# Patient Record
Sex: FEMALE | Race: WHITE | Employment: OTHER | URBAN - METROPOLITAN AREA
[De-identification: names, ages, dates, MRNs, and addresses within clinical notes are randomized per-mention and may not be internally consistent; named-entity substitution may affect disease eponyms.]

---

## 2017-03-02 LAB
CREATININE, EXTERNAL: 0.63
HBA1C MFR BLD HPLC: 10.1 %
LDL-C, EXTERNAL: 74

## 2017-04-21 ENCOUNTER — OFFICE VISIT (OUTPATIENT)
Dept: ENDOCRINOLOGY | Age: 77
End: 2017-04-21

## 2017-04-21 VITALS
BODY MASS INDEX: 36.25 KG/M2 | OXYGEN SATURATION: 95 % | HEIGHT: 61 IN | HEART RATE: 72 BPM | WEIGHT: 192 LBS | SYSTOLIC BLOOD PRESSURE: 121 MMHG | RESPIRATION RATE: 18 BRPM | TEMPERATURE: 97.6 F | DIASTOLIC BLOOD PRESSURE: 63 MMHG

## 2017-04-21 DIAGNOSIS — I10 ESSENTIAL HYPERTENSION: ICD-10-CM

## 2017-04-21 DIAGNOSIS — E11.9 TYPE 2 DIABETES MELLITUS WITHOUT COMPLICATION, UNSPECIFIED LONG TERM INSULIN USE STATUS: Primary | ICD-10-CM

## 2017-04-21 DIAGNOSIS — E11.9 TYPE 2 DIABETES MELLITUS WITHOUT COMPLICATION, UNSPECIFIED LONG TERM INSULIN USE STATUS: ICD-10-CM

## 2017-04-21 DIAGNOSIS — E78.2 MIXED HYPERLIPIDEMIA: ICD-10-CM

## 2017-04-21 RX ORDER — NATEGLINIDE 120 MG/1
120 TABLET ORAL
Qty: 90 TAB | Refills: 6 | Status: SHIPPED | OUTPATIENT
Start: 2017-04-21 | End: 2017-06-26 | Stop reason: ALTCHOICE

## 2017-04-21 RX ORDER — METFORMIN HYDROCHLORIDE 500 MG/1
500 TABLET, EXTENDED RELEASE ORAL
Qty: 30 TAB | Refills: 6 | Status: SHIPPED | OUTPATIENT
Start: 2017-04-21 | End: 2017-08-15 | Stop reason: SDUPTHER

## 2017-04-21 RX ORDER — INSULIN DETEMIR 100 [IU]/ML
46 INJECTION, SOLUTION SUBCUTANEOUS
COMMUNITY
Start: 2017-04-03 | End: 2017-04-21 | Stop reason: SDUPTHER

## 2017-04-21 RX ORDER — LOSARTAN POTASSIUM 25 MG/1
1 TABLET ORAL DAILY
COMMUNITY
Start: 2017-01-27 | End: 2022-04-25

## 2017-04-21 RX ORDER — ATORVASTATIN CALCIUM 10 MG/1
1 TABLET, FILM COATED ORAL DAILY
COMMUNITY
Start: 2017-02-10

## 2017-04-21 RX ORDER — METFORMIN HYDROCHLORIDE 500 MG/1
2 TABLET, EXTENDED RELEASE ORAL
COMMUNITY
Start: 2017-01-29 | End: 2017-04-21 | Stop reason: SDUPTHER

## 2017-04-21 RX ORDER — DULOXETIN HYDROCHLORIDE 30 MG/1
1 CAPSULE, DELAYED RELEASE ORAL DAILY
COMMUNITY
Start: 2017-04-01 | End: 2017-08-28 | Stop reason: DRUGHIGH

## 2017-04-21 NOTE — PATIENT INSTRUCTIONS
Do not skip meals  Do not eat in between meals    Reduce carbs- pasta, rice, potatoes, bread   Do not drink juices or sodas  Donot eat peanut butter     Do not eat sugar free cookies and cakes   Do not eat peaches,  Grapes, raisins, pineapples and oranges       -------------------------------------------------------------------------------------------------------------------------------------------------------------------------------------------------    Decrease metformin er  500 mg with dinner     Check blood sugars immediately before each meal and at bedtime      Take  Levemir  insulin  50  units  at bed time   And increase  To 60 units after  One week     Take  starlix  120 mg right before each meal        Less than 70 mg NO INSULIN

## 2017-04-21 NOTE — PROGRESS NOTES
Wt Readings from Last 3 Encounters:   04/21/17 192 lb (87.1 kg)   09/24/12 190 lb (86.2 kg)   07/25/11 200 lb (90.7 kg)     Temp Readings from Last 3 Encounters:   04/21/17 97.6 °F (36.4 °C) (Oral)     BP Readings from Last 3 Encounters:   04/21/17 121/63   09/24/12 127/53   07/25/11 123/77     Pulse Readings from Last 3 Encounters:   04/21/17 72   09/24/12 67   07/25/11 67     Lab Results   Component Value Date/Time    Hemoglobin A1c 7.0 05/04/2010 05:25 AM     No Podiatry  Last Eye exam 2017

## 2017-04-21 NOTE — MR AVS SNAPSHOT
Visit Information Date & Time Provider Department Dept. Phone Encounter #  
 4/21/2017  4:00 PM Nasreen Drew MD [facility] [phone] [id number] Follow-up Instructions Return in about 6 weeks (around 6/2/2017). Upcoming Health Maintenance Date Due DTaP/Tdap/Td series (1 - Tdap) 9/16/1961 ZOSTER VACCINE AGE 60> 9/16/2000 GLAUCOMA SCREENING Q2Y 9/16/2005 OSTEOPOROSIS SCREENING (DEXA) 9/16/2005 Pneumococcal 65+ Low/Medium Risk (1 of 2 - PCV13) 9/16/2005 MEDICARE YEARLY EXAM 9/16/2005 INFLUENZA AGE 9 TO ADULT 8/1/2016 Allergies as of 4/21/2017  Review Complete On: 4/21/2017 By: Nasreen Drew MD  
 No Known Allergies Current Immunizations  Never Reviewed No immunizations on file. Not reviewed this visit You Were Diagnosed With   
  
 Codes Comments Type 2 diabetes mellitus without complication, unspecified long term insulin use status    -  Primary ICD-10-CM: E11.9 ICD-9-CM: 250.00 Vitals BP Pulse Temp Resp Height(growth percentile) Weight(growth percentile) 121/63 72 97.6 °F (36.4 °C) (Oral) 18 5' 1\" (1.549 m) 192 lb (87.1 kg) SpO2 BMI OB Status Smoking Status 95% 36.28 kg/m2 Hysterectomy Former Smoker BMI and BSA Data Body Mass Index Body Surface Area  
 36.28 kg/m 2 1.94 m 2 Your Updated Medication List  
  
   
This list is accurate as of: 4/21/17  5:02 PM.  Always use your most recent med list.  
  
  
  
  
 atorvastatin 10 mg tablet Commonly known as:  LIPITOR Take 1 Tab by mouth daily. DULoxetine 30 mg capsule Commonly known as:  CYMBALTA Take 1 Cap by mouth daily. LEVEMIR FLEXTOUCH 100 unit/mL (3 mL) Inpn Generic drug:  insulin detemir 46 Units by SubCUTAneous route nightly. losartan 25 mg tablet Commonly known as:  COZAAR Take 1 Tab by mouth daily. metFORMIN  mg tablet Commonly known as:  GLUCOPHAGE XR Take 2 Tabs by mouth nightly. We Performed the Following AMB POC HEMOGLOBIN A1C [57502 CPT(R)] Follow-up Instructions Return in about 6 weeks (around 6/2/2017). Patient Instructions Do not skip meals Do not eat in between meals Reduce carbs- pasta, rice, potatoes, bread Do not drink juices or sodas Donot eat peanut butter Do not eat sugar free cookies and cakes Do not eat peaches,  Grapes, raisins, pineapples and oranges  
 
 
------------------------------------------------------------------------------------------------------------------------------------------------------------------------------------------------- 
 
Decrease metformin er  500 mg with dinner Check blood sugars immediately before each meal and at bedtime Take  Levemir  insulin  50  units  at bed time   And increase  To 60 units after  One week Take  starlix  120 mg right before each meal  
 
  
Less than 70 mg NO INSULIN Introducing [facility] SERVICES! Sara Galvan introduces [employer] patient portal. Now you can access parts of your medical record, email your doctor's office, and request medication refills online. 1. In your internet browser, go to [email or web address] 2. Click on the First Time User? Click Here link in the Sign In box. You will see the New Member Sign Up page. 3. Enter your [employer] Access Code exactly as it appears below. You will not need to use this code after youve completed the sign-up process. If you do not sign up before the expiration date, you must request a new code. · [employer] Access Code: [email or web address] Expires: 7/20/2017  5:02 PM 
 
4. Enter the last four digits of your Social Security Number (xxxx) and Date of Birth (mm/dd/yyyy) as indicated and click Submit. You will be taken to the next sign-up page. 5. Create a [employer] ID.  This will be your [employer] login ID and cannot be changed, so think of one that is secure and easy to remember. 6. Create a Plum Baby password. You can change your password at any time. 7. Enter your Password Reset Question and Answer. This can be used at a later time if you forget your password. 8. Enter your e-mail address. You will receive e-mail notification when new information is available in [address] 9. Click Sign Up. You can now view and download portions of your medical record. 10. Click the Download Summary menu link to download a portable copy of your medical information. If you have questions, please visit the Frequently Asked Questions section of the [employer] website. Remember, [employer] Baby is NOT to be used for urgent needs. For medical emergencies, dial 911. Now available from your iPhone and Android! Please provide this summary of care documentation to your next provider. Your primary care clinician is listed as Faith Regalado. If you have any questions after today's visit, please call [phone].

## 2017-04-21 NOTE — PROGRESS NOTES
HISTORY OF PRESENT ILLNESS  Dung Palencia is a 68 y.o. female. HPI  Patient here for initial visit of Type 2 diabetes mellitus . Referred : by daughter, my pt      H/o diabetes for 10 years     Current A1C is over 10 %  and symptoms/problems include polyuria and polydipsia     Current diabetic medications include Lantus And meformin ER     Current monitoring regimen: home blood tests - 1-2 times daily  Home blood sugar records: trend: fluctuating a lot  Any episodes of hypoglycemia? Weight trend: stable  Prior visit with dietician: no  Current diet: \"unhealthy\" diet in general  Current exercise: no regular exercise    Known diabetic complications: retinopathy, nephropathy and peripheral neuropathy  Cardiovascular risk factors: dyslipidemia, diabetes mellitus, obesity, sedentary life style, hypertension, stress, post-menopausal    Eye exam current (within one year): yes  JACOB: yes     Past Medical History:   Diagnosis Date    Arthritis     Cancer ([facility] [address] 2006    colon cancer    COPD     sleep apnea    [facility]     GERD (gastroesophageal reflux disease)     Other ill-defined conditions     fibromylgia    PUD (peptic ulcer disease)     Unspecified sleep apnea      Past Surgical History:   Procedure Laterality Date    ABDOMEN SURGERY PROC UNLISTED  2006    colectomy    HX APPENDECTOMY      HX BACK SURGERY      neck and back    HX HYSTERECTOMY      HX ORTHOPAEDIC  2010    right tkr    HX OTHER SURGICAL      tonsil    WRIST ARTHROSCOP,DIAGNOSTIC      bilateral carpal tunell     Current Outpatient Prescriptions   Medication Sig    atorvastatin (LIPITOR) 10 mg tablet Take 1 Tab by mouth daily.  DULoxetine (CYMBALTA) 30 mg capsule Take 1 Cap by mouth daily.  LEVEMIR FLEXTOUCH 100 unit/mL (3 mL) inpn 46 Units by SubCUTAneous route nightly.  losartan (COZAAR) 25 mg tablet Take 1 Tab by mouth daily.  metFORMIN ER (GLUCOPHAGE XR) 500 mg tablet Take 2 Tabs by mouth nightly.      No current facility-administered medications for this visit. Review of Systems   Constitutional: Negative. HENT: Negative. Eyes: Negative for pain and redness. Respiratory: Negative. Cardiovascular: Negative for chest pain, palpitations and leg swelling. Gastrointestinal: Negative. Negative for constipation. Genitourinary: Negative. Musculoskeletal: Negative for myalgias. Skin: Negative. Neurological: Negative. Endo/Heme/Allergies: Negative. Psychiatric/Behavioral: Negative for depression and memory loss. The patient does not have insomnia. Physical Exam   Constitutional: She is oriented to person, place, and time. She appears well-developed and well-nourished. HENT:   Head: Normocephalic. Eyes: Conjunctivae and EOM are normal. Pupils are equal, round, and reactive to light. Neck: Normal range of motion. Neck supple. No JVD present. No tracheal deviation present. No thyromegaly present. Cardiovascular: Normal rate, regular rhythm and normal heart sounds. No murmur heard. Pulmonary/Chest: Breath sounds normal.   Abdominal: Soft. Bowel sounds are normal.   Musculoskeletal: Normal range of motion. Lymphadenopathy:     She has no cervical adenopathy. Neurological: She is alert and oriented to person, place, and time. She has normal reflexes. Skin: Skin is warm. Psychiatric: She has a normal mood and affect. ASSESSMENT and PLAN    1. Type 2 DM, poorly controlled :   a1c is 10. 1 %     From feb 2017     Discussed DM 2 patho-physiology extensively with pt and her daughter     Asking her to avoid the high GI foods     Take  Levemir  insulin  50  units  at bed time   And increase  To 60 units after  One week   Take  starlix  120 mg right before each meal AS  A  TRIAL     If not better , will change to humalog with each meal     Patient is advised about checking blood sugars 2-3  times a day and maintaining log book.  The danger of having low blood sugars has been explained with inappropriate use of insulin  Patient voiced understanding and using the printed instructions at home. Hypoglycemia management has been explained to the patient. Low Carbohydrate diet  discussed with the patient Patient is demonstrated the use of Humalog pen. Explained the benefits associated with Pen use. lab results and schedule of future lab studies reviewed with patient  specific diabetic recommendations: diabetic diet discussed in detail, written exchange diet given, low cholesterol diet, weight control and daily exercise discussed and home glucose monitoring demonstrated and taught    2. Hypoglycemia :  Educated on treating the hypoglycemia. Discussed Glucagon use and prescribed    3. HTN : continue current care. Patient is educated about importance of compliance with anti-hypertensives especially ARB/ACEI    4. Dyslipidemia : continue current meds. Patient is educated about benefits and adverse effects of statins and explained how benefits outweigh risk. 5. use of aspirin to prevent MI and TIA's discussed      6.  Early alzeihmers dementia- daughter is helpful, but challenge is still the diet and checks and taking insulin       7. askign to try metformin er with FOOD and one pill only for GI distress prevention    > 50 % of time is spent on counseling

## 2017-06-12 DIAGNOSIS — E11.9 TYPE 2 DIABETES MELLITUS WITHOUT COMPLICATION, UNSPECIFIED LONG TERM INSULIN USE STATUS: ICD-10-CM

## 2017-06-26 ENCOUNTER — OFFICE VISIT (OUTPATIENT)
Dept: ENDOCRINOLOGY | Age: 77
End: 2017-06-26

## 2017-06-26 VITALS
TEMPERATURE: 97.9 F | OXYGEN SATURATION: 97 % | WEIGHT: 187.7 LBS | DIASTOLIC BLOOD PRESSURE: 60 MMHG | HEART RATE: 67 BPM | BODY MASS INDEX: 35.44 KG/M2 | RESPIRATION RATE: 16 BRPM | SYSTOLIC BLOOD PRESSURE: 137 MMHG | HEIGHT: 61 IN

## 2017-06-26 DIAGNOSIS — Z79.4 TYPE 2 DIABETES MELLITUS WITH HYPERGLYCEMIA, WITH LONG-TERM CURRENT USE OF INSULIN (HCC): Primary | ICD-10-CM

## 2017-06-26 DIAGNOSIS — E78.2 MIXED HYPERLIPIDEMIA: ICD-10-CM

## 2017-06-26 DIAGNOSIS — E11.65 TYPE 2 DIABETES MELLITUS WITH HYPERGLYCEMIA, WITH LONG-TERM CURRENT USE OF INSULIN (HCC): Primary | ICD-10-CM

## 2017-06-26 DIAGNOSIS — I10 ESSENTIAL HYPERTENSION: ICD-10-CM

## 2017-06-26 LAB
GLUCOSE POC: 171 MG/DL
HBA1C MFR BLD HPLC: 12.2 %

## 2017-06-26 RX ORDER — PEN NEEDLE, DIABETIC 30 GX3/16"
NEEDLE, DISPOSABLE MISCELLANEOUS
Qty: 200 PACKAGE | Refills: 6 | Status: SHIPPED | OUTPATIENT
Start: 2017-06-26 | End: 2021-09-17

## 2017-06-26 RX ORDER — INSULIN LISPRO 100 [IU]/ML
INJECTION, SOLUTION INTRAVENOUS; SUBCUTANEOUS
Qty: 15 ML | Refills: 6 | Status: SHIPPED | OUTPATIENT
Start: 2017-06-26 | End: 2017-06-26 | Stop reason: SDUPTHER

## 2017-06-26 RX ORDER — INSULIN LISPRO 100 [IU]/ML
INJECTION, SOLUTION INTRAVENOUS; SUBCUTANEOUS
Qty: 45 ML | Refills: 6 | Status: SHIPPED | OUTPATIENT
Start: 2017-06-26 | End: 2017-08-28 | Stop reason: SDUPTHER

## 2017-06-26 NOTE — MR AVS SNAPSHOT
Visit Information Date & Time Provider Department Dept. Phone Encounter #  
 6/26/2017 11:30 AM Celena Sanchez [facility] [phone] [id number] Follow-up Instructions Return in about 2 months (around 8/26/2017). Upcoming Health Maintenance Date Due  
 FOOT EXAM Q1 9/16/1950 MICROALBUMIN Q1 9/16/1950 EYE EXAM RETINAL OR DILATED Q1 9/16/1950 DTaP/Tdap/Td series (1 - Tdap) 9/16/1961 ZOSTER VACCINE AGE 60> 9/16/2000 GLAUCOMA SCREENING Q2Y 9/16/2005 OSTEOPOROSIS SCREENING (DEXA) 9/16/2005 Pneumococcal 65+ Low/Medium Risk (1 of 2 - PCV13) 9/16/2005 MEDICARE YEARLY EXAM 9/16/2005 INFLUENZA AGE 9 TO ADULT 8/1/2017 HEMOGLOBIN A1C Q6M 9/2/2017 LIPID PANEL Q1 3/2/2018 Allergies as of 6/26/2017  Review Complete On: 6/26/2017 By: Celena Sanchez MD  
 No Known Allergies Current Immunizations  Never Reviewed No immunizations on file. Not reviewed this visit You Were Diagnosed With   
  
 Codes Comments Type 2 diabetes mellitus with hyperglycemia, with long-term current use of insulin (HCC)    -  Primary ICD-10-CM: E11.65, Z79.4 ICD-9-CM: 250.00, 790.29, V58.67 Vitals BP Pulse Temp Resp Height(growth percentile) Weight(growth percentile)  
 137/60 (BP 1 Location: Right arm, BP Patient Position: Sitting) 67 97.9 °F (36.6 °C) (Oral) 16 5' 1\" (1.549 m) 187 lb 11.2 oz (85.1 kg) SpO2 BMI OB Status Smoking Status 97% 35.47 kg/m2 Hysterectomy Former Smoker BMI and BSA Data Body Mass Index Body Surface Area  
 35.47 kg/m 2 1.91 m 2 Preferred Pharmacy Pharmacy Name Phone [address] [facility], [facility] [address] ([address] [phone] Your Updated Medication List  
  
   
This list is accurate as of: 6/26/17 12:20 PM.  Always use your most recent med list.  
  
  
  
  
 atorvastatin 10 mg tablet Commonly known as: LIPITOR Take 1 Tab by mouth daily. DULoxetine 30 mg capsule Commonly known as:  CYMBALTA Take 1 Cap by mouth daily. insulin detemir 100 unit/mL (3 mL) Inpn Commonly known as:  LEVEMIR FLEXTOUCH  
60 Units by SubCUTAneous route nightly. Note the increase  
  
 losartan 25 mg tablet Commonly known as:  COZAAR Take 1 Tab by mouth daily. metFORMIN  mg tablet Commonly known as:  GLUCOPHAGE XR Take 1 Tab by mouth nightly. We Performed the Following AMB POC GLUCOSE, QUANTITATIVE, BLOOD [44125 CPT(R)] AMB POC HEMOGLOBIN A1C [71462 CPT(R)] Follow-up Instructions Return in about 2 months (around 8/26/2017). Patient Instructions Do not skip meals Do not eat in between meals Reduce carbs- pasta, rice, potatoes, bread Do not drink juices or sodas Donot eat peanut butter Do not eat sugar free cookies and cakes Do not eat peaches,  Grapes, raisins, pineapples and oranges  
 
 
------------------------------------------------------------------------------------------------------------------------------------------------------------------------------------------------- 
 
Decrease metformin er  500 mg with dinner Check blood sugars immediately before each meal and at bedtime Take  Levemir  insulin  60 units at home Start on Novolog  6 units before each meal  
Stop starlix Take additional Novolog  as folllows with meals: 
 
150-200 mg 2 units 201-250 mg 4 units 251-300 mg 6 units 301-350 mg 8 units 351-400 mg 10  units 401-450 mg 12 units 451-500 mg 14 units Introducing [facility] SERVICES! [employer] introduces [employer] patient portal. Now you can access parts of your medical record, email your doctor's office, and request medication refills online. 1. In your internet browser, go to [email or web address] 2. Click on the First Time User?  Click Here link in the Sign In box. You will see the New Member Sign Up page. 3. Enter your [employer] Access Code exactly as it appears below. You will not need to use this code after youve completed the sign-up process. If you do not sign up before the expiration date, you must request a new code. · [employer] Access Code: [email or web address] Expires: 7/20/2017  5:02 PM 
 
4. Enter the last four digits of your Social Security Number (xxxx) and Date of Birth (mm/dd/yyyy) as indicated and click Submit. You will be taken to the next sign-up page. 5. Create a [employer] ID. This will be your [employer] login ID and cannot be changed, so think of one that is secure and easy to remember. 6. Create a [employer] password. You can change your password at any time. 7. Enter your Password Reset Question and Answer. This can be used at a later time if you forget your password. 8. Enter your e-mail address. You will receive e-mail notification when new information is available in [address] 9. Click Sign Up. You can now view and download portions of your medical record. 10. Click the Download Summary menu link to download a portable copy of your medical information. If you have questions, please visit the Frequently Asked Questions section of the [employer] website. Remember, [employer] is NOT to be used for urgent needs. For medical emergencies, dial 911. Now available from your iPhone and Android! Please provide this summary of care documentation to your next provider. Your primary care clinician is listed as Moise Hudson. If you have any questions after today's visit, please call [phone].

## 2017-06-26 NOTE — PROGRESS NOTES
Wt Readings from Last 3 Encounters:   06/26/17 187 lb 11.2 oz (85.1 kg)   04/21/17 192 lb (87.1 kg)   09/24/12 190 lb (86.2 kg)     Temp Readings from Last 3 Encounters:   06/26/17 97.9 °F (36.6 °C) (Oral)   04/21/17 97.6 °F (36.4 °C) (Oral)     BP Readings from Last 3 Encounters:   06/26/17 137/60   04/21/17 121/63   09/24/12 127/53     Pulse Readings from Last 3 Encounters:   06/26/17 67   04/21/17 72   09/24/12 67   Foot exam:No  Eye exam:No

## 2017-06-26 NOTE — PATIENT INSTRUCTIONS
Do not skip meals  Do not eat in between meals    Reduce carbs- pasta, rice, potatoes, bread   Do not drink juices or sodas  Donot eat peanut butter     Do not eat sugar free cookies and cakes   Do not eat peaches,  Grapes, raisins, pineapples and oranges       -------------------------------------------------------------------------------------------------------------------------------------------------------------------------------------------------    Decrease metformin er  500 mg with dinner     Check blood sugars immediately before each meal and at bedtime      Take  Levemir  insulin  60 units at home     Start on Novolog  6 units before each meal   Stop starlix     Take additional Novolog  as folllows with meals:    150-200 mg 2 units    201-250 mg 4 units    251-300 mg 6 units    301-350 mg 8 units    351-400 mg 10  units    401-450 mg 12 units    451-500 mg 14 units

## 2017-06-26 NOTE — PROGRESS NOTES
HISTORY OF PRESENT ILLNESS  Fabienne Aguilar is a 68 y.o. female. HPI  Patient here for  First f/u after  initial visit of Type 2 diabetes mellitus   From April 2017       and daughter are present in room    Pt provides no history   Blood sugars are high on log     Minimal checks    appears very frustrated about the whole management of diabetes     He is surprised that no one mentioned about starlix           Prior history    Referred : by daughter, my pt      H/o diabetes for 10 years     Current A1C is over 10 %  and symptoms/problems include polyuria and polydipsia     Current diabetic medications include Lantus And meformin ER     Current monitoring regimen: home blood tests - 1-2 times daily  Home blood sugar records: trend: fluctuating a lot  Any episodes of hypoglycemia?      Weight trend: stable  Prior visit with dietician: no  Current diet: \"unhealthy\" diet in general  Current exercise: no regular exercise    Known diabetic complications: retinopathy, nephropathy and peripheral neuropathy  Cardiovascular risk factors: dyslipidemia, diabetes mellitus, obesity, sedentary life style, hypertension, stress, post-menopausal    Eye exam current (within one year): yes  JACOB: yes     Past Medical History:   Diagnosis Date    Arthritis     [facility] 2006    colon cancer    COPD     sleep apnea    [facility]     GERD (gastroesophageal reflux disease)     Other ill-defined conditions     fibromylgia    PUD (peptic ulcer disease)     Unspecified sleep apnea      Past Surgical History:   Procedure Laterality Date    ABDOMEN SURGERY PROC UNLISTED  2006    colectomy    HX APPENDECTOMY      HX BACK SURGERY      neck and back    HX HYSTERECTOMY      HX ORTHOPAEDIC  2010    right tkr    HX OTHER SURGICAL      tonsil    WRIST ARTHROSCOP,DIAGNOSTIC      bilateral carpal tunell     Current Outpatient Prescriptions   Medication Sig    insulin detemir (LEVEMIR FLEXTOUCH) 100 unit/mL (3 mL) inpn 60 Units by SubCUTAneous route nightly. Note the increase    atorvastatin (LIPITOR) 10 mg tablet Take 1 Tab by mouth daily.  DULoxetine (CYMBALTA) 30 mg capsule Take 1 Cap by mouth daily.  metFORMIN ER (GLUCOPHAGE XR) 500 mg tablet Take 1 Tab by mouth nightly.  losartan (COZAAR) 25 mg tablet Take 1 Tab by mouth daily.  nateglinide (STARLIX) 120 mg tablet Take 1 Tab by mouth Before breakfast, lunch, and dinner. No current facility-administered medications for this visit. Review of Systems   Constitutional: Negative. HENT: Negative. Eyes: Negative for pain and redness. Respiratory: Negative. Cardiovascular: Negative for chest pain, palpitations and leg swelling. Gastrointestinal: Negative. Negative for constipation. Genitourinary: Negative. Musculoskeletal: Negative for myalgias. Skin: Negative. Neurological: Negative. Endo/Heme/Allergies: Negative. Psychiatric/Behavioral: Negative for depression and memory loss. The patient does not have insomnia. Physical Exam   Constitutional: She is oriented to person, place, and time. She appears well-developed and well-nourished. HENT:   Head: Normocephalic. Eyes: Conjunctivae and EOM are normal. Pupils are equal, round, and reactive to light. Neck: Normal range of motion. Neck supple. No JVD present. No tracheal deviation present. No thyromegaly present. Cardiovascular: Normal rate, regular rhythm and normal heart sounds. No murmur heard. Pulmonary/Chest: Breath sounds normal.   Abdominal: Soft. Bowel sounds are normal.   Musculoskeletal: Normal range of motion. Lymphadenopathy:     She has no cervical adenopathy. Neurological: She is alert and oriented to person, place, and time. She has normal reflexes. Skin: Skin is warm. Psychiatric: She has a normal mood and affect. ASSESSMENT and PLAN    1.  Type 2 DM, poorly controlled :   a1c is  12 .2  %    From  June 2017     comapred to 10. 1 %     From feb 2017     Discussed DM 2 patho-physiology extensively with  and her daughter   Asked her to avoid the high GI foods     Take  Levemir  insulin   60 units hs  Will do humalog with each meal as sugars are very high     Patient is advised about checking blood sugars 2-3  times a day and maintaining log book. The danger of having low blood sugars has been explained with inappropriate use of insulin  Patient voiced understanding and using the printed instructions at home. Hypoglycemia management has been explained to the patient. Low Carbohydrate diet  discussed with the patient Patient is demonstrated the use of Humalog pen. Explained the benefits associated with Pen use. 2. Hypoglycemia :  Educated on treating the hypoglycemia. Discussed Glucagon use and prescribed    3. HTN : continue current care. Patient is educated about importance of compliance with anti-hypertensives especially ARB/ACEI    4. Dyslipidemia : continue current meds. Patient is educated about benefits and adverse effects of statins and explained how benefits outweigh risk. 5. use of aspirin to prevent MI and TIA's discussed      6.  Early alzeihmers dementia- daughter is helpful, but challenge is still the diet and checks and taking insulin       7. askign to try metformin er with FOOD and one pill only for GI distress prevention    > 50 % of time is spent on counseling

## 2017-06-27 RX ORDER — BLOOD SUGAR DIAGNOSTIC
STRIP MISCELLANEOUS
Qty: 300 STRIP | Refills: 6 | Status: SHIPPED | OUTPATIENT
Start: 2017-06-27 | End: 2021-09-17

## 2017-08-14 ENCOUNTER — TELEPHONE (OUTPATIENT)
Dept: ENDOCRINOLOGY | Age: 77
End: 2017-08-14

## 2017-08-15 DIAGNOSIS — E11.9 TYPE 2 DIABETES MELLITUS WITHOUT COMPLICATION, UNSPECIFIED LONG TERM INSULIN USE STATUS: ICD-10-CM

## 2017-08-15 RX ORDER — METFORMIN HYDROCHLORIDE 500 MG/1
500 TABLET, EXTENDED RELEASE ORAL
Qty: 30 TAB | Refills: 6 | Status: SHIPPED | OUTPATIENT
Start: 2017-08-15 | End: 2017-08-15 | Stop reason: SDUPTHER

## 2017-08-15 RX ORDER — METFORMIN HYDROCHLORIDE 500 MG/1
TABLET, EXTENDED RELEASE ORAL
Qty: 90 TAB | Refills: 6 | Status: SHIPPED | OUTPATIENT
Start: 2017-08-15 | End: 2018-09-10 | Stop reason: ALTCHOICE

## 2017-08-21 ENCOUNTER — HOSPITAL ENCOUNTER (OUTPATIENT)
Dept: LAB | Age: 77
Discharge: HOME | End: 2017-08-21
Payer: MEDICARE

## 2017-08-21 PROCEDURE — 83036 HEMOGLOBIN GLYCOSYLATED A1C: CPT

## 2017-08-21 PROCEDURE — 82043 UR ALBUMIN QUANTITATIVE: CPT

## 2017-08-21 PROCEDURE — 80061 LIPID PANEL: CPT

## 2017-08-21 PROCEDURE — 80053 COMPREHEN METABOLIC PANEL: CPT

## 2017-08-21 PROCEDURE — 36415 COLL VENOUS BLD VENIPUNCTURE: CPT

## 2017-08-28 ENCOUNTER — OFFICE VISIT (OUTPATIENT)
Dept: ENDOCRINOLOGY | Age: 77
End: 2017-08-28

## 2017-08-28 VITALS
HEART RATE: 70 BPM | DIASTOLIC BLOOD PRESSURE: 59 MMHG | BODY MASS INDEX: 35.95 KG/M2 | TEMPERATURE: 95.8 F | WEIGHT: 190.4 LBS | RESPIRATION RATE: 16 BRPM | HEIGHT: 61 IN | SYSTOLIC BLOOD PRESSURE: 133 MMHG | OXYGEN SATURATION: 95 %

## 2017-08-28 DIAGNOSIS — I10 ESSENTIAL HYPERTENSION: ICD-10-CM

## 2017-08-28 DIAGNOSIS — Z79.4 UNCONTROLLED TYPE 2 DIABETES MELLITUS WITH HYPERGLYCEMIA, WITH LONG-TERM CURRENT USE OF INSULIN (HCC): Primary | ICD-10-CM

## 2017-08-28 DIAGNOSIS — E78.2 MIXED HYPERLIPIDEMIA: ICD-10-CM

## 2017-08-28 DIAGNOSIS — E11.65 UNCONTROLLED TYPE 2 DIABETES MELLITUS WITH HYPERGLYCEMIA, WITH LONG-TERM CURRENT USE OF INSULIN (HCC): Primary | ICD-10-CM

## 2017-08-28 DIAGNOSIS — G30.9 ALZHEIMER'S DEMENTIA WITHOUT BEHAVIORAL DISTURBANCE, UNSPECIFIED TIMING OF DEMENTIA ONSET: ICD-10-CM

## 2017-08-28 DIAGNOSIS — F02.80 ALZHEIMER'S DEMENTIA WITHOUT BEHAVIORAL DISTURBANCE, UNSPECIFIED TIMING OF DEMENTIA ONSET: ICD-10-CM

## 2017-08-28 RX ORDER — INSULIN LISPRO 100 [IU]/ML
INJECTION, SOLUTION INTRAVENOUS; SUBCUTANEOUS
Qty: 45 ML | Refills: 6 | Status: SHIPPED | OUTPATIENT
Start: 2017-08-28 | End: 2017-08-28 | Stop reason: SDUPTHER

## 2017-08-28 RX ORDER — INSULIN LISPRO 100 [IU]/ML
INJECTION, SOLUTION INTRAVENOUS; SUBCUTANEOUS
Qty: 75 ML | Refills: 6 | Status: SHIPPED | OUTPATIENT
Start: 2017-08-28 | End: 2018-09-10 | Stop reason: SDUPTHER

## 2017-08-28 RX ORDER — DULOXETIN HYDROCHLORIDE 60 MG/1
CAPSULE, DELAYED RELEASE ORAL
Qty: 90 CAP | Refills: 3 | Status: SHIPPED | OUTPATIENT
Start: 2017-08-28 | End: 2018-09-01 | Stop reason: SDUPTHER

## 2017-08-28 RX ORDER — DULOXETIN HYDROCHLORIDE 60 MG/1
60 CAPSULE, DELAYED RELEASE ORAL DAILY
Qty: 30 CAP | Refills: 3 | Status: SHIPPED | OUTPATIENT
Start: 2017-08-28 | End: 2017-08-28 | Stop reason: SDUPTHER

## 2017-08-28 NOTE — MR AVS SNAPSHOT
Visit Information Date & Time Provider Department Dept. Phone Encounter #  
 8/28/2017 11:00 AM Libra Mazariegos MD [facility] [phone] [id number] Follow-up Instructions Return in about 3 months (around 11/28/2017). Upcoming Health Maintenance Date Due  
 FOOT EXAM Q1 9/16/1950 EYE EXAM RETINAL OR DILATED Q1 9/16/1950 DTaP/Tdap/Td series (1 - Tdap) 9/16/1961 ZOSTER VACCINE AGE 60> 7/16/2000 GLAUCOMA SCREENING Q2Y 9/16/2005 OSTEOPOROSIS SCREENING (DEXA) 9/16/2005 Pneumococcal 65+ Low/Medium Risk (1 of 2 - PCV13) 9/16/2005 MEDICARE YEARLY EXAM 9/16/2005 INFLUENZA AGE 9 TO ADULT 8/1/2017 HEMOGLOBIN A1C Q6M 2/21/2018 MICROALBUMIN Q1 8/21/2018 LIPID PANEL Q1 8/21/2018 Allergies as of 8/28/2017  Review Complete On: 8/28/2017 By: Libra Mazariegos MD  
 No Known Allergies Current Immunizations  Never Reviewed No immunizations on file. Not reviewed this visit You Were Diagnosed With   
  
 Codes Comments Uncontrolled type 2 diabetes mellitus with hyperglycemia, with long-term current use of insulin (HCC)    -  Primary ICD-10-CM: E11.65, Z79.4 ICD-9-CM: 250.02, V58.67 Essential hypertension     ICD-10-CM: I10 
ICD-9-CM: 401.9 Mixed hyperlipidemia     ICD-10-CM: E78.2 ICD-9-CM: 272.2 Vitals BP Pulse Temp Resp Height(growth percentile) Weight(growth percentile) 133/59 (BP 1 Location: Left arm, BP Patient Position: Sitting) 70 95.8 °F (35.4 °C) (Oral) 16 5' 1\" (1.549 m) 190 lb 6.4 oz (86.4 kg) SpO2 BMI OB Status Smoking Status 95% 35.98 kg/m2 Hysterectomy Former Smoker Vitals History BMI and BSA Data Body Mass Index Body Surface Area 35.98 kg/m 2 1.93 m 2 Preferred Pharmacy Pharmacy Name Phone 310 [facility] [address] ([phone] Your Updated Medication List  
  
 This list is accurate as of: 8/28/17 12:12 PM.  Always use your most recent med list.  
  
  
  
  
 atorvastatin 10 mg tablet Commonly known as:  LIPITOR Take 1 Tab by mouth daily. DULoxetine 30 mg capsule Commonly known as:  CYMBALTA Take 1 Cap by mouth daily. HumaLOG KwikPen 100 unit/mL kwikpen Generic drug:  insulin lispro INJECT 6 UNITS SUBCUTANEOUSLY BEFORE BREAKFAST LUNCH AND DINNER PLUS SLIDING SCALE  
  
 insulin detemir 100 unit/mL (3 mL) Inpn Commonly known as:  LEVEMIR FLEXTOUCH  
60 Units by SubCUTAneous route nightly. Note the increase Insulin Needles (Disposable) 31 gauge x 5/16\" Ndle Commonly known as:  ULTRA-THIN II (SHORT) PEN NDL Use  4 times daily. Dx code E11.65  
  
 losartan 25 mg tablet Commonly known as:  COZAAR Take 1 Tab by mouth daily. metFORMIN  mg tablet Commonly known as:  GLUCOPHAGE XR  
TAKE 1 TABLET BY MOUTH EVERY NIGHT  
  
 ONETOUCH ULTRA TEST strip Generic drug:  glucose blood VI test strips USE TO TEST BLOOD SUGAR FOUR TIMES DAILY Follow-up Instructions Return in about 3 months (around 11/28/2017). Patient Instructions Do not skip meals Do not eat in between meals Reduce carbs- pasta, rice, potatoes, bread Do not drink juices or sodas Donot eat peanut butter Do not eat sugar free cookies and cakes Do not eat peaches,  Grapes, raisins, pineapples and oranges  
 
 
------------------------------------------------------------------------------------------------------------------------------------------------------------------------------------------------- Increase cymbalta to 60 mg a day ( 3 refills and future goes to pcp ) Decrease metformin er  500 mg with dinner Check blood sugars immediately before each meal and at bedtime Take  Levemir  insulin  60 units at home Increase  Novolog 8  units before each meal  
Stop starlix Take additional Novolog  as folllows with meals: 
 
150-200 mg 2 units 201-250 mg 5 units 251-300 mg 8 units 301-350 mg 11 units 351-400 mg 14  units 401-450 mg 17 units 451-500 mg 20  Units Less than 70 mg - no insulin Introducing [facility]! Ester Everette introduces NuLabel patient portal. Now you can access parts of your medical record, email your doctor's office, and request medication refills online. 1. In your internet browser, go to [email or web address] 2. Click on the First Time User? Click Here link in the Sign In box. You will see the New Member Sign Up page. 3. Enter your NuLabel Access Code exactly as it appears below. You will not need to use this code after youve completed the sign-up process. If you do not sign up before the expiration date, you must request a new code. · NuLabel Access Code: [email or web address] Expires: 11/26/2017 12:12 PM 
 
4. Enter the last four digits of your Social Security Number (xxxx) and Date of Birth (mm/dd/yyyy) as indicated and click Submit. You will be taken to the next sign-up page. 5. Create a NuLabel ID. This will be your NuLabel login ID and cannot be changed, so think of one that is secure and easy to remember. 6. Create a NuLabel password. You can change your password at any time. 7. Enter your Password Reset Question and Answer. This can be used at a later time if you forget your password. 8. Enter your e-mail address. You will receive e-mail notification when new information is available in [address] 9. Click Sign Up. You can now view and download portions of your medical record. 10. Click the Download Summary menu link to download a portable copy of your medical information. If you have questions, please visit the Frequently Asked Questions section of the NuLabel website. Remember, NuLabel is NOT to be used for urgent needs. For medical emergencies, dial 911. Now available from your iPhone and Android! Please provide this summary of care documentation to your next provider. Your primary care clinician is listed as Reta Mckee. If you have any questions after today's visit, please call [phone].

## 2017-08-28 NOTE — PATIENT INSTRUCTIONS
Do not skip meals  Do not eat in between meals    Reduce carbs- pasta, rice, potatoes, bread   Do not drink juices or sodas  Donot eat peanut butter     Do not eat sugar free cookies and cakes   Do not eat peaches,  Grapes, raisins, pineapples and oranges       -------------------------------------------------------------------------------------------------------------------------------------------------------------------------------------------------      Increase cymbalta to 60 mg a day ( 3 refills and future goes to pcp )    Decrease metformin er  500 mg with dinner     Check blood sugars immediately before each meal and at bedtime      Take  Levemir  insulin  60 units at home     Increase  Novolog 8  units before each meal   Stop starlix     Take additional Novolog  as folllows with meals:    150-200 mg 2 units    201-250 mg 5 units    251-300 mg 8 units    301-350 mg 11 units    351-400 mg 14  units    401-450 mg 17 units    451-500 mg 20  Units      Less than 70 mg - no insulin

## 2017-08-28 NOTE — PROGRESS NOTES
HISTORY OF PRESENT ILLNESS  Christopher Rangel is a 68 y.o. female. HPI  Patient here for  First f/u after  initial visit of Type 2 diabetes mellitus   From April 2017       daughter is present in room    Pt provides no history   Blood sugars are high on log     Minimal checks   Pt is demented             Prior history    Referred : by daughter, my pt      H/o diabetes for 10 years     Current A1C is over 10 %  and symptoms/problems include polyuria and polydipsia     Current diabetic medications include Lantus And meformin ER     Current monitoring regimen: home blood tests - 1-2 times daily  Home blood sugar records: trend: fluctuating a lot  Any episodes of hypoglycemia?      Weight trend: stable  Prior visit with dietician: no  Current diet: \"unhealthy\" diet in general  Current exercise: no regular exercise    Known diabetic complications: retinopathy, nephropathy and peripheral neuropathy  Cardiovascular risk factors: dyslipidemia, diabetes mellitus, obesity, sedentary life style, hypertension, stress, post-menopausal    Eye exam current (within one year): yes  JACOB: yes     Past Medical History:   Diagnosis Date    Arthritis     [facility] ([facility] 2006    colon cancer    COPD     sleep apnea    [facility] ([facility]     GERD (gastroesophageal reflux disease)     Other ill-defined conditions     fibromylgia    PUD (peptic ulcer disease)     Unspecified sleep apnea      Past Surgical History:   Procedure Laterality Date    ABDOMEN SURGERY PROC UNLISTED  2006    colectomy    HX APPENDECTOMY      HX BACK SURGERY      neck and back    HX HYSTERECTOMY      HX ORTHOPAEDIC  2010    right tkr    HX OTHER SURGICAL      tonsil    WRIST ARTHROSCOP,DIAGNOSTIC      bilateral carpal tunell     Current Outpatient Prescriptions   Medication Sig    metFORMIN ER (GLUCOPHAGE XR) 500 mg tablet TAKE 1 TABLET BY MOUTH EVERY NIGHT    ONETOUCH ULTRA TEST strip USE TO TEST BLOOD SUGAR FOUR TIMES DAILY    Insulin Needles, Disposable, (ULTRA-THIN II, SHORT, PEN NDL) 31 gauge x 5/16\" ndle Use  4 times daily. Dx code E11.65    HUMALOG KWIKPEN 100 unit/mL kwikpen INJECT 6 UNITS SUBCUTANEOUSLY BEFORE BREAKFAST LUNCH AND DINNER PLUS SLIDING SCALE    insulin detemir (LEVEMIR FLEXTOUCH) 100 unit/mL (3 mL) inpn 60 Units by SubCUTAneous route nightly. Note the increase    atorvastatin (LIPITOR) 10 mg tablet Take 1 Tab by mouth daily.  DULoxetine (CYMBALTA) 30 mg capsule Take 1 Cap by mouth daily.  losartan (COZAAR) 25 mg tablet Take 1 Tab by mouth daily. No current facility-administered medications for this visit. Review of Systems   Constitutional: Negative. HENT: Negative. Eyes: Negative for pain and redness. Respiratory: Negative. Cardiovascular: Negative for chest pain, palpitations and leg swelling. Gastrointestinal: Negative. Negative for constipation. Genitourinary: Negative. Musculoskeletal: Negative for myalgias. Skin: Negative. Neurological: Negative. Endo/Heme/Allergies: Negative. Psychiatric/Behavioral: pt is demented       Physical Exam   Constitutional: She is oriented to person, place, and time. She appears well-developed and well-nourished. HENT:   Head: Normocephalic. Eyes: Conjunctivae and EOM are normal. Pupils are equal, round, and reactive to light. Neck: Normal range of motion. Neck supple. No JVD present. No tracheal deviation present. No thyromegaly present. Cardiovascular: Normal rate, regular rhythm and normal heart sounds. No murmur heard. Pulmonary/Chest: Breath sounds normal.   Abdominal: Soft. Bowel sounds are normal.   Musculoskeletal: Normal range of motion. Lymphadenopathy:     She has no cervical adenopathy. Neurological: She is alert and oriented to person, place, and time. She has normal reflexes. Skin: Skin is warm. Psychiatric: She has a normal mood and affect.          Lab Results  Component Value Date/Time   Hemoglobin A1c 11.9 08/21/2017 03:12 PM Hemoglobin A1c 7.0 05/04/2010 05:25 AM   Hemoglobin A1c, External 10.1 03/02/2017   Glucose 307 08/21/2017 03:12 PM   Glucose (POC) 185 05/04/2010 11:44 AM   Glucose  06/26/2017 11:45 AM   Microalb/Creat ratio (ug/mg creat.) 31.3 08/21/2017 03:12 PM   LDL, calculated Comment 08/21/2017 03:12 PM   Creatinine 0.60 08/21/2017 03:12 PM      Lab Results  Component Value Date/Time   Cholesterol, total 257 08/21/2017 03:12 PM   HDL Cholesterol 35 08/21/2017 03:12 PM   LDL, calculated Comment 08/21/2017 03:12 PM   LDL-C, External 74 03/02/2017   Triglyceride 406 08/21/2017 03:12 PM   Lab Results  Component Value Date/Time   ALT (SGPT) 19 08/21/2017 03:12 PM   AST (SGOT) 21 08/21/2017 03:12 PM   Alk. phosphatase 97 08/21/2017 03:12 PM   Bilirubin, total <0.2 08/21/2017 03:12 PM   Albumin 4.2 08/21/2017 03:12 PM   Protein, total 7.2 08/21/2017 03:12 PM   INR 1.2 05/05/2010 03:57 AM   Prothrombin time 12.4 05/05/2010 03:57 AM   PLATELET 577 89/19/1889 03:57 AM       Lab Results  Component Value Date/Time   GFR est non-AA 89 08/21/2017 03:12 PM   GFR est  08/21/2017 03:12 PM   Creatinine 0.60 08/21/2017 03:12 PM   BUN 12 08/21/2017 03:12 PM   Sodium 136 08/21/2017 03:12 PM   Potassium 4.6 08/21/2017 03:12 PM   Chloride 96 08/21/2017 03:12 PM   CO2 23 08/21/2017 03:12 PM       ASSESSMENT and PLAN    1. Type 2 DM, poorly controlled :   a1c is  11.9 %      From       Aug 2017    compared  To   12 .2  %    From  June 2017     comapred to    10. 1 %     From feb 2017     Take  Levemir  insulin   60 units hs   humalog with each meal has to be taken as sugars are very high     Controlling her diet has become a big challenge per daughter   Pt has alzeihmer's dementia     Patient is advised about checking blood sugars 2-3  times a day and maintaining log book. The danger of having low blood sugars has been explained with inappropriate use of insulin  Patient voiced understanding and using the printed instructions at home. Hypoglycemia management has been explained to the patient. Low Carbohydrate diet  discussed with the patient Patient is demonstrated the use of Humalog pen. Explained the benefits associated with Pen use. 2. Hypoglycemia :  Educated on treating the hypoglycemia. Discussed Glucagon use and prescribed    3. HTN : continue current care. Patient is educated about importance of compliance with anti-hypertensives especially ARB/ACEI    4. Dyslipidemia : continue current meds. Patient is educated about benefits and adverse effects of statins and explained how benefits outweigh risk. 5. use of aspirin to prevent MI and TIA's discussed      6.  Early alzeihmers dementia- daughter is helpful, but challenge is still the diet and checks and taking insulin       7. askign to try metformin er with FOOD and one pill only for GI distress prevention    > 50 % of time is spent on counseling

## 2017-08-28 NOTE — PROGRESS NOTES
Eye exam: within last year  Foot exam: none    Lab Results   Component Value Date/Time    Hemoglobin A1c 11.9 08/21/2017 03:12 PM    Hemoglobin A1c (POC) 12.2 06/26/2017 11:48 AM    Hemoglobin A1c, External 10.1 03/02/2017     Wt Readings from Last 3 Encounters:   08/28/17 190 lb 6.4 oz (86.4 kg)   06/26/17 187 lb 11.2 oz (85.1 kg)   04/21/17 192 lb (87.1 kg)     Temp Readings from Last 3 Encounters:   08/28/17 95.8 °F (35.4 °C) (Oral)   06/26/17 97.9 °F (36.6 °C) (Oral)   04/21/17 97.6 °F (36.4 °C) (Oral)     BP Readings from Last 3 Encounters:   08/28/17 133/59   06/26/17 137/60   04/21/17 121/63     Pulse Readings from Last 3 Encounters:   08/28/17 70   06/26/17 67   04/21/17 72

## 2018-01-08 ENCOUNTER — OFFICE VISIT (OUTPATIENT)
Dept: ENDOCRINOLOGY | Age: 78
End: 2018-01-08

## 2018-01-08 VITALS
BODY MASS INDEX: 34.38 KG/M2 | SYSTOLIC BLOOD PRESSURE: 143 MMHG | OXYGEN SATURATION: 97 % | HEART RATE: 85 BPM | DIASTOLIC BLOOD PRESSURE: 67 MMHG | RESPIRATION RATE: 16 BRPM | WEIGHT: 182.1 LBS | HEIGHT: 61 IN | TEMPERATURE: 95.6 F

## 2018-01-08 DIAGNOSIS — Z79.4 UNCONTROLLED TYPE 2 DIABETES MELLITUS WITH HYPERGLYCEMIA, WITH LONG-TERM CURRENT USE OF INSULIN (HCC): Primary | ICD-10-CM

## 2018-01-08 DIAGNOSIS — E11.65 UNCONTROLLED TYPE 2 DIABETES MELLITUS WITH HYPERGLYCEMIA, WITH LONG-TERM CURRENT USE OF INSULIN (HCC): Primary | ICD-10-CM

## 2018-01-08 DIAGNOSIS — G30.9 ALZHEIMER'S DEMENTIA WITHOUT BEHAVIORAL DISTURBANCE, UNSPECIFIED TIMING OF DEMENTIA ONSET: ICD-10-CM

## 2018-01-08 DIAGNOSIS — E78.2 MIXED HYPERLIPIDEMIA: ICD-10-CM

## 2018-01-08 DIAGNOSIS — I10 ESSENTIAL HYPERTENSION: ICD-10-CM

## 2018-01-08 DIAGNOSIS — F02.80 ALZHEIMER'S DEMENTIA WITHOUT BEHAVIORAL DISTURBANCE, UNSPECIFIED TIMING OF DEMENTIA ONSET: ICD-10-CM

## 2018-01-08 PROBLEM — E11.21 TYPE 2 DIABETES MELLITUS WITH NEPHROPATHY (HCC): Status: ACTIVE | Noted: 2018-01-08

## 2018-01-08 LAB
GLUCOSE POC: 179 MG/DL
HBA1C MFR BLD HPLC: 11.7 %

## 2018-01-08 NOTE — PROGRESS NOTES
HISTORY OF PRESENT ILLNESS  Madelyn Love is a 68 y.o. female. HPI  Patient here for  Last visit  after  Last  visit of Type 2 diabetes mellitus   From August 2017      daughter is present in room    Pt provides no history ( h/o dementia )  Blood sugars are high on log , over 400   She lives at home with her      Minimal checks only in the past 3-4 days  None before that       Pt was given 60 units bid of levemir by daughter, to bring the sugars down        Prior history    Referred : by daughter, my pt      H/o diabetes for 10 years     Current A1C is over 10 %  and symptoms/problems include polyuria and polydipsia     Current diabetic medications include Lantus And meformin ER     Current monitoring regimen: home blood tests - 1-2 times daily  Home blood sugar records: trend: fluctuating a lot  Any episodes of hypoglycemia?      Weight trend: stable  Prior visit with dietician: no  Current diet: \"unhealthy\" diet in general  Current exercise: no regular exercise    Known diabetic complications: retinopathy, nephropathy and peripheral neuropathy  Cardiovascular risk factors: dyslipidemia, diabetes mellitus, obesity, sedentary life style, hypertension, stress, post-menopausal    Eye exam current (within one year): yes  JACOB: yes     Past Medical History:   Diagnosis Date    Arthritis     Cancer ([facility] 2006    colon cancer    COPD     sleep apnea    Diabetes ([facility]     GERD (gastroesophageal reflux disease)     Other ill-defined conditions(799.89)     fibromylgia    PUD (peptic ulcer disease)     Unspecified sleep apnea      Past Surgical History:   Procedure Laterality Date    ABDOMEN SURGERY PROC UNLISTED  2006    colectomy    HX APPENDECTOMY      HX BACK SURGERY      neck and back    HX HYSTERECTOMY      HX ORTHOPAEDIC  2010    right tkr    HX OTHER SURGICAL      tonsil    WRIST ARTHROSCOP,DIAGNOSTIC      bilateral carpal tunell     Current Outpatient Prescriptions   Medication Sig    DULoxetine (CYMBALTA) 60 mg capsule TAKE ONE CAPSULE BY MOUTH DAILY    HUMALOG KWIKPEN 100 unit/mL kwikpen INJECT 8 UNITS BEFORE EACH MEAL. PLUS SLIDING SCALE. MAXIMUM DAILY DOSE IS 84 UNITS    metFORMIN ER (GLUCOPHAGE XR) 500 mg tablet TAKE 1 TABLET BY MOUTH EVERY NIGHT    ONETOUCH ULTRA TEST strip USE TO TEST BLOOD SUGAR FOUR TIMES DAILY    Insulin Needles, Disposable, (ULTRA-THIN II, SHORT, PEN NDL) 31 gauge x 5/16\" ndle Use  4 times daily. Dx code E11.65    insulin detemir (LEVEMIR FLEXTOUCH) 100 unit/mL (3 mL) inpn 60 Units by SubCUTAneous route nightly. Note the increase    atorvastatin (LIPITOR) 10 mg tablet Take 1 Tab by mouth daily.  losartan (COZAAR) 25 mg tablet Take 1 Tab by mouth daily. No current facility-administered medications for this visit. Review of Systems   Constitutional: Negative. HENT: Negative. Eyes: Negative for pain and redness. Respiratory: Negative. Cardiovascular: Negative for chest pain, palpitations and leg swelling. Gastrointestinal: Negative. Negative for constipation. Genitourinary: Negative. Musculoskeletal: Negative for myalgias. Skin: Negative. Neurological: Negative. Endo/Heme/Allergies: Negative. Psychiatric/Behavioral: pt is demented       Physical Exam   Constitutional: She is oriented to person, place, and time. She appears well-developed and well-nourished. HENT:   Head: Normocephalic. Eyes: Conjunctivae and EOM are normal. Pupils are equal, round, and reactive to light. Neck: Normal range of motion. Neck supple. No JVD present. No tracheal deviation present. No thyromegaly present. Cardiovascular: Normal rate, regular rhythm and normal heart sounds. No murmur heard. Pulmonary/Chest: Breath sounds normal.   Abdominal: Soft. Bowel sounds are normal.   Musculoskeletal: Normal range of motion. Lymphadenopathy:     She has no cervical adenopathy.    Neurological: She is alert and oriented to person, place, and time. She has normal reflexes. Skin: Skin is warm. Psychiatric: She has a normal mood and affect. Diabetic foot exam: jan 2018    Left: Reflexes not done      Vibratory sensation diminished    Proprioception not done    Sharp/dull discrimination not done     Filament test reduced sensation with micro filament   Pulse DP: 2+ (normal)   Pulse PT: 2+ (normal)   Deformities: Yes - left foot , third patti is hurt, contused ( she injured herself )  Right: Reflexes not done      Vibratory sensation diminished    Proprioception not done    Sharp/dull discrimination not done     Filament test reduced sensation with micro filament   Pulse DP: 2+ (normal)   Pulse PT: 2+ (normal)   Deformities: none        Lab Results   Component Value Date/Time    Hemoglobin A1c 11.9 08/21/2017 03:12 PM    Hemoglobin A1c 7.0 05/04/2010 05:25 AM    Hemoglobin A1c, External 10.1 03/02/2017    Glucose 307 08/21/2017 03:12 PM    Glucose (POC) 185 05/04/2010 11:44 AM    Glucose  01/08/2018 09:46 AM    Microalb/Creat ratio (ug/mg creat.) 31.3 08/21/2017 03:12 PM    LDL, calculated Comment 08/21/2017 03:12 PM    Creatinine 0.60 08/21/2017 03:12 PM      Lab Results   Component Value Date/Time    Cholesterol, total 257 08/21/2017 03:12 PM    HDL Cholesterol 35 08/21/2017 03:12 PM    LDL, calculated Comment 08/21/2017 03:12 PM    LDL-C, External 74 03/02/2017    Triglyceride 406 08/21/2017 03:12 PM     Lab Results   Component Value Date/Time    ALT (SGPT) 19 08/21/2017 03:12 PM    AST (SGOT) 21 08/21/2017 03:12 PM    Alk.  phosphatase 97 08/21/2017 03:12 PM    Bilirubin, total <0.2 08/21/2017 03:12 PM    Albumin 4.2 08/21/2017 03:12 PM    Protein, total 7.2 08/21/2017 03:12 PM    INR 1.2 05/05/2010 03:57 AM    Prothrombin time 12.4 05/05/2010 03:57 AM    PLATELET 592 09/58/7021 03:57 AM       Lab Results   Component Value Date/Time    GFR est non-AA 89 08/21/2017 03:12 PM    GFR est  08/21/2017 03:12 PM    Creatinine 0.60 08/21/2017 03:12 PM    BUN 12 08/21/2017 03:12 PM    Sodium 136 08/21/2017 03:12 PM    Potassium 4.6 08/21/2017 03:12 PM    Chloride 96 08/21/2017 03:12 PM    CO2 23 08/21/2017 03:12 PM       ASSESSMENT and PLAN    1. Type 2 DM, poorly controlled :   a1c is  11.7 %     From today by fsbg compared to   11.9 %      From       Aug 2017    compared  To   12 .2  %    From  June 2017     comapred to    10. 1 %     From feb 2017       Controlling her diet has become a big challenge per daughter   Pt has alzeihmer's dementia     Will try levemir 40 units bid, as giving 2 basal doses might be a better choice     I requested daughter to bring along her dad as educating him might help pt  Patient is advised about checking blood sugars 3-4  times a day and maintaining log book. The danger of having low blood sugars has been explained with inappropriate use of insulin  Patient voiced understanding and using the printed instructions at home. Hypoglycemia management has been explained to the patient. Low Carbohydrate diet  discussed with the patient Patient is demonstrated the use of Humalog pen. Explained the benefits associated with Pen use. 2. Hypoglycemia :  Educated on treating the hypoglycemia. Discussed Glucagon use and prescribed    3. HTN : continue current care. Patient is educated about importance of compliance with anti-hypertensives especially ARB/ACEI    4. Dyslipidemia : continue current meds. Patient is educated about benefits and adverse effects of statins and explained how benefits outweigh risk. 5. use of aspirin to prevent MI and TIA's discussed      6.  Early alzeihmers dementia- daughter is helpful, but challenge is still the diet and checks and taking insulin       7. askign to try metformin er with FOOD and one pill only for GI distress prevention    Referred to ophthal      > 50 % of time is spent on counseling   Patient voiced understanding her plan of care

## 2018-01-08 NOTE — PATIENT INSTRUCTIONS
Do not skip meals  Do not eat in between meals    Reduce carbs- pasta, rice, potatoes, bread   Do not drink juices or sodas  Donot eat peanut butter     Do not eat sugar free cookies and cakes   Do not eat peaches,  Grapes, raisins, pineapples and oranges       -------------------------------------------------------------------------------------------------------------------------------------------------------------------------------------------------      Increase cymbalta to 60 mg a day ( 3 refills and future goes to pcp )    Decrease metformin er  500 mg with dinner     Check blood sugars immediately before each meal and at bedtime      Increase   Levemir  insulin  40 units twice a day     Decrease Novolog 5  units before each meal     Take additional Novolog  as folllows with meals:    150-200 mg 2 units    201-250 mg 5 units    251-300 mg 8 units    301-350 mg 11 units    351-400 mg 14  units    401-450 mg 17 units    451-500 mg 20  Units      Less than 70 mg - no insulin

## 2018-01-08 NOTE — PROGRESS NOTES
Chief Complaint   Patient presents with    Diabetes     1. Have you been to the ER, urgent care clinic since your last visit? Hospitalized since your last visit? No    2. Have you seen or consulted any other health care providers outside of the [address] [place] [address] since your last visit? Include any pap smears or colon screening.  No     Wt Readings from Last 3 Encounters:   01/08/18 182 lb 1.6 oz (82.6 kg)   08/28/17 190 lb 6.4 oz (86.4 kg)   06/26/17 187 lb 11.2 oz (85.1 kg)     Temp Readings from Last 3 Encounters:   01/08/18 95.6 °F (35.3 °C) (Oral)   08/28/17 95.8 °F (35.4 °C) (Oral)   06/26/17 97.9 °F (36.6 °C) (Oral)     BP Readings from Last 3 Encounters:   01/08/18 143/67   08/28/17 133/59   06/26/17 137/60     Pulse Readings from Last 3 Encounters:   01/08/18 85   08/28/17 70   06/26/17 67     Pt has meter  No foot exam  No eye exam

## 2018-01-08 NOTE — MR AVS SNAPSHOT
Visit Information Date & Time Provider Department Dept. Phone Encounter #  
 1/8/2018  9:15 AM Kourtney Quintero MD [facility] [phone] [id number] Follow-up Instructions Return in about 4 months (around 5/8/2018). Upcoming Health Maintenance Date Due  
 FOOT EXAM Q1 9[birth year]16/1950 EYE EXAM RETINAL OR DILATED Q1 9/16/1950 DTaP/Tdap/Td series (1 - Tdap) 9/16/1961 ZOSTER VACCINE AGE 60> 7/16/2000 GLAUCOMA SCREENING Q2Y 9/16/2005 OSTEOPOROSIS SCREENING (DEXA) 9/16/2005 Pneumococcal 65+ Low/Medium Risk (1 of 2 - PCV13) 9/16/2005 MEDICARE YEARLY EXAM 9/16/2005 Influenza Age 5 to Adult 8/1/2017 HEMOGLOBIN A1C Q6M 2/21/2018 MICROALBUMIN Q1 8/21/2018 LIPID PANEL Q1 8/21/2018 Allergies as of 1/8/2018  Review Complete On: 1/8/2018 By: Kourtney Quintero MD  
 No Known Allergies Current Immunizations  Never Reviewed No immunizations on file. Not reviewed this visit You Were Diagnosed With   
  
 Codes Comments Uncontrolled type 2 diabetes mellitus with hyperglycemia, with long-term current use of insulin (HCC)    -  Primary ICD-10-CM: E11.65, Z79.4 ICD-9-CM: 250.02, V58.67 Mixed hyperlipidemia     ICD-10-CM: E78.2 ICD-9-CM: 272.2 Essential hypertension     ICD-10-CM: I10 
ICD-9-CM: 401.9 Alzheimer's dementia without behavioral disturbance, unspecified timing of dementia onset     ICD-10-CM: G30.9, F02.80 ICD-9-CM: 331.0, 294.10 Vitals BP Pulse Temp Resp Height(growth percentile) Weight(growth percentile) 143/67 (BP 1 Location: Left arm, BP Patient Position: Sitting) 85 95.6 °F (35.3 °C) (Oral) 16 5' 1\" (1.549 m) 182 lb 1.6 oz (82.6 kg) SpO2 BMI OB Status Smoking Status 97% 34.41 kg/m2 Hysterectomy Former Smoker BMI and BSA Data Body Mass Index Body Surface Area 34.41 kg/m 2 1.89 m 2 Preferred Pharmacy Pharmacy Name Phone  [address], [address] AT [address] OF [address] [facility] ([address] [phone] Your Updated Medication List  
  
   
This list is accurate as of: 1/8/18 10:13 AM.  Always use your most recent med list.  
  
  
  
  
 atorvastatin 10 mg tablet Commonly known as:  LIPITOR Take 1 Tab by mouth daily. DULoxetine 60 mg capsule Commonly known as:  CYMBALTA TAKE ONE CAPSULE BY MOUTH DAILY HumaLOG KwikPen 100 unit/mL kwikpen Generic drug:  insulin lispro INJECT 8 UNITS BEFORE EACH MEAL. PLUS SLIDING SCALE. MAXIMUM DAILY DOSE IS 84 UNITS  
  
 insulin detemir 100 unit/mL (3 mL) Inpn Commonly known as:  LEVEMIR FLEXTOUCH  
60 Units by SubCUTAneous route nightly. Note the increase Insulin Needles (Disposable) 31 gauge x 5/16\" Ndle Commonly known as:  ULTRA-THIN II (SHORT) PEN NDL Use  4 times daily. Dx code E11.65  
  
 losartan 25 mg tablet Commonly known as:  COZAAR Take 1 Tab by mouth daily. metFORMIN  mg tablet Commonly known as:  GLUCOPHAGE XR  
TAKE 1 TABLET BY MOUTH EVERY NIGHT  
  
 ONETOUCH ULTRA TEST strip Generic drug:  glucose blood VI test strips USE TO TEST BLOOD SUGAR FOUR TIMES DAILY We Performed the Following AMB POC GLUCOSE BLOOD, BY GLUCOSE MONITORING DEVICE [83204 CPT(R)] AMB POC HEMOGLOBIN A1C [82532 CPT(R)]  DIABETES FOOT EXAM [HM7 Custom] REFERRAL TO OPHTHALMOLOGY [REF57 Custom] Comments:  
 She has to see her old doc Follow-up Instructions Return in about 4 months (around 5/8/2018). Referral Information Referral ID Referred By Referred To  
  
 [id number] Uzam Patino Not Available Visits Status Start Date End Date 1 New Request 1/8/18 1/8/19 If your referral has a status of pending review or denied, additional information will be sent to support the outcome of this decision. Patient Instructions Do not skip meals Do not eat in between meals Reduce carbs- pasta, rice, potatoes, bread Do not drink juices or sodas Donot eat peanut butter Do not eat sugar free cookies and cakes Do not eat peaches,  Grapes, raisins, pineapples and oranges  
 
 
------------------------------------------------------------------------------------------------------------------------------------------------------------------------------------------------- Increase cymbalta to 60 mg a day ( 3 refills and future goes to pcp ) Decrease metformin er  500 mg with dinner Check blood sugars immediately before each meal and at bedtime Increase   Levemir  insulin  40 units twice a day Decrease Novolog 5  units before each meal  
 
Take additional Novolog  as folllows with meals: 
 
150-200 mg 2 units 201-250 mg 5 units 251-300 mg 8 units 301-350 mg 11 units 351-400 mg 14  units 401-450 mg 17 units 451-500 mg 20  Units Less than 70 mg - no insulin Introducing [facility]! Neema Deras introduces Venuetastic patient portal. Now you can access parts of your medical record, email your doctor's office, and request medication refills online. 1. In your internet browser, go to [email or web address] 2. Click on the First Time User? Click Here link in the Sign In box. You will see the New Member Sign Up page. 3. Enter your Venuetastic Access Code exactly as it appears below. You will not need to use this code after youve completed the sign-up process. If you do not sign up before the expiration date, you must request a new code. · Venu[employer] Access Code: [email or web address] Expires: 4/8/2018 10:01 AM 
 
4. Enter the last four digits of your Social Security Number (xxxx) and Date of Birth (mm/dd/yyyy) as indicated and click Submit. You will be taken to the next sign-up page. 5. Create a Venuetastic ID. This will be your Venuetastic login ID and cannot be changed, so think of one that is secure and easy to remember.  
6. Create a Divitel password. You can change your password at any time. 7. Enter your Password Reset Question and Answer. This can be used at a later time if you forget your password. 8. Enter your e-mail address. You will receive e-mail notification when new information is available in [address] 9. Click Sign Up. You can now view and download portions of your medical record. 10. Click the Download Summary menu link to download a portable copy of your medical information. If you have questions, please visit the Frequently Asked Questions section of the Divitel website. Remember, Divitel is NOT to be used for urgent needs. For medical emergencies, dial 911. Now available from your iPhone and Android! Please provide this summary of care documentation to your next provider. Your primary care clinician is listed as Elaine Guajardo. If you have any questions after today's visit, please call [phone].

## 2018-03-20 ENCOUNTER — OP HISTORICAL/CONVERTED ENCOUNTER (OUTPATIENT)
Dept: OTHER | Age: 78
End: 2018-03-20

## 2018-03-24 DIAGNOSIS — E11.9 TYPE 2 DIABETES MELLITUS WITHOUT COMPLICATION, UNSPECIFIED LONG TERM INSULIN USE STATUS: ICD-10-CM

## 2018-03-24 RX ORDER — METFORMIN HYDROCHLORIDE 500 MG/1
TABLET, EXTENDED RELEASE ORAL
Qty: 30 TAB | Refills: 0 | Status: SHIPPED | OUTPATIENT
Start: 2018-03-24 | End: 2018-04-22 | Stop reason: SDUPTHER

## 2018-03-29 DIAGNOSIS — E11.9 TYPE 2 DIABETES MELLITUS WITHOUT COMPLICATION, UNSPECIFIED LONG TERM INSULIN USE STATUS: ICD-10-CM

## 2018-03-29 NOTE — TELEPHONE ENCOUNTER
----- Message from [facility] sent at 3/29/2018 12:24 PM EDT -----  Regarding: Dr. Ross Factor  Pt stated her dosage of \"Levemir\" has changed from 60 once a day to 60 twice a day and that the pharmacy is not providing enough to last the entire month. Pt would like the doctor to send over a new script to pharmacy. [facility] on [address], [address] [facility]. Jeannette Chong, daughter Best contact [phone] because pt is out of medication.

## 2018-04-23 RX ORDER — METFORMIN HYDROCHLORIDE 500 MG/1
TABLET, EXTENDED RELEASE ORAL
Qty: 90 TAB | Refills: 6 | Status: SHIPPED | OUTPATIENT
Start: 2018-04-23 | End: 2018-09-10 | Stop reason: SDUPTHER

## 2018-08-17 ENCOUNTER — IP HISTORICAL/CONVERTED ENCOUNTER (OUTPATIENT)
Dept: OTHER | Age: 78
End: 2018-08-17

## 2018-08-31 ENCOUNTER — TELEPHONE (OUTPATIENT)
Dept: ENDOCRINOLOGY | Age: 78
End: 2018-08-31

## 2018-08-31 DIAGNOSIS — Z79.4 UNCONTROLLED TYPE 2 DIABETES MELLITUS WITH HYPERGLYCEMIA, WITH LONG-TERM CURRENT USE OF INSULIN (HCC): Primary | ICD-10-CM

## 2018-08-31 DIAGNOSIS — E11.65 UNCONTROLLED TYPE 2 DIABETES MELLITUS WITH HYPERGLYCEMIA, WITH LONG-TERM CURRENT USE OF INSULIN (HCC): Primary | ICD-10-CM

## 2018-09-01 RX ORDER — DULOXETIN HYDROCHLORIDE 60 MG/1
CAPSULE, DELAYED RELEASE ORAL
Qty: 90 CAP | Refills: 4 | Status: SHIPPED | OUTPATIENT
Start: 2018-09-01 | End: 2019-10-07 | Stop reason: SDUPTHER

## 2018-09-05 ENCOUNTER — HOSPITAL ENCOUNTER (OUTPATIENT)
Dept: LAB | Age: 78
Discharge: HOME | End: 2018-09-05
Payer: MEDICARE

## 2018-09-05 PROCEDURE — 83036 HEMOGLOBIN GLYCOSYLATED A1C: CPT

## 2018-09-05 PROCEDURE — 80053 COMPREHEN METABOLIC PANEL: CPT

## 2018-09-05 PROCEDURE — 36415 COLL VENOUS BLD VENIPUNCTURE: CPT

## 2018-09-05 PROCEDURE — 80061 LIPID PANEL: CPT

## 2018-09-05 PROCEDURE — 82043 UR ALBUMIN QUANTITATIVE: CPT

## 2018-09-06 LAB
ALBUMIN SERPL-MCNC: 3.9 G/DL (ref 3.5–4.8)
ALBUMIN/CREAT UR: 19.4 MG/G CREAT (ref 0–30)
ALBUMIN/GLOB SERPL: 1.5 {RATIO} (ref 1.2–2.2)
ALP SERPL-CCNC: 83 IU/L (ref 39–117)
ALT SERPL-CCNC: 16 IU/L (ref 0–32)
AST SERPL-CCNC: 17 IU/L (ref 0–40)
BILIRUB SERPL-MCNC: <0.2 MG/DL (ref 0–1.2)
BUN SERPL-MCNC: 11 MG/DL (ref 8–27)
BUN/CREAT SERPL: 17 (ref 12–28)
CALCIUM SERPL-MCNC: 9.4 MG/DL (ref 8.7–10.3)
CHLORIDE SERPL-SCNC: 103 MMOL/L (ref 96–106)
CHOLEST SERPL-MCNC: 163 MG/DL (ref 100–199)
CO2 SERPL-SCNC: 24 MMOL/L (ref 20–29)
CREAT SERPL-MCNC: 0.63 MG/DL (ref 0.57–1)
CREAT UR-MCNC: 181.2 MG/DL
EST. AVERAGE GLUCOSE BLD GHB EST-MCNC: 301 MG/DL
GLOBULIN SER CALC-MCNC: 2.6 G/DL (ref 1.5–4.5)
GLUCOSE SERPL-MCNC: 186 MG/DL (ref 65–99)
HBA1C MFR BLD: 12.1 % (ref 4.8–5.6)
HDLC SERPL-MCNC: 37 MG/DL
INTERPRETATION, 910389: NORMAL
LDLC SERPL CALC-MCNC: 77 MG/DL (ref 0–99)
Lab: NORMAL
MICROALBUMIN UR-MCNC: 35.1 UG/ML
POTASSIUM SERPL-SCNC: 4.3 MMOL/L (ref 3.5–5.2)
PROT SERPL-MCNC: 6.5 G/DL (ref 6–8.5)
SODIUM SERPL-SCNC: 141 MMOL/L (ref 134–144)
TRIGL SERPL-MCNC: 243 MG/DL (ref 0–149)
VLDLC SERPL CALC-MCNC: 49 MG/DL (ref 5–40)

## 2018-09-10 ENCOUNTER — OFFICE VISIT (OUTPATIENT)
Dept: ENDOCRINOLOGY | Age: 78
End: 2018-09-10

## 2018-09-10 VITALS
OXYGEN SATURATION: 98 % | HEIGHT: 61 IN | SYSTOLIC BLOOD PRESSURE: 138 MMHG | WEIGHT: 194.2 LBS | HEART RATE: 77 BPM | DIASTOLIC BLOOD PRESSURE: 59 MMHG | BODY MASS INDEX: 36.67 KG/M2 | RESPIRATION RATE: 18 BRPM | TEMPERATURE: 97 F

## 2018-09-10 DIAGNOSIS — I10 ESSENTIAL HYPERTENSION: ICD-10-CM

## 2018-09-10 DIAGNOSIS — E11.65 UNCONTROLLED TYPE 2 DIABETES MELLITUS WITH HYPERGLYCEMIA, WITH LONG-TERM CURRENT USE OF INSULIN (HCC): Primary | ICD-10-CM

## 2018-09-10 DIAGNOSIS — E78.2 MIXED HYPERLIPIDEMIA: ICD-10-CM

## 2018-09-10 DIAGNOSIS — F02.80 EARLY ONSET ALZHEIMER'S DEMENTIA WITHOUT BEHAVIORAL DISTURBANCE (HCC): ICD-10-CM

## 2018-09-10 DIAGNOSIS — Z79.4 UNCONTROLLED TYPE 2 DIABETES MELLITUS WITH HYPERGLYCEMIA, WITH LONG-TERM CURRENT USE OF INSULIN (HCC): Primary | ICD-10-CM

## 2018-09-10 DIAGNOSIS — I63.9 CEREBROVASCULAR ACCIDENT (CVA), UNSPECIFIED MECHANISM (HCC): ICD-10-CM

## 2018-09-10 DIAGNOSIS — G30.0 EARLY ONSET ALZHEIMER'S DEMENTIA WITHOUT BEHAVIORAL DISTURBANCE (HCC): ICD-10-CM

## 2018-09-10 PROBLEM — E66.01 SEVERE OBESITY (BMI 35.0-39.9): Status: ACTIVE | Noted: 2018-09-10

## 2018-09-10 RX ORDER — DONEPEZIL HYDROCHLORIDE 5 MG/1
TABLET, FILM COATED ORAL
COMMUNITY
Start: 2018-09-05 | End: 2021-09-17

## 2018-09-10 RX ORDER — INSULIN LISPRO 100 [IU]/ML
INJECTION, SOLUTION INTRAVENOUS; SUBCUTANEOUS
Qty: 75 ML | Refills: 6 | Status: SHIPPED | OUTPATIENT
Start: 2018-09-10 | End: 2021-09-17

## 2018-09-10 NOTE — PROGRESS NOTES
HISTORY OF PRESENT ILLNESS Xi Fonseca is a 68 y.o. female. HPI Patient here for  Last visit  after  Last  visit of Type 2 diabetes mellitus   From jan 2018  
 
daughter is present in room Pt is hospitalized in the interim at [facility]  A month ago She has developed right sided weakness and  Is recovering She has home health , not using cane Gaining 12 lbs Old history ; Pt provides no history ( h/o dementia ) Blood sugars are high on log , over 400 She lives at home with her  Minimal checks only in the past 3-4 days None before that Pt was given 60 units bid of levemir by daughter, to bring the sugars down Prior history Referred : by daughter, my pt   
 
H/o diabetes for 10 years Current A1C is over 10 %  and symptoms/problems include polyuria and polydipsia Current diabetic medications include Lantus And meformin ER Current monitoring regimen: home blood tests - 1-2 times daily Home blood sugar records: trend: fluctuating a lot Any episodes of hypoglycemia? Weight trend: stable Prior visit with dietician: no 
Current diet: \"unhealthy\" diet in general 
Current exercise: no regular exercise Known diabetic complications: retinopathy, nephropathy and peripheral neuropathy Cardiovascular risk factors: dyslipidemia, diabetes mellitus, obesity, sedentary life style, hypertension, stress, post-menopausal 
 
Eye exam current (within one year): yes JACOB: yes Past Medical History:  
Diagnosis Date  Arthritis  Cancer [facility]) 2006  
 colon cancer  COPD   
 sleep apnea  Diabetes ([facility] [address]  GERD (gastroesophageal reflux disease)  Other ill-defined conditions(799.89)   
 fibromylgia  PUD (peptic ulcer disease)  Unspecified sleep apnea Past Surgical History:  
Procedure Laterality Date  ABDOMEN SURGERY PROC UNLISTED  2006  
 colectomy  HX APPENDECTOMY  HX BACK SURGERY    
 neck and back  HX HYSTERECTOMY  HX ORTHOPAEDIC  2010  
 right tkr  HX OTHER SURGICAL    
 tonsil  WRIST ARTHROSCOP,DIAGNOSTIC    
 bilateral carpal tunell Current Outpatient Prescriptions Medication Sig  
 donepezil (ARICEPT) 5 mg tablet  DULoxetine (CYMBALTA) 60 mg capsule TAKE ONE CAPSULE BY MOUTH DAILY  LEVEMIR FLEXTOUCH U-100 INSULN 100 unit/mL (3 mL) inpn INJECT 60 UNITS UNDER THE SKIN EVERY NIGHT AT BEDTIME  insulin detemir U-100 (LEVEMIR FLEXTOUCH U-100 INSULN) 100 unit/mL (3 mL) inpn 40 Units by SubCUTAneous route two (2) times a day. Note the change  HUMALOG KWIKPEN 100 unit/mL kwikpen INJECT 8 UNITS BEFORE EACH MEAL. PLUS SLIDING SCALE. MAXIMUM DAILY DOSE IS 84 UNITS  metFORMIN ER (GLUCOPHAGE XR) 500 mg tablet TAKE 1 TABLET BY MOUTH EVERY NIGHT  ONETOUCH ULTRA TEST strip USE TO TEST BLOOD SUGAR FOUR TIMES DAILY  Insulin Needles, Disposable, (ULTRA-THIN II, SHORT, PEN NDL) 31 gauge x 5/16\" ndle Use  4 times daily. Dx code E11.65  
 atorvastatin (LIPITOR) 10 mg tablet Take 1 Tab by mouth daily.  losartan (COZAAR) 25 mg tablet Take 1 Tab by mouth daily. No current facility-administered medications for this visit. Review of Systems Constitutional: Negative. HENT: Negative. Eyes: Negative for pain and redness. Respiratory: Negative. Cardiovascular: Negative for chest pain, palpitations and leg swelling. Gastrointestinal: Negative. Negative for constipation. Genitourinary: Negative. Musculoskeletal: Negative for myalgias. Skin: Negative. Neurological: Negative. Endo/Heme/Allergies: Negative. Psychiatric/Behavioral: pt is demented Physical Exam  
Constitutional: She is oriented to person, place, and time. She appears well-developed and well-nourished. HENT:  
Head: Normocephalic. Eyes: Conjunctivae and EOM are normal. Pupils are equal, round, and reactive to light. Neck: Normal range of motion. Neck supple. No JVD present.  No tracheal deviation present. No thyromegaly present. Cardiovascular: Normal rate, regular rhythm and normal heart sounds. No murmur heard. Pulmonary/Chest: Breath sounds normal.  
Abdominal: Soft. Bowel sounds are normal.  
Musculoskeletal: Normal range of motion. Lymphadenopathy:  
  She has no cervical adenopathy. Neurological: She is alert and oriented to person, place, and time. She has normal reflexes. Skin: Skin is warm. Psychiatric: She has a normal mood and affect. Diabetic foot exam: jan 2018 Left: Reflexes not done Vibratory sensation diminished Proprioception not done Sharp/dull discrimination not done Filament test reduced sensation with micro filament Pulse DP: 2+ (normal) Pulse PT: 2+ (normal) Deformities: Yes - left foot , third patti is hurt, contused ( she injured herself ) Right: Reflexes not done Vibratory sensation diminished Proprioception not done Sharp/dull discrimination not done Filament test reduced sensation with micro filament Pulse DP: 2+ (normal) Pulse PT: 2+ (normal) Deformities: none Lab Results Component Value Date/Time Hemoglobin A1c 12.1 (H) 09/05/2018 02:17 PM  
 Hemoglobin A1c 11.9 (H) 08/21/2017 03:12 PM  
 Hemoglobin A1c 7.0 (H) 05/04/2010 05:25 AM  
 Hemoglobin A1c, External 10.1 03/02/2017 Glucose 186 (H) 09/05/2018 02:17 PM  
 Glucose (POC) 185 (H) 05/04/2010 11:44 AM  
 Glucose  01/08/2018 09:46 AM  
 Microalb/Creat ratio (ug/mg creat.) 19.4 09/05/2018 02:17 PM  
 LDL, calculated 77 09/05/2018 02:17 PM  
 Creatinine 0.63 09/05/2018 02:17 PM  
  
Lab Results Component Value Date/Time Cholesterol, total 163 09/05/2018 02:17 PM  
 HDL Cholesterol 37 (L) 09/05/2018 02:17 PM  
 LDL, calculated 77 09/05/2018 02:17 PM  
 LDL-C, External 74 03/02/2017 Triglyceride 243 (H) 09/05/2018 02:17 PM  
 
Lab Results Component Value Date/Time  ALT (SGPT) 16 09/05/2018 02:17 PM  
 AST (SGOT) 17 09/05/2018 02:17 PM  
 Alk. phosphatase 83 09/05/2018 02:17 PM  
 Bilirubin, total <0.2 09/05/2018 02:17 PM  
 Albumin 3.9 09/05/2018 02:17 PM  
 Protein, total 6.5 09/05/2018 02:17 PM  
 INR 1.2 (H) 05/05/2010 03:57 AM  
 Prothrombin time 12.4 (H) 05/05/2010 03:57 AM  
 PLATELET 564 28/90/4779 03:57 AM  
 
 
Lab Results Component Value Date/Time GFR est non-AA 87 09/05/2018 02:17 PM  
 GFR est  09/05/2018 02:17 PM  
 Creatinine 0.63 09/05/2018 02:17 PM  
 BUN 11 09/05/2018 02:17 PM  
 Sodium 141 09/05/2018 02:17 PM  
 Potassium 4.3 09/05/2018 02:17 PM  
 Chloride 103 09/05/2018 02:17 PM  
 CO2 24 09/05/2018 02:17 PM  
 
 
ASSESSMENT and PLAN 1. Type 2 DM, poorly controlled :   a1c is  12.1 %      From     Sept 2018    comapred to   11.7 %     From today by fsbg compared to   11.9 %      From       Aug 2017    compared  To   12 .2  %    From  June 2017     comapred to    10. 1 %     From feb 2017 Her not having control  On DM  made her have   STROKE She needs help with diet and medication use No checks being done and none is happening to control her diabetes Daughter works and managing mom's DM is a difficult task for her other than having co-living Controlling her diet has become a big challenge per daughter Pt has alzeihmer's dementia  
 
 levemir 40 units bid, as giving 2 basal doses might be a better choice I requested daughter to bring along her dad as educating him might help pt Patient is advised about checking blood sugars 3-4  times a day and maintaining log book. The danger of having low blood sugars has been explained with inappropriate use of insulin  Patient voiced understanding and using the printed instructions at home. Hypoglycemia management has been explained to the patient. Low Carbohydrate diet  discussed with the patient Patient is demonstrated the use of Humalog pen. Explained the benefits associated with Pen use.  
 
 
2. Hypoglycemia :  Educated on treating the hypoglycemia. Discussed Glucagon use and prescribed 3. HTN : continue current care. Patient is educated about importance of compliance with anti-hypertensives especially ARB/ACEI 4. Dyslipidemia : continue current meds. Patient is educated about benefits and adverse effects of statins and explained how benefits outweigh risk. 5. use of aspirin to prevent MI and TIA's discussed 6. Early alzeihmers dementia- daughter is helpful, but challenge is still the diet and checks and taking insulin Will get her helo 7. askign to try metformin er with FOOD and one pill only for GI distress prevention , daughter refuses 8   Referred to ophthal, she had it , obraining recs 9. New onset CVA , right sided hemiparesis To get home health continue  
 
 
 
> 50 % of time is spent on counseling Patient voiced understanding her plan of care

## 2018-09-10 NOTE — MR AVS SNAPSHOT
[address] [facility] [address] 
[phone] Patient: Ceci Motta MRN: [id number] ILANA:[id number] Visit Information Date & Time Provider Department Dept. Phone Encounter #  
 9/10/2018  9:45 AM Kane Cunningham MD [facility] [phone] [id number] Follow-up Instructions Return in about 4 months (around 1/10/2019). Upcoming Health Maintenance Date Due  
 EYE EXAM RETINAL OR DILATED Q1 9[birth year]16/1950 DTaP/Tdap/Td series (1 - Tdap) 9/16/1961 ZOSTER VACCINE AGE 60> 7/16/2000 GLAUCOMA SCREENING Q2Y 9/16/2005 Bone Densitometry (Dexa) Screening 9/16/2005 Pneumococcal 65+ Low/Medium Risk (1 of 2 - PCV13) 9/16/2005 MEDICARE YEARLY EXAM 3/14/2018 Influenza Age 5 to Adult 8/1/2018 FOOT EXAM Q1 1/8/2019 HEMOGLOBIN A1C Q6M 3/5/2019 MICROALBUMIN Q1 9/5/2019 LIPID PANEL Q1 9/5/2019 Allergies as of 9/10/2018  Review Complete On: 9/10/2018 By: Kane Cunningham MD  
 No Known Allergies Current Immunizations  Never Reviewed No immunizations on file. Not reviewed this visit You Were Diagnosed With   
  
 Codes Comments Uncontrolled type 2 diabetes mellitus with hyperglycemia, with long-term current use of insulin (HCC)    -  Primary ICD-10-CM: E11.65, Z79.4 ICD-9-CM: 250.02, V58.67 Cerebrovascular accident (CVA), unspecified mechanism ([facility] [address]     ICD-10-CM: I63.9 ICD-9-CM: 434.91 Essential hypertension     ICD-10-CM: I10 
ICD-9-CM: 401.9 Mixed hyperlipidemia     ICD-10-CM: E78.2 ICD-9-CM: 272.2 Early onset Alzheimer's dementia without behavioral disturbance     ICD-10-CM: G30.0, F02.80 ICD-9-CM: 331.0, 294.10 Vitals BP Pulse Temp Resp Height(growth percentile) Weight(growth percentile) 138/59 (BP 1 Location: Left arm, BP Patient Position: Sitting) 77 97 °F (36.1 °C) (Oral) 18 5' 1\" (1.549 m) 194 lb 3.2 oz (88.1 kg)  SpO2 BMI OB Status Smoking Status 98% 36.69 kg/m2 Hysterectomy Former Smoker BMI and BSA Data Body Mass Index Body Surface Area  
 36.69 kg/m 2 1.95 m 2 Preferred Pharmacy Pharmacy Name Phone [address] [facility] [address] ([address] [phone] Your Updated Medication List  
  
   
This list is accurate as of 9/10/18 11:10 AM.  Always use your most recent med list.  
  
  
  
  
 atorvastatin 10 mg tablet Commonly known as:  LIPITOR Take 1 Tab by mouth daily. donepezil 5 mg tablet Commonly known as:  ARICEPT DULoxetine 60 mg capsule Commonly known as:  CYMBALTA TAKE ONE CAPSULE BY MOUTH DAILY HumaLOG KwikPen Insulin 100 unit/mL kwikpen Generic drug:  insulin lispro INJECT 8 UNITS BEFORE EACH MEAL. PLUS SLIDING SCALE. MAXIMUM DAILY DOSE IS 84 UNITS  
  
 * insulin detemir U-100 100 unit/mL (3 mL) Inpn Commonly known as:  LEVEMIR FLEXTOUCH U-100 INSULN  
40 Units by SubCUTAneous route two (2) times a day. Note the change * LEVEMIR FLEXTOUCH U-100 INSULN 100 unit/mL (3 mL) Inpn Generic drug:  insulin detemir U-100 INJECT 60 UNITS UNDER THE SKIN EVERY NIGHT AT BEDTIME Insulin Needles (Disposable) 31 gauge x 5/16\" Ndle Commonly known as:  ULTRA-THIN II (SHORT) PEN NDL Use  4 times daily. Dx code E11.65  
  
 losartan 25 mg tablet Commonly known as:  COZAAR Take 1 Tab by mouth daily. ONETOUCH ULTRA TEST strip Generic drug:  glucose blood VI test strips USE TO TEST BLOOD SUGAR FOUR TIMES DAILY * Notice: This list has 2 medication(s) that are the same as other medications prescribed for you. Read the directions carefully, and ask your doctor or other care provider to review them with you. We Performed the Following [address]  Comments:  
 Pt needs hemiparesis, right sided and she has dementia and not caring for herself when it comes to insulin use and diet Follow-up Instructions Return in about 4 months (around 1/10/2019). Referral Information Referral ID Referred By Referred To  
  
 [id number] Antonio Medina Not Available Visits Status Start Date End Date 1 New Request 9/10/18 9/10/19 If your referral has a status of pending review or denied, additional information will be sent to support the outcome of this decision. Patient Instructions Refills    -    please call your pharmacy and have them send us a refill request 
 
Results  -  allow up to a week for lab results to be processed and reviewed. Phone calls  -  Allow upto 24 hrs. for non-urgent calls to be retained Prior authorization - It may take up to 2 weeks to process, depending on your insurance Forms  -  FMLA, DMV, patient assistance, etc. will take up to 2 weeks to process Cancellations - please notify the office in advance if you cannot keep your appointment Samples  - will only be dispensed at visits as supply is limited If you are having a medical emergency call 911 
 
-------------------------------------------------------------------------------------------- Do not skip meals Do not eat in between meals Reduce carbs- pasta, rice, potatoes, bread Do not drink juices or sodas Donot eat peanut butter Do not eat sugar free cookies and cakes Do not eat peaches,  Grapes, raisins, pineapples and oranges  
 
 
------------------------------------------------------------------------------------------------------------------------------------------------------------------------------------------------- 
 
 
 
STOP  metformin er  500 mg with dinner Check blood sugars immediately before each meal and at bedtime Levemir  insulin  40 units twice a day Novolog 5  units before each meal  
 
Take additional Novolog  as folllows with meals: 
 
150-200 mg 2 units 201-250 mg 5 units 251-300 mg 8 units 301-350 mg 11 units 351-400 mg 14  units 401-450 mg 17 units 451-500 mg 20  Units Less than 70 mg - no insulin Introducing [facility]! Aleksander Rehman introduces Arkmicro patient portal. Now you can access parts of your medical record, email your doctor's office, and request medication refills online. 1. In your internet browser, go to [email or web address] 2. Click on the First Time User? Click Here link in the Sign In box. You will see the New Member Sign Up page. 3. Enter your Arkmicro Access Code exactly as it appears below. You will not need to use this code after youve completed the sign-up process. If you do not sign up before the expiration date, you must request a new code. · Arkmicro Access Code: [email or web address] Expires: 12/9/2018 10:30 AM 
 
4. Enter the last four digits of your Social Security Number (xxxx) and Date of Birth (mm/dd/yyyy) as indicated and click Submit. You will be taken to the next sign-up page. 5. Create a Arkmicro ID. This will be your Arkmicro login ID and cannot be changed, so think of one that is secure and easy to remember. 6. Create a Arkmicro password. You can change your password at any time. 7. Enter your Password Reset Question and Answer. This can be used at a later time if you forget your password. 8. Enter your e-mail address. You will receive e-mail notification when new information is available in [address] 9. Click Sign Up. You can now view and download portions of your medical record. 10. Click the Download Summary menu link to download a portable copy of your medical information. If you have questions, please visit the Frequently Asked Questions section of the Arkmicro website. Remember, Arkmicro is NOT to be used for urgent needs. For medical emergencies, dial 911. Now available from your iPhone and Android!  
 
  
  
 Please provide this summary of care documentation to your next provider. Your primary care clinician is listed as Elvis Chen. If you have any questions after today's visit, please call [phone].

## 2018-09-10 NOTE — PROGRESS NOTES
1. Have you been to the ER, urgent care clinic since your last visit? Yes/[facility]/Stroke 8/2018  Hospitalized since your last visit? yes 2. Have you seen or consulted any other health care providers outside of the [address] [place] [address] since your last visit? Yes Wt Readings from Last 3 Encounters:  
09/10/18 194 lb 3.2 oz (88.1 kg) 01/08/18 182 lb 1.6 oz (82.6 kg) 08/28/17 190 lb 6.4 oz (86.4 kg) Temp Readings from Last 3 Encounters:  
09/10/18 97 °F (36.1 °C) (Oral) 01/08/18 95.6 °F (35.3 °C) (Oral) 08/28/17 95.8 °F (35.4 °C) (Oral) BP Readings from Last 3 Encounters:  
09/10/18 138/59  
01/08/18 143/67  
08/28/17 133/59 Pulse Readings from Last 3 Encounters:  
09/10/18 77  
01/08/18 85  
08/28/17 70 Lab Results Component Value Date/Time Hemoglobin A1c 12.1 (H) 09/05/2018 02:17 PM  
 Hemoglobin A1c (POC) 11.7 01/08/2018 09:46 AM  
 Hemoglobin A1c, External 10.1 03/02/2017 No meter or logbook today.

## 2018-09-10 NOTE — PATIENT INSTRUCTIONS
Refills    -    please call your pharmacy and have them send us a refill request 
 
Results  -  allow up to a week for lab results to be processed and reviewed. Phone calls  -  Allow upto 24 hrs. for non-urgent calls to be retained Prior authorization - It may take up to 2 weeks to process, depending on your insurance Forms  -  FMLA, DMV, patient assistance, etc. will take up to 2 weeks to process Cancellations - please notify the office in advance if you cannot keep your appointment Samples  - will only be dispensed at visits as supply is limited If you are having a medical emergency call 911 
 
-------------------------------------------------------------------------------------------- Do not skip meals Do not eat in between meals Reduce carbs- pasta, rice, potatoes, bread Do not drink juices or sodas Donot eat peanut butter Do not eat sugar free cookies and cakes Do not eat peaches,  Grapes, raisins, pineapples and oranges  
 
 
------------------------------------------------------------------------------------------------------------------------------------------------------------------------------------------------- 
 
 
 
STOP  metformin er  500 mg with dinner Check blood sugars immediately before each meal and at bedtime Levemir  insulin  40 units twice a day Novolog 5  units before each meal  
 
Take additional Novolog  as folllows with meals: 
 
150-200 mg 2 units 201-250 mg 5 units 251-300 mg 8 units 301-350 mg 11 units 351-400 mg 14  units 401-450 mg 17 units 451-500 mg 20  Units Less than 70 mg - no insulin

## 2019-03-11 ENCOUNTER — HOSPITAL ENCOUNTER (OUTPATIENT)
Dept: LAB | Age: 79
Discharge: HOME | End: 2019-03-11
Payer: MEDICARE

## 2019-03-11 PROCEDURE — 83036 HEMOGLOBIN GLYCOSYLATED A1C: CPT

## 2019-03-11 PROCEDURE — 80061 LIPID PANEL: CPT

## 2019-03-11 PROCEDURE — 82043 UR ALBUMIN QUANTITATIVE: CPT

## 2019-03-11 PROCEDURE — 80053 COMPREHEN METABOLIC PANEL: CPT

## 2019-03-11 PROCEDURE — 36415 COLL VENOUS BLD VENIPUNCTURE: CPT

## 2019-08-19 ENCOUNTER — HOSPITAL ENCOUNTER (OUTPATIENT)
Dept: LAB | Age: 79
Discharge: HOME | End: 2019-08-19
Payer: MEDICARE

## 2019-08-19 ENCOUNTER — OFFICE VISIT (OUTPATIENT)
Dept: ENDOCRINOLOGY | Age: 79
End: 2019-08-19

## 2019-08-19 VITALS
HEIGHT: 61 IN | RESPIRATION RATE: 20 BRPM | BODY MASS INDEX: 38.48 KG/M2 | OXYGEN SATURATION: 97 % | SYSTOLIC BLOOD PRESSURE: 128 MMHG | DIASTOLIC BLOOD PRESSURE: 49 MMHG | WEIGHT: 203.8 LBS | HEART RATE: 78 BPM | TEMPERATURE: 95.8 F

## 2019-08-19 DIAGNOSIS — G30.9 ALZHEIMER'S DEMENTIA WITHOUT BEHAVIORAL DISTURBANCE, UNSPECIFIED TIMING OF DEMENTIA ONSET: ICD-10-CM

## 2019-08-19 DIAGNOSIS — F02.80 ALZHEIMER'S DEMENTIA WITHOUT BEHAVIORAL DISTURBANCE, UNSPECIFIED TIMING OF DEMENTIA ONSET: ICD-10-CM

## 2019-08-19 DIAGNOSIS — Z79.4 UNCONTROLLED TYPE 2 DIABETES MELLITUS WITH HYPERGLYCEMIA, WITH LONG-TERM CURRENT USE OF INSULIN (HCC): Primary | ICD-10-CM

## 2019-08-19 DIAGNOSIS — E11.65 UNCONTROLLED TYPE 2 DIABETES MELLITUS WITH HYPERGLYCEMIA, WITH LONG-TERM CURRENT USE OF INSULIN (HCC): Primary | ICD-10-CM

## 2019-08-19 LAB — HBA1C MFR BLD HPLC: 8.4 %

## 2019-08-19 PROCEDURE — 83036 HEMOGLOBIN GLYCOSYLATED A1C: CPT

## 2019-08-19 PROCEDURE — 80053 COMPREHEN METABOLIC PANEL: CPT

## 2019-08-19 PROCEDURE — 82043 UR ALBUMIN QUANTITATIVE: CPT

## 2019-08-19 PROCEDURE — 80061 LIPID PANEL: CPT

## 2019-08-19 PROCEDURE — 36415 COLL VENOUS BLD VENIPUNCTURE: CPT

## 2019-08-19 RX ORDER — INSULIN GLARGINE 100 [IU]/ML
INJECTION, SOLUTION SUBCUTANEOUS
COMMUNITY
End: 2021-09-17

## 2019-08-19 NOTE — LETTER
8/19/19 Patient: Niko Real YOB: 1940 Date of Visit: 8/19/2019 Gretchen Nj MD 
[address] [facility] [address] VIA Facsimile: [phone] Dear Gretchen Nj MD, Thank you for referring Ms. Keith Gordillo to [address] [place] [address] for evaluation. My notes for this consultation are attached. If you have questions, please do not hesitate to call me. I look forward to following your patient along with you. Sincerely, Love Morales MD

## 2019-08-19 NOTE — PROGRESS NOTES
HISTORY OF PRESENT ILLNESS  Alex Espitia is a 66 y.o. female. HPI  Patient here for  Last visit  after  Last  visit of Type 2 diabetes mellitus   From September 2018     Another daughter is present   Gained 9 lbs     Her  had stroke in June 2019   Therefore, pt has no log in hand     She had no labs   Family is trying to fill gaps with continuity of care       Old history :    daughter is present in room  Pt is hospitalized in the interim at [facility]  A month ago   She has developed right sided weakness and  Is recovering  She has home health , not using cane     Gaining 12 lbs       Old history ; Pt provides no history ( h/o dementia )  Blood sugars are high on log , over 400   She lives at home with her      Minimal checks only in the past 3-4 days  None before that       Pt was given 60 units bid of levemir by daughter, to bring the sugars down        Prior history    Referred : by daughter, my pt      H/o diabetes for 10 years     Current A1C is over 10 %  and symptoms/problems include polyuria and polydipsia     Current diabetic medications include Lantus And meformin ER     Current monitoring regimen: home blood tests - 1-2 times daily  Home blood sugar records: trend: fluctuating a lot  Any episodes of hypoglycemia?      Weight trend: stable  Prior visit with dietician: no  Current diet: \"unhealthy\" diet in general  Current exercise: no regular exercise    Known diabetic complications: retinopathy, nephropathy and peripheral neuropathy  Cardiovascular risk factors: dyslipidemia, diabetes mellitus, obesity, sedentary life style, hypertension, stress, post-menopausal    Eye exam current (within one year): yes  JACOB: yes     Past Medical History:   Diagnosis Date    Arthritis     Cancer ([facility] [address] 2006    colon cancer    COPD     sleep apnea    Diabetes ([facility] [address]     GERD (gastroesophageal reflux disease)     Other ill-defined conditions(799.89)     fibromylgia    PUD (peptic ulcer disease)     Unspecified sleep apnea      Past Surgical History:   Procedure Laterality Date    ABDOMEN SURGERY PROC UNLISTED  2006    colectomy    HX APPENDECTOMY      HX BACK SURGERY      neck and back    HX HYSTERECTOMY      HX ORTHOPAEDIC  2010    right tkr    HX OTHER SURGICAL      tonsil    WRIST ARTHROSCOP,DIAGNOSTIC      bilateral carpal tunell     Current Outpatient Medications   Medication Sig    insulin glargine (LANTUS SOLOSTAR U-100 INSULIN) 100 unit/mL (3 mL) inpn by SubCUTAneous route. Inject 60 units in the Am and 60 units at Night    metformin HCl (METFORMIN PO) Take  by mouth. 1 tab once every morning.  donepezil (ARICEPT) 5 mg tablet     insulin lispro (HUMALOG KWIKPEN INSULIN) 100 unit/mL kwikpen DECREASE INJECT 5 UNITS BEFORE EACH MEAL. PLUS SLIDING SCALE. MAXIMUM DAILY DOSE IS 75 UNITS    DULoxetine (CYMBALTA) 60 mg capsule TAKE ONE CAPSULE BY MOUTH DAILY    ONETOUCH ULTRA TEST strip USE TO TEST BLOOD SUGAR FOUR TIMES DAILY    Insulin Needles, Disposable, (ULTRA-THIN II, SHORT, PEN NDL) 31 gauge x 5/16\" ndle Use  4 times daily. Dx code E11.65    atorvastatin (LIPITOR) 10 mg tablet Take 1 Tab by mouth daily.  insulin detemir U-100 (LEVEMIR FLEXTOUCH U-100 INSULN) 100 unit/mL (3 mL) inpn DECREASE INJECT 40 UNITS UNDER THE SKIN EVERY NIGHT AT BEDTIME    insulin detemir U-100 (LEVEMIR FLEXTOUCH U-100 INSULN) 100 unit/mL (3 mL) inpn 40 Units by SubCUTAneous route two (2) times a day. Note the change    losartan (COZAAR) 25 mg tablet Take 1 Tab by mouth daily. No current facility-administered medications for this visit. Review of Systems   Constitutional: Negative. HENT: Negative. Eyes: Negative for pain and redness. Respiratory: Negative. Cardiovascular: Negative for chest pain, palpitations and leg swelling. Gastrointestinal: Negative. Negative for constipation. Genitourinary: Negative. Musculoskeletal: Negative for myalgias. Skin: Negative.     Neurological: Negative. Endo/Heme/Allergies: Negative. Psychiatric/Behavioral: pt is demented       Physical Exam   Constitutional: She is oriented to person, place, and time. She appears well-developed and well-nourished. HENT:   Head: Normocephalic. Eyes: Conjunctivae and EOM are normal. Pupils are equal, round, and reactive to light. Neck: Normal range of motion. Neck supple. No JVD present. No tracheal deviation present. No thyromegaly present. Cardiovascular: Normal rate, regular rhythm and normal heart sounds. No murmur heard. Pulmonary/Chest: Breath sounds normal.   Abdominal: Soft. Bowel sounds are normal.   Musculoskeletal: Normal range of motion. Lymphadenopathy:     She has no cervical adenopathy. Neurological: She is alert and oriented to person, place, and time. She has normal reflexes. Skin: Skin is warm. Psychiatric: She has a normal mood and affect.        Diabetic foot exam: jan 2018    Left: Reflexes not done      Vibratory sensation diminished    Proprioception not done    Sharp/dull discrimination not done     Filament test reduced sensation with micro filament   Pulse DP: 2+ (normal)   Pulse PT: 2+ (normal)   Deformities: Yes - left foot , third patti is hurt, contused ( she injured herself )  Right: Reflexes not done      Vibratory sensation diminished    Proprioception not done    Sharp/dull discrimination not done     Filament test reduced sensation with micro filament   Pulse DP: 2+ (normal)   Pulse PT: 2+ (normal)   Deformities: none        Lab Results   Component Value Date/Time    Hemoglobin A1c 8.4 (H) 03/11/2019 10:10 AM    Hemoglobin A1c 12.1 (H) 09/05/2018 02:17 PM    Hemoglobin A1c 11.9 (H) 08/21/2017 03:12 PM    Hemoglobin A1c, External 10.1 03/02/2017    Glucose 109 (H) 03/11/2019 10:10 AM    Glucose (POC) 185 (H) 05/04/2010 11:44 AM    Glucose  01/08/2018 09:46 AM    Microalb/Creat ratio (ug/mg creat.) 11.5 03/11/2019 10:10 AM    LDL, calculated 99 03/11/2019 10:10 AM    Creatinine 0.71 03/11/2019 10:10 AM      Lab Results   Component Value Date/Time    Cholesterol, total 163 03/11/2019 10:10 AM    HDL Cholesterol 44 03/11/2019 10:10 AM    LDL, calculated 99 03/11/2019 10:10 AM    LDL-C, External 74 03/02/2017    Triglyceride 99 03/11/2019 10:10 AM     Lab Results   Component Value Date/Time    ALT (SGPT) 11 03/11/2019 10:10 AM    AST (SGOT) 13 03/11/2019 10:10 AM    Alk. phosphatase 80 03/11/2019 10:10 AM    Bilirubin, total 0.4 03/11/2019 10:10 AM    Albumin 4.0 03/11/2019 10:10 AM    Protein, total 6.8 03/11/2019 10:10 AM    INR 1.2 (H) 05/05/2010 03:57 AM    Prothrombin time 12.4 (H) 05/05/2010 03:57 AM    PLATELET 864 02/16/2948 03:57 AM       Lab Results   Component Value Date/Time    GFR est non-AA 82 03/11/2019 10:10 AM    GFR est AA 94 03/11/2019 10:10 AM    Creatinine 0.71 03/11/2019 10:10 AM    BUN 10 03/11/2019 10:10 AM    Sodium 140 03/11/2019 10:10 AM    Potassium 4.7 03/11/2019 10:10 AM    Chloride 103 03/11/2019 10:10 AM    CO2 23 03/11/2019 10:10 AM       ASSESSMENT and PLAN    1.  Type 2 DM, poorly controlled :   a1c is    8.7 %     From march 2019     Compared to   12.1 %      From     Sept 2018    comapred to   11.7 %     From today by fsbg compared to   11.9 %      From       Aug 2017    compared  To   12 .2  %    From  June 2017     comapred to    10. 1 %     From feb 2017       No meter is present   No labs present before the visit     Improved  control  By march 2019   Compared to   Sept 2018 visit after she had STROKE     She needs help with diet and medication use  ( her daughters have to do it )    No checks being done and none is happening to control her diabetes   Daughter works and managing mom's DM is a difficult task for her other than having co-living     Controlling her diet has become a big challenge per daughter   Pt has alzeihmer's dementia      levemir 40 units bid, as giving 2 basal doses might be a better choice but her pcp had increased doses to 60 units bid   Not sure if she is using novolog well     Is she having low sugars  ?/  No answers to these questions       Patient is advised about checking blood sugars 3-4  times a day and maintaining log book. The danger of having low blood sugars has been explained with inappropriate use of insulin  Patient voiced understanding and using the printed instructions at home. Hypoglycemia management has been explained to the patient. Low Carbohydrate diet  discussed with the patient Patient is demonstrated the use of Humalog pen. Explained the benefits associated with Pen use. 2. Hypoglycemia :  Educated on treating the hypoglycemia. Discussed Glucagon use and prescribed    3. HTN : continue current care. Patient is educated about importance of compliance with anti-hypertensives especially ARB/ACEI    4. Dyslipidemia : continue current meds. Patient is educated about benefits and adverse effects of statins and explained how benefits outweigh risk. 5. use of aspirin to prevent MI and TIA's discussed      6. Early alzeihmers dementia- daughter is helpful, but challenge is still the diet and checks and taking insulin   Will get her helo       7. asking to try metformin er with FOOD and one pill only for GI distress prevention , daughter refused but she is back on to it        8   Referred to ophthal, she had it , obraining recs       9.  New onset CVA , right sided hemiparesis   To get home health continue       Daughter at the visit called daughter who actually Is involved in care ,   I clearly stated that I will not be able to write insulin doses at such higher doses unless I see the glycemic excurtions, which means I need log     > 50 % of time is spent on counseling   Patient voiced understanding her plan of care

## 2019-08-19 NOTE — PATIENT INSTRUCTIONS
Refills    -    please call your pharmacy and have them send us a refill request    Results  -  allow up to a week for lab results to be processed and reviewed. Phone calls  -  Allow upto 24 hrs.  for non-urgent calls to be retained    Prior authorization - It may take up to 2 weeks to process, depending on your insurance    Forms  -  FMLA, DMV, patient assistance, etc. will take up to 2 weeks to process    Cancellations - please notify the office in advance if you cannot keep your appointment    Samples  - will only be dispensed at visits as supply is limited      If you are having a medical emergency call 911    --------------------------------------------------------------------------------------------      Do not skip meals  Do not eat in between meals    Reduce carbs- pasta, rice, potatoes, bread   Do not drink juices or sodas  Donot eat peanut butter     Do not eat sugar free cookies and cakes   Do not eat peaches,  Grapes, raisins, pineapples and oranges       -------------------------------------------------------------------------------------------------------------------------------------------------------------------------------------------------  Patient is getting free insulin from Dr. Dayan Ramirez   Daughter is going to decide on refills  ( I am not in agreement with doses )              Resumed metformin er  500 mg with dinner  ( she gets it free )    Check blood sugars immediately before each meal and at bedtime       Levemir  insulin  40 units twice a day ( per daughter, pcp  Is writing   lantus 60 units twice a day  As delfinaetn gets it free   )     Novolog 5  units before each meal     Take additional Novolog  as folllows with meals:    150-200 mg 2 units    201-250 mg 5 units    251-300 mg 8 units    301-350 mg 11 units    351-400 mg 14  units    401-450 mg 17 units    451-500 mg 20  Units      Less than 70 mg - no insulin

## 2019-08-19 NOTE — PROGRESS NOTES
1. Have you been to the ER, urgent care clinic since your last visit? No  Hospitalized since your last visit? No    2. Have you seen or consulted any other health care providers outside of the [address] [place] [address] since your last visit? Include any pap smears or colon screening. No     Wt Readings from Last 3 Encounters:   08/19/19 203 lb 12.8 oz (92.4 kg)   09/10/18 194 lb 3.2 oz (88.1 kg)   01/08/18 182 lb 1.6 oz (82.6 kg)     Temp Readings from Last 3 Encounters:   08/19/19 95.8 °F (35.4 °C) (Oral)   09/10/18 97 °F (36.1 °C) (Oral)   01/08/18 95.6 °F (35.3 °C) (Oral)     BP Readings from Last 3 Encounters:   08/19/19 128/49   09/10/18 138/59   01/08/18 143/67     Pulse Readings from Last 3 Encounters:   08/19/19 78   09/10/18 77   01/08/18 85     Lab Results   Component Value Date/Time    Hemoglobin A1c 8.4 (H) 03/11/2019 10:10 AM    Hemoglobin A1c (POC) 11.7 01/08/2018 09:46 AM    Hemoglobin A1c, External 10.1 03/02/2017     Patient does not have meter today.   Patient  Will schedule eye exam.

## 2019-08-20 LAB
ALBUMIN SERPL-MCNC: 4 G/DL (ref 3.5–4.8)
ALBUMIN/CREAT UR: 12.6 MG/G CREAT (ref 0–30)
ALBUMIN/GLOB SERPL: 1.3 {RATIO} (ref 1.2–2.2)
ALP SERPL-CCNC: 89 IU/L (ref 39–117)
ALT SERPL-CCNC: 12 IU/L (ref 0–32)
AST SERPL-CCNC: 14 IU/L (ref 0–40)
BILIRUB SERPL-MCNC: 0.3 MG/DL (ref 0–1.2)
BUN SERPL-MCNC: 13 MG/DL (ref 8–27)
BUN/CREAT SERPL: 19 (ref 12–28)
CALCIUM SERPL-MCNC: 9.7 MG/DL (ref 8.7–10.3)
CHLORIDE SERPL-SCNC: 101 MMOL/L (ref 96–106)
CHOLEST SERPL-MCNC: 147 MG/DL (ref 100–199)
CO2 SERPL-SCNC: 23 MMOL/L (ref 20–29)
CREAT SERPL-MCNC: 0.68 MG/DL (ref 0.57–1)
CREAT UR-MCNC: 200.5 MG/DL
EST. AVERAGE GLUCOSE BLD GHB EST-MCNC: 203 MG/DL
GLOBULIN SER CALC-MCNC: 3 G/DL (ref 1.5–4.5)
GLUCOSE SERPL-MCNC: 260 MG/DL (ref 65–99)
HBA1C MFR BLD: 8.7 % (ref 4.8–5.6)
HDLC SERPL-MCNC: 42 MG/DL
INTERPRETATION, 910389: NORMAL
LDLC SERPL CALC-MCNC: 63 MG/DL (ref 0–99)
Lab: NORMAL
MICROALBUMIN UR-MCNC: 25.3 UG/ML
POTASSIUM SERPL-SCNC: 4.3 MMOL/L (ref 3.5–5.2)
PROT SERPL-MCNC: 7 G/DL (ref 6–8.5)
SODIUM SERPL-SCNC: 141 MMOL/L (ref 134–144)
TRIGL SERPL-MCNC: 208 MG/DL (ref 0–149)
VLDLC SERPL CALC-MCNC: 42 MG/DL (ref 5–40)

## 2019-08-22 ENCOUNTER — TELEPHONE (OUTPATIENT)
Dept: ENDOCRINOLOGY | Age: 79
End: 2019-08-22

## 2019-08-22 NOTE — TELEPHONE ENCOUNTER
----- Message from Gary Masters sent at 8/22/2019 12:28 PM EDT -----  Regarding: Dr. Abiel Negrete / Nolan Rodriguez, Daughter ([phone]) is returning a call

## 2019-08-22 NOTE — PROGRESS NOTES
Attempt to contact patient at [phone]. Left voicemail for return call. Number provided [phone] does not belong to patient.

## 2019-10-07 RX ORDER — DULOXETIN HYDROCHLORIDE 60 MG/1
CAPSULE, DELAYED RELEASE ORAL
Qty: 90 CAP | Refills: 0 | Status: SHIPPED | OUTPATIENT
Start: 2019-10-07 | End: 2021-09-17

## 2019-10-12 ENCOUNTER — ED HISTORICAL/CONVERTED ENCOUNTER (OUTPATIENT)
Dept: OTHER | Age: 79
End: 2019-10-12

## 2020-04-15 ENCOUNTER — ED HISTORICAL/CONVERTED ENCOUNTER (OUTPATIENT)
Dept: OTHER | Age: 80
End: 2020-04-15

## 2020-05-23 ENCOUNTER — OP HISTORICAL/CONVERTED ENCOUNTER (OUTPATIENT)
Dept: OTHER | Age: 80
End: 2020-05-23

## 2020-07-12 ENCOUNTER — OP HISTORICAL/CONVERTED ENCOUNTER (OUTPATIENT)
Dept: OTHER | Age: 80
End: 2020-07-12

## 2020-08-20 ENCOUNTER — ED HISTORICAL/CONVERTED ENCOUNTER (OUTPATIENT)
Dept: OTHER | Age: 80
End: 2020-08-20

## 2021-09-14 ENCOUNTER — TRANSCRIBE ORDER (OUTPATIENT)
Dept: SCHEDULING | Age: 81
End: 2021-09-14
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
## 2021-09-14 DIAGNOSIS — G44.89 SLUDER'S NEURALGIA: Primary | ICD-10-CM

## 2021-09-17 ENCOUNTER — HOSPITAL ENCOUNTER (EMERGENCY)
Age: 81
Discharge: HOME | End: 2021-09-17
Attending: EMERGENCY MEDICINE
Payer: MEDICARE

## 2021-09-17 ENCOUNTER — APPOINTMENT (OUTPATIENT)
Dept: GENERAL RADIOLOGY | Age: 81
End: 2021-09-17
Attending: FAMILY MEDICINE
Payer: MEDICARE

## 2021-09-17 VITALS
HEART RATE: 85 BPM | BODY MASS INDEX: 30.18 KG/M2 | HEIGHT: 62 IN | SYSTOLIC BLOOD PRESSURE: 125 MMHG | WEIGHT: 164 LBS | DIASTOLIC BLOOD PRESSURE: 61 MMHG | TEMPERATURE: 98.3 F | OXYGEN SATURATION: 94 % | RESPIRATION RATE: 18 BRPM

## 2021-09-17 DIAGNOSIS — R05.9 COUGH: Primary | ICD-10-CM

## 2021-09-17 PROCEDURE — 99282 EMERGENCY DEPT VISIT SF MDM: CPT

## 2021-09-17 PROCEDURE — 71045 X-RAY EXAM CHEST 1 VIEW: CPT

## 2021-09-17 RX ORDER — QUETIAPINE FUMARATE 25 MG/1
25 TABLET, FILM COATED ORAL 2 TIMES DAILY
COMMUNITY

## 2021-09-17 RX ORDER — AZITHROMYCIN 250 MG/1
TABLET, FILM COATED ORAL
Qty: 6 TABLET | Refills: 0 | Status: SHIPPED | OUTPATIENT
Start: 2021-09-17 | End: 2022-04-25

## 2021-09-17 RX ORDER — ACETAMINOPHEN AND CODEINE PHOSPHATE 300; 30 MG/1; MG/1
1 TABLET ORAL
Qty: 5 TABLET | Refills: 0 | Status: SHIPPED | OUTPATIENT
Start: 2021-09-17 | End: 2021-09-22

## 2021-09-17 NOTE — ED TRIAGE NOTES
Pt c/o nasal congestion and cough x 1 week. Saw PCP and had a negative COVID test and negative UA. Family reports increasing confusion.

## 2021-09-18 NOTE — ED PROVIDER NOTES
EMERGENCY DEPARTMENT HISTORY AND PHYSICAL EXAM      Date: 9/17/2021  Patient Name: Maggie Corral    History of Presenting Illness     Chief Complaint   Patient presents with    Cough       History Provided By: Patient and granddaughters. HPI: Maggie Corral, 80 y.o. female   presents to the ED with cc of cough. The family states the patient has had intermittent episodes of nonproductive cough for last several days. Family states the coughing episodes are worse at nighttime after she lays down on the bed. No fever chills. Patient denies any chest pain or shortness of breath. Patient has received Covid vaccination. No obvious exposed COVID-19. No posttussive vomiting. Patient was seen by her primary care last week for the same and was tested negative for COVID-19. Family states that patient has not given any medication for her coughing and requests a cough medicine due to severe cough at night impeding her sleep. Patient has history of dementia and the family states that her confusion has been intermittently worse over last several days. PCP: Xavier Aiken MD    No current facility-administered medications on file prior to encounter. Current Outpatient Medications on File Prior to Encounter   Medication Sig Dispense Refill    QUEtiapine (SEROqueL) 25 mg tablet Take 25 mg by mouth two (2) times a day. Take 2 per dose      atorvastatin (LIPITOR) 10 mg tablet Take 1 Tab by mouth daily.  losartan (COZAAR) 25 mg tablet Take 1 Tab by mouth daily.          Past History     Past Medical History:  Past Medical History:   Diagnosis Date    Arthritis     Cancer ([facility] [address] 2006    colon cancer    COPD     sleep apnea    Diabetes ([facility] [address]     GERD (gastroesophageal reflux disease)     Other ill-defined conditions(799.89)     fibromylgia    PUD (peptic ulcer disease)     Unspecified sleep apnea        Past Surgical History:  Past Surgical History:   Procedure Laterality Date    HX APPENDECTOMY      HX BACK SURGERY      neck and back    HX HYSTERECTOMY      HX ORTHOPAEDIC  2010    right tkr    HX OTHER SURGICAL      tonsil    [place] ABDOMEN SURGERY PROC UNLISTED  2006    colectomy    RI WRIST ARTHROSCOP,DIAGNOSTIC      bilateral carpal tunell       Family History:  Family History   Problem Relation Age of Onset    Diabetes Mother     Diabetes Sister     Diabetes Brother     Heart Disease Brother        Social History:  Social History     Tobacco Use    Smoking status: Former Smoker    Smokeless tobacco: Never Used   Substance Use Topics    Alcohol use: No    Drug use: Not on file       Allergies:  No Known Allergies      Review of Systems   Review of Systems   Constitutional: Negative for activity change, appetite change, chills and fever. HENT: Negative for sore throat. Eyes: Negative for discharge. Respiratory: Negative for shortness of breath. Cardiovascular: Negative for chest pain. Gastrointestinal: Negative for nausea. Endocrine: Negative for polyuria. Genitourinary: Negative for difficulty urinating. Musculoskeletal: Negative for arthralgias. Skin: Negative for rash. Neurological: Negative for headaches. Hematological: Negative for adenopathy. Psychiatric/Behavioral: Positive for confusion. All other systems reviewed and are negative. Physical Exam   Physical Exam  Vitals and nursing note reviewed. Constitutional:       Appearance: Normal appearance. HENT:      Head: Normocephalic and atraumatic. Nose: Nose normal.      Mouth/Throat:      Mouth: Mucous membranes are moist.      Pharynx: Oropharynx is clear. Eyes:      Extraocular Movements: Extraocular movements intact. Conjunctiva/sclera: Conjunctivae normal.      Pupils: Pupils are equal, round, and reactive to light. Cardiovascular:      Rate and Rhythm: Normal rate and regular rhythm. Heart sounds: Normal heart sounds.    Pulmonary:      Effort: Pulmonary effort is normal.      Breath sounds: Normal breath sounds. Abdominal:      General: Abdomen is flat. Bowel sounds are normal.      Palpations: Abdomen is soft. Musculoskeletal:      Cervical back: Neck supple. Right lower leg: No edema. Left lower leg: No edema. Skin:     General: Skin is warm and dry. Neurological:      General: No focal deficit present. Mental Status: She is alert. Cranial Nerves: No cranial nerve deficit. Motor: No weakness. Gait: Gait normal.      Comments: Patient is oriented to place and person and situation. Patient is disoriented to time. Psychiatric:         Mood and Affect: Mood normal.         Diagnostic Study Results     Labs -   No results found for this or any previous visit (from the past 12 hour(s)). Radiologic Studies -   XR CHEST SNGL V   Final Result   No acute process or active disease. CT Results  (Last 48 hours)    None        CXR Results  (Last 48 hours)               09/17/21 1838  XR CHEST SNGL V Final result    Impression:  No acute process or active disease. Narrative:  9/17/2021 6:38 PM       A single view of the chest is presented with no old films available for   comparison. The heart, mediastinum and pulmonary vasculature are normal. The   lungs are relatively well expanded and clear. No acute finding is identified. Medical Decision Making   I am the first provider for this patient. I reviewed the vital signs, available nursing notes, past medical history, past surgical history, family history and social history. Vital Signs-Reviewed the patient's vital signs. Patient Vitals for the past 12 hrs:   Temp Pulse Resp BP SpO2   09/17/21 1811 98.3 °F (36.8 °C) 85 18 125/61 94 %       Records Reviewed:     Provider Notes (Medical Decision Making):       ED Course:   Initial assessment performed.  The patients presenting problems have been discussed, and they are in agreement with the care plan formulated and outlined with them. I have encouraged them to ask questions as they arise throughout their visit. Family request for cough suppressant. I discussed with the family about cautious use of Tylenol with codeine at night. PROCEDURES      Disposition: Condition stable   DC- Adult Discharges: All of the diagnostic tests were reviewed and questions answered. Diagnosis, care plan and treatment options were discussed. understand instructions and will follow up as directed. The patients results have been reviewed with them. They have been counseled regarding their diagnosis. The patient verbally convey understanding and agreement of the signs, symptoms, diagnosis, treatment and prognosis and additionally agrees to follow up as recommended. They also agree with the care-plan and convey that all of their questions have been answered. I have also put together some discharge instructions for them that include: 1) educational information regarding their diagnosis, 2) how to care for their diagnosis at home, as well a 3) list of reasons why they would want to return to the ED prior to their follow-up appointment, should their condition change. PLAN:  1. Discharge Medication List as of 9/17/2021  7:47 PM      START taking these medications    Details   azithromycin (Zithromax Z-Nabeel) 250 mg tablet As instructed in the pack, Normal, Disp-6 Tablet, R-0      guaiFENesin-dextromethorphan SR (Mucinex DM) 600-30 mg per tablet Take 1 Tablet by mouth two (2) times a day., Normal, Disp-12 Tablet, R-0      acetaminophen-codeine (Tylenol-Codeine #3) 300-30 mg per tablet Take 1 Tablet by mouth nightly as needed for Pain (cough) for up to 5 days. Max Daily Amount: 1 Tablet., Normal, Disp-5 Tablet, R-0         CONTINUE these medications which have NOT CHANGED    Details   QUEtiapine (SEROqueL) 25 mg tablet Take 25 mg by mouth two (2) times a day.  Take 2 per dose, Historical Med      atorvastatin (LIPITOR) 10 mg tablet Take 1 Tab by mouth daily. , Historical Med      losartan (COZAAR) 25 mg tablet Take 1 Tab by mouth daily. , Historical Med           2. Follow-up Information     Follow up With Specialties Details Why Contact Info    Follow up with your primary care physician  Schedule an appointment as soon as possible for a visit in 3 days As needed         Return to ED if worse     Diagnosis     Clinical Impression:   1. Cough        Please note that this dictation was completed with [employer], the computer voice recognition software. Quite often unanticipated grammatical, syntax, homophones, and other interpretive errors are inadvertently transcribed by the computer software. Please disregard these errors. Please excuse any errors that have escaped final proofreading. Thank you.

## 2021-10-28 ENCOUNTER — TRANSCRIBE ORDER (OUTPATIENT)
Dept: SCHEDULING | Age: 81
End: 2021-10-28

## 2021-10-28 DIAGNOSIS — R91.1 LUNG NODULE: ICD-10-CM

## 2021-10-28 DIAGNOSIS — C18.2 MALIGNANT NEOPLASM OF ASCENDING COLON (HCC): Primary | ICD-10-CM

## 2021-10-28 DIAGNOSIS — K92.2 ACUTE GASTROINTESTINAL HEMORRHAGE: ICD-10-CM

## 2021-10-29 ENCOUNTER — HOSPITAL ENCOUNTER (OUTPATIENT)
Dept: INFUSION THERAPY | Age: 81
Discharge: HOME | End: 2021-10-29
Payer: MEDICARE

## 2021-10-29 VITALS
HEART RATE: 76 BPM | TEMPERATURE: 98.2 F | SYSTOLIC BLOOD PRESSURE: 133 MMHG | RESPIRATION RATE: 18 BRPM | DIASTOLIC BLOOD PRESSURE: 75 MMHG | OXYGEN SATURATION: 96 %

## 2021-10-29 PROCEDURE — 96374 THER/PROPH/DIAG INJ IV PUSH: CPT

## 2021-10-29 PROCEDURE — 74011250636 HC RX REV CODE- 250/636: Performed by: INTERNAL MEDICINE

## 2021-10-29 PROCEDURE — 36430 TRANSFUSION BLD/BLD COMPNT: CPT

## 2021-10-29 PROCEDURE — 74011250637 HC RX REV CODE- 250/637: Performed by: INTERNAL MEDICINE

## 2021-10-29 PROCEDURE — 86923 COMPATIBILITY TEST ELECTRIC: CPT

## 2021-10-29 PROCEDURE — P9016 RBC LEUKOCYTES REDUCED: HCPCS

## 2021-10-29 PROCEDURE — 36415 COLL VENOUS BLD VENIPUNCTURE: CPT

## 2021-10-29 PROCEDURE — 86901 BLOOD TYPING SEROLOGIC RH(D): CPT

## 2021-10-29 RX ORDER — FUROSEMIDE 10 MG/ML
20 INJECTION INTRAMUSCULAR; INTRAVENOUS ONCE
Status: COMPLETED | OUTPATIENT
Start: 2021-10-29 | End: 2021-10-29

## 2021-10-29 RX ORDER — ACETAMINOPHEN 325 MG/1
650 TABLET ORAL ONCE
Status: COMPLETED | OUTPATIENT
Start: 2021-10-29 | End: 2021-10-29

## 2021-10-29 RX ORDER — SODIUM CHLORIDE 9 MG/ML
250 INJECTION, SOLUTION INTRAVENOUS AS NEEDED
Status: DISCONTINUED | OUTPATIENT
Start: 2021-10-29 | End: 2021-10-31 | Stop reason: HOSPADM

## 2021-10-29 RX ADMIN — FUROSEMIDE 20 MG: 10 INJECTION, SOLUTION INTRAMUSCULAR; INTRAVENOUS at 13:35

## 2021-10-29 RX ADMIN — ACETAMINOPHEN 650 MG: 325 TABLET ORAL at 10:52

## 2021-10-29 NOTE — PROGRESS NOTES
1ST UNIT PRBC'S INFUSED PT TOLERATED WELL NO DISTRESS NOTED FAMILY AT BEDSIDE DISCHARGE INSTRUCTIONS GIVEN TO DAUGHTER

## 2021-10-30 LAB
ABO + RH BLD: NORMAL
BLD PROD TYP BPU: NORMAL
BLOOD GROUP ANTIBODIES SERPL: NEGATIVE
BPU ID: NORMAL
CROSSMATCH RESULT,%XM: NORMAL
SPECIMEN EXP DATE BLD: NORMAL
STATUS OF UNIT,%ST: NORMAL
TRANSFUSION STATUS PATIENT QL: NORMAL
UNIT DIVISION, %UDIV: 0

## 2021-11-02 ENCOUNTER — HOSPITAL ENCOUNTER (OUTPATIENT)
Dept: LAB | Age: 81
Discharge: HOME | End: 2021-11-02
Attending: INTERNAL MEDICINE
Payer: MEDICARE

## 2021-11-02 ENCOUNTER — TRANSCRIBE ORDER (OUTPATIENT)
Dept: REGISTRATION | Age: 81
End: 2021-11-02

## 2021-11-02 ENCOUNTER — HOSPITAL ENCOUNTER (OUTPATIENT)
Dept: LAB | Age: 81
End: 2021-11-02
Attending: INTERNAL MEDICINE
Payer: MEDICARE

## 2021-11-02 ENCOUNTER — HOSPITAL ENCOUNTER (OUTPATIENT)
Dept: CT IMAGING | Age: 81
Discharge: HOME | End: 2021-11-02
Attending: INTERNAL MEDICINE
Payer: MEDICARE

## 2021-11-02 DIAGNOSIS — G44.89 SLUDER'S NEURALGIA: ICD-10-CM

## 2021-11-02 DIAGNOSIS — C18.2 ASCENDING COLON MALIGNANT NEOPLASM (HCC): ICD-10-CM

## 2021-11-02 DIAGNOSIS — C18.2 MALIGNANT NEOPLASM OF ASCENDING COLON (HCC): ICD-10-CM

## 2021-11-02 DIAGNOSIS — C18.2 MALIGNANT NEOPLASM OF ASCENDING COLON (HCC): Primary | ICD-10-CM

## 2021-11-02 DIAGNOSIS — R91.1 LUNG NODULE: ICD-10-CM

## 2021-11-02 DIAGNOSIS — K92.2 ACUTE GASTROINTESTINAL HEMORRHAGE: ICD-10-CM

## 2021-11-02 LAB — CREAT SERPL-MCNC: 0.76 MG/DL (ref 0.55–1.02)

## 2021-11-02 PROCEDURE — 74177 CT ABD & PELVIS W/CONTRAST: CPT

## 2021-11-02 PROCEDURE — 74011000636 HC RX REV CODE- 636: Performed by: INTERNAL MEDICINE

## 2021-11-02 PROCEDURE — 36415 COLL VENOUS BLD VENIPUNCTURE: CPT

## 2021-11-02 PROCEDURE — 82565 ASSAY OF CREATININE: CPT

## 2021-11-02 PROCEDURE — 71260 CT THORAX DX C+: CPT

## 2021-11-02 RX ADMIN — IOPAMIDOL 100 ML: 755 INJECTION, SOLUTION INTRAVENOUS at 14:44

## 2022-03-18 PROBLEM — E11.21 TYPE 2 DIABETES MELLITUS WITH NEPHROPATHY (HCC): Status: ACTIVE | Noted: 2018-01-08

## 2022-03-19 PROBLEM — E11.65 UNCONTROLLED TYPE 2 DIABETES MELLITUS WITH HYPERGLYCEMIA, WITH LONG-TERM CURRENT USE OF INSULIN (HCC): Status: ACTIVE | Noted: 2017-08-28

## 2022-03-19 PROBLEM — G30.9 ALZHEIMER'S DEMENTIA WITHOUT BEHAVIORAL DISTURBANCE (HCC): Status: ACTIVE | Noted: 2017-08-28

## 2022-03-19 PROBLEM — Z79.4 UNCONTROLLED TYPE 2 DIABETES MELLITUS WITH HYPERGLYCEMIA, WITH LONG-TERM CURRENT USE OF INSULIN (HCC): Status: ACTIVE | Noted: 2017-08-28

## 2022-03-19 PROBLEM — E78.2 MIXED HYPERLIPIDEMIA: Status: ACTIVE | Noted: 2017-08-28

## 2022-03-19 PROBLEM — F02.80 ALZHEIMER'S DEMENTIA WITHOUT BEHAVIORAL DISTURBANCE (HCC): Status: ACTIVE | Noted: 2017-08-28

## 2022-03-20 PROBLEM — I10 ESSENTIAL HYPERTENSION: Status: ACTIVE | Noted: 2017-08-28

## 2022-04-25 ENCOUNTER — APPOINTMENT (OUTPATIENT)
Dept: CT IMAGING | Age: 82
DRG: 871 | End: 2022-04-25
Attending: PHYSICIAN ASSISTANT
Payer: MEDICARE

## 2022-04-25 ENCOUNTER — APPOINTMENT (OUTPATIENT)
Dept: GENERAL RADIOLOGY | Age: 82
DRG: 871 | End: 2022-04-25
Attending: EMERGENCY MEDICINE
Payer: MEDICARE

## 2022-04-25 ENCOUNTER — HOSPITAL ENCOUNTER (INPATIENT)
Age: 82
LOS: 6 days | Discharge: HOME HEALTH | DRG: 871 | End: 2022-05-01
Attending: EMERGENCY MEDICINE | Admitting: INTERNAL MEDICINE
Payer: MEDICARE

## 2022-04-25 ENCOUNTER — APPOINTMENT (OUTPATIENT)
Dept: CT IMAGING | Age: 82
DRG: 871 | End: 2022-04-25
Attending: EMERGENCY MEDICINE
Payer: MEDICARE

## 2022-04-25 DIAGNOSIS — R09.02 HYPOXIA: ICD-10-CM

## 2022-04-25 DIAGNOSIS — J18.9 COMMUNITY ACQUIRED PNEUMONIA, UNSPECIFIED LATERALITY: Primary | ICD-10-CM

## 2022-04-25 DIAGNOSIS — N17.9 AKI (ACUTE KIDNEY INJURY) (HCC): ICD-10-CM

## 2022-04-25 PROBLEM — J69.0 ASPIRATION PNEUMONIA (HCC): Status: ACTIVE | Noted: 2022-04-25

## 2022-04-25 LAB
ALBUMIN SERPL-MCNC: 3.7 G/DL (ref 3.5–5)
ALBUMIN/GLOB SERPL: 1.1 {RATIO} (ref 1.1–2.2)
ALP SERPL-CCNC: 76 U/L (ref 45–117)
ALT SERPL-CCNC: 22 U/L (ref 12–78)
ANION GAP SERPL CALC-SCNC: 9 MMOL/L (ref 5–15)
APPEARANCE UR: ABNORMAL
AST SERPL W P-5'-P-CCNC: 27 U/L (ref 15–37)
ATRIAL RATE: 101 BPM
BACTERIA URNS QL MICRO: NEGATIVE /HPF
BASOPHILS # BLD: 0 K/UL (ref 0–0.1)
BASOPHILS NFR BLD: 0 % (ref 0–1)
BILIRUB SERPL-MCNC: 0.8 MG/DL (ref 0.2–1)
BILIRUB UR QL: NEGATIVE
BNP SERPL-MCNC: 597 PG/ML
BUN SERPL-MCNC: 24 MG/DL (ref 6–20)
BUN/CREAT SERPL: 17 (ref 12–20)
CA-I BLD-MCNC: 10 MG/DL (ref 8.5–10.1)
CALCULATED P AXIS, ECG09: 16 DEGREES
CALCULATED R AXIS, ECG10: -26 DEGREES
CALCULATED T AXIS, ECG11: 48 DEGREES
CHLORIDE SERPL-SCNC: 99 MMOL/L (ref 97–108)
CO2 SERPL-SCNC: 25 MMOL/L (ref 21–32)
COLOR UR: ABNORMAL
CREAT SERPL-MCNC: 1.4 MG/DL (ref 0.55–1.02)
DIAGNOSIS, 93000: NORMAL
DIFFERENTIAL METHOD BLD: ABNORMAL
EOSINOPHIL # BLD: 0 K/UL (ref 0–0.4)
EOSINOPHIL NFR BLD: 0 % (ref 0–7)
ERYTHROCYTE [DISTWIDTH] IN BLOOD BY AUTOMATED COUNT: 15.5 % (ref 11.5–14.5)
GLOBULIN SER CALC-MCNC: 3.5 G/DL (ref 2–4)
GLUCOSE BLD STRIP.AUTO-MCNC: 318 MG/DL (ref 65–117)
GLUCOSE SERPL-MCNC: 419 MG/DL (ref 65–100)
GLUCOSE UR STRIP.AUTO-MCNC: >300 MG/DL
HCT VFR BLD AUTO: 39.6 % (ref 35–47)
HGB BLD-MCNC: 13 G/DL (ref 11.5–16)
HGB UR QL STRIP: NEGATIVE
HYALINE CASTS URNS QL MICRO: ABNORMAL /LPF (ref 0–5)
IMM GRANULOCYTES # BLD AUTO: 0 K/UL
IMM GRANULOCYTES NFR BLD AUTO: 0 %
KETONES UR QL STRIP.AUTO: 5 MG/DL
LACTATE SERPL-SCNC: 4.5 MMOL/L (ref 0.4–2)
LEUKOCYTE ESTERASE UR QL STRIP.AUTO: ABNORMAL
LYMPHOCYTES # BLD: 0.8 K/UL (ref 0.8–3.5)
LYMPHOCYTES NFR BLD: 6 % (ref 12–49)
MCH RBC QN AUTO: 29.7 PG (ref 26–34)
MCHC RBC AUTO-ENTMCNC: 32.8 G/DL (ref 30–36.5)
MCV RBC AUTO: 90.6 FL (ref 80–99)
MONOCYTES # BLD: 0.9 K/UL (ref 0–1)
MONOCYTES NFR BLD: 7 % (ref 5–13)
MUCOUS THREADS URNS QL MICRO: ABNORMAL /LPF
NEUTS SEG # BLD: 11.8 K/UL (ref 1.8–8)
NEUTS SEG NFR BLD: 87 % (ref 32–75)
NITRITE UR QL STRIP.AUTO: NEGATIVE
NRBC # BLD: 0 K/UL (ref 0–0.01)
NRBC BLD-RTO: 0 PER 100 WBC
P-R INTERVAL, ECG05: 156 MS
PERFORMED BY, TECHID: ABNORMAL
PH UR STRIP: 6 [PH] (ref 5–8)
PLATELET # BLD AUTO: 255 K/UL (ref 150–400)
PMV BLD AUTO: 12.6 FL (ref 8.9–12.9)
POTASSIUM SERPL-SCNC: 4.4 MMOL/L (ref 3.5–5.1)
PROT SERPL-MCNC: 7.2 G/DL (ref 6.4–8.2)
PROT UR STRIP-MCNC: 30 MG/DL
Q-T INTERVAL, ECG07: 366 MS
QRS DURATION, ECG06: 64 MS
QTC CALCULATION (BEZET), ECG08: 474 MS
RBC # BLD AUTO: 4.37 M/UL (ref 3.8–5.2)
RBC #/AREA URNS HPF: ABNORMAL /HPF (ref 0–5)
RBC MORPH BLD: ABNORMAL
SODIUM SERPL-SCNC: 133 MMOL/L (ref 136–145)
SP GR UR REFRACTOMETRY: 1.02 (ref 1–1.03)
TROPONIN-HIGH SENSITIVITY: 17 NG/L (ref 0–51)
UA: UC IF INDICATED,UAUC: ABNORMAL
UROBILINOGEN UR QL STRIP.AUTO: 0.1 EU/DL (ref 0.1–1)
VENTRICULAR RATE, ECG03: 101 BPM
WBC # BLD AUTO: 13.5 K/UL (ref 3.6–11)
WBC URNS QL MICRO: ABNORMAL /HPF (ref 0–4)

## 2022-04-25 PROCEDURE — 74011250637 HC RX REV CODE- 250/637: Performed by: PHYSICIAN ASSISTANT

## 2022-04-25 PROCEDURE — 36415 COLL VENOUS BLD VENIPUNCTURE: CPT

## 2022-04-25 PROCEDURE — 99285 EMERGENCY DEPT VISIT HI MDM: CPT

## 2022-04-25 PROCEDURE — 65270000032 HC RM SEMIPRIVATE

## 2022-04-25 PROCEDURE — 74011000250 HC RX REV CODE- 250: Performed by: EMERGENCY MEDICINE

## 2022-04-25 PROCEDURE — 74011636637 HC RX REV CODE- 636/637: Performed by: PHYSICIAN ASSISTANT

## 2022-04-25 PROCEDURE — 83036 HEMOGLOBIN GLYCOSYLATED A1C: CPT

## 2022-04-25 PROCEDURE — 83605 ASSAY OF LACTIC ACID: CPT

## 2022-04-25 PROCEDURE — 74176 CT ABD & PELVIS W/O CONTRAST: CPT

## 2022-04-25 PROCEDURE — 87086 URINE CULTURE/COLONY COUNT: CPT

## 2022-04-25 PROCEDURE — 74011250637 HC RX REV CODE- 250/637: Performed by: INTERNAL MEDICINE

## 2022-04-25 PROCEDURE — 70450 CT HEAD/BRAIN W/O DYE: CPT

## 2022-04-25 PROCEDURE — 71250 CT THORAX DX C-: CPT

## 2022-04-25 PROCEDURE — 82962 GLUCOSE BLOOD TEST: CPT

## 2022-04-25 PROCEDURE — 87040 BLOOD CULTURE FOR BACTERIA: CPT

## 2022-04-25 PROCEDURE — 74011250636 HC RX REV CODE- 250/636: Performed by: EMERGENCY MEDICINE

## 2022-04-25 PROCEDURE — 71045 X-RAY EXAM CHEST 1 VIEW: CPT

## 2022-04-25 PROCEDURE — 74011000258 HC RX REV CODE- 258: Performed by: PHYSICIAN ASSISTANT

## 2022-04-25 PROCEDURE — 74011250636 HC RX REV CODE- 250/636: Performed by: PHYSICIAN ASSISTANT

## 2022-04-25 PROCEDURE — 93005 ELECTROCARDIOGRAM TRACING: CPT

## 2022-04-25 PROCEDURE — 96375 TX/PRO/DX INJ NEW DRUG ADDON: CPT

## 2022-04-25 PROCEDURE — 84484 ASSAY OF TROPONIN QUANT: CPT

## 2022-04-25 PROCEDURE — 80053 COMPREHEN METABOLIC PANEL: CPT

## 2022-04-25 PROCEDURE — 74011250636 HC RX REV CODE- 250/636: Performed by: INTERNAL MEDICINE

## 2022-04-25 PROCEDURE — 81001 URINALYSIS AUTO W/SCOPE: CPT

## 2022-04-25 PROCEDURE — 83880 ASSAY OF NATRIURETIC PEPTIDE: CPT

## 2022-04-25 PROCEDURE — 74011000250 HC RX REV CODE- 250: Performed by: PHYSICIAN ASSISTANT

## 2022-04-25 PROCEDURE — 96374 THER/PROPH/DIAG INJ IV PUSH: CPT

## 2022-04-25 PROCEDURE — 85025 COMPLETE CBC W/AUTO DIFF WBC: CPT

## 2022-04-25 RX ORDER — BISMUTH SUBSALICYLATE 262 MG
1 TABLET,CHEWABLE ORAL DAILY
COMMUNITY

## 2022-04-25 RX ORDER — POLYETHYLENE GLYCOL 3350 17 G/17G
17 POWDER, FOR SOLUTION ORAL DAILY PRN
Status: DISCONTINUED | OUTPATIENT
Start: 2022-04-25 | End: 2022-05-01 | Stop reason: HOSPADM

## 2022-04-25 RX ORDER — ONDANSETRON 2 MG/ML
4 INJECTION INTRAMUSCULAR; INTRAVENOUS
Status: DISCONTINUED | OUTPATIENT
Start: 2022-04-25 | End: 2022-05-01 | Stop reason: HOSPADM

## 2022-04-25 RX ORDER — SODIUM CHLORIDE 0.9 % (FLUSH) 0.9 %
5-40 SYRINGE (ML) INJECTION EVERY 8 HOURS
Status: DISCONTINUED | OUTPATIENT
Start: 2022-04-25 | End: 2022-05-01 | Stop reason: HOSPADM

## 2022-04-25 RX ORDER — GALANTAMINE HYDROBROMIDE 16 MG/1
16 CAPSULE, EXTENDED RELEASE ORAL
COMMUNITY

## 2022-04-25 RX ORDER — ASPIRIN 81 MG/1
TABLET ORAL
COMMUNITY

## 2022-04-25 RX ORDER — SODIUM CHLORIDE 9 MG/ML
500 INJECTION, SOLUTION INTRAVENOUS ONCE
Status: COMPLETED | OUTPATIENT
Start: 2022-04-25 | End: 2022-04-25

## 2022-04-25 RX ORDER — SODIUM CHLORIDE 9 MG/ML
75 INJECTION, SOLUTION INTRAVENOUS ONCE
Status: COMPLETED | OUTPATIENT
Start: 2022-04-25 | End: 2022-04-25

## 2022-04-25 RX ORDER — INSULIN LISPRO 100 [IU]/ML
INJECTION, SOLUTION INTRAVENOUS; SUBCUTANEOUS
Status: DISCONTINUED | OUTPATIENT
Start: 2022-04-25 | End: 2022-05-01 | Stop reason: HOSPADM

## 2022-04-25 RX ORDER — OLANZAPINE 10 MG/1
5 TABLET ORAL EVERY EVENING
Status: DISCONTINUED | OUTPATIENT
Start: 2022-04-25 | End: 2022-04-26

## 2022-04-25 RX ORDER — ENOXAPARIN SODIUM 100 MG/ML
30 INJECTION SUBCUTANEOUS DAILY
Status: DISCONTINUED | OUTPATIENT
Start: 2022-04-26 | End: 2022-05-01 | Stop reason: HOSPADM

## 2022-04-25 RX ORDER — GLIPIZIDE 5 MG/1
5 TABLET, FILM COATED, EXTENDED RELEASE ORAL DAILY
COMMUNITY

## 2022-04-25 RX ORDER — MAGNESIUM SULFATE 100 %
4 CRYSTALS MISCELLANEOUS AS NEEDED
Status: DISCONTINUED | OUTPATIENT
Start: 2022-04-25 | End: 2022-05-01 | Stop reason: HOSPADM

## 2022-04-25 RX ORDER — SODIUM CHLORIDE 0.9 % (FLUSH) 0.9 %
5-40 SYRINGE (ML) INJECTION AS NEEDED
Status: DISCONTINUED | OUTPATIENT
Start: 2022-04-25 | End: 2022-05-01 | Stop reason: HOSPADM

## 2022-04-25 RX ORDER — FERROUS SULFATE 325(65) MG
325 TABLET, DELAYED RELEASE (ENTERIC COATED) ORAL 2 TIMES DAILY
COMMUNITY

## 2022-04-25 RX ORDER — ACETAMINOPHEN 650 MG/1
650 SUPPOSITORY RECTAL
Status: DISCONTINUED | OUTPATIENT
Start: 2022-04-25 | End: 2022-05-01 | Stop reason: HOSPADM

## 2022-04-25 RX ORDER — ACETAMINOPHEN 325 MG/1
650 TABLET ORAL
Status: DISCONTINUED | OUTPATIENT
Start: 2022-04-25 | End: 2022-05-01 | Stop reason: HOSPADM

## 2022-04-25 RX ORDER — DEXTROSE MONOHYDRATE 100 MG/ML
0-250 INJECTION, SOLUTION INTRAVENOUS AS NEEDED
Status: DISCONTINUED | OUTPATIENT
Start: 2022-04-25 | End: 2022-05-01 | Stop reason: HOSPADM

## 2022-04-25 RX ORDER — MEMANTINE HYDROCHLORIDE 10 MG/1
10 TABLET ORAL 2 TIMES DAILY
COMMUNITY

## 2022-04-25 RX ORDER — ONDANSETRON 4 MG/1
4 TABLET, ORALLY DISINTEGRATING ORAL
Status: DISCONTINUED | OUTPATIENT
Start: 2022-04-25 | End: 2022-05-01 | Stop reason: HOSPADM

## 2022-04-25 RX ORDER — SODIUM CHLORIDE 9 MG/ML
50 INJECTION, SOLUTION INTRAVENOUS CONTINUOUS
Status: DISCONTINUED | OUTPATIENT
Start: 2022-04-25 | End: 2022-04-26

## 2022-04-25 RX ADMIN — WATER 1 G: 1 INJECTION INTRAMUSCULAR; INTRAVENOUS; SUBCUTANEOUS at 14:53

## 2022-04-25 RX ADMIN — INSULIN LISPRO 4 UNITS: 100 INJECTION, SOLUTION INTRAVENOUS; SUBCUTANEOUS at 22:53

## 2022-04-25 RX ADMIN — SODIUM CHLORIDE 500 ML: 9 INJECTION, SOLUTION INTRAVENOUS at 21:00

## 2022-04-25 RX ADMIN — PIPERACILLIN AND TAZOBACTAM 3.38 G: 3; .375 INJECTION, POWDER, LYOPHILIZED, FOR SOLUTION INTRAVENOUS at 22:54

## 2022-04-25 RX ADMIN — SODIUM CHLORIDE, PRESERVATIVE FREE 10 ML: 5 INJECTION INTRAVENOUS at 22:32

## 2022-04-25 RX ADMIN — OLANZAPINE 5 MG: 10 TABLET, FILM COATED ORAL at 22:31

## 2022-04-25 RX ADMIN — ACETAMINOPHEN 650 MG: 325 TABLET ORAL at 22:30

## 2022-04-25 RX ADMIN — PIPERACILLIN AND TAZOBACTAM 3.38 G: 3; .375 INJECTION, POWDER, LYOPHILIZED, FOR SOLUTION INTRAVENOUS at 15:39

## 2022-04-25 RX ADMIN — SODIUM CHLORIDE 75 ML/HR: 9 INJECTION, SOLUTION INTRAVENOUS at 14:54

## 2022-04-25 NOTE — ED PROVIDER NOTES
EMERGENCY DEPARTMENT HISTORY AND PHYSICAL EXAM      Date: 4/25/2022  Patient Name: Nella Newton      History of Presenting Illness     Chief Complaint   Patient presents with    Fatigue    Nausea    Vomiting    Chest Pain    Abdominal Pain       History Provided By: Patient    HPI: Nella Newton, 80 y.o. female with a past medical history significant  For arthritis, history of colon cancer, diabetes, GERD, peptic ulcer disease presents to the ED with cc of chest pain. Patient's family noticed since yesterday patient has not been acting herself. She has had vomiting nausea and diffuse weakness. Complaining of pain in her chest.  She is status postcholecystectomy. She does have a history of a gastric ulcer. She has not had trouble breathing but has had a chronic cough which she has had for many years. This is unchanged. Patient has a history of a COVID infection in late 2021. There are no other complaints, changes, or physical findings at this time. PCP: Morris Reeves    Current Facility-Administered Medications   Medication Dose Route Frequency Provider Last Rate Last Admin    cefTRIAXone (ROCEPHIN) 1 g in sterile water (preservative free) 10 mL IV syringe  1 g IntraVENous NOW Clarissa Serrano MD        0.9% sodium chloride infusion  75 mL/hr IntraVENous Holley Rai MD         Current Outpatient Medications   Medication Sig Dispense Refill    QUEtiapine (SEROqueL) 25 mg tablet Take 25 mg by mouth two (2) times a day. Take 2 per dose      azithromycin (Zithromax Z-Nabeel) 250 mg tablet As instructed in the pack 6 Tablet 0    guaiFENesin-dextromethorphan SR (Mucinex DM) 600-30 mg per tablet Take 1 Tablet by mouth two (2) times a day. 12 Tablet 0    atorvastatin (LIPITOR) 10 mg tablet Take 1 Tab by mouth daily.  losartan (COZAAR) 25 mg tablet Take 1 Tab by mouth daily.          Past History     Past Medical History:  Past Medical History:   Diagnosis Date    Arthritis     [facility]    colon cancer    COPD     sleep apnea    [facility]     GERD (gastroesophageal reflux disease)     Other ill-defined conditions(799.89)     fibromylgia    PUD (peptic ulcer disease)     Unspecified sleep apnea        Past Surgical History:  Past Surgical History:   Procedure Laterality Date    HX APPENDECTOMY      HX BACK SURGERY      neck and back    HX HYSTERECTOMY      HX ORTHOPAEDIC  2010    right tkr    HX OTHER SURGICAL      tonsil    AR ABDOMEN SURGERY PROC UNLISTED  2006    colectomy    AR WRIST ARTHROSCOP,DIAGNOSTIC      bilateral carpal tunell       Family History:  Family History   Problem Relation Age of Onset    Diabetes Mother     Diabetes Sister     Diabetes Brother     Heart Disease Brother        Social History:  Social History     Tobacco Use    Smoking status: Former Smoker    Smokeless tobacco: Never Used   Substance Use Topics    Alcohol use: No    Drug use: Not on file       Allergies:  No Known Allergies      Review of Systems     Review of Systems   Unable to perform ROS: Dementia       Physical Exam     Physical Exam  Vitals and nursing note reviewed. Constitutional:       Appearance: Normal appearance. She is normal weight. HENT:      Head: Normocephalic and atraumatic. Nose: Nose normal.      Mouth/Throat:      Mouth: Mucous membranes are moist.   Eyes:      Conjunctiva/sclera: Conjunctivae normal.   Cardiovascular:      Rate and Rhythm: Normal rate. Pulses: Normal pulses. Heart sounds: Normal heart sounds. Pulmonary:      Effort: Pulmonary effort is normal. No respiratory distress. Breath sounds: Normal breath sounds. Comments: 2L NC  Abdominal:      Tenderness: There is no abdominal tenderness. There is no guarding or rebound. Musculoskeletal:         General: No swelling or deformity. Normal range of motion. Skin:     General: Skin is warm and dry. Findings: No rash.    Neurological:      General: No focal deficit present. Mental Status: She is alert and oriented to person, place, and time. Sensory: No sensory deficit. Motor: No weakness. Psychiatric:         Mood and Affect: Mood normal.         Behavior: Behavior normal.         Lab and Diagnostic Study Results     Labs -     Recent Results (from the past 12 hour(s))   CBC WITH AUTOMATED DIFF    Collection Time: 04/25/22 11:34 AM   Result Value Ref Range    WBC 13.5 (H) 3.6 - 11.0 K/uL    RBC 4.37 3.80 - 5.20 M/uL    HGB 13.0 11.5 - 16.0 g/dL    HCT 39.6 35.0 - 47.0 %    MCV 90.6 80.0 - 99.0 FL    MCH 29.7 26.0 - 34.0 PG    MCHC 32.8 30.0 - 36.5 g/dL    RDW 15.5 (H) 11.5 - 14.5 %    PLATELET 227 840 - 942 K/uL    MPV 12.6 8.9 - 12.9 FL    NRBC 0.0 0.0  WBC    ABSOLUTE NRBC 0.00 0.00 - 0.01 K/uL    NEUTROPHILS 87 (H) 32 - 75 %    LYMPHOCYTES 6 (L) 12 - 49 %    MONOCYTES 7 5 - 13 %    EOSINOPHILS 0 0 - 7 %    BASOPHILS 0 0 - 1 %    IMMATURE GRANULOCYTES 0 %    ABS. NEUTROPHILS 11.8 (H) 1.8 - 8.0 K/UL    ABS. LYMPHOCYTES 0.8 0.8 - 3.5 K/UL    ABS. MONOCYTES 0.9 0.0 - 1.0 K/UL    ABS. EOSINOPHILS 0.0 0.0 - 0.4 K/UL    ABS. BASOPHILS 0.0 0.0 - 0.1 K/UL    ABS. IMM. GRANS. 0.0 K/UL    DF Manual      RBC COMMENTS Normocytic, Normochromic     METABOLIC PANEL, COMPREHENSIVE    Collection Time: 04/25/22 11:34 AM   Result Value Ref Range    Sodium 133 (L) 136 - 145 mmol/L    Potassium 4.4 3.5 - 5.1 mmol/L    Chloride 99 97 - 108 mmol/L    CO2 25 21 - 32 mmol/L    Anion gap 9 5 - 15 mmol/L    Glucose 419 (H) 65 - 100 mg/dL    BUN 24 (H) 6 - 20 mg/dL    Creatinine 1.40 (H) 0.55 - 1.02 mg/dL    BUN/Creatinine ratio 17 12 - 20      GFR est AA 44 (L) >60 ml/min/1.73m2    GFR est non-AA 36 (L) >60 ml/min/1.73m2    Calcium 10.0 8.5 - 10.1 mg/dL    Bilirubin, total 0.8 0.2 - 1.0 mg/dL    AST (SGOT) 27 15 - 37 U/L    ALT (SGPT) 22 12 - 78 U/L    Alk.  phosphatase 76 45 - 117 U/L    Protein, total 7.2 6.4 - 8.2 g/dL    Albumin 3.7 3.5 - 5.0 g/dL    Globulin 3.5 2.0 - 4.0 g/dL    A-G Ratio 1.1 1.1 - 2.2     TROPONIN-HIGH SENSITIVITY    Collection Time: 04/25/22 11:34 AM   Result Value Ref Range    Troponin-High Sensitivity 17 0 - 51 ng/L       Radiologic Studies -   [unfilled]  CT Results  (Last 48 hours)               04/25/22 1147  CT HEAD WO CONT Final result    Impression:  Mild central and cortical atrophy. Moderate microvascular ischemic   changes           No acute intracranial abnormality. Narrative:  CT SCAN OF THE BRAIN WITHOUT CONTRAST:               CLINICAL HISTORY: Altered mental status. Thin axial and coronal and sagittal reconstructions were obtained. All CT scans at this facility are performed using dose reduction optimization   techniques as appropriate to a performed exam including the following: Automated   exposure control, adjustments of the mA and/or kV according to patient size, or   use of iterative reconstruction technique. Ventricles are midline. Mild central and cortical atrophy is noted including   mild atrophy of the medial temporal lobes. . Moderate microvascular ischemic   changes are noted. No intra or extra-axial hemorrhage, mass or acute infarction   in a major arterial distribution is demonstrated. Pituitary gland is of normal   size. Scans through the posterior fossa show no major infarction, mass or hemorrhage. The cerebellar tonsils are at the level of the foramen magnum in a satisfactory   position. Visualized paranasal sinuses are clear. Mastoid air cells are clear. Both orbits have a normal CT appearance. CXR Results  (Last 48 hours)               04/25/22 1200  XR CHEST PORT Final result    Narrative:  Chest single view. Comparison single view chest September 17, 2021. Hazy reticular markings central and medial basilar lungs.  Consider mild degree   central pulmonary interstitial edema, possible cardiogenic cause and/or infectious/inflammatory origin. Cardiac and mediastinal structures otherwise unchanged. Thoracic aorta   atherosclerosis. No pneumothorax or sizable pleural effusion. Medical Decision Making and ED Course   - I am the first and primary provider for this patient AND AM THE PRIMARY PROVIDER OF RECORD. - I reviewed the vital signs, available nursing notes, past medical history, past surgical history, family history and social history. - Initial assessment performed. The patients presenting problems have been discussed, and the staff are in agreement with the care plan formulated and outlined with them. I have encouraged them to ask questions as they arise throughout their visit. Vital Signs-Reviewed the patient's vital signs. Patient Vitals for the past 12 hrs:   Temp Pulse Resp BP SpO2   04/25/22 1128 -- -- -- -- 93 %   04/25/22 1125 97.6 °F (36.4 °C) 99 19 115/62 (!) 88 %     Records Reviewed: Nursing Notes  ED Course:       ED Course as of 04/25/22 1439   Mon Apr 25, 2022   1203 EKG performed at 1122, read 1123. Sinus tachycardia with rate of 101. Normal MT, normal QRS, normal QTC. [LW]   0 80year-old female presents for generalized fatigue, nausea, vomiting. Also has had some chest pain. Family notes that she has been diffusely weak and not seeming like herself. She required multiple people to help her to the car which is not normal for her. They have not noticed any fevers. They state that they all had COVID in late December. On exam patient is well-appearing but on room air was satting in the high 80s requiring 2 L nasal cannula. She does not have an oxygen requirement at baseline. Differential diagnosis is broad and includes pneumonia versus viral syndrome versus UTI versus other infectious etiology versus CAROL versus electrolyte disarray. Getting labs including a CBC, CMP, troponin, UA, blood cultures, BMP, lactate as well as a chest x-ray and head CT. Disposition as per clinical course. [LW]   26 Head CT with atrophy consistent with patient's history of dementia. Chest x-ray with possible interstitial edema versus infectious etiology. Patient now requiring 4 L nasal cannula. Started on ceftriaxone. Blood cultures and lactate ordered. Patient has CAROL. Gentle fluids started at a rate of 75 an hour. Patient admitted. Dr. Reji Angulo accepts. [LW]      ED Course User Index  [LW] Porter López MD         Consultations:       Consultations: -  Hospitalist Consultant: Dr. Reji Angulo: We have asked for emergent assistance with regard to this patient. We have discussed the patients HPI, ROS, PE and results this far. They will come and evaluate the patient for admission. Disposition     Disposition: Admitted to Floor Medical Floor the case was discussed with the admitting physician Reji Angulo. Admitted      Diagnosis     Clinical Impression:   1. Community acquired pneumonia, unspecified laterality    2. CAROL (acute kidney injury) ([facility]    3. Hypoxia        Attestations:    Cecilia Forbes MD    Please note that this dictation was completed with [employer], the computer voice recognition software. Quite often unanticipated grammatical, syntax, homophones, and other interpretive errors are inadvertently transcribed by the computer software. Please disregard these errors. Please excuse any errors that have escaped final proofreading. Thank you.

## 2022-04-25 NOTE — PROGRESS NOTES
Reason for Admission:  Community acquired pneumonia                     RUR Score:   N/A                  Plan for utilizing home health:   [facility]        PCP: First and Last name:  Claudeen Park     Name of Practice:    Are you a current patient: Yes/No:    Approximate date of last visit:  January 19, 2022   Can you participate in a virtual visit with your PCP:                     Current Advanced Directive/Advance Care Plan: No Order      Healthcare Decision Maker:   Click here to complete [address] including selection of the [address] Relationship (ie \"Primary\")           Jag, [phone]                  Transition of Care Plan:                      Met f/f with Pt and her daughter. Pt daughter confirmed that the information on the face sheet is correct. Pt daughter stated that Pt lives with her. Pt daughter stated that they have, walker, cane, bedside commode, shower chair, and hospital bed. Pt daughter stated that they use [facility], choice letter signed and placed on chart. Pt daughter stated that if Pt has to go to rehab she wants her to go to [facility]. Pt daughter stated that Pt uses [facility] on [address]. Pt daughter stated that she will give Pt a ride when she is D/C from the hospital.     CM DISPO: Home with [facility] vs IRF.

## 2022-04-25 NOTE — ED NOTES
1420- Pt o2 sats assessed at 88-89 percent on RA placed on 02 at 2l, provider sam aware    1423- pt o2 sats remains at 88-89 percent on RA, o2 increase to 95% on 4l, provider at bedside and aware

## 2022-04-25 NOTE — H&P
History and Physical    Patient: Suman White MRN: [id number]  SSN: [id number]    YOB: 1940  Age: 80 y.o. Sex: female      Subjective:      Suman White is a 80 y.o. female with a hx of DMII, alzheimer's dementia, HTN, HLD who was brought in by daughter for shortness of breath. History is limited due to pt's dementia, history provided by daughter at bedside. Daughter who lives with patient reports for the past few days she has noticed patient has been more confused and fatigued. At baseline patient is able to ambulate unassisted but today was unable to get up on her own. Daughter states patient felt unwell this morning with 2 episodes of non-bloody emesis. Patient has also been complaining of RUQ abdominal pain. No recent urinary changes, diarrhea, constipation. Given patient has not been acting herself daughter checked patient's vitals and brought her to the ED when SpO2 at home was 88%. Pt is not on supplemental O2 at baseline. In the ED workup showed WBC of 13.5, CAROL with Cr of 1.4. CXR and CT of the chest concerning for developing aspiration PNA, patient treated empirically with Rocephin. CT of the head/neck with no acute findings. Patient is now requiring 4-5L of supplemental O2 via NC. Patient will be admitted for management of aspiraton PNA and hypoxia.      Past Medical History:   Diagnosis Date    Arthritis     Cancer [facility]) 2006    colon cancer    COPD     sleep apnea    Diabetes ([facility]     GERD (gastroesophageal reflux disease)     Other ill-defined conditions(799.89)     fibromylgia    PUD (peptic ulcer disease)     Unspecified sleep apnea      Past Surgical History:   Procedure Laterality Date    HX APPENDECTOMY      HX BACK SURGERY      neck and back    HX HYSTERECTOMY      HX ORTHOPAEDIC  2010    right tkr    HX OTHER SURGICAL      tonsil    AR ABDOMEN SURGERY PROC UNLISTED  2006    colectomy    AR WRIST ARTHROSCOP,DIAGNOSTIC      bilateral carpal tunell Family History   Problem Relation Age of Onset    Diabetes Mother     Diabetes Sister     Diabetes Brother     Heart Disease Brother      Social History     Tobacco Use    Smoking status: Former Smoker    Smokeless tobacco: Never Used   Substance Use Topics    Alcohol use: No      Prior to Admission medications    Medication Sig Start Date End Date Taking? Authorizing Provider   aspirin delayed-release (Aspir-81) 81 mg tablet Aspir-81   1 tablet daily   Yes Provider, Historical   glipiZIDE SR (GLUCOTROL XL) 5 mg CR tablet Take 5 mg by mouth daily. Yes Other, MD Alisha   galantamine (RAZADYNE) 16 mg SR capsule Take 16 mg by mouth Daily (before breakfast). Yes Other, MD Alisha   ferrous sulfate (IRON) 325 mg (65 mg iron) EC tablet Take 325 mg by mouth two (2) times a day. Yes Destiny, MD Alisha   memantine (NAMENDA) 10 mg tablet Take 10 mg by mouth two (2) times a day. Yes Other, MD Alisha   multivitamin (ONE A DAY) tablet Take 1 Tablet by mouth daily. Yes Destiny, MD Alisha   QUEtiapine (SEROqueL) 25 mg tablet Take 25 mg by mouth two (2) times a day. Take 2 per dose   Yes Other, MD Alisha   guaiFENesin-dextromethorphan SR (Mucinex DM) 600-30 mg per tablet Take 1 Tablet by mouth two (2) times a day. 9/17/21  Yes Eugene Lomeli MD   atorvastatin (LIPITOR) 10 mg tablet Take 1 Tab by mouth daily. 2/10/17  Yes Provider, Historical        No Known Allergies    Review of Systems:  Review of Systems   Respiratory: Positive for shortness of breath. Cardiovascular: Negative for chest pain. Gastrointestinal: Positive for abdominal pain and vomiting. ROS Limited due to dementia.      Objective:     Recent Results (from the past 24 hour(s))   EKG, 12 LEAD, INITIAL    Collection Time: 04/25/22 11:22 AM   Result Value Ref Range    Ventricular Rate 101 BPM    Atrial Rate 101 BPM    P-R Interval 156 ms    QRS Duration 64 ms    Q-T Interval 366 ms    QTC Calculation (Bezet) 474 ms    Calculated P Axis 16 degrees Calculated R Axis -26 degrees    Calculated T Axis 48 degrees    Diagnosis       Sinus tachycardia  Low voltage QRS  Cannot rule out Anterior infarct , age undetermined  Abnormal ECG  No previous ECGs available  Confirmed by Brenton Tabares ([id number]) on 4/25/2022 4:16:05 PM     CBC WITH AUTOMATED DIFF    Collection Time: 04/25/22 11:34 AM   Result Value Ref Range    WBC 13.5 (H) 3.6 - 11.0 K/uL    RBC 4.37 3.80 - 5.20 M/uL    HGB 13.0 11.5 - 16.0 g/dL    HCT 39.6 35.0 - 47.0 %    MCV 90.6 80.0 - 99.0 FL    MCH 29.7 26.0 - 34.0 PG    MCHC 32.8 30.0 - 36.5 g/dL    RDW 15.5 (H) 11.5 - 14.5 %    PLATELET 157 874 - 108 K/uL    MPV 12.6 8.9 - 12.9 FL    NRBC 0.0 0.0  WBC    ABSOLUTE NRBC 0.00 0.00 - 0.01 K/uL    NEUTROPHILS 87 (H) 32 - 75 %    LYMPHOCYTES 6 (L) 12 - 49 %    MONOCYTES 7 5 - 13 %    EOSINOPHILS 0 0 - 7 %    BASOPHILS 0 0 - 1 %    IMMATURE GRANULOCYTES 0 %    ABS. NEUTROPHILS 11.8 (H) 1.8 - 8.0 K/UL    ABS. LYMPHOCYTES 0.8 0.8 - 3.5 K/UL    ABS. MONOCYTES 0.9 0.0 - 1.0 K/UL    ABS. EOSINOPHILS 0.0 0.0 - 0.4 K/UL    ABS. BASOPHILS 0.0 0.0 - 0.1 K/UL    ABS. IMM. GRANS. 0.0 K/UL    DF Manual      RBC COMMENTS Normocytic, Normochromic     METABOLIC PANEL, COMPREHENSIVE    Collection Time: 04/25/22 11:34 AM   Result Value Ref Range    Sodium 133 (L) 136 - 145 mmol/L    Potassium 4.4 3.5 - 5.1 mmol/L    Chloride 99 97 - 108 mmol/L    CO2 25 21 - 32 mmol/L    Anion gap 9 5 - 15 mmol/L    Glucose 419 (H) 65 - 100 mg/dL    BUN 24 (H) 6 - 20 mg/dL    Creatinine 1.40 (H) 0.55 - 1.02 mg/dL    BUN/Creatinine ratio 17 12 - 20      GFR est AA 44 (L) >60 ml/min/1.73m2    GFR est non-AA 36 (L) >60 ml/min/1.73m2    Calcium 10.0 8.5 - 10.1 mg/dL    Bilirubin, total 0.8 0.2 - 1.0 mg/dL    AST (SGOT) 27 15 - 37 U/L    ALT (SGPT) 22 12 - 78 U/L    Alk.  phosphatase 76 45 - 117 U/L    Protein, total 7.2 6.4 - 8.2 g/dL    Albumin 3.7 3.5 - 5.0 g/dL    Globulin 3.5 2.0 - 4.0 g/dL    A-G Ratio 1.1 1.1 - 2.2     TROPONIN-HIGH SENSITIVITY    Collection Time: 04/25/22 11:34 AM   Result Value Ref Range    Troponin-High Sensitivity 17 0 - 51 ng/L   NT-PRO BNP    Collection Time: 04/25/22  2:48 PM   Result Value Ref Range    NT pro- (H) <450 pg/mL        CT CHEST WO CONT   Final Result   1. Interval development of extensive predominantly central bilateral airspace   disease or edema. 2. Thickened esophagus with mild dilatation distally. Recommend clinical   evaluation and follow-up. 3. Increasing size of several small mediastinal lymph nodes one of which is now   borderline in size. XR CHEST PORT   Final Result      CT HEAD WO CONT   Final Result   Mild central and cortical atrophy. Moderate microvascular ischemic   changes         No acute intracranial abnormality. XR CHEST PORT    (Results Pending)   CT ABD PELV WO CONT    (Results Pending)        Vitals:    04/25/22 1125 04/25/22 1128 04/25/22 1450   BP: 115/62  (!) 137/56   Pulse: 99  100   Resp: 19  19   Temp: 97.6 °F (36.4 °C)     SpO2: (!) 88% 93% 92%   Weight: 70.3 kg (155 lb)     Height: 5' (1.524 m)          Physical Exam:  Physical Exam  Vitals reviewed. Constitutional:       Appearance: She is not ill-appearing. Cardiovascular:      Rate and Rhythm: Regular rhythm. Tachycardia present. Heart sounds: Normal heart sounds. Pulmonary:      Effort: Pulmonary effort is normal.      Breath sounds: Rhonchi present. Comments: On 4-5L NC   Abdominal:      General: There is no distension. Palpations: Abdomen is soft. Tenderness: There is abdominal tenderness (RUQ). There is no guarding. Musculoskeletal:         General: Normal range of motion. Right lower leg: No edema. Left lower leg: No edema. Skin:     General: Skin is warm and dry. Neurological:      Mental Status: She is alert. She is disoriented. Motor: No weakness.       Comments: Pleasantly demented           Assessment/Plan:     Hospital Problems  Date Reviewed: 8/19/2019          Codes Class Noted POA    Aspiration pneumonia ([facility] ICD-10-CM: J69.0  ICD-9-CM: 507.0  4/25/2022 Unknown            Aspiration PNA  - Given dose of Rocephin in ED, changed to Zosyn   - Repeat CXR in AM   - Sputum culture  - Pulmonology consult   -High likelihood of further decompensation, discussed with daughter    Acute respiratory failure with hypoxia  - Supplemental O2, keep >92%. - Pulmonary consult  -RT consult  -Continuous pulse OX    Sepsis  - WBC 13.5  - Lactate pending   - Urine pending  - Blood culture  - IVF NS     RUQ Abdominal pain   -  CT A/P ordered     CAROL  - Cr 1.40  - Continue to monitor  - IVF NS     Mild hyponatremia  - Na 133  - Likely due to dehydration  - Continue to monitor   - IVF NS     DM  - Glucose 419, HHS  - Insuline sliding scale     Dementia/worsening AMS  - CT head and neck without acute abnormality   - Continue home medications, Memantine     Code status: DNR  DVT prophylaxis: Lovenox   GI prophylaxis:   POA: DaughterNick Son. [phone]    Case and treatment plan discussed at length with Daughter at bedside. >75 minutes spent on patient management.        Signed By: Coby Monahan     April 25, 2022

## 2022-04-25 NOTE — ED TRIAGE NOTES
Per daughter pt had increased in right sided weakness that started yesterday, pt has hx of TIAs and known history of right sided weakness. Pt presents with generalized weakness n/v and chest pain. [facility] -  Has hx of alzheimer and dementia.

## 2022-04-25 NOTE — ROUTINE PROCESS
TRANSFER - OUT REPORT:    Verbal [facility] on [employer]  being transferred to [facility](unit) for routine progression of care       Report consisted of patients Situation, Background, Assessment and   Recommendations(SBAR). Information from the following report(s) SBAR and ED Summary was reviewed with the receiving nurse. Lines:   Peripheral IV 04/25/22 Anterior;Left;Proximal Forearm (Active)        Opportunity for questions and clarification was provided.       Patient transported with:   [employer]

## 2022-04-26 ENCOUNTER — APPOINTMENT (OUTPATIENT)
Dept: GENERAL RADIOLOGY | Age: 82
DRG: 871 | End: 2022-04-26
Attending: PHYSICIAN ASSISTANT
Payer: MEDICARE

## 2022-04-26 LAB
ALBUMIN SERPL-MCNC: 3.1 G/DL (ref 3.5–5)
ALBUMIN/GLOB SERPL: 1 {RATIO} (ref 1.1–2.2)
ALP SERPL-CCNC: 64 U/L (ref 45–117)
ALT SERPL-CCNC: 19 U/L (ref 12–78)
ANION GAP SERPL CALC-SCNC: 5 MMOL/L (ref 5–15)
ARTERIAL PATENCY WRIST A: ABNORMAL
AST SERPL W P-5'-P-CCNC: 14 U/L (ref 15–37)
BASE DEFICIT BLDA-SCNC: 2.3 MMOL/L (ref 0–2)
BASOPHILS # BLD: 0.1 K/UL (ref 0–0.1)
BASOPHILS NFR BLD: 0 % (ref 0–1)
BDY SITE: ABNORMAL
BILIRUB SERPL-MCNC: 0.5 MG/DL (ref 0.2–1)
BUN SERPL-MCNC: 32 MG/DL (ref 6–20)
BUN/CREAT SERPL: 27 (ref 12–20)
CA-I BLD-MCNC: 8.9 MG/DL (ref 8.5–10.1)
CHLORIDE SERPL-SCNC: 104 MMOL/L (ref 97–108)
CO2 SERPL-SCNC: 26 MMOL/L (ref 21–32)
CREAT SERPL-MCNC: 1.17 MG/DL (ref 0.55–1.02)
DIFFERENTIAL METHOD BLD: ABNORMAL
EOSINOPHIL # BLD: 0 K/UL (ref 0–0.4)
EOSINOPHIL NFR BLD: 0 % (ref 0–7)
ERYTHROCYTE [DISTWIDTH] IN BLOOD BY AUTOMATED COUNT: 15.7 % (ref 11.5–14.5)
EST. AVERAGE GLUCOSE BLD GHB EST-MCNC: 171 MG/DL
GAS FLOW.O2 O2 DELIVERY SYS: 3 L/MIN
GLOBULIN SER CALC-MCNC: 3.2 G/DL (ref 2–4)
GLUCOSE BLD STRIP.AUTO-MCNC: 167 MG/DL (ref 65–117)
GLUCOSE BLD STRIP.AUTO-MCNC: 204 MG/DL (ref 65–117)
GLUCOSE BLD STRIP.AUTO-MCNC: 291 MG/DL (ref 65–117)
GLUCOSE BLD STRIP.AUTO-MCNC: 298 MG/DL (ref 65–117)
GLUCOSE SERPL-MCNC: 279 MG/DL (ref 65–100)
HBA1C MFR BLD: 7.6 % (ref 4–5.6)
HCO3 BLDA-SCNC: 22 MMOL/L (ref 22–26)
HCT VFR BLD AUTO: 33.3 % (ref 35–47)
HGB BLD-MCNC: 10.7 G/DL (ref 11.5–16)
IMM GRANULOCYTES # BLD AUTO: 0.4 K/UL (ref 0–0.04)
IMM GRANULOCYTES NFR BLD AUTO: 2 % (ref 0–0.5)
LACTATE SERPL-SCNC: 2.6 MMOL/L (ref 0.4–2)
LACTATE SERPL-SCNC: 3.2 MMOL/L (ref 0.4–2)
LACTATE SERPL-SCNC: 4.8 MMOL/L (ref 0.4–2)
LYMPHOCYTES # BLD: 1.1 K/UL (ref 0.8–3.5)
LYMPHOCYTES NFR BLD: 6 % (ref 12–49)
MCH RBC QN AUTO: 29.6 PG (ref 26–34)
MCHC RBC AUTO-ENTMCNC: 32.1 G/DL (ref 30–36.5)
MCV RBC AUTO: 92 FL (ref 80–99)
MONOCYTES # BLD: 0.7 K/UL (ref 0–1)
MONOCYTES NFR BLD: 1 % (ref 5–13)
NEUTS SEG # BLD: 17.2 K/UL (ref 1.8–8)
NEUTS SEG NFR BLD: 89 % (ref 32–75)
NRBC # BLD: 0 K/UL (ref 0–0.01)
NRBC BLD-RTO: 0 PER 100 WBC
PCO2 BLDA: 36 MMHG (ref 35–45)
PERFORMED BY, TECHID: ABNORMAL
PH BLDA: 7.4 [PH] (ref 7.35–7.45)
PLATELET # BLD AUTO: 207 K/UL (ref 150–400)
PMV BLD AUTO: 12.5 FL (ref 8.9–12.9)
PO2 BLDA: 70 MMHG (ref 75–100)
POTASSIUM SERPL-SCNC: 3.8 MMOL/L (ref 3.5–5.1)
PROT SERPL-MCNC: 6.3 G/DL (ref 6.4–8.2)
RBC # BLD AUTO: 3.62 M/UL (ref 3.8–5.2)
SAO2 % BLD: 94 %
SAO2% DEVICE SAO2% SENSOR NAME: ABNORMAL
SODIUM SERPL-SCNC: 135 MMOL/L (ref 136–145)
SPECIMEN SITE: ABNORMAL
WBC # BLD AUTO: 19.4 K/UL (ref 3.6–11)

## 2022-04-26 PROCEDURE — 74011000258 HC RX REV CODE- 258: Performed by: PHYSICIAN ASSISTANT

## 2022-04-26 PROCEDURE — 77010033678 HC OXYGEN DAILY

## 2022-04-26 PROCEDURE — 82962 GLUCOSE BLOOD TEST: CPT

## 2022-04-26 PROCEDURE — 71045 X-RAY EXAM CHEST 1 VIEW: CPT

## 2022-04-26 PROCEDURE — 83605 ASSAY OF LACTIC ACID: CPT

## 2022-04-26 PROCEDURE — 74011250636 HC RX REV CODE- 250/636: Performed by: PHYSICIAN ASSISTANT

## 2022-04-26 PROCEDURE — 36415 COLL VENOUS BLD VENIPUNCTURE: CPT

## 2022-04-26 PROCEDURE — 80053 COMPREHEN METABOLIC PANEL: CPT

## 2022-04-26 PROCEDURE — 85025 COMPLETE CBC W/AUTO DIFF WBC: CPT

## 2022-04-26 PROCEDURE — 74011250637 HC RX REV CODE- 250/637: Performed by: PHYSICIAN ASSISTANT

## 2022-04-26 PROCEDURE — 36600 WITHDRAWAL OF ARTERIAL BLOOD: CPT

## 2022-04-26 PROCEDURE — 65270000032 HC RM SEMIPRIVATE

## 2022-04-26 PROCEDURE — 74011636637 HC RX REV CODE- 636/637: Performed by: PHYSICIAN ASSISTANT

## 2022-04-26 PROCEDURE — 92610 EVALUATE SWALLOWING FUNCTION: CPT

## 2022-04-26 PROCEDURE — 82803 BLOOD GASES ANY COMBINATION: CPT

## 2022-04-26 PROCEDURE — 74011000250 HC RX REV CODE- 250: Performed by: PHYSICIAN ASSISTANT

## 2022-04-26 PROCEDURE — 94761 N-INVAS EAR/PLS OXIMETRY MLT: CPT

## 2022-04-26 RX ORDER — PANTOPRAZOLE SODIUM 40 MG/1
40 TABLET, DELAYED RELEASE ORAL
Status: DISCONTINUED | OUTPATIENT
Start: 2022-04-26 | End: 2022-04-29

## 2022-04-26 RX ORDER — GALANTAMINE 4 MG/1
8 TABLET, FILM COATED ORAL 2 TIMES DAILY WITH MEALS
Status: DISCONTINUED | OUTPATIENT
Start: 2022-04-26 | End: 2022-05-01 | Stop reason: HOSPADM

## 2022-04-26 RX ORDER — MEMANTINE HYDROCHLORIDE 10 MG/1
10 TABLET ORAL 2 TIMES DAILY
Status: DISCONTINUED | OUTPATIENT
Start: 2022-04-26 | End: 2022-05-01 | Stop reason: HOSPADM

## 2022-04-26 RX ORDER — QUETIAPINE FUMARATE 25 MG/1
25 TABLET, FILM COATED ORAL 2 TIMES DAILY
Status: DISCONTINUED | OUTPATIENT
Start: 2022-04-26 | End: 2022-05-01 | Stop reason: HOSPADM

## 2022-04-26 RX ORDER — GLIPIZIDE 5 MG/1
5 TABLET ORAL
Status: DISCONTINUED | OUTPATIENT
Start: 2022-04-27 | End: 2022-05-01 | Stop reason: HOSPADM

## 2022-04-26 RX ADMIN — SODIUM CHLORIDE, PRESERVATIVE FREE 10 ML: 5 INJECTION INTRAVENOUS at 21:07

## 2022-04-26 RX ADMIN — INSULIN LISPRO 2 UNITS: 100 INJECTION, SOLUTION INTRAVENOUS; SUBCUTANEOUS at 21:06

## 2022-04-26 RX ADMIN — INSULIN LISPRO 5 UNITS: 100 INJECTION, SOLUTION INTRAVENOUS; SUBCUTANEOUS at 17:27

## 2022-04-26 RX ADMIN — QUETIAPINE FUMARATE 25 MG: 25 TABLET, FILM COATED ORAL at 12:12

## 2022-04-26 RX ADMIN — PIPERACILLIN AND TAZOBACTAM 3.38 G: 3; .375 INJECTION, POWDER, LYOPHILIZED, FOR SOLUTION INTRAVENOUS at 14:38

## 2022-04-26 RX ADMIN — MEMANTINE HYDROCHLORIDE 10 MG: 10 TABLET ORAL at 21:06

## 2022-04-26 RX ADMIN — PIPERACILLIN AND TAZOBACTAM 3.38 G: 3; .375 INJECTION, POWDER, LYOPHILIZED, FOR SOLUTION INTRAVENOUS at 09:27

## 2022-04-26 RX ADMIN — SODIUM CHLORIDE, PRESERVATIVE FREE 10 ML: 5 INJECTION INTRAVENOUS at 06:13

## 2022-04-26 RX ADMIN — SODIUM CHLORIDE, PRESERVATIVE FREE 10 ML: 5 INJECTION INTRAVENOUS at 14:39

## 2022-04-26 RX ADMIN — SODIUM CHLORIDE 500 ML: 9 INJECTION, SOLUTION INTRAVENOUS at 14:38

## 2022-04-26 RX ADMIN — ACETAMINOPHEN 650 MG: 325 TABLET ORAL at 21:06

## 2022-04-26 RX ADMIN — ENOXAPARIN SODIUM 30 MG: 100 INJECTION SUBCUTANEOUS at 09:27

## 2022-04-26 RX ADMIN — PANTOPRAZOLE SODIUM 40 MG: 40 TABLET, DELAYED RELEASE ORAL at 17:27

## 2022-04-26 RX ADMIN — SODIUM CHLORIDE 50 ML/HR: 9 INJECTION, SOLUTION INTRAVENOUS at 09:27

## 2022-04-26 RX ADMIN — INSULIN LISPRO 5 UNITS: 100 INJECTION, SOLUTION INTRAVENOUS; SUBCUTANEOUS at 12:12

## 2022-04-26 RX ADMIN — QUETIAPINE FUMARATE 25 MG: 25 TABLET, FILM COATED ORAL at 21:06

## 2022-04-26 RX ADMIN — MEMANTINE HYDROCHLORIDE 10 MG: 10 TABLET ORAL at 12:12

## 2022-04-26 RX ADMIN — GALANTAMINE 8 MG: 4 TABLET, FILM COATED ORAL at 17:00

## 2022-04-26 RX ADMIN — INSULIN LISPRO 3 UNITS: 100 INJECTION, SOLUTION INTRAVENOUS; SUBCUTANEOUS at 09:27

## 2022-04-26 NOTE — CONSULTS
PULMONARY CONSULT  [facility]    Name: Manny Horowitz MRN: [id number]   : 1940 Hospital: [facility]   Date: 2022  Admission date: 2022 Hospital Day: 2       HPI:     Hospital Problems  Date Reviewed: 2019          Codes Class Noted POA    Aspiration pneumonia [facility]) ICD-10-CM: J69.0  ICD-9-CM: 507.0  2022 Unknown                   [x] High complexity decision making was performed  [x] See my orders for details      Subjective/Initial History:     I was asked by Ramiro Dixon MD to see Manny Horowitz  a 80 y.o.  female in consultation     Excerpts from admission 2022 or consult notes as follows:   Patient is a 79yo female with a hx of COPD, sleep apnea, Type 2 diabetes, HTN, hyperlipidemia, Alzheimer's dementia, and colon caner that presented to the ED with shortness of breath. The history was given by her daughter. Patient's daughter states that she has had two episodes of non-bloody emesis with RUQ abdominal pain. Denies bowel/bladder changes. Daughter states that her SpO2 was 88% and she is not on supplemental oxygen. CXR and chest CT taken in the ED is concerning for aspiration PNA. Rocephin was started empirically, but changed later to Zosyn. Patient placed on 4L O2 via NC. Pulmonary was consulted for reasons stated above.        No Known Allergies     MAR reviewed and pertinent medications noted or modified as needed     Current Facility-Administered Medications   Medication    QUEtiapine (SEROquel) tablet 25 mg    memantine (NAMENDA) tablet 10 mg    [START ON 2022] glipiZIDE (GLUCOTROL) tablet 5 mg    galantamine (RAZADYNE) tablet 8 mg    pantoprazole (PROTONIX) tablet 40 mg    piperacillin-tazobactam (ZOSYN) 3.375 g in 0.9% sodium chloride (MBP/ADV) 100 mL MBP    sodium chloride (NS) flush 5-40 mL    sodium chloride (NS) flush 5-40 mL    acetaminophen (TYLENOL) tablet 650 mg    Or    acetaminophen (TYLENOL) suppository 650 mg    polyethylene glycol (MIRALAX) packet 17 g    ondansetron (ZOFRAN ODT) tablet 4 mg    Or    ondansetron (ZOFRAN) injection 4 mg    enoxaparin (LOVENOX) injection 30 mg    glucose chewable tablet 16 g    dextrose 10% infusion 0-250 mL    glucagon (GLUCAGEN) injection 1 mg    insulin lispro (HUMALOG) injection    0.9% sodium chloride infusion      Patient PCP: Jordy Medeiros  PMH:  has a past medical history of Arthritis, Cancer ([facility] [address] (), COPD, Diabetes ([facility] [address] GERD (gastroesophageal reflux disease), Other ill-defined conditions(799.89), PUD (peptic ulcer disease), and Unspecified sleep apnea. PSH:   has a past surgical history that includes hx hysterectomy; pr abdomen surgery proc unlisted (); hx appendectomy; hx other surgical; hx orthopaedic (); pr wrist arthroscop,diagnostic; and hx back surgery. FHX: family history includes Diabetes in her brother, mother, and sister; Heart Disease in her brother. SHX:  reports that she has quit smoking. She has never used smokeless tobacco. She reports that she does not drink alcohol. ROS:    Review of Systems   Constitutional: Negative. HENT: Negative. Eyes: Negative. Respiratory: Positive for shortness of breath. Cardiovascular: Negative. Gastrointestinal: Positive for abdominal pain and vomiting. Genitourinary: Negative. Skin: Negative.          Objective:     Vital Signs: Telemetry:    normal sinus rhythm Intake/Output:   Visit Vitals  BP (!) 105/56   Pulse 91   Temp 98.9 °F (37.2 °C)   Resp 20   Ht 5' (1.524 m)   Wt 70.3 kg (155 lb)   SpO2 96%   BMI 30.27 kg/m²       Temp (24hrs), Av.9 °F (36.6 °C), Min:97.3 °F (36.3 °C), Max:98.9 °F (37.2 °C)        O2 Device: Nasal cannula O2 Flow Rate (L/min): 4 l/min       Wt Readings from Last 4 Encounters:   22 70.3 kg (155 lb)   21 74.4 kg (164 lb)   19 92.4 kg (203 lb 12.8 oz)   09/10/18 88.1 kg (194 lb 3.2 oz)        No intake or output data in the 24 hours ending 04/26/22 1043    Last shift:      No intake/output data recorded. Last 3 shifts: No intake/output data recorded. Physical Exam:     Physical Exam  Constitutional:       Appearance: She is not ill-appearing. HENT:      Head: Normocephalic and atraumatic. Cardiovascular:      Rate and Rhythm: Regular rhythm. Tachycardia present. Pulses: Normal pulses. Heart sounds: Normal heart sounds. Pulmonary:      Breath sounds: Rhonchi present. Abdominal:      Tenderness: There is abdominal tenderness. Musculoskeletal:      Right lower leg: No edema. Left lower leg: No edema. Skin:     General: Skin is warm and dry. Labs:    Recent Labs     04/26/22  0046 04/25/22  1134   WBC 19.4* 13.5*   HGB 10.7* 13.0    255     Recent Labs     04/26/22  0047 04/26/22  0046 04/25/22  1847 04/25/22  1134   NA  --  135*  --  133*   K  --  3.8  --  4.4   CL  --  104  --  99   CO2  --  26  --  25   GLU  --  279*  --  419*   BUN  --  32*  --  24*   CREA  --  1.17*  --  1.40*   CA  --  8.9  --  10.0   LAC 3.2*  --  4.5*  --    ALB  --  3.1*  --  3.7   ALT  --  19  --  22     No results for input(s): PH, PCO2, PO2, HCO3, FIO2 in the last 72 hours. No results for input(s): CPK, CKNDX, TROIQ in the last 72 hours. No lab exists for component: CPKMB  No results found for: BNPP, BNP   Lab Results   Component Value Date/Time    Culture result: MIXED SKIN VOLODYMYR ISOLATED 04/28/2010 12:18 PM    Culture result:  04/28/2010 12:03 PM     MRSA NOT PRESENT      Screening of patient nares for MRSA is for surveillance purposes and, if positive, to facilitate isolation considerations in high risk settings. It is not intended for automatic decolonization interventions per se as regimens are not sufficiently effective to warrant routine use.    No results found for: TSH, TSHEXT, TSHEXT    Imaging:    CXR Results  (Last 48 hours)               04/26/22 0929  XR CHEST PORT Final result    Narrative:  Chest single view. Comparison single view chest April 25, 2022. Hazy, somewhat patchy reticular markings throughout the lungs persist, same   distribution. Differential considerations would include infectious/inflammatory   change. Cardiac and mediastinal structures unchanged noting thoracic aorta   atherosclerosis. No pneumothorax or sizable pleural effusion. 04/25/22 1200  XR CHEST PORT Final result    Narrative:  Chest single view. Comparison single view chest September 17, 2021. Hazy reticular markings central and medial basilar lungs. Consider mild degree   central pulmonary interstitial edema, possible cardiogenic cause and/or   infectious/inflammatory origin. Cardiac and mediastinal structures otherwise unchanged. Thoracic aorta   atherosclerosis. No pneumothorax or sizable pleural effusion. Results from [place] encounter on 04/25/22    CT ABD PELV WO CONT    Narrative  CT abdomen and pelvis without IV contrast.    Comparison CT abdomen and pelvis November 2, 2021. Axial images are reviewed along with reformatted sagittal/coronal images. No IV  contrast administered. Sensitivity for detection of neoplastic process limited  in the absence of IV contrast.  Dose reduction: All CT scans at this facility are performed using dose reduction  optimization techniques as appropriate to a performed exam including the  following-  automated exposure control, adjustments of mA and/or Kv according to patient  size, or use of iterative reconstructive technique. Nonspecific hazy reticular markings through basilar lungs. Distal esophageal wall thickening. Mild distal esophageal distention. Small  hiatal hernia. See recent CT chest report. Liver appears unremarkable on this nonenhanced study. Prior cholecystectomy. Pancreas, spleen, and bilateral adrenal glands appear unremarkable.  Each kidney  appears unremarkable on this nonenhanced study. Stomach and small bowel loops are nondistended. Prior partial colectomy. Surgical anastomosis right mid abdomen. Stool and air through colon. Sigmoid  colon diverticula. No CT evidence for appendicitis or diverticulitis. No  ascites. Prior hysterectomy. Atherosclerotic change normal caliber abdominal aorta. Unchanged appearance anterior abdominal wall noted broad-based hernia containing  omental fat but no bowel. Lumbar hardware. Impression  Prior abdominal surgery. No bowel obstruction. No ascites. Prior cholecystectomy. Abdominal aorta atherosclerosis. Findings regarding distal esophagus/GE junction region also described on recent  CT chest.        IMPRESSION:   1. Acute respiratory failure with hypoxia  2. Concern for aspiration pneumonia  3. Chronic Obstructive Pulmonary Disease  4. Sleep apnea  5. GERD  6. Alzheimer's Dementia  7. Colon cancer  8. Type 2 Diabetes  9. GERD  10. PUD  11. Fibromyalgia  12. Hypertension  13. Hyperlipidemia  Body mass index is 30.27 kg/m². 14. Additional workup outlined below  15. Prognosis guarded       RECOMMENDATIONS/PLAN:     1. On 4L O2 via nasal cannula as salvage oxygen delivery device to provide high concentration of oxygen to overcome refractory hypoxia;  2. Patient had vomited yesterday last night after the her condition got worse  3. Chest x-ray showed hazy reticular markings at central and medial basilar lungs. Follow repeat chest x-ray. 4. WBC is elevated at 19.4. Agree with Empiric IV antibiotics pending culture results. Patient was given Rocephin in the ED and changed to Zosyn. 5. Follow culture results  6. Order ABG. 7. CAT scan of the chest shows esophageal wall thickening possible reflux and GERD disease agree with Protonix rule out diabetic gastroparesis  8. Supplemental O2 to keep sats > 93%  9. Aspiration precautions  10. Labs to follow electrolytes, renal function and and blood counts  11.  Glucose monitoring and SSI  12. Bronchial hygiene with respiratory therapy techniques, bronchodilators  13. DVT, SUP prophylaxis  14. Pt needs IV fluids with additives and Drug therapy requiring intensive monitoring for toxicity  15. Prescription drug management with home med reconciliation reviewed       This care involved high complexity medical decision making: I personally:  · Reviewed the flowsheet and previous days notes  · Reviewed and summarized records or history from previous days note or discussions with staff, family  · High Risk Drug therapy requiring intensive monitoring for toxicity: eg steroids, pressors, antibiotics  · Reviewed and/or ordered Clinical lab tests  · Reviewed images and/or ordered Radiology tests  · Reviewed the patients ECG / Telemetry  · Reviewed and/or adjusted NiPPV settings  · Called and arranged for Radiologic procedures or interventions  · performed or ordered Diagnostic endoscopies with identified risk factors.   · discussed my assessment/management with : Nursing, Hospitalist and Family for coordination of care          Gali Sanders MD

## 2022-04-26 NOTE — PROGRESS NOTES
Hospitalist Progress Note    Subjective:   Daily Progress Note: 2022 9:31 AM    Patient examined at bedside, awake and alert. Patient has no current complaints, although hx is limited by dementia. Current Facility-Administered Medications   Medication Dose Route Frequency    piperacillin-tazobactam (ZOSYN) 3.375 g in 0.9% sodium chloride (MBP/ADV) 100 mL MBP  3.375 g IntraVENous Q8H    sodium chloride (NS) flush 5-40 mL  5-40 mL IntraVENous Q8H    sodium chloride (NS) flush 5-40 mL  5-40 mL IntraVENous PRN    acetaminophen (TYLENOL) tablet 650 mg  650 mg Oral Q6H PRN    Or    acetaminophen (TYLENOL) suppository 650 mg  650 mg Rectal Q6H PRN    polyethylene glycol (MIRALAX) packet 17 g  17 g Oral DAILY PRN    ondansetron (ZOFRAN ODT) tablet 4 mg  4 mg Oral Q8H PRN    Or    ondansetron (ZOFRAN) injection 4 mg  4 mg IntraVENous Q6H PRN    enoxaparin (LOVENOX) injection 30 mg  30 mg SubCUTAneous DAILY    glucose chewable tablet 16 g  4 Tablet Oral PRN    dextrose 10% infusion 0-250 mL  0-250 mL IntraVENous PRN    glucagon (GLUCAGEN) injection 1 mg  1 mg IntraMUSCular PRN    insulin lispro (HUMALOG) injection   SubCUTAneous AC&HS    0.9% sodium chloride infusion  50 mL/hr IntraVENous CONTINUOUS    OLANZapine (ZyPREXA) tablet 5 mg  5 mg Oral QPM        Review of Systems  Review of Systems   Respiratory: Positive for shortness of breath. Cardiovascular: Negative for chest pain. Gastrointestinal: Negative for abdominal pain, nausea and vomiting. ROS limited due to dementia      Objective:     Visit Vitals  BP (!) 105/56   Pulse 91   Temp 98.9 °F (37.2 °C)   Resp 20   Ht 5' (1.524 m)   Wt 70.3 kg (155 lb)   SpO2 96%   BMI 30.27 kg/m²    O2 Flow Rate (L/min): 4 l/min O2 Device: Nasal cannula    Temp (24hrs), Av.9 °F (36.6 °C), Min:97.3 °F (36.3 °C), Max:98.9 °F (37.2 °C)      No intake/output data recorded. No intake/output data recorded.     Recent Results (from the past 24 hour(s)) EKG, 12 LEAD, INITIAL    Collection Time: 04/25/22 11:22 AM   Result Value Ref Range    Ventricular Rate 101 BPM    Atrial Rate 101 BPM    P-R Interval 156 ms    QRS Duration 64 ms    Q-T Interval 366 ms    QTC Calculation (Bezet) 474 ms    Calculated P Axis 16 degrees    Calculated R Axis -26 degrees    Calculated T Axis 48 degrees    Diagnosis       Sinus tachycardia  Low voltage QRS  Cannot rule out Anterior infarct , age undetermined  Abnormal ECG  No previous ECGs available  Confirmed by Robbie Rosales ([id number]) on 4/25/2022 4:16:05 PM     CBC WITH AUTOMATED DIFF    Collection Time: 04/25/22 11:34 AM   Result Value Ref Range    WBC 13.5 (H) 3.6 - 11.0 K/uL    RBC 4.37 3.80 - 5.20 M/uL    HGB 13.0 11.5 - 16.0 g/dL    HCT 39.6 35.0 - 47.0 %    MCV 90.6 80.0 - 99.0 FL    MCH 29.7 26.0 - 34.0 PG    MCHC 32.8 30.0 - 36.5 g/dL    RDW 15.5 (H) 11.5 - 14.5 %    PLATELET 488 190 - 616 K/uL    MPV 12.6 8.9 - 12.9 FL    NRBC 0.0 0.0  WBC    ABSOLUTE NRBC 0.00 0.00 - 0.01 K/uL    NEUTROPHILS 87 (H) 32 - 75 %    LYMPHOCYTES 6 (L) 12 - 49 %    MONOCYTES 7 5 - 13 %    EOSINOPHILS 0 0 - 7 %    BASOPHILS 0 0 - 1 %    IMMATURE GRANULOCYTES 0 %    ABS. NEUTROPHILS 11.8 (H) 1.8 - 8.0 K/UL    ABS. LYMPHOCYTES 0.8 0.8 - 3.5 K/UL    ABS. MONOCYTES 0.9 0.0 - 1.0 K/UL    ABS. EOSINOPHILS 0.0 0.0 - 0.4 K/UL    ABS. BASOPHILS 0.0 0.0 - 0.1 K/UL    ABS. IMM.  GRANS. 0.0 K/UL    DF Manual      RBC COMMENTS Normocytic, Normochromic     METABOLIC PANEL, COMPREHENSIVE    Collection Time: 04/25/22 11:34 AM   Result Value Ref Range    Sodium 133 (L) 136 - 145 mmol/L    Potassium 4.4 3.5 - 5.1 mmol/L    Chloride 99 97 - 108 mmol/L    CO2 25 21 - 32 mmol/L    Anion gap 9 5 - 15 mmol/L    Glucose 419 (H) 65 - 100 mg/dL    BUN 24 (H) 6 - 20 mg/dL    Creatinine 1.40 (H) 0.55 - 1.02 mg/dL    BUN/Creatinine ratio 17 12 - 20      GFR est AA 44 (L) >60 ml/min/1.73m2    GFR est non-AA 36 (L) >60 ml/min/1.73m2    Calcium 10.0 8.5 - 10.1 mg/dL Bilirubin, total 0.8 0.2 - 1.0 mg/dL    AST (SGOT) 27 15 - 37 U/L    ALT (SGPT) 22 12 - 78 U/L    Alk.  phosphatase 76 45 - 117 U/L    Protein, total 7.2 6.4 - 8.2 g/dL    Albumin 3.7 3.5 - 5.0 g/dL    Globulin 3.5 2.0 - 4.0 g/dL    A-G Ratio 1.1 1.1 - 2.2     TROPONIN-HIGH SENSITIVITY    Collection Time: 04/25/22 11:34 AM   Result Value Ref Range    Troponin-High Sensitivity 17 0 - 51 ng/L   NT-PRO BNP    Collection Time: 04/25/22  2:48 PM   Result Value Ref Range    NT pro- (H) <450 pg/mL   URINALYSIS W/ REFLEX CULTURE    Collection Time: 04/25/22  3:52 PM    Specimen: Urine   Result Value Ref Range    Color Yellow/Straw      Appearance Turbid (A) Clear      Specific gravity 1.022 1.003 - 1.030      pH (UA) 6.0 5.0 - 8.0      Protein 30 (A) Negative mg/dL    Glucose >300 (A) Negative mg/dL    Ketone 5 (A) Negative mg/dL    Bilirubin Negative Negative      Blood Negative Negative      Urobilinogen 0.1 0.1 - 1.0 EU/dL    Nitrites Negative Negative      Leukocyte Esterase Moderate (A) Negative      WBC  0 - 4 /hpf    RBC 0-5 0 - 5 /hpf    Bacteria Negative Negative /hpf    UA:UC IF INDICATED Urine Culture Ordered (A) Culture not indicated by UA result      Mucus 1+ (A) Negative /lpf    Hyaline cast 2-5 0 - 5 /lpf   LACTIC ACID    Collection Time: 04/25/22  6:47 PM   Result Value Ref Range    Lactic acid 4.5 (HH) 0.4 - 2.0 mmol/L   GLUCOSE, POC    Collection Time: 04/25/22 10:27 PM   Result Value Ref Range    Glucose (POC) 318 (H) 65 - 117 mg/dL    Performed by Rosas Carrasco RN    CBC WITH AUTOMATED DIFF    Collection Time: 04/26/22 12:46 AM   Result Value Ref Range    WBC 19.4 (H) 3.6 - 11.0 K/uL    RBC 3.62 (L) 3.80 - 5.20 M/uL    HGB 10.7 (L) 11.5 - 16.0 g/dL    HCT 33.3 (L) 35.0 - 47.0 %    MCV 92.0 80.0 - 99.0 FL    MCH 29.6 26.0 - 34.0 PG    MCHC 32.1 30.0 - 36.5 g/dL    RDW 15.7 (H) 11.5 - 14.5 %    PLATELET 359 115 - 672 K/uL    MPV 12.5 8.9 - 12.9 FL    NRBC 0.0 0.0  WBC    ABSOLUTE NRBC 0. 00 0.00 - 0.01 K/uL    NEUTROPHILS 89 (H) 32 - 75 %    LYMPHOCYTES 6 (L) 12 - 49 %    MONOCYTES 1 (L) 5 - 13 %    EOSINOPHILS 0 0 - 7 %    BASOPHILS 0 0 - 1 %    IMMATURE GRANULOCYTES 2 (H) 0 - 0.5 %    ABS. NEUTROPHILS 17.2 (H) 1.8 - 8.0 K/UL    ABS. LYMPHOCYTES 1.1 0.8 - 3.5 K/UL    ABS. MONOCYTES 0.7 0.0 - 1.0 K/UL    ABS. EOSINOPHILS 0.0 0.0 - 0.4 K/UL    ABS. BASOPHILS 0.1 0.0 - 0.1 K/UL    ABS. IMM. GRANS. 0.4 (H) 0.00 - 0.04 K/UL    DF AUTOMATED     METABOLIC PANEL, COMPREHENSIVE    Collection Time: 04/26/22 12:46 AM   Result Value Ref Range    Sodium 135 (L) 136 - 145 mmol/L    Potassium 3.8 3.5 - 5.1 mmol/L    Chloride 104 97 - 108 mmol/L    CO2 26 21 - 32 mmol/L    Anion gap 5 5 - 15 mmol/L    Glucose 279 (H) 65 - 100 mg/dL    BUN 32 (H) 6 - 20 mg/dL    Creatinine 1.17 (H) 0.55 - 1.02 mg/dL    BUN/Creatinine ratio 27 (H) 12 - 20      GFR est AA 54 (L) >60 ml/min/1.73m2    GFR est non-AA 44 (L) >60 ml/min/1.73m2    Calcium 8.9 8.5 - 10.1 mg/dL    Bilirubin, total 0.5 0.2 - 1.0 mg/dL    AST (SGOT) 14 (L) 15 - 37 U/L    ALT (SGPT) 19 12 - 78 U/L    Alk. phosphatase 64 45 - 117 U/L    Protein, total 6.3 (L) 6.4 - 8.2 g/dL    Albumin 3.1 (L) 3.5 - 5.0 g/dL    Globulin 3.2 2.0 - 4.0 g/dL    A-G Ratio 1.0 (L) 1.1 - 2.2     LACTIC ACID    Collection Time: 04/26/22 12:47 AM   Result Value Ref Range    Lactic acid 3.2 (HH) 0.4 - 2.0 mmol/L   GLUCOSE, POC    Collection Time: 04/26/22  8:06 AM   Result Value Ref Range    Glucose (POC) 167 (H) 65 - 117 mg/dL    Performed by Loi Mejia         CT ABD PELV WO CONT   Final Result   Prior abdominal surgery. No bowel obstruction. No ascites. Prior cholecystectomy. Abdominal aorta atherosclerosis. Findings regarding distal esophagus/GE junction region also described on recent   CT chest.               CT CHEST WO CONT   Final Result   1. Interval development of extensive predominantly central bilateral airspace   disease or edema.    2. Thickened esophagus with mild dilatation distally. Recommend clinical   evaluation and follow-up. 3. Increasing size of several small mediastinal lymph nodes one of which is now   borderline in size. XR CHEST PORT   Final Result      CT HEAD WO CONT   Final Result   Mild central and cortical atrophy. Moderate microvascular ischemic   changes         No acute intracranial abnormality. XR CHEST PORT    (Results Pending)        PHYSICAL EXAM:    Physical Exam  Vitals reviewed. Constitutional:       Appearance: She is not ill-appearing. Cardiovascular:      Rate and Rhythm: Normal rate and regular rhythm. Heart sounds: Normal heart sounds. Pulmonary:      Effort: Pulmonary effort is normal.      Comments: Mild rhonchi in bilateral bases. Abdominal:      General: Abdomen is flat. There is no distension. Palpations: Abdomen is soft. Tenderness: There is no abdominal tenderness. Skin:     General: Skin is warm and dry. Neurological:      Mental Status: She is alert. Comments: Pleasantly demented.            Data Review    Recent Results (from the past 24 hour(s))   EKG, 12 LEAD, INITIAL    Collection Time: 04/25/22 11:22 AM   Result Value Ref Range    Ventricular Rate 101 BPM    Atrial Rate 101 BPM    P-R Interval 156 ms    QRS Duration 64 ms    Q-T Interval 366 ms    QTC Calculation (Bezet) 474 ms    Calculated P Axis 16 degrees    Calculated R Axis -26 degrees    Calculated T Axis 48 degrees    Diagnosis       Sinus tachycardia  Low voltage QRS  Cannot rule out Anterior infarct , age undetermined  Abnormal ECG  No previous ECGs available  Confirmed by Ron Claros ([id number]) on 4/25/2022 4:16:05 PM     CBC WITH AUTOMATED DIFF    Collection Time: 04/25/22 11:34 AM   Result Value Ref Range    WBC 13.5 (H) 3.6 - 11.0 K/uL    RBC 4.37 3.80 - 5.20 M/uL    HGB 13.0 11.5 - 16.0 g/dL    HCT 39.6 35.0 - 47.0 %    MCV 90.6 80.0 - 99.0 FL    MCH 29.7 26.0 - 34.0 PG    MCHC 32.8 30.0 - 36.5 g/dL    RDW 15.5 (H) 11.5 - 14.5 %    PLATELET 409 124 - 037 K/uL    MPV 12.6 8.9 - 12.9 FL    NRBC 0.0 0.0  WBC    ABSOLUTE NRBC 0.00 0.00 - 0.01 K/uL    NEUTROPHILS 87 (H) 32 - 75 %    LYMPHOCYTES 6 (L) 12 - 49 %    MONOCYTES 7 5 - 13 %    EOSINOPHILS 0 0 - 7 %    BASOPHILS 0 0 - 1 %    IMMATURE GRANULOCYTES 0 %    ABS. NEUTROPHILS 11.8 (H) 1.8 - 8.0 K/UL    ABS. LYMPHOCYTES 0.8 0.8 - 3.5 K/UL    ABS. MONOCYTES 0.9 0.0 - 1.0 K/UL    ABS. EOSINOPHILS 0.0 0.0 - 0.4 K/UL    ABS. BASOPHILS 0.0 0.0 - 0.1 K/UL    ABS. IMM. GRANS. 0.0 K/UL    DF Manual      RBC COMMENTS Normocytic, Normochromic     METABOLIC PANEL, COMPREHENSIVE    Collection Time: 04/25/22 11:34 AM   Result Value Ref Range    Sodium 133 (L) 136 - 145 mmol/L    Potassium 4.4 3.5 - 5.1 mmol/L    Chloride 99 97 - 108 mmol/L    CO2 25 21 - 32 mmol/L    Anion gap 9 5 - 15 mmol/L    Glucose 419 (H) 65 - 100 mg/dL    BUN 24 (H) 6 - 20 mg/dL    Creatinine 1.40 (H) 0.55 - 1.02 mg/dL    BUN/Creatinine ratio 17 12 - 20      GFR est AA 44 (L) >60 ml/min/1.73m2    GFR est non-AA 36 (L) >60 ml/min/1.73m2    Calcium 10.0 8.5 - 10.1 mg/dL    Bilirubin, total 0.8 0.2 - 1.0 mg/dL    AST (SGOT) 27 15 - 37 U/L    ALT (SGPT) 22 12 - 78 U/L    Alk.  phosphatase 76 45 - 117 U/L    Protein, total 7.2 6.4 - 8.2 g/dL    Albumin 3.7 3.5 - 5.0 g/dL    Globulin 3.5 2.0 - 4.0 g/dL    A-G Ratio 1.1 1.1 - 2.2     TROPONIN-HIGH SENSITIVITY    Collection Time: 04/25/22 11:34 AM   Result Value Ref Range    Troponin-High Sensitivity 17 0 - 51 ng/L   NT-PRO BNP    Collection Time: 04/25/22  2:48 PM   Result Value Ref Range    NT pro- (H) <450 pg/mL   URINALYSIS W/ REFLEX CULTURE    Collection Time: 04/25/22  3:52 PM    Specimen: Urine   Result Value Ref Range    Color Yellow/Straw      Appearance Turbid (A) Clear      Specific gravity 1.022 1.003 - 1.030      pH (UA) 6.0 5.0 - 8.0      Protein 30 (A) Negative mg/dL    Glucose >300 (A) Negative mg/dL    Ketone 5 (A) Negative mg/dL Bilirubin Negative Negative      Blood Negative Negative      Urobilinogen 0.1 0.1 - 1.0 EU/dL    Nitrites Negative Negative      Leukocyte Esterase Moderate (A) Negative      WBC  0 - 4 /hpf    RBC 0-5 0 - 5 /hpf    Bacteria Negative Negative /hpf    UA:UC IF INDICATED Urine Culture Ordered (A) Culture not indicated by UA result      Mucus 1+ (A) Negative /lpf    Hyaline cast 2-5 0 - 5 /lpf   LACTIC ACID    Collection Time: 04/25/22  6:47 PM   Result Value Ref Range    Lactic acid 4.5 (HH) 0.4 - 2.0 mmol/L   GLUCOSE, POC    Collection Time: 04/25/22 10:27 PM   Result Value Ref Range    Glucose (POC) 318 (H) 65 - 117 mg/dL    Performed by Yumiko Shen RN    CBC WITH AUTOMATED DIFF    Collection Time: 04/26/22 12:46 AM   Result Value Ref Range    WBC 19.4 (H) 3.6 - 11.0 K/uL    RBC 3.62 (L) 3.80 - 5.20 M/uL    HGB 10.7 (L) 11.5 - 16.0 g/dL    HCT 33.3 (L) 35.0 - 47.0 %    MCV 92.0 80.0 - 99.0 FL    MCH 29.6 26.0 - 34.0 PG    MCHC 32.1 30.0 - 36.5 g/dL    RDW 15.7 (H) 11.5 - 14.5 %    PLATELET 506 716 - 211 K/uL    MPV 12.5 8.9 - 12.9 FL    NRBC 0.0 0.0  WBC    ABSOLUTE NRBC 0.00 0.00 - 0.01 K/uL    NEUTROPHILS 89 (H) 32 - 75 %    LYMPHOCYTES 6 (L) 12 - 49 %    MONOCYTES 1 (L) 5 - 13 %    EOSINOPHILS 0 0 - 7 %    BASOPHILS 0 0 - 1 %    IMMATURE GRANULOCYTES 2 (H) 0 - 0.5 %    ABS. NEUTROPHILS 17.2 (H) 1.8 - 8.0 K/UL    ABS. LYMPHOCYTES 1.1 0.8 - 3.5 K/UL    ABS. MONOCYTES 0.7 0.0 - 1.0 K/UL    ABS. EOSINOPHILS 0.0 0.0 - 0.4 K/UL    ABS. BASOPHILS 0.1 0.0 - 0.1 K/UL    ABS. IMM.  GRANS. 0.4 (H) 0.00 - 0.04 K/UL    DF AUTOMATED     METABOLIC PANEL, COMPREHENSIVE    Collection Time: 04/26/22 12:46 AM   Result Value Ref Range    Sodium 135 (L) 136 - 145 mmol/L    Potassium 3.8 3.5 - 5.1 mmol/L    Chloride 104 97 - 108 mmol/L    CO2 26 21 - 32 mmol/L    Anion gap 5 5 - 15 mmol/L    Glucose 279 (H) 65 - 100 mg/dL    BUN 32 (H) 6 - 20 mg/dL    Creatinine 1.17 (H) 0.55 - 1.02 mg/dL    BUN/Creatinine ratio 27 (H) 12 - 20      GFR est AA 54 (L) >60 ml/min/1.73m2    GFR est non-AA 44 (L) >60 ml/min/1.73m2    Calcium 8.9 8.5 - 10.1 mg/dL    Bilirubin, total 0.5 0.2 - 1.0 mg/dL    AST (SGOT) 14 (L) 15 - 37 U/L    ALT (SGPT) 19 12 - 78 U/L    Alk. phosphatase 64 45 - 117 U/L    Protein, total 6.3 (L) 6.4 - 8.2 g/dL    Albumin 3.1 (L) 3.5 - 5.0 g/dL    Globulin 3.2 2.0 - 4.0 g/dL    A-G Ratio 1.0 (L) 1.1 - 2.2     LACTIC ACID    Collection Time: 04/26/22 12:47 AM   Result Value Ref Range    Lactic acid 3.2 (HH) 0.4 - 2.0 mmol/L   GLUCOSE, POC    Collection Time: 04/26/22  8:06 AM   Result Value Ref Range    Glucose (POC) 167 (H) 65 - 117 mg/dL    Performed by Mena Obregon         Assessment/Plan:     Hospital course:   Ar Posada is a 80 y.o. female with a hx of DMII, alzheimer's dementia, HTN, HLD who was brought in for shortness of breath and was admitted for management of aspiration PNA and acute respiratory failure with hypoxia. Pt is not on supplemental O2 at baseline but has been maintaining O2 saturation >92% on 4L NC. In the ED workup showed WBC of 13.5, CAROL with Cr of 1.4. CXR and CT of the chest concerning for developing aspiration PNA, patient treated empirically with Rocephin. Patient was started on Zosyn and Pulmonology consulted. CT of the head/neck with no acute findings. UA consistent with UTI, urine culture pending. Given CT of the chest and CT A/P demonstrating distal esophageal wall thickening and mild distal esophageal distention in the setting of new aspiration PNA and RUQ abdominal pain GI has been consulted. Aspiration PNA  - Continue zosyn  - Repeat CXR with persistent aspiration PNA  - Sputum culture pending  - Pulmonology consulted, obtain ABG   - SLP consulted progress diet to full liquids     Acute respiratory failure with hypoxia  - Supplemental O2, keep >92%.    - Pulmonary consult  - RT consult  - Continuous pulse OX     Sepsis  Duration pneumonia and UTI  - WBC 19  - Lactate 4.5 --> 3.2, repeat  - Blood culture pending  - Continue to monitor  - IVF NS   - Zosyn     UTI   - Urine culture pending   - Zosyn    Esophagitis  - CT A/P 04/25/22 demonstrating distal esophageal wall thickening and mild distal esophageal distention.  - Consult GI   -Protonix     CAROL  - Cr 1.40 --> 1.17   - Continue to monitor  - IVF NS      Mild hyponatremia  - Na 133 --> 135  - Likely due to dehydration  - Continue to monitor   - IVF NS     Mild normocytic anemia   - Hgb 13.0 --> 10.7  - Continue to monitor   - Continue iron supplementation       DM  - Glucose 419 --> 279  - Insuline sliding scale      Dementia/worsening AMS  - CT head and neck 04/25/22 without acute abnormality   - Continue home medications, Memantine      Code status: DNR  DVT prophylaxis: Lovenox   GI prophylaxis: Protonix  POA: Daughter, Xiomara Hendricks. [phone], updated    Care Plan discussed with: Patient/Family    Total time spent with patient: >35 minutes.

## 2022-04-26 NOTE — PROGRESS NOTES
Problem: Dysphagia (Adult)  Goal: *Acute Goals and Plan of Care (Insert Text)  Description: Speech Therapy Goals  Initiated 4/26/2022  -Patient will tolerate soft and bite size diet with thin liquids without signs/symptoms of aspiration given no cues within 7 day(s). [ ] Not met  [ ]  MET   [ ] Progressing  [ ] Tammie Colmenares  -Patient will demonstrate understanding of swallow safety precautions and aspiration precautions, diet recs with minimal cues within 7 day(s). [ ] Not met  [ ]  MET   [ ] Progressing  [ ] Discontinue  Outcome: Not Met   SPEECH [address] EVALUATION  Patient: Shreya Pereira [de-identified]80 y.o. female)  Date: 4/26/2022  Primary Diagnosis: Aspiration pneumonia ([facility] [address] [J69.0]        Precautions: aspiration       ASSESSMENT :  Based on the objective data described below, the patient presents with oropharyngeal sw function WNL. Pt admitted with aspiration pna, s/p vomiting episode overnight. Pt with hx of dementia and therefore pt's daughter provides information as well. Pt reportedly has hx of reflux and has had outpt workup with GI with recs for medication management per pt's daughter. Per PA, current CT chest shows esophagitis. Pt currently on NC02. Vocal quality with mild roughness, largely WFL. Oral motor function WNL. Pt is edentulous and dentures are at home, pt inconsistently eats with them. Pt able to self feed trials of thin via cup/straw, puree and soft solids. Oropharyngeal sw function WNL and pt tolerates all trials without overt s/s aspiration. Patient will benefit from skilled intervention to address the above impairments. Patients rehabilitation potential is considered to be Good     PLAN :  Recommendations and Planned Interventions: At this time, per PA, will initiate full liquids diet and recommend advance as tolerated to soft and bite size/thin with aspiration and GERD precautions, meds as tolerated.   Will defer to PA re: need for GI consult. Frequency/Duration: Patient will be followed by speech-language pathology 3 times a week to address goals. Discharge Recommendations: cont SLP tx at this time. SUBJECTIVE:   Patient alert, agreeable, Daughter Nneka Damon and  at bedside with pt's consent. OBJECTIVE:     CXR Results  (Last 48 hours)                 04/25/22 1200  XR CHEST PORT Final result    Narrative:  Chest single view. Comparison single view chest September 17, 2021. Hazy reticular markings central and medial basilar lungs. Consider mild degree   central pulmonary interstitial edema, possible cardiogenic cause and/or   infectious/inflammatory origin. Cardiac and mediastinal structures otherwise unchanged. Thoracic aorta   atherosclerosis. No pneumothorax or sizable pleural effusion. CT Results  (Last 48 hours)                 04/25/22 1702  CT ABD PELV WO CONT Final result    Impression:  Prior abdominal surgery. No bowel obstruction. No ascites. Prior cholecystectomy. Abdominal aorta atherosclerosis. Findings regarding distal esophagus/GE junction region also described on recent   CT chest.                   Narrative:  CT abdomen and pelvis without IV contrast.       Comparison CT abdomen and pelvis November 2, 2021. Axial images are reviewed along with reformatted sagittal/coronal images. No IV   contrast administered. Sensitivity for detection of neoplastic process limited   in the absence of IV contrast.   Dose reduction: All CT scans at this facility are performed using dose reduction   optimization techniques as appropriate to a performed exam including the   following-   automated exposure control, adjustments of mA and/or Kv according to patient   size, or use of iterative reconstructive technique. Nonspecific hazy reticular markings through basilar lungs. Distal esophageal wall thickening. Mild distal esophageal distention. Small   hiatal hernia.    See recent CT chest report. Liver appears unremarkable on this nonenhanced study. Prior cholecystectomy. Pancreas, spleen, and bilateral adrenal glands appear unremarkable. Each kidney   appears unremarkable on this nonenhanced study. Stomach and small bowel loops are nondistended. Prior partial colectomy. Surgical anastomosis right mid abdomen. Stool and air through colon. Sigmoid   colon diverticula. No CT evidence for appendicitis or diverticulitis. No   ascites. Prior hysterectomy. Atherosclerotic change normal caliber abdominal aorta. Unchanged appearance anterior abdominal wall noted broad-based hernia containing   omental fat but no bowel. Lumbar hardware. 04/25/22 1435  CT CHEST WO CONT Final result    Impression:  1. Interval development of extensive predominantly central bilateral airspace   disease or edema. 2. Thickened esophagus with mild dilatation distally. Recommend clinical   evaluation and follow-up. 3. Increasing size of several small mediastinal lymph nodes one of which is now   borderline in size. Narrative:  Axial images through the chest were obtained without contrast. Sagittal and   coronal reformatted images were reviewed. All CT scans at this facility are   performed using dose reduction optimization techniques as appropriate to a   performed exam including the following:   automated exposure control,   adjustments of the mA and/or kV according to patient size, or use of iterative   reconstruction technique. Comparison November 2, 2021. INDICATION: Evaluate for pneumonia. Thyroid gland is unremarkable. There are several mediastinal lymph nodes,   increasing in size compared to prior study but not definitely pathologically   enlarged. There is a borderline-enlarged subcarinal lymph node measuring 10 mm   in short axis diameter. The esophagus appears diffusely thick-walled and is   mildly dilated along its distal third.  Increasing size of small to moderate   hiatal hernia. Coronary artery calcifications, aortic valvular calcification and   mitral annulus calcification. No pleural or pericardial effusion. Lung windows   reveal development of bilateral predominantly central airspace disease or edema. No endobronchial lesions. Incidental imaging through the upper abdomen shows no   acute appearing findings. Bone windows and reconstructed images show   degenerative changes of the spine and shoulders. No acute bony findings. 04/25/22 1147  CT HEAD WO CONT Final result    Impression:  Mild central and cortical atrophy. Moderate microvascular ischemic   changes           No acute intracranial abnormality. Narrative:  CT SCAN OF THE BRAIN WITHOUT CONTRAST:               CLINICAL HISTORY: Altered mental status. Thin axial and coronal and sagittal reconstructions were obtained. All CT scans at this facility are performed using dose reduction optimization   techniques as appropriate to a performed exam including the following: Automated   exposure control, adjustments of the mA and/or kV according to patient size, or   use of iterative reconstruction technique. Ventricles are midline. Mild central and cortical atrophy is noted including   mild atrophy of the medial temporal lobes. . Moderate microvascular ischemic   changes are noted. No intra or extra-axial hemorrhage, mass or acute infarction   in a major arterial distribution is demonstrated. Pituitary gland is of normal   size. Scans through the posterior fossa show no major infarction, mass or hemorrhage. The cerebellar tonsils are at the level of the foramen magnum in a satisfactory   position. Visualized paranasal sinuses are clear. Mastoid air cells are clear. Both orbits have a normal CT appearance.                     Past Medical History:   Diagnosis Date    Arthritis     Cancer [facility]) 2006    colon cancer    COPD     sleep apnea    Diabetes ([facility] [address]     GERD (gastroesophageal reflux disease)     Other ill-defined conditions(799.89)     fibromylgia    PUD (peptic ulcer disease)     Unspecified sleep apnea      Past Surgical History:   Procedure Laterality Date    HX APPENDECTOMY      HX BACK SURGERY      neck and back    HX HYSTERECTOMY      HX ORTHOPAEDIC  2010    right tkr    HX OTHER SURGICAL      tonsil    SD ABDOMEN SURGERY PROC UNLISTED  2006    colectomy    SD WRIST ARTHROSCOP,DIAGNOSTIC      bilateral carpal tunell     Prior Level of Function/Home Situation: lives with daughter  Home Situation  Support Systems: Child(germaine)  Patient Expects to be Discharged to[de-identified] Home with home health  Diet prior to admission: regular/thin  Current Diet:  NPO   Cognitive and Communication Status:  Neurologic State: Alert  Orientation Level: Oriented to person    Pain:  0    After treatment:   Patient left in no apparent distress in bed, Call bell within reach, Nursing notified, and Caregiver / family present    COMMUNICATION/EDUCATION:   Patient was educated regarding purpose of SLP assessment, POC, diet recs and sw safety precautions. Patient demonstrated [address] understanding as evidenced by hx of dementia, appropriate verbal responsiveness, needs reinforcement. Pt's daughter Heather Reyes expresses understanding. The patient's plan of care including recommendations, planned interventions, and recommended diet changes were discussed with: Registered nurse and PA. Patient/family have participated as able in goal setting and plan of care. Patient/family agree to work toward stated goals and plan of care.     Thank you for this referral.  Merle Lubin M.S. CCC-SLP  Time Calculation: 18 mins

## 2022-04-26 NOTE — PROGRESS NOTES
Problem: Falls - Risk of  Goal: *Absence of Falls  Description: Document Nino Lara Fall Risk and appropriate interventions in the flowsheet.   Outcome: Progressing Towards Goal  Note: Fall Risk Interventions:  Mobility Interventions: Bed/chair exit alarm,Patient to call before getting OOB    Mentation Interventions: Bed/chair exit alarm,Door open when patient unattended,Family/sitter at [employer] frequent rounding,Reorient patient,Room close to nurse's station    Medication Interventions: Bed/chair exit alarm,Patient to call before getting OOB    Elimination Interventions: Bed/chair exit alarm,Call light in reach,Patient to call for help with toileting needs

## 2022-04-26 NOTE — CONSULTS
PULMONARY CONSULT  [facility]    Name: Thang Tobias MRN: [id number]   : 1940 Hospital: [facility]   Date: 2022  Admission date: 2022 Hospital Day: 2       HPI:     Hospital Problems  Date Reviewed: 2019          Codes Class Noted POA    Aspiration pneumonia [facility]) ICD-10-CM: J69.0  ICD-9-CM: 507.0  2022 Unknown                   [x] High complexity decision making was performed  [x] See my orders for details      Subjective/Initial History:     I was asked by Karlos Kent MD to see Thang Tobias  a 80 y.o.  female in consultation     Excerpts from admission 2022 or consult notes as follows:   Patient is a 81yo female with a hx of COPD, sleep apnea, Type 2 diabetes, HTN, hyperlipidemia, Alzheimer's dementia, and colon caner that presented to the ED with shortness of breath. The history was given by her daughter. Patient's daughter states that she has had two episodes of non-bloody emesis with RUQ abdominal pain. Denies bowel/bladder changes. Daughter states that her SpO2 was 88% and she is not on supplemental oxygen. CXR and chest CT taken in the ED is concerning for aspiration PNA. Rocephin was started empirically, but changed later to Zosyn. Patient placed on 4L O2 via NC. Pulmonary was consulted for reasons stated above.        No Known Allergies     MAR reviewed and pertinent medications noted or modified as needed     Current Facility-Administered Medications   Medication    piperacillin-tazobactam (ZOSYN) 3.375 g in 0.9% sodium chloride (MBP/ADV) 100 mL MBP    sodium chloride (NS) flush 5-40 mL    sodium chloride (NS) flush 5-40 mL    acetaminophen (TYLENOL) tablet 650 mg    Or    acetaminophen (TYLENOL) suppository 650 mg    polyethylene glycol (MIRALAX) packet 17 g    ondansetron (ZOFRAN ODT) tablet 4 mg    Or    ondansetron (ZOFRAN) injection 4 mg    enoxaparin (LOVENOX) injection 30 mg    glucose chewable tablet 16 g    dextrose 10% infusion 0-250 mL    glucagon (GLUCAGEN) injection 1 mg    insulin lispro (HUMALOG) injection    0.9% sodium chloride infusion    OLANZapine (ZyPREXA) tablet 5 mg      Patient PCP: Hesham Enriquez  PMH:  has a past medical history of Arthritis, Cancer ([facility] [address] (), COPD, Diabetes ([facility] [address] GERD (gastroesophageal reflux disease), Other ill-defined conditions(799.89), PUD (peptic ulcer disease), and Unspecified sleep apnea. PSH:   has a past surgical history that includes hx hysterectomy; pr abdomen surgery proc unlisted (); hx appendectomy; hx other surgical; hx orthopaedic (); pr wrist arthroscop,diagnostic; and hx back surgery. FHX: family history includes Diabetes in her brother, mother, and sister; Heart Disease in her brother. SHX:  reports that she has quit smoking. She has never used smokeless tobacco. She reports that she does not drink alcohol. ROS:    Review of Systems   Constitutional: Negative. HENT: Negative. Eyes: Negative. Respiratory: Positive for shortness of breath. Cardiovascular: Negative. Gastrointestinal: Positive for abdominal pain and vomiting. Genitourinary: Negative. Skin: Negative. Objective:     Vital Signs: Telemetry:    normal sinus rhythm Intake/Output:   Visit Vitals  BP (!) 105/56   Pulse 91   Temp 98.9 °F (37.2 °C)   Resp 20   Ht 5' (1.524 m)   Wt 70.3 kg (155 lb)   SpO2 97%   BMI 30.27 kg/m²       Temp (24hrs), Av.9 °F (36.6 °C), Min:97.3 °F (36.3 °C), Max:98.9 °F (37.2 °C)        O2 Device: Nasal cannula O2 Flow Rate (L/min): 4 l/min       Wt Readings from Last 4 Encounters:   22 70.3 kg (155 lb)   21 74.4 kg (164 lb)   19 92.4 kg (203 lb 12.8 oz)   09/10/18 88.1 kg (194 lb 3.2 oz)        No intake or output data in the 24 hours ending 22 [id number]    Last shift:      No intake/output data recorded. Last 3 shifts: No intake/output data recorded.        Physical Exam:     Physical Exam  Constitutional:       Appearance: She is not ill-appearing. HENT:      Head: Normocephalic and atraumatic. Cardiovascular:      Rate and Rhythm: Regular rhythm. Tachycardia present. Pulses: Normal pulses. Heart sounds: Normal heart sounds. Pulmonary:      Breath sounds: Rhonchi present. Abdominal:      Tenderness: There is abdominal tenderness. Musculoskeletal:      Right lower leg: No edema. Left lower leg: No edema. Skin:     General: Skin is warm and dry. Labs:    Recent Labs     04/26/22  0046 04/25/22  1134   WBC 19.4* 13.5*   HGB 10.7* 13.0    255     Recent Labs     04/26/22  0047 04/26/22  0046 04/25/22  1847 04/25/22  1134   NA  --  135*  --  133*   K  --  3.8  --  4.4   CL  --  104  --  99   CO2  --  26  --  25   GLU  --  279*  --  419*   BUN  --  32*  --  24*   CREA  --  1.17*  --  1.40*   CA  --  8.9  --  10.0   LAC 3.2*  --  4.5*  --    ALB  --  3.1*  --  3.7   ALT  --  19  --  22     No results for input(s): PH, PCO2, PO2, HCO3, FIO2 in the last 72 hours. No results for input(s): CPK, CKNDX, TROIQ in the last 72 hours. No lab exists for component: CPKMB  No results found for: BNPP, BNP   Lab Results   Component Value Date/Time    Culture result: MIXED SKIN VOLODYMYR ISOLATED 04/28/2010 12:18 PM    Culture result:  04/28/2010 12:03 PM     MRSA NOT PRESENT      Screening of patient nares for MRSA is for surveillance purposes and, if positive, to facilitate isolation considerations in high risk settings. It is not intended for automatic decolonization interventions per se as regimens are not sufficiently effective to warrant routine use. No results found for: TSH, TSHEXT    Imaging:    CXR Results  (Last 48 hours)               04/25/22 1200  XR CHEST PORT Final result    Narrative:  Chest single view. Comparison single view chest September 17, 2021. Hazy reticular markings central and medial basilar lungs.  Consider mild degree   central pulmonary interstitial edema, possible cardiogenic cause and/or   infectious/inflammatory origin. Cardiac and mediastinal structures otherwise unchanged. Thoracic aorta   atherosclerosis. No pneumothorax or sizable pleural effusion. Results from [facility] encounter on 04/25/22    CT ABD PELV WO CONT    Narrative  CT abdomen and pelvis without IV contrast.    Comparison CT abdomen and pelvis November 2, 2021. Axial images are reviewed along with reformatted sagittal/coronal images. No IV  contrast administered. Sensitivity for detection of neoplastic process limited  in the absence of IV contrast.  Dose reduction: All CT scans at this facility are performed using dose reduction  optimization techniques as appropriate to a performed exam including the  following-  automated exposure control, adjustments of mA and/or Kv according to patient  size, or use of iterative reconstructive technique. Nonspecific hazy reticular markings through basilar lungs. Distal esophageal wall thickening. Mild distal esophageal distention. Small  hiatal hernia. See recent CT chest report. Liver appears unremarkable on this nonenhanced study. Prior cholecystectomy. Pancreas, spleen, and bilateral adrenal glands appear unremarkable. Each kidney  appears unremarkable on this nonenhanced study. Stomach and small bowel loops are nondistended. Prior partial colectomy. Surgical anastomosis right mid abdomen. Stool and air through colon. Sigmoid  colon diverticula. No CT evidence for appendicitis or diverticulitis. No  ascites. Prior hysterectomy. Atherosclerotic change normal caliber abdominal aorta. Unchanged appearance anterior abdominal wall noted broad-based hernia containing  omental fat but no bowel. Lumbar hardware. Impression  Prior abdominal surgery. No bowel obstruction. No ascites. Prior cholecystectomy. Abdominal aorta atherosclerosis.   Findings regarding distal esophagus/GE junction region also described on recent  CT chest.        IMPRESSION:   1. Acute respiratory failure with hypoxia  2. Concern for aspiration pneumonia  3. Chronic Obstructive Pulmonary Disease  4. Sleep apnea  5. Alzheimer's Dementia  6. Colon cancer  7. Type 2 Diabetes  8. GERD  9. PUD  10. Fibromyalgia  11. Hypertension  12. Hyperlipidemia  13. Body mass index is 30.27 kg/m². 14. Additional workup outlined below  15. Prognosis guarded       RECOMMENDATIONS/PLAN:     1. On 4L O2 via nasal cannula as salvage oxygen delivery device to provide high concentration of oxygen to overcome refractory hypoxia;  2. Intubate and place on vent if NIV fails  3. Chest x-ray showed hazy reticular markings at central and medial basilar lungs. Follow repeat chest x-ray. 4. WBC is elevated at 19.4. Agree with Empiric IV antibiotics pending culture results. Patient was given Rocephin in the ED and changed to Zosyn. 5. Follow culture results  6. Order ABG. 7. Supplemental O2 to keep sats > 93%  8. Aspiration precautions  9. Labs to follow electrolytes, renal function and and blood counts  10. Glucose monitoring and SSI  11. Bronchial hygiene with respiratory therapy techniques, bronchodilators  12. DVT, SUP prophylaxis  13. Pt needs IV fluids with additives and Drug therapy requiring intensive monitoring for toxicity  14.  Prescription drug management with home med reconciliation reviewed       This care involved high complexity medical decision making: I personally:  · Reviewed the flowsheet and previous days notes  · Reviewed and summarized records or history from previous days note or discussions with staff, family  · High Risk Drug therapy requiring intensive monitoring for toxicity: eg steroids, pressors, antibiotics  · Reviewed and/or ordered Clinical lab tests  · Reviewed images and/or ordered Radiology tests  · Reviewed the patients ECG / Telemetry  · Reviewed and/or adjusted NiPPV settings  · Called and arranged for Radiologic procedures or interventions  · performed or ordered Diagnostic endoscopies with identified risk factors.   · discussed my assessment/management with : Nursing, Hospitalist and Family for coordination of care          [facility]

## 2022-04-27 ENCOUNTER — ANESTHESIA EVENT (OUTPATIENT)
Dept: ENDOSCOPY | Age: 82
DRG: 871 | End: 2022-04-27
Payer: MEDICARE

## 2022-04-27 ENCOUNTER — APPOINTMENT (OUTPATIENT)
Dept: NON INVASIVE DIAGNOSTICS | Age: 82
DRG: 871 | End: 2022-04-27
Attending: INTERNAL MEDICINE
Payer: MEDICARE

## 2022-04-27 LAB
ALBUMIN SERPL-MCNC: 2.5 G/DL (ref 3.5–5)
ALBUMIN/GLOB SERPL: 0.7 {RATIO} (ref 1.1–2.2)
ALP SERPL-CCNC: 68 U/L (ref 45–117)
ALT SERPL-CCNC: 12 U/L (ref 12–78)
ANION GAP SERPL CALC-SCNC: 8 MMOL/L (ref 5–15)
ARTERIAL PATENCY WRIST A: ABNORMAL
AST SERPL W P-5'-P-CCNC: 9 U/L (ref 15–37)
BACTERIA SPEC CULT: NORMAL
BASE EXCESS BLDA CALC-SCNC: 1.3 MMOL/L (ref 0–2)
BASOPHILS # BLD: 0 K/UL (ref 0–0.1)
BASOPHILS NFR BLD: 0 % (ref 0–1)
BDY SITE: ABNORMAL
BILIRUB SERPL-MCNC: 0.5 MG/DL (ref 0.2–1)
BNP SERPL-MCNC: 420 PG/ML
BUN SERPL-MCNC: 16 MG/DL (ref 6–20)
BUN/CREAT SERPL: 21 (ref 12–20)
CA-I BLD-MCNC: 9.1 MG/DL (ref 8.5–10.1)
CHLORIDE SERPL-SCNC: 106 MMOL/L (ref 97–108)
CO2 SERPL-SCNC: 23 MMOL/L (ref 21–32)
CREAT SERPL-MCNC: 0.75 MG/DL (ref 0.55–1.02)
DIFFERENTIAL METHOD BLD: ABNORMAL
ECHO AO ROOT DIAM: 2.9 CM
ECHO AO ROOT INDEX: 1.74 CM/M2
ECHO AV PEAK GRADIENT: 9 MMHG
ECHO AV PEAK VELOCITY: 1.5 M/S
ECHO AV VELOCITY RATIO: 0.47
ECHO EST RA PRESSURE: 3 MMHG
ECHO LA DIAMETER INDEX: 2.28 CM/M2
ECHO LA DIAMETER: 3.8 CM
ECHO LA TO AORTIC ROOT RATIO: 1.31
ECHO LV E' LATERAL VELOCITY: 7 CM/S
ECHO LV E' SEPTAL VELOCITY: 6 CM/S
ECHO LV EJECTION FRACTION BIPLANE: 71 % (ref 55–100)
ECHO LV FRACTIONAL SHORTENING: 40 % (ref 28–44)
ECHO LV INTERNAL DIMENSION DIASTOLE INDEX: 2.4 CM/M2
ECHO LV INTERNAL DIMENSION DIASTOLIC: 4 CM (ref 3.9–5.3)
ECHO LV INTERNAL DIMENSION SYSTOLIC INDEX: 1.44 CM/M2
ECHO LV INTERNAL DIMENSION SYSTOLIC: 2.4 CM
ECHO LV IVSD: 1.3 CM (ref 0.6–0.9)
ECHO LV MASS 2D: 165.5 G (ref 67–162)
ECHO LV MASS INDEX 2D: 99.1 G/M2 (ref 43–95)
ECHO LV POSTERIOR WALL DIASTOLIC: 1.1 CM (ref 0.6–0.9)
ECHO LV RELATIVE WALL THICKNESS RATIO: 0.55
ECHO LVOT PEAK GRADIENT: 2 MMHG
ECHO LVOT PEAK VELOCITY: 0.7 M/S
ECHO MV A VELOCITY: 1.4 M/S
ECHO MV E DECELERATION TIME (DT): 313 MS
ECHO MV E VELOCITY: 0.83 M/S
ECHO MV E/A RATIO: 0.59
ECHO MV E/E' LATERAL: 11.86
ECHO MV E/E' RATIO (AVERAGED): 12.85
ECHO MV E/E' SEPTAL: 13.83
ECHO MV MAX VELOCITY: 1.7 M/S
ECHO MV MEAN GRADIENT: 3 MMHG
ECHO MV MEAN VELOCITY: 0.8 M/S
ECHO MV PEAK GRADIENT: 11 MMHG
ECHO MV VTI: 44 CM
ECHO PV MAX VELOCITY: 1.2 M/S
ECHO PV PEAK GRADIENT: 5 MMHG
ECHO RIGHT VENTRICULAR SYSTOLIC PRESSURE (RVSP): 23 MMHG
ECHO RV INTERNAL DIMENSION: 3.1 CM
ECHO TV REGURGITANT MAX VELOCITY: 2.26 M/S
ECHO TV REGURGITANT PEAK GRADIENT: 20 MMHG
EOSINOPHIL # BLD: 0.3 K/UL (ref 0–0.4)
EOSINOPHIL NFR BLD: 2 % (ref 0–7)
ERYTHROCYTE [DISTWIDTH] IN BLOOD BY AUTOMATED COUNT: 15.2 % (ref 11.5–14.5)
FIO2 ON VENT: 21 %
GLOBULIN SER CALC-MCNC: 3.7 G/DL (ref 2–4)
GLUCOSE BLD STRIP.AUTO-MCNC: 211 MG/DL (ref 65–117)
GLUCOSE BLD STRIP.AUTO-MCNC: 217 MG/DL (ref 65–117)
GLUCOSE BLD STRIP.AUTO-MCNC: 224 MG/DL (ref 65–117)
GLUCOSE BLD STRIP.AUTO-MCNC: 230 MG/DL (ref 65–117)
GLUCOSE SERPL-MCNC: 187 MG/DL (ref 65–100)
HCO3 BLDA-SCNC: 26 MMOL/L (ref 22–26)
HCT VFR BLD AUTO: 29.6 % (ref 35–47)
HGB BLD-MCNC: 9.6 G/DL (ref 11.5–16)
IMM GRANULOCYTES # BLD AUTO: 0.2 K/UL (ref 0–0.04)
IMM GRANULOCYTES NFR BLD AUTO: 2 % (ref 0–0.5)
LYMPHOCYTES # BLD: 1.2 K/UL (ref 0.8–3.5)
LYMPHOCYTES NFR BLD: 10 % (ref 12–49)
MCH RBC QN AUTO: 29.6 PG (ref 26–34)
MCHC RBC AUTO-ENTMCNC: 32.4 G/DL (ref 30–36.5)
MCV RBC AUTO: 91.4 FL (ref 80–99)
MONOCYTES # BLD: 0.7 K/UL (ref 0–1)
MONOCYTES NFR BLD: 5 % (ref 5–13)
NEUTS SEG # BLD: 10 K/UL (ref 1.8–8)
NEUTS SEG NFR BLD: 81 % (ref 32–75)
NRBC # BLD: 0 K/UL (ref 0–0.01)
NRBC BLD-RTO: 0 PER 100 WBC
PCO2 BLDA: 33 MMHG (ref 35–45)
PERFORMED BY, TECHID: ABNORMAL
PH BLDA: 7.48 [PH] (ref 7.35–7.45)
PLATELET # BLD AUTO: 170 K/UL (ref 150–400)
PMV BLD AUTO: 13 FL (ref 8.9–12.9)
PO2 BLDA: 55 MMHG (ref 75–100)
POTASSIUM SERPL-SCNC: 3.6 MMOL/L (ref 3.5–5.1)
PROT SERPL-MCNC: 6.2 G/DL (ref 6.4–8.2)
RBC # BLD AUTO: 3.24 M/UL (ref 3.8–5.2)
SAO2 % BLD: 91 %
SAO2% DEVICE SAO2% SENSOR NAME: ABNORMAL
SERVICE CMNT-IMP: ABNORMAL
SODIUM SERPL-SCNC: 137 MMOL/L (ref 136–145)
SPECIAL REQUESTS,SREQ: NORMAL
SPECIMEN SITE: ABNORMAL
WBC # BLD AUTO: 12.3 K/UL (ref 3.6–11)

## 2022-04-27 PROCEDURE — 93306 TTE W/DOPPLER COMPLETE: CPT

## 2022-04-27 PROCEDURE — 74011250636 HC RX REV CODE- 250/636: Performed by: PHYSICIAN ASSISTANT

## 2022-04-27 PROCEDURE — 82803 BLOOD GASES ANY COMBINATION: CPT

## 2022-04-27 PROCEDURE — 74011000250 HC RX REV CODE- 250: Performed by: PHYSICIAN ASSISTANT

## 2022-04-27 PROCEDURE — 85025 COMPLETE CBC W/AUTO DIFF WBC: CPT

## 2022-04-27 PROCEDURE — 65270000032 HC RM SEMIPRIVATE

## 2022-04-27 PROCEDURE — 77010033678 HC OXYGEN DAILY

## 2022-04-27 PROCEDURE — 74011636637 HC RX REV CODE- 636/637: Performed by: PHYSICIAN ASSISTANT

## 2022-04-27 PROCEDURE — 94761 N-INVAS EAR/PLS OXIMETRY MLT: CPT

## 2022-04-27 PROCEDURE — 97530 THERAPEUTIC ACTIVITIES: CPT

## 2022-04-27 PROCEDURE — 36415 COLL VENOUS BLD VENIPUNCTURE: CPT

## 2022-04-27 PROCEDURE — 82962 GLUCOSE BLOOD TEST: CPT

## 2022-04-27 PROCEDURE — 74011000258 HC RX REV CODE- 258: Performed by: PHYSICIAN ASSISTANT

## 2022-04-27 PROCEDURE — 74011250637 HC RX REV CODE- 250/637: Performed by: PHYSICIAN ASSISTANT

## 2022-04-27 PROCEDURE — 36600 WITHDRAWAL OF ARTERIAL BLOOD: CPT

## 2022-04-27 PROCEDURE — 83880 ASSAY OF NATRIURETIC PEPTIDE: CPT

## 2022-04-27 PROCEDURE — 80053 COMPREHEN METABOLIC PANEL: CPT

## 2022-04-27 PROCEDURE — 74011250636 HC RX REV CODE- 250/636: Performed by: INTERNAL MEDICINE

## 2022-04-27 PROCEDURE — 97165 OT EVAL LOW COMPLEX 30 MIN: CPT

## 2022-04-27 RX ORDER — FUROSEMIDE 10 MG/ML
40 INJECTION INTRAMUSCULAR; INTRAVENOUS ONCE
Status: COMPLETED | OUTPATIENT
Start: 2022-04-27 | End: 2022-04-27

## 2022-04-27 RX ADMIN — INSULIN LISPRO 3 UNITS: 100 INJECTION, SOLUTION INTRAVENOUS; SUBCUTANEOUS at 08:59

## 2022-04-27 RX ADMIN — ENOXAPARIN SODIUM 30 MG: 100 INJECTION SUBCUTANEOUS at 08:58

## 2022-04-27 RX ADMIN — INSULIN LISPRO 3 UNITS: 100 INJECTION, SOLUTION INTRAVENOUS; SUBCUTANEOUS at 12:10

## 2022-04-27 RX ADMIN — FUROSEMIDE 40 MG: 10 INJECTION, SOLUTION INTRAMUSCULAR; INTRAVENOUS at 13:47

## 2022-04-27 RX ADMIN — QUETIAPINE FUMARATE 25 MG: 25 TABLET, FILM COATED ORAL at 21:05

## 2022-04-27 RX ADMIN — PIPERACILLIN AND TAZOBACTAM 3.38 G: 3; .375 INJECTION, POWDER, LYOPHILIZED, FOR SOLUTION INTRAVENOUS at 23:37

## 2022-04-27 RX ADMIN — PANTOPRAZOLE SODIUM 40 MG: 40 TABLET, DELAYED RELEASE ORAL at 17:13

## 2022-04-27 RX ADMIN — PIPERACILLIN AND TAZOBACTAM 3.38 G: 3; .375 INJECTION, POWDER, LYOPHILIZED, FOR SOLUTION INTRAVENOUS at 17:12

## 2022-04-27 RX ADMIN — QUETIAPINE FUMARATE 25 MG: 25 TABLET, FILM COATED ORAL at 08:58

## 2022-04-27 RX ADMIN — GALANTAMINE 8 MG: 4 TABLET, FILM COATED ORAL at 08:00

## 2022-04-27 RX ADMIN — INSULIN LISPRO 3 UNITS: 100 INJECTION, SOLUTION INTRAVENOUS; SUBCUTANEOUS at 17:10

## 2022-04-27 RX ADMIN — SODIUM CHLORIDE, PRESERVATIVE FREE 10 ML: 5 INJECTION INTRAVENOUS at 06:01

## 2022-04-27 RX ADMIN — MEMANTINE HYDROCHLORIDE 10 MG: 10 TABLET ORAL at 21:05

## 2022-04-27 RX ADMIN — SODIUM CHLORIDE, PRESERVATIVE FREE 10 ML: 5 INJECTION INTRAVENOUS at 21:05

## 2022-04-27 RX ADMIN — INSULIN LISPRO 2 UNITS: 100 INJECTION, SOLUTION INTRAVENOUS; SUBCUTANEOUS at 21:05

## 2022-04-27 RX ADMIN — PANTOPRAZOLE SODIUM 40 MG: 40 TABLET, DELAYED RELEASE ORAL at 08:58

## 2022-04-27 RX ADMIN — GLIPIZIDE 5 MG: 5 TABLET ORAL at 09:18

## 2022-04-27 RX ADMIN — PIPERACILLIN AND TAZOBACTAM 3.38 G: 3; .375 INJECTION, POWDER, LYOPHILIZED, FOR SOLUTION INTRAVENOUS at 00:34

## 2022-04-27 RX ADMIN — MEMANTINE HYDROCHLORIDE 10 MG: 10 TABLET ORAL at 08:58

## 2022-04-27 RX ADMIN — PIPERACILLIN AND TAZOBACTAM 3.38 G: 3; .375 INJECTION, POWDER, LYOPHILIZED, FOR SOLUTION INTRAVENOUS at 08:58

## 2022-04-27 RX ADMIN — SODIUM CHLORIDE, PRESERVATIVE FREE 10 ML: 5 INJECTION INTRAVENOUS at 17:11

## 2022-04-27 NOTE — PROGRESS NOTES
PULMONARY NOTE  [facility]    Name: Danii Perdomo MRN: [id number]   : 1940 Hospital: [address] [place] [address]   Date: 2022  Admission date: 2022 Hospital Day: 3       HPI:     Hospital Problems  Date Reviewed: 2019          Codes Class Noted POA    Aspiration pneumonia [facility]) ICD-10-CM: J69.0  ICD-9-CM: 507.0  2022 Unknown                   [x] High complexity decision making was performed  [x] See my orders for details      Subjective/Initial History:     I was asked by Gilmar Landa MD to see Danii Perdomo  a 80 y.o.  female in consultation     Excerpts from admission 2022 or consult notes as follows:   Patient is a 79yo female with a hx of COPD, sleep apnea, Type 2 diabetes, HTN, hyperlipidemia, Alzheimer's dementia, and colon caner that presented to the ED with shortness of breath. The history was given by her daughter. Patient's daughter states that she has had two episodes of non-bloody emesis with RUQ abdominal pain. Denies bowel/bladder changes. Daughter states that her SpO2 was 88% and she is not on supplemental oxygen. CXR and chest CT taken in the ED is concerning for aspiration PNA. Rocephin was started empirically, but changed later to Zosyn. Patient placed on 4L O2 via NC. Pulmonary was consulted for reasons stated above.        No Known Allergies     MAR reviewed and pertinent medications noted or modified as needed     Current Facility-Administered Medications   Medication    QUEtiapine (SEROquel) tablet 25 mg    memantine (NAMENDA) tablet 10 mg    glipiZIDE (GLUCOTROL) tablet 5 mg    galantamine (RAZADYNE) tablet 8 mg    pantoprazole (PROTONIX) tablet 40 mg    piperacillin-tazobactam (ZOSYN) 3.375 g in 0.9% sodium chloride (MBP/ADV) 100 mL MBP    sodium chloride (NS) flush 5-40 mL    sodium chloride (NS) flush 5-40 mL    acetaminophen (TYLENOL) tablet 650 mg    Or    acetaminophen (TYLENOL) suppository 650 mg    polyethylene glycol (MIRALAX) packet 17 g    ondansetron (ZOFRAN ODT) tablet 4 mg    Or    ondansetron (ZOFRAN) injection 4 mg    enoxaparin (LOVENOX) injection 30 mg    glucose chewable tablet 16 g    dextrose 10% infusion 0-250 mL    glucagon (GLUCAGEN) injection 1 mg    insulin lispro (HUMALOG) injection      Patient PCP: Leona Merino  PMH:  has a past medical history of Arthritis, Cancer ([facility] [address] (), COPD, Diabetes ([facility] [address] GERD (gastroesophageal reflux disease), Other ill-defined conditions(799.89), PUD (peptic ulcer disease), and Unspecified sleep apnea. PSH:   has a past surgical history that includes hx hysterectomy; pr abdomen surgery proc unlisted (); hx appendectomy; hx other surgical; hx orthopaedic (); pr wrist arthroscop,diagnostic; and hx back surgery. FHX: family history includes Diabetes in her brother, mother, and sister; Heart Disease in her brother. SHX:  reports that she has quit smoking. She has never used smokeless tobacco. She reports that she does not drink alcohol. ROS:    Review of Systems   Constitutional: Negative. HENT: Negative. Eyes: Negative. Respiratory: Positive for shortness of breath. Cardiovascular: Negative. Gastrointestinal: Positive for abdominal pain. Genitourinary: Negative. Skin: Negative.          Objective:     Vital Signs: Telemetry:    normal sinus rhythm Intake/Output:   Visit Vitals  /62 (BP 1 Location: Left upper arm, BP Patient Position: Semi fowlers)   Pulse 91   Temp 99.5 °F (37.5 °C)   Resp 18   Ht 5' (1.524 m)   Wt 70.3 kg (155 lb)   SpO2 96%   BMI 30.27 kg/m²       Temp (24hrs), Av.8 °F (37.1 °C), Min:98.4 °F (36.9 °C), Max:99.5 °F (37.5 °C)        O2 Device: Nasal cannula O2 Flow Rate (L/min): 4 l/min       Wt Readings from Last 4 Encounters:   22 70.3 kg (155 lb)   21 74.4 kg (164 lb)   19 92.4 kg (203 lb 12.8 oz)   09/10/18 88.1 kg (194 lb 3.2 oz)        No intake or output data in the 24 hours ending 04/27/22 1036    Last shift:      No intake/output data recorded. Last 3 shifts: No intake/output data recorded. Physical Exam:     Physical Exam  Constitutional:       Appearance: She is not ill-appearing. HENT:      Head: Normocephalic and atraumatic. Cardiovascular:      Rate and Rhythm: Normal rate and regular rhythm. Pulses: Normal pulses. Heart sounds: Normal heart sounds. Pulmonary:      Breath sounds: Rhonchi present. Abdominal:      Tenderness: There is abdominal tenderness. Musculoskeletal:      Right lower leg: No edema. Left lower leg: No edema. Skin:     General: Skin is warm and dry. Labs:    Recent Labs     04/27/22  0632 04/26/22  0046 04/25/22  1134   WBC 12.3* 19.4* 13.5*   HGB 9.6* 10.7* 13.0    207 255     Recent Labs     04/27/22  0632 04/26/22  1953 04/26/22  1250 04/26/22  0047 04/26/22  0046 04/25/22  1847 04/25/22  1134     --   --   --  135*  --  133*   K 3.6  --   --   --  3.8  --  4.4     --   --   --  104  --  99   CO2 23  --   --   --  26  --  25   *  --   --   --  279*  --  419*   BUN 16  --   --   --  32*  --  24*   CREA 0.75  --   --   --  1.17*  --  1.40*   CA 9.1  --   --   --  8.9  --  10.0   LAC  --  2.6* 4.8* 3.2*  --    < >  --    ALB 2.5*  --   --   --  3.1*  --  3.7   ALT 12  --   --   --  19  --  22    < > = values in this interval not displayed. Recent Labs     04/26/22  1150   PH 7.40   PCO2 36   PO2 70*   HCO3 22     No results for input(s): CPK, CKNDX, TROIQ in the last 72 hours.     No lab exists for component: CPKMB  No results found for: BNPP, BNP   Lab Results   Component Value Date/Time    Culture result: No Growth (<1000 cfu/mL) 04/25/2022 03:52 PM    Culture result: No growth 1 day 04/25/2022 02:15 PM    Culture result: MIXED SKIN VOLODYMYR ISOLATED 04/28/2010 12:18 PM   No results found for: TSH, TSHEXT, TSHEXT    Imaging:    CXR Results  (Last 48 hours)               04/26/22 2125  XR CHEST PORT Final result    Narrative:  Chest single view. Comparison single view chest April 25, 2022. Hazy, somewhat patchy reticular markings throughout the lungs persist, same   distribution. Differential considerations would include infectious/inflammatory   change. Cardiac and mediastinal structures unchanged noting thoracic aorta   atherosclerosis. No pneumothorax or sizable pleural effusion. 04/25/22 1200  XR CHEST PORT Final result    Narrative:  Chest single view. Comparison single view chest September 17, 2021. Hazy reticular markings central and medial basilar lungs. Consider mild degree   central pulmonary interstitial edema, possible cardiogenic cause and/or   infectious/inflammatory origin. Cardiac and mediastinal structures otherwise unchanged. Thoracic aorta   atherosclerosis. No pneumothorax or sizable pleural effusion. Results from [place] encounter on 04/25/22    CT ABD PELV WO CONT    Narrative  CT abdomen and pelvis without IV contrast.    Comparison CT abdomen and pelvis November 2, 2021. Axial images are reviewed along with reformatted sagittal/coronal images. No IV  contrast administered. Sensitivity for detection of neoplastic process limited  in the absence of IV contrast.  Dose reduction: All CT scans at this facility are performed using dose reduction  optimization techniques as appropriate to a performed exam including the  following-  automated exposure control, adjustments of mA and/or Kv according to patient  size, or use of iterative reconstructive technique. Nonspecific hazy reticular markings through basilar lungs. Distal esophageal wall thickening. Mild distal esophageal distention. Small  hiatal hernia. See recent CT chest report. Liver appears unremarkable on this nonenhanced study. Prior cholecystectomy. Pancreas, spleen, and bilateral adrenal glands appear unremarkable.  Each kidney  appears unremarkable on this nonenhanced study. Stomach and small bowel loops are nondistended. Prior partial colectomy. Surgical anastomosis right mid abdomen. Stool and air through colon. Sigmoid  colon diverticula. No CT evidence for appendicitis or diverticulitis. No  ascites. Prior hysterectomy. Atherosclerotic change normal caliber abdominal aorta. Unchanged appearance anterior abdominal wall noted broad-based hernia containing  omental fat but no bowel. Lumbar hardware. Impression  Prior abdominal surgery. No bowel obstruction. No ascites. Prior cholecystectomy. Abdominal aorta atherosclerosis. Findings regarding distal esophagus/GE junction region also described on recent  CT chest.        IMPRESSION:   1. Acute respiratory failure with hypoxia  2. Concern for aspiration pneumonia  3. Chronic Obstructive Pulmonary Disease  4. Rule out CHF fluid overload  5. Sleep apnea  6. GERD  7. Alzheimer's Dementia  8. Colon cancer  9. Type 2 Diabetes  10. GERD  11. PUD  12. Fibromyalgia  13. Hypertension  14. Hyperlipidemia  Body mass index is 30.27 kg/m². 15. Additional workup outlined below  16. Prognosis guarded       RECOMMENDATIONS/PLAN:     1. On 4L O2 via nasal cannula as salvage oxygen delivery device to provide high concentration of oxygen to overcome refractory hypoxia. Consider for at home oxygen. 1. Patient vomited the morning of her admission. Her condition worsened post emesis. 2. Repeat chest x-ray still shows hazy reticular markings at basilar lungs. 2. WBC decreased to 12.3. Agree with Empiric IV antibiotics pending culture results. Patient was given Rocephin in the ED and changed to Zosyn. 3. We will get BNP 2D echo and give Lasix  3. ABG completed 4/26. PO2 is 70.  4. CAT scan of the chest shows esophageal wall thickening possible reflux and GERD disease agree with Protonix. Rule out diabetic gastroparesis.                 Jenn Reed MD

## 2022-04-27 NOTE — CONSULTS
Consult    Patient: Imelda Herbert MRN: [id number]  SSN: [id number]    YOB: 1940  Age: 80 y.o. Sex: female      Subjective:      Imelda Herbert is a 80 y.o. female who is being seen for dysphagia    She was brought into er for shortness of breath. patient has been more confused and fatigued. patient might have non-bloody emesis. In the ED workup showed WBC of 13.5,  CXR and CT of the chest concerning for developing aspiration PNA, patient treated with Rocephin. Patient;s breath is better and on clear liquid diet. CT also showed distal esophageal wall thickening. Mild distal esophageal distention. Small hiatal hernia,GI consultation placed.        Past Medical History:   Diagnosis Date    Arthritis     Cancer [facility]) 2006    colon cancer    COPD     sleep apnea    Diabetes ([facility] [address]     GERD (gastroesophageal reflux disease)     Other ill-defined conditions(799.89)     fibromylgia    PUD (peptic ulcer disease)     Unspecified sleep apnea      Past Surgical History:   Procedure Laterality Date    HX APPENDECTOMY      HX BACK SURGERY      neck and back    HX HYSTERECTOMY      HX ORTHOPAEDIC  2010    right tkr    HX OTHER SURGICAL      tonsil    AL ABDOMEN SURGERY PROC UNLISTED  2006    colectomy    AL WRIST ARTHROSCOP,DIAGNOSTIC      bilateral carpal tunell      Family History   Problem Relation Age of Onset    Diabetes Mother     Diabetes Sister     Diabetes Brother     Heart Disease Brother      Social History     Tobacco Use    Smoking status: Former Smoker    Smokeless tobacco: Never Used   Substance Use Topics    Alcohol use: No      Current Facility-Administered Medications   Medication Dose Route Frequency Provider Last Rate Last Admin    QUEtiapine (SEROquel) tablet 25 mg  25 mg Oral BID Willernie Ax, PA-C   25 mg at 04/26/22 2106    memantine (NAMENDA) tablet 10 mg  10 mg Oral BID Willernie Ax, PA-C   10 mg at 04/26/22 2106    [START ON 4/27/2022] glipiZIDE (GLUCOTROL) tablet 5 mg  5 mg Oral ACB Carmine Meyer PA-C        galantamine (RAZADYNE) tablet 8 mg  8 mg Oral BID WITH MEALS Carmine Meyer PA-C   8 mg at 04/26/22 1700    pantoprazole (PROTONIX) tablet 40 mg  40 mg Oral ACB&D Josse Flores PA-C   40 mg at 04/26/22 1727    piperacillin-tazobactam (ZOSYN) 3.375 g in 0.9% sodium chloride (MBP/ADV) 100 mL MBP  3.375 g IntraVENous Q8H Josse Flores PA-C 25 mL/hr at 04/26/22 1438 3.375 g at 04/26/22 1438    sodium chloride (NS) flush 5-40 mL  5-40 mL IntraVENous Q8H Carmine Meyer PA-C   10 mL at 04/26/22 2107    sodium chloride (NS) flush 5-40 mL  5-40 mL IntraVENous PRN Carmine Meyer PA-C        acetaminophen (TYLENOL) tablet 650 mg  650 mg Oral Q6H PRN Josse Flores PA-C   650 mg at 04/26/22 2106    Or    acetaminophen (TYLENOL) suppository 650 mg  650 mg Rectal Q6H PRN Carmine Meyer PA-C        polyethylene glycol (MIRALAX) packet 17 g  17 g Oral DAILY PRN Carmine Meyer PA-C        ondansetron (ZOFRAN ODT) tablet 4 mg  4 mg Oral Q8H PRN Carmine Meyer PA-C        Or    ondansetron (ZOFRAN) injection 4 mg  4 mg IntraVENous Q6H PRN Carmine Meyer PA-C        enoxaparin (LOVENOX) injection 30 mg  30 mg SubCUTAneous DAILY Josse Flores PA-C   30 mg at 04/26/22 9889    glucose chewable tablet 16 g  4 Tablet Oral PRN Josse Flores PA-C        dextrose 10% infusion 0-250 mL  0-250 mL IntraVENous PRN Carmine Meyer PA-C        glucagon (GLUCAGEN) injection 1 mg  1 mg IntraMUSCular PRN Carmine Meyer PA-C        insulin lispro (HUMALOG) injection   SubCUTAneous AC&HS Josse Flores PA-C   2 Units at 04/26/22 2106        No Known Allergies    Review of Systems:  Review of Systems   Unable to perform ROS: Dementia        Objective:     Vitals:    04/26/22 0939 04/26/22 1109 04/26/22 1553 04/26/22 1920   BP:   119/68 133/73   Pulse:   90 90   Resp:   20 20   Temp:   98.4 °F (36.9 °C) 98.5 °F (36.9 °C)   SpO2: 96% 96% 99% 96%   Weight:       Height:            Physical Exam:  Physical Exam  Constitutional:       Appearance: She is ill-appearing. HENT:      Head: Atraumatic. Mouth/Throat:      Mouth: Mucous membranes are dry. Eyes:      Extraocular Movements: Extraocular movements intact. Neck:      Vascular: No carotid bruit. Cardiovascular:      Rate and Rhythm: Rhythm irregular. Pulmonary:      Effort: Respiratory distress present. Breath sounds: Wheezing present. Abdominal:      General: Abdomen is flat. Bowel sounds are normal. There is distension. Palpations: Abdomen is soft. Musculoskeletal:         General: Normal range of motion. Skin:     General: Skin is warm. Neurological:      Mental Status: Mental status is at baseline. Recent Results (from the past 24 hour(s))   GLUCOSE, POC    Collection Time: 04/25/22 10:27 PM   Result Value Ref Range    Glucose (POC) 318 (H) 65 - 117 mg/dL    Performed by Lin Lerner RN    CBC WITH AUTOMATED DIFF    Collection Time: 04/26/22 12:46 AM   Result Value Ref Range    WBC 19.4 (H) 3.6 - 11.0 K/uL    RBC 3.62 (L) 3.80 - 5.20 M/uL    HGB 10.7 (L) 11.5 - 16.0 g/dL    HCT 33.3 (L) 35.0 - 47.0 %    MCV 92.0 80.0 - 99.0 FL    MCH 29.6 26.0 - 34.0 PG    MCHC 32.1 30.0 - 36.5 g/dL    RDW 15.7 (H) 11.5 - 14.5 %    PLATELET 395 190 - 034 K/uL    MPV 12.5 8.9 - 12.9 FL    NRBC 0.0 0.0  WBC    ABSOLUTE NRBC 0.00 0.00 - 0.01 K/uL    NEUTROPHILS 89 (H) 32 - 75 %    LYMPHOCYTES 6 (L) 12 - 49 %    MONOCYTES 1 (L) 5 - 13 %    EOSINOPHILS 0 0 - 7 %    BASOPHILS 0 0 - 1 %    IMMATURE GRANULOCYTES 2 (H) 0 - 0.5 %    ABS. NEUTROPHILS 17.2 (H) 1.8 - 8.0 K/UL    ABS. LYMPHOCYTES 1.1 0.8 - 3.5 K/UL    ABS. MONOCYTES 0.7 0.0 - 1.0 K/UL    ABS. EOSINOPHILS 0.0 0.0 - 0.4 K/UL    ABS. BASOPHILS 0.1 0.0 - 0.1 K/UL    ABS. IMM.  GRANS. 0.4 (H) 0.00 - 0.04 K/UL    DF AUTOMATED     METABOLIC PANEL, COMPREHENSIVE    Collection Time: 04/26/22 12:46 AM   Result Value Ref Range    Sodium 135 (L) 136 - 145 mmol/L    Potassium 3.8 3.5 - 5.1 mmol/L    Chloride 104 97 - 108 mmol/L    CO2 26 21 - 32 mmol/L    Anion gap 5 5 - 15 mmol/L    Glucose 279 (H) 65 - 100 mg/dL    BUN 32 (H) 6 - 20 mg/dL    Creatinine 1.17 (H) 0.55 - 1.02 mg/dL    BUN/Creatinine ratio 27 (H) 12 - 20      GFR est AA 54 (L) >60 ml/min/1.73m2    GFR est non-AA 44 (L) >60 ml/min/1.73m2    Calcium 8.9 8.5 - 10.1 mg/dL    Bilirubin, total 0.5 0.2 - 1.0 mg/dL    AST (SGOT) 14 (L) 15 - 37 U/L    ALT (SGPT) 19 12 - 78 U/L    Alk.  phosphatase 64 45 - 117 U/L    Protein, total 6.3 (L) 6.4 - 8.2 g/dL    Albumin 3.1 (L) 3.5 - 5.0 g/dL    Globulin 3.2 2.0 - 4.0 g/dL    A-G Ratio 1.0 (L) 1.1 - 2.2     LACTIC ACID    Collection Time: 04/26/22 12:47 AM   Result Value Ref Range    Lactic acid 3.2 (HH) 0.4 - 2.0 mmol/L   GLUCOSE, POC    Collection Time: 04/26/22  8:06 AM   Result Value Ref Range    Glucose (POC) 167 (H) 65 - 117 mg/dL    Performed by Karlie Olmstead    GLUCOSE, POC    Collection Time: 04/26/22 11:17 AM   Result Value Ref Range    Glucose (POC) 298 (H) 65 - 117 mg/dL    Performed by Karlie Olmstead    BLOOD GAS, ARTERIAL    Collection Time: 04/26/22 11:50 AM   Result Value Ref Range    pH 7.40 7.35 - 7.45      PCO2 36 35 - 45 mmHg    PO2 70 (L) 75 - 100 mmHg    O2 SAT 94 (L) >95 %    BICARBONATE 22 22 - 26 mmol/L    BASE DEFICIT 2.3 (H) 0 - 2 mmol/L    O2 METHOD Nasal Cannula      O2 FLOW RATE 3.0 L/min    Sample source Arterial      SITE Right Radial      LAUREN'S TEST PASS     LACTIC ACID    Collection Time: 04/26/22 12:50 PM   Result Value Ref Range    Lactic acid 4.8 (HH) 0.4 - 2.0 mmol/L   GLUCOSE, POC    Collection Time: 04/26/22  4:37 PM   Result Value Ref Range    Glucose (POC) 291 (H) 65 - 117 mg/dL    Performed by Maryanne Hooker 429, POC    Collection Time: 04/26/22  7:31 PM   Result Value Ref Range    Glucose (POC) 204 (H) 65 - 117 mg/dL    Performed by Sujatha Bishop    LACTIC ACID    Collection Time: 04/26/22  7:53 PM   Result Value Ref Range    Lactic acid 2.6 (HH) 0.4 - 2.0 mmol/L        XR CHEST PORT   Final Result      CT ABD PELV WO CONT   Final Result   Prior abdominal surgery. No bowel obstruction. No ascites. Prior cholecystectomy. Abdominal aorta atherosclerosis. Findings regarding distal esophagus/GE junction region also described on recent   CT chest.               CT CHEST WO CONT   Final Result   1. Interval development of extensive predominantly central bilateral airspace   disease or edema. 2. Thickened esophagus with mild dilatation distally. Recommend clinical   evaluation and follow-up. 3. Increasing size of several small mediastinal lymph nodes one of which is now   borderline in size. XR CHEST PORT   Final Result      CT HEAD WO CONT   Final Result   Mild central and cortical atrophy. Moderate microvascular ischemic   changes         No acute intracranial abnormality. Assessment:     Hospital Problems  Date Reviewed: 8/19/2019          Codes Class Noted POA    Aspiration pneumonia [facility]) ICD-10-CM: J69.0  ICD-9-CM: 507.0  4/25/2022 Unknown          dysphagia and aspiration  hematomesis     CT of the chest:  distal esophageal wall thickening and mild distal esophageal distention   She had egd few months ago at outpatient setting in [facility] unknown result but no malignancy      Aspiration PNA  Renal insufficieny   Plan:    Continue zosyn  -aspiration precaution  CXR in am  PPI  Iv fluids     If O2 sat improved with less O 2 equipment   May do an egd for evaluation. Indication/risk /options discussed with her daughter.  High risk patient      Signed By: Fozia Arredondo MD     April 26, 2022         Thank you for allowing me to participate in this patients care  Cc Referring Physician   Valarie Ellison

## 2022-04-27 NOTE — PROGRESS NOTES
Hospitalist Progress Note    Subjective:   Daily Progress Note: 4/27/2022 9:31 AM    Patient examined at bedside, resting comfortably. She has no current complaints, but history is limited by dementia. Current Facility-Administered Medications   Medication Dose Route Frequency    QUEtiapine (SEROquel) tablet 25 mg  25 mg Oral BID    memantine (NAMENDA) tablet 10 mg  10 mg Oral BID    glipiZIDE (GLUCOTROL) tablet 5 mg  5 mg Oral ACB    galantamine (RAZADYNE) tablet 8 mg  8 mg Oral BID WITH MEALS    pantoprazole (PROTONIX) tablet 40 mg  40 mg Oral ACB&D    piperacillin-tazobactam (ZOSYN) 3.375 g in 0.9% sodium chloride (MBP/ADV) 100 mL MBP  3.375 g IntraVENous Q8H    sodium chloride (NS) flush 5-40 mL  5-40 mL IntraVENous Q8H    sodium chloride (NS) flush 5-40 mL  5-40 mL IntraVENous PRN    acetaminophen (TYLENOL) tablet 650 mg  650 mg Oral Q6H PRN    Or    acetaminophen (TYLENOL) suppository 650 mg  650 mg Rectal Q6H PRN    polyethylene glycol (MIRALAX) packet 17 g  17 g Oral DAILY PRN    ondansetron (ZOFRAN ODT) tablet 4 mg  4 mg Oral Q8H PRN    Or    ondansetron (ZOFRAN) injection 4 mg  4 mg IntraVENous Q6H PRN    enoxaparin (LOVENOX) injection 30 mg  30 mg SubCUTAneous DAILY    glucose chewable tablet 16 g  4 Tablet Oral PRN    dextrose 10% infusion 0-250 mL  0-250 mL IntraVENous PRN    glucagon (GLUCAGEN) injection 1 mg  1 mg IntraMUSCular PRN    insulin lispro (HUMALOG) injection   SubCUTAneous AC&HS        Review of Systems  Review of Systems   Respiratory: Positive for shortness of breath. Cardiovascular: Negative for chest pain. Gastrointestinal: Negative for abdominal pain, nausea and vomiting.     ROS limited due to dementia      Objective:     Visit Vitals  /62 (BP 1 Location: Left upper arm, BP Patient Position: Semi fowlers)   Pulse 91   Temp 99.5 °F (37.5 °C)   Resp 18   Ht 5' (1.524 m)   Wt 70.3 kg (155 lb)   SpO2 96%   BMI 30.27 kg/m²    O2 Flow Rate (L/min): 4 l/min O2 Device: Nasal cannula    Temp (24hrs), Av.8 °F (37.1 °C), Min:98.4 °F (36.9 °C), Max:99.5 °F (37.5 °C)      No intake/output data recorded. No intake/output data recorded. Recent Results (from the past 24 hour(s))   GLUCOSE, POC    Collection Time: 22 11:17 AM   Result Value Ref Range    Glucose (POC) 298 (H) 65 - 117 mg/dL    Performed by Bagwell Ek    BLOOD GAS, ARTERIAL    Collection Time: 22 11:50 AM   Result Value Ref Range    pH 7.40 7.35 - 7.45      PCO2 36 35 - 45 mmHg    PO2 70 (L) 75 - 100 mmHg    O2 SAT 94 (L) >95 %    BICARBONATE 22 22 - 26 mmol/L    BASE DEFICIT 2.3 (H) 0 - 2 mmol/L    O2 METHOD Nasal Cannula      O2 FLOW RATE 3.0 L/min    Sample source Arterial      SITE Right Radial      LAUREN'S TEST PASS     LACTIC ACID    Collection Time: 22 12:50 PM   Result Value Ref Range    Lactic acid 4.8 (HH) 0.4 - 2.0 mmol/L   GLUCOSE, POC    Collection Time: 22  4:37 PM   Result Value Ref Range    Glucose (POC) 291 (H) 65 - 117 mg/dL    Performed by Maryanne Hooker 429, POC    Collection Time: 22  7:31 PM   Result Value Ref Range    Glucose (POC) 204 (H) 65 - 117 mg/dL    Performed by Sunday Payne    LACTIC ACID    Collection Time: 22  7:53 PM   Result Value Ref Range    Lactic acid 2.6 (HH) 0.4 - 2.0 mmol/L   CBC WITH AUTOMATED DIFF    Collection Time: 22  6:32 AM   Result Value Ref Range    WBC 12.3 (H) 3.6 - 11.0 K/uL    RBC 3.24 (L) 3.80 - 5.20 M/uL    HGB 9.6 (L) 11.5 - 16.0 g/dL    HCT 29.6 (L) 35.0 - 47.0 %    MCV 91.4 80.0 - 99.0 FL    MCH 29.6 26.0 - 34.0 PG    MCHC 32.4 30.0 - 36.5 g/dL    RDW 15.2 (H) 11.5 - 14.5 %    PLATELET 287 117 - 749 K/uL    MPV 13.0 (H) 8.9 - 12.9 FL    NRBC 0.0 0.0  WBC    ABSOLUTE NRBC 0.00 0.00 - 0.01 K/uL    NEUTROPHILS 81 (H) 32 - 75 %    LYMPHOCYTES 10 (L) 12 - 49 %    MONOCYTES 5 5 - 13 %    EOSINOPHILS 2 0 - 7 %    BASOPHILS 0 0 - 1 %    IMMATURE GRANULOCYTES 2 (H) 0 - 0.5 %    ABS.  NEUTROPHILS 10.0 (H) 1.8 - 8.0 K/UL    ABS. LYMPHOCYTES 1.2 0.8 - 3.5 K/UL    ABS. MONOCYTES 0.7 0.0 - 1.0 K/UL    ABS. EOSINOPHILS 0.3 0.0 - 0.4 K/UL    ABS. BASOPHILS 0.0 0.0 - 0.1 K/UL    ABS. IMM. GRANS. 0.2 (H) 0.00 - 0.04 K/UL    DF AUTOMATED     GLUCOSE, POC    Collection Time: 04/27/22  7:33 AM   Result Value Ref Range    Glucose (POC) 211 (H) 65 - 117 mg/dL    Performed by Javon Gallo         XR CHEST PORT   Final Result      CT ABD PELV WO CONT   Final Result   Prior abdominal surgery. No bowel obstruction. No ascites. Prior cholecystectomy. Abdominal aorta atherosclerosis. Findings regarding distal esophagus/GE junction region also described on recent   CT chest.               CT CHEST WO CONT   Final Result   1. Interval development of extensive predominantly central bilateral airspace   disease or edema. 2. Thickened esophagus with mild dilatation distally. Recommend clinical   evaluation and follow-up. 3. Increasing size of several small mediastinal lymph nodes one of which is now   borderline in size. XR CHEST PORT   Final Result      CT HEAD WO CONT   Final Result   Mild central and cortical atrophy. Moderate microvascular ischemic   changes         No acute intracranial abnormality. PHYSICAL EXAM:    Physical Exam  Vitals reviewed. Constitutional:       Appearance: She is not ill-appearing. Cardiovascular:      Rate and Rhythm: Normal rate and regular rhythm. Heart sounds: Normal heart sounds. Pulmonary:      Effort: Pulmonary effort is normal.      Comments: Mild rhonchi in bilateral bases. Abdominal:      General: Abdomen is flat. There is no distension. Palpations: Abdomen is soft. Tenderness: There is no abdominal tenderness. Musculoskeletal:      Right lower leg: Edema (mild) present. Left lower leg: Edema (mild) present. Skin:     General: Skin is warm and dry. Neurological:      Mental Status: She is alert.       Comments: Pleasantly demented. Data Review    Recent Results (from the past 24 hour(s))   GLUCOSE, POC    Collection Time: 04/26/22 11:17 AM   Result Value Ref Range    Glucose (POC) 298 (H) 65 - 117 mg/dL    Performed by Marika Ek    BLOOD GAS, ARTERIAL    Collection Time: 04/26/22 11:50 AM   Result Value Ref Range    pH 7.40 7.35 - 7.45      PCO2 36 35 - 45 mmHg    PO2 70 (L) 75 - 100 mmHg    O2 SAT 94 (L) >95 %    BICARBONATE 22 22 - 26 mmol/L    BASE DEFICIT 2.3 (H) 0 - 2 mmol/L    O2 METHOD Nasal Cannula      O2 FLOW RATE 3.0 L/min    Sample source Arterial      SITE Right Radial      LAUREN'S TEST PASS     LACTIC ACID    Collection Time: 04/26/22 12:50 PM   Result Value Ref Range    Lactic acid 4.8 (HH) 0.4 - 2.0 mmol/L   GLUCOSE, POC    Collection Time: 04/26/22  4:37 PM   Result Value Ref Range    Glucose (POC) 291 (H) 65 - 117 mg/dL    Performed by Maryanne Hooker 429, POC    Collection Time: 04/26/22  7:31 PM   Result Value Ref Range    Glucose (POC) 204 (H) 65 - 117 mg/dL    Performed by Sunday Payne    LACTIC ACID    Collection Time: 04/26/22  7:53 PM   Result Value Ref Range    Lactic acid 2.6 (HH) 0.4 - 2.0 mmol/L   CBC WITH AUTOMATED DIFF    Collection Time: 04/27/22  6:32 AM   Result Value Ref Range    WBC 12.3 (H) 3.6 - 11.0 K/uL    RBC 3.24 (L) 3.80 - 5.20 M/uL    HGB 9.6 (L) 11.5 - 16.0 g/dL    HCT 29.6 (L) 35.0 - 47.0 %    MCV 91.4 80.0 - 99.0 FL    MCH 29.6 26.0 - 34.0 PG    MCHC 32.4 30.0 - 36.5 g/dL    RDW 15.2 (H) 11.5 - 14.5 %    PLATELET 081 744 - 011 K/uL    MPV 13.0 (H) 8.9 - 12.9 FL    NRBC 0.0 0.0  WBC    ABSOLUTE NRBC 0.00 0.00 - 0.01 K/uL    NEUTROPHILS 81 (H) 32 - 75 %    LYMPHOCYTES 10 (L) 12 - 49 %    MONOCYTES 5 5 - 13 %    EOSINOPHILS 2 0 - 7 %    BASOPHILS 0 0 - 1 %    IMMATURE GRANULOCYTES 2 (H) 0 - 0.5 %    ABS. NEUTROPHILS 10.0 (H) 1.8 - 8.0 K/UL    ABS. LYMPHOCYTES 1.2 0.8 - 3.5 K/UL    ABS. MONOCYTES 0.7 0.0 - 1.0 K/UL    ABS. EOSINOPHILS 0.3 0.0 - 0.4 K/UL    ABS. BASOPHILS 0.0 0.0 - 0.1 K/UL    ABS. IMM. GRANS. 0.2 (H) 0.00 - 0.04 K/UL    DF AUTOMATED     GLUCOSE, POC    Collection Time: 04/27/22  7:33 AM   Result Value Ref Range    Glucose (POC) 211 (H) 65 - 117 mg/dL    Performed by Karli Pierre         Assessment/Plan:     Hospital course:   Dee Dee Nava is a 80 y.o. female with a hx of DMII, alzheimer's dementia, HTN, HLD who was brought in for shortness of breath and was admitted for management of aspiration PNA and acute respiratory failure with hypoxia. Pt is not on supplemental O2 at baseline but has been maintaining O2 saturation >92% on 4L NC. In the ED workup showed WBC of 13.5, CAROL with Cr of 1.4. CXR and CT of the chest concerning for developing aspiration PNA, patient treated empirically with Rocephin. Patient was started on Zosyn and Pulmonology consulted. CT of the head/neck with no acute findings. UA consistent with UTI, urine culture pending. Given CT of the chest and CT A/P demonstrating distal esophageal wall thickening and mild distal esophageal distention in the setting of new aspiration PNA and RUQ abdominal pain GI has been consulted. GI will consider completing EGD for evaluation once the O2 sat improves with less O2 equipment required. Pulmonology has ordered one dose of lasix and an ECHO to rule out a CHF fluid overload. ABG complete on 04/26 revealed a PO2 of 70. Blood and urine culture have shown no growth in the preliminary results, will continue to monitor. Aspiration PNA  - Continue zosyn  - Repeat CXR with persistent aspiration PNA  - Sputum culture pending  - Pulmonology consulted, obtain ABG   - ABG, PO2 is 70  - SLP consulted progress diet to full liquids     Acute respiratory failure with hypoxia  - Supplemental O2, keep >92%.    - Pulmonary following patient  - ABG, PO2 is 70  - RT consult  - Continuous pulse OX    Diastolic CHF  -  on 04/27  - ECHO pending  - one dose of Lasix given  - continue to monitor     Sepsis  Duration pneumonia and UTI  - WBC 19 --> 12.3  - Lactate 4.5 --> 3.2 --> 4.8 -->2.6   - Blood culture; no growth in preliminary results, will continue to follow  - Continue to monitor  - IVF NS   - Zosyn     UTI   - Urine culture; no growth in preliminary results, will continue to follow  - Zosyn    Esophagitis  - CT A/P 04/25/22 demonstrating distal esophageal wall thickening and mild distal esophageal distention. - GI consult: may consider egd for evaluation once O2 improves  -Protonix     CAROL  Resolved  - Cr 1.40 --> 1.17 --> 0.75  - Continue to monitor  - IVF NS      Mild hyponatremia  Resolved  - Na 133 --> 135 --> 137  - Likely due to dehydration  - Continue to monitor   - IVF NS     Mild normocytic anemia   - Hgb 13.0 --> 10.7 --> 9.6  - Continue to monitor   - Continue iron supplementation       DM  - Glucose 419 --> 279 --> 187  - Insuline sliding scale      Dementia/worsening AMS  - CT head and neck 04/25/22 without acute abnormality   - Continue home medications, Memantine      Code status: DNR  DVT prophylaxis: Lovenox   GI prophylaxis: Protonix  POA: Daughter, Chapincito Tucker. [phone], updated    Care Plan discussed with: Patient/Family    Total time spent with patient: >35 minutes.

## 2022-04-27 NOTE — PROGRESS NOTES
OCCUPATIONAL THERAPY EVALUATION  Patient: Terrence Meredith (43 y.o. female)  Date: 4/27/2022  Primary Diagnosis: Aspiration pneumonia ([facility] [address] [J69.0]        Precautions: fall risk       ASSESSMENT  Pt is a 79 y/o F with PMH of COPD, sleep apnea, type 2 diabetes, HTN, hyperlipidemia, Alzheimer's dementia, and colon cancer  presenting [facility] with c/o SOB and abdominal pain w/ two episodes of emesis, admitted 4/25  and being treated for aspiration PNA. Pt placed on 4 L O2 via NC. CXR and chest CT taken in the ED is concerning for aspiration PNA. CT also showed distal esophageal wall thickening. Mild distal esophageal distention; pt may undergo EGD later this week. Pt received in chair upon arrival w/ family at bedside (family remained w/ pt's permission), alert to person (disoriented to time, place, and situation), and agreeable to OT evaluation at this time. Per pt report, pt lives with daughter, grand-daughter, and great grand-daughter in a one-story home with 3 SHANITA w/ B HR as well as a ramp to enter (family reports pt refuses to use ramp). Family reports pt is a furniture walker within the home but utilizes a rollator for community mobility. Pt also completes most ADLs w/ SUP; family provides A for showers. She has a tub/shower combo and uses a shower chair. Family reports pt has OT come to the house 1x/wk and she has no O2 at home. Other DME owned includes: hospital bed, rollator, RW, shower chair     Based on current observations, pt presents with deficits in generalized strength/AROM, static/dynamic standing balance, functional activity tolerance, confusion, and decreased safety awareness impacting overall performance of ADLs and functional transfers/mobility. Pt currently requires CGA for STS and ambulated to bathroom w/ slow gait using HHA (PA in room and ok'd removing O2 for ambulation). She req'd CGA for bathroom commode transfer using L grab.  Pt req'd SUP for anterior and posterior pericare while sitting on bathroom commode, however, req'd additional posterior care while standing w/ constant support. She returned to chair and req'd min A to [facility]/don clean hospital gown d/t lines. She req'd SUP to adjust B socks seated. Pt was left in chair w/ all needs/call bell in reach, 4 L O2 via NC on, and family at bedside. Overall, pt tolerates session fair w/out c/o pain or SOB (O2 remained >93% during ambulation w/out O2; RN informed). She demo'd fair functional activity tolerance, therefore, would benefit from continued skilled OT services to address current impairments, prevent further decline during hospital admission, and improve IND and safety with self cares and functional transfers/mobility. Current OT d/c recommendation home w/ 24/7 SUP and prior level of care once medically appropriate. Family reports no concerns with pt discharging home. Other factors to consider for discharge: family/social support, DME, time since onset, severity of deficits, decline from functional baseline     Patient will benefit from skilled therapy intervention to address the above noted impairments. PLAN :  Recommendations and Planned Interventions: self care training, functional mobility training, therapeutic exercise, balance training, therapeutic activities, endurance activities, patient education and home safety training    Frequency/Duration: Patient will be followed by occupational therapy:  1-2x/week to address goals. Recommendation for discharge: (in order for the patient to meet his/her long term goals)  Home w/ 24/7 SUP and prior level of care     This discharge recommendation:  Has been made in collaboration with the attending provider and/or case management    IF patient discharges home will need the following DME: none       SUBJECTIVE:   Patient stated I do not feel out of breath.     OBJECTIVE DATA SUMMARY:   HISTORY:   Past Medical History:   Diagnosis Date    Arthritis     Cancer ([facility] [address] 2006    colon cancer    COPD     sleep apnea    Diabetes ([facility] [address]     GERD (gastroesophageal reflux disease)     Other ill-defined conditions(799.89)     fibromylgia    PUD (peptic ulcer disease)     Unspecified sleep apnea      Past Surgical History:   Procedure Laterality Date    HX APPENDECTOMY      HX BACK SURGERY      neck and back    HX HYSTERECTOMY      HX ORTHOPAEDIC  2010    right tkr    HX OTHER SURGICAL      tonsil    [place] ABDOMEN SURGERY PROC UNLISTED  2006    colectomy    [place] WRIST ARTHROSCOP,DIAGNOSTIC      bilateral carpal tunell       Expanded or extensive additional review of patient history:     Home Situation  Home Environment: Private residence  # Steps to Enter: 3  Rails to Enter: Yes  Hand Rails : Bilateral  Wheelchair Ramp: Yes  One/Two Story Residence: One story  Living Alone: No  Support Systems: Child(germaine),Other Family Member(s) (daughter, grand-daughter, great grand-daughter )  Patient Expects to be Discharged to[de-identified] Home with home health  Current DME Used/Available at Home: Moi Newness, rollator,Shower chair,Hospital bed  Tub or Shower Type: Tub/Shower combination    Hand dominance: Right    EXAMINATION OF PERFORMANCE DEFICITS:  Cognitive/Behavioral Status:  Neurologic State: Alert  Orientation Level: Oriented to person;Disoriented to place; Disoriented to situation;Disoriented to time                Range of Motion:  AROM: Generally decreased, functional                         Strength:  Strength: Generally decreased, functional                Coordination:     Fine Motor Skills-Upper: Left Intact; Right Intact    Gross Motor Skills-Upper: Left Intact; Right Intact    Tone & Sensation:     Sensation: Intact                      Balance:  Sitting: Without support; Intact  Standing: Impaired; With support  Standing - Static: Fair;Occasional  Standing - Dynamic : Fair;Constant support    Functional Mobility and Transfers for ADLs:  Bed Mobility:       Transfers:  Sit to Stand: Contact guard assistance  Stand to Sit: Contact guard assistance  Bathroom Mobility: Contact guard assistance  Toilet Transfer : Contact guard assistance; Other (comment) (L grab bar)    ADL Assessment:                                            ADL Intervention and task modifications:       Grooming  Washing Face: Supervision  Washing Hands: Supervision              Upper Body [address]: Minimum  assistance (d/t lines )    Lower Body Dressing Assistance  Protective Undergarmet: Supervision  Socks: Supervision    Toileting  Bowel Hygiene: Supervision         Therapeutic Exercise:  Pt will benefit from BUE HEP to improve participation in ADLs and mobility. Plan will be initiated at next session. Functional Measure:    [facility] AM-PACTM \"6 Clicks\"                                                       Daily Activity Inpatient Short Form  How much help from another person does the patient currently need. .. Total; A Lot A Little None   1. Putting on and taking off regular lower body clothing? []  1 []  2 [x]  3 []  4   2. Bathing (including washing, rinsing, drying)? []  1 []  2 [x]  3 []  4   3. Toileting, which includes using toilet, bedpan or urinal? [] 1 []  2 [x]  3 []  4   4. Putting on and taking off regular upper body clothing? []  1 []  2 [x]  3 []  4   5. Taking care of personal grooming such as brushing teeth? []  1 []  2 [x]  3 []  4   6. Eating meals? []  1 []  2 [x]  3 []  4   © 2007, Trustees of [facility], under license to [employer]. All rights reserved     Score: 18/24     Interpretation of Tool:  Represents clinically-significant functional categories (i.e. Activities of daily living).   Percentage of Impairment CH    0%   CI    1-19% CJ    20-39% CK    40-59% CL    60-79% CM    80-99% CN     100%   [facility]  Score 6-24 24 23 20-22 15-19 10-14 7-9 6         Occupational Therapy Evaluation Charge Determination   History Examination Decision-Making   LOW Complexity : Brief history review  LOW Complexity : 1-3 performance deficits relating to physical, cognitive , or psychosocial skils that result in activity limitations and / or participation restrictions  LOW Complexity : No comorbidities that affect functional and no verbal or physical assistance needed to complete eval tasks       Based on the above components, the patient evaluation is determined to be of the following complexity level: LOW   Pain Rating:  Pt did not report any pain. Activity Tolerance:   Fair and requires rest breaks    After treatment patient left in no apparent distress:    Sitting in chair, Call bell within reach and Caregiver / family present    COMMUNICATION/EDUCATION:   The patients plan of care was discussed with: Registered nurse. Patient/family have participated as able in goal setting and plan of care. and Patient/family agree to work toward stated goals and plan of care. This patients plan of care is appropriate for delegation to [facility].        Thank you for this referral.  Janna Roldan OT  Time Calculation: 43 mins   Problem: Self Care Deficits Care Plan (Adult)  Goal: *Acute Goals and Plan of Care (Insert Text)  Description: Pt will be SUP sup <>sit in prep for EOB ADLs  Pt will be SUP grooming standing at sinktop  Pt will be SUP LE dressing sitting EOB/long sit  Pt will be SUP sit <>  prep for toileting LRAD  Pt will be SUP toileting/toilet transfer/cloth mgmt LRAD  Pt will be IND following UE HEP in prep for self care tasks     Outcome: Not Met

## 2022-04-27 NOTE — PROGRESS NOTES
PULMONARY NOTE  [facility]    Name: Fidencio Yuen MRN: [id number]   : 1940 Hospital: [address] [place] [address]   Date: 2022  Admission date: 2022 Hospital Day: 3       HPI:     Hospital Problems  Date Reviewed: 2019          Codes Class Noted POA    Aspiration pneumonia [facility]) ICD-10-CM: J69.0  ICD-9-CM: 507.0  2022 Unknown                   [x] High complexity decision making was performed  [x] See my orders for details      Subjective/Initial History:     I was asked by Dimitri Fisher MD to see Fidencio Yuen  a 80 y.o.  female in consultation     Excerpts from admission 2022 or consult notes as follows:   Patient is a 79yo female with a hx of COPD, sleep apnea, Type 2 diabetes, HTN, hyperlipidemia, Alzheimer's dementia, and colon caner that presented to the ED with shortness of breath. The history was given by her daughter. Patient's daughter states that she has had two episodes of non-bloody emesis with RUQ abdominal pain. Denies bowel/bladder changes. Daughter states that her SpO2 was 88% and she is not on supplemental oxygen. CXR and chest CT taken in the ED is concerning for aspiration PNA. Rocephin was started empirically, but changed later to Zosyn. Patient placed on 4L O2 via [place]. Pulmonary was consulted for reasons stated above.        No Known Allergies     MAR reviewed and pertinent medications noted or modified as needed     Current Facility-Administered Medications   Medication    QUEtiapine (SEROquel) tablet 25 mg    memantine (NAMENDA) tablet 10 mg    glipiZIDE (GLUCOTROL) tablet 5 mg    galantamine (RAZADYNE) tablet 8 mg    pantoprazole (PROTONIX) tablet 40 mg    piperacillin-tazobactam (ZOSYN) 3.375 g in 0.9% sodium chloride (MBP/ADV) 100 mL MBP    sodium chloride (NS) flush 5-40 mL    sodium chloride (NS) flush 5-40 mL    acetaminophen (TYLENOL) tablet 650 mg    Or    acetaminophen (TYLENOL) suppository 650 mg    polyethylene glycol (MIRALAX) packet 17 g    ondansetron (ZOFRAN ODT) tablet 4 mg    Or    ondansetron (ZOFRAN) injection 4 mg    enoxaparin (LOVENOX) injection 30 mg    glucose chewable tablet 16 g    dextrose 10% infusion 0-250 mL    glucagon (GLUCAGEN) injection 1 mg    insulin lispro (HUMALOG) injection      Patient PCP: Audi Chavez  PMH:  has a past medical history of Arthritis, Cancer ([facility] (), COPD, Diabetes ([facility] GERD (gastroesophageal reflux disease), Other ill-defined conditions(799.89), PUD (peptic ulcer disease), and Unspecified sleep apnea. PSH:   has a past surgical history that includes hx hysterectomy; pr abdomen surgery proc unlisted (); hx appendectomy; hx other surgical; hx orthopaedic (); pr wrist arthroscop,diagnostic; and hx back surgery. FHX: family history includes Diabetes in her brother, mother, and sister; Heart Disease in her brother. SHX:  reports that she has quit smoking. She has never used smokeless tobacco. She reports that she does not drink alcohol. ROS:    Review of Systems   Constitutional: Negative. HENT: Negative. Eyes: Negative. Respiratory: Positive for shortness of breath. Cardiovascular: Negative. Gastrointestinal: Positive for abdominal pain. Genitourinary: Negative. Skin: Negative.          Objective:     Vital Signs: Telemetry:    normal sinus rhythm Intake/Output:   Visit Vitals  /62 (BP 1 Location: Left upper arm, BP Patient Position: Semi fowlers)   Pulse 91   Temp 99.5 °F (37.5 °C)   Resp 18   Ht 5' (1.524 m)   Wt 70.3 kg (155 lb)   SpO2 96%   BMI 30.27 kg/m²       Temp (24hrs), Av.8 °F (37.1 °C), Min:98.4 °F (36.9 °C), Max:99.5 °F (37.5 °C)        O2 Device: Nasal cannula O2 Flow Rate (L/min): 4 l/min       Wt Readings from Last 4 Encounters:   22 70.3 kg (155 lb)   21 74.4 kg (164 lb)   19 92.4 kg (203 lb 12.8 oz)   09/10/18 88.1 kg (194 lb 3.2 oz)        No intake or output data in the 24 hours ending 04/27/22 0853    Last shift:      No intake/output data recorded. Last 3 shifts: No intake/output data recorded. Physical Exam:     Physical Exam  Constitutional:       Appearance: She is not ill-appearing. HENT:      Head: Normocephalic and atraumatic. Cardiovascular:      Rate and Rhythm: Normal rate and regular rhythm. Pulses: Normal pulses. Heart sounds: Normal heart sounds. Pulmonary:      Breath sounds: Rhonchi present. Abdominal:      Tenderness: There is abdominal tenderness. Musculoskeletal:      Right lower leg: No edema. Left lower leg: No edema. Skin:     General: Skin is warm and dry. Labs:    Recent Labs     04/27/22  0632 04/26/22  0046 04/25/22  1134   WBC 12.3* 19.4* 13.5*   HGB 9.6* 10.7* 13.0    207 255     Recent Labs     04/26/22  1953 04/26/22  1250 04/26/22  0047 04/26/22  0046 04/25/22  1847 04/25/22  1134   NA  --   --   --  135*  --  133*   K  --   --   --  3.8  --  4.4   CL  --   --   --  104  --  99   CO2  --   --   --  26  --  25   GLU  --   --   --  279*  --  419*   BUN  --   --   --  32*  --  24*   CREA  --   --   --  1.17*  --  1.40*   CA  --   --   --  8.9  --  10.0   LAC 2.6* 4.8* 3.2*  --    < >  --    ALB  --   --   --  3.1*  --  3.7   ALT  --   --   --  19  --  22    < > = values in this interval not displayed. Recent Labs     04/26/22  1150   PH 7.40   PCO2 36   PO2 70*   HCO3 22     No results for input(s): CPK, CKNDX, TROIQ in the last 72 hours. No lab exists for component: CPKMB  No results found for: BNPP, BNP   Lab Results   Component Value Date/Time    Culture result: No Growth (<1000 cfu/mL) 04/25/2022 03:52 PM    Culture result: MIXED SKIN VOLODYMYR ISOLATED 04/28/2010 12:18 PM    Culture result:  04/28/2010 12:03 PM     MRSA NOT PRESENT      Screening of patient nares for MRSA is for surveillance purposes and, if positive, to facilitate isolation considerations in high risk settings.  It is not intended for automatic decolonization interventions per se as regimens are not sufficiently effective to warrant routine use. No results found for: TSH, TSHEXT, TSHEXT    Imaging:    CXR Results  (Last 48 hours)               04/26/22 0929  XR CHEST PORT Final result    Narrative:  Chest single view. Comparison single view chest April 25, 2022. Hazy, somewhat patchy reticular markings throughout the lungs persist, same   distribution. Differential considerations would include infectious/inflammatory   change. Cardiac and mediastinal structures unchanged noting thoracic aorta   atherosclerosis. No pneumothorax or sizable pleural effusion. 04/25/22 1200  XR CHEST PORT Final result    Narrative:  Chest single view. Comparison single view chest September 17, 2021. Hazy reticular markings central and medial basilar lungs. Consider mild degree   central pulmonary interstitial edema, possible cardiogenic cause and/or   infectious/inflammatory origin. Cardiac and mediastinal structures otherwise unchanged. Thoracic aorta   atherosclerosis. No pneumothorax or sizable pleural effusion. Results from [place] encounter on 04/25/22    CT ABD PELV WO CONT    Narrative  CT abdomen and pelvis without IV contrast.    Comparison CT abdomen and pelvis November 2, 2021. Axial images are reviewed along with reformatted sagittal/coronal images. No IV  contrast administered. Sensitivity for detection of neoplastic process limited  in the absence of IV contrast.  Dose reduction: All CT scans at this facility are performed using dose reduction  optimization techniques as appropriate to a performed exam including the  following-  automated exposure control, adjustments of mA and/or Kv according to patient  size, or use of iterative reconstructive technique. Nonspecific hazy reticular markings through basilar lungs. Distal esophageal wall thickening.  Mild distal esophageal distention. Small  hiatal hernia. See recent CT chest report. Liver appears unremarkable on this nonenhanced study. Prior cholecystectomy. Pancreas, spleen, and bilateral adrenal glands appear unremarkable. Each kidney  appears unremarkable on this nonenhanced study. Stomach and small bowel loops are nondistended. Prior partial colectomy. Surgical anastomosis right mid abdomen. Stool and air through colon. Sigmoid  colon diverticula. No CT evidence for appendicitis or diverticulitis. No  ascites. Prior hysterectomy. Atherosclerotic change normal caliber abdominal aorta. Unchanged appearance anterior abdominal wall noted broad-based hernia containing  omental fat but no bowel. Lumbar hardware. Impression  Prior abdominal surgery. No bowel obstruction. No ascites. Prior cholecystectomy. Abdominal aorta atherosclerosis. Findings regarding distal esophagus/GE junction region also described on recent  CT chest.        IMPRESSION:   1. Acute respiratory failure with hypoxia  2. Concern for aspiration pneumonia  3. Chronic Obstructive Pulmonary Disease  4. Sleep apnea  5. GERD  6. Alzheimer's Dementia  7. Colon cancer  8. Type 2 Diabetes  9. GERD  10. PUD  11. Fibromyalgia  12. Hypertension  13. Hyperlipidemia  Body mass index is 30.27 kg/m². 14. Additional workup outlined below  15. Prognosis guarded       RECOMMENDATIONS/PLAN:     1. On 4L O2 via nasal cannula as salvage oxygen delivery device to provide high concentration of oxygen to overcome refractory hypoxia. Consider for at home oxygen. 1. Patient vomited the morning of her admission. Her condition worsened post emesis. 2. Repeat chest x-ray still shows hazy reticular markings at basilar lungs. 2. WBC decreased to 12.3. Agree with Empiric IV antibiotics pending culture results. Patient was given Rocephin in the ED and changed to Zosyn. 3. Follow culture results  3. ABG completed 4/26. PO2 is 70.  4.  CAT scan of the chest shows esophageal wall thickening possible reflux and GERD disease agree with Protonix. Rule out diabetic gastroparesis. GI consult. 5. Supplemental O2 to keep sats > 93%  6. Aspiration precautions  7. Labs to follow electrolytes, renal function and and blood counts  8. Glucose monitoring and SSI  9. Bronchial hygiene with respiratory therapy techniques, bronchodilators  10. DVT, SUP prophylaxis  11. Pt needs IV fluids with additives and Drug therapy requiring intensive monitoring for toxicity  12.  Prescription drug management with home med reconciliation reviewed             [facility]

## 2022-04-27 NOTE — PROGRESS NOTES
Problem: Falls - Risk of  Goal: *Absence of Falls  Description: Document Bessy Gregoryalan Fall Risk and appropriate interventions in the flowsheet.   Outcome: Progressing Towards Goal  Note: Fall Risk Interventions:  Mobility Interventions: Bed/chair exit alarm    Mentation Interventions: Bed/chair exit alarm,Door open when patient unattended,Increase mobility,More frequent rounding,Reorient patient,Room close to nurse's station,Update white board    Medication Interventions: Bed/chair exit alarm,Patient to call before getting OOB,Teach patient to arise slowly    Elimination Interventions: Bed/chair exit alarm,Call light in reach

## 2022-04-27 NOTE — PROGRESS NOTES
Progress Note    Patient: Dawn Sahni MRN: [id number]  SSN: [id number]    YOB: 1940  Age: 80 y.o.   Sex: female      Admit Date: 4/25/2022    LOS: 2 days     Subjective:    examined in hospital room, her daughter in the room,  She is a pleasant confused, no complaint shortness of breath, no cough no sputum production    Past Medical History:   Diagnosis Date    Arthritis     Cancer ([facility] 2006    colon cancer    COPD     sleep apnea    Diabetes ([facility]     GERD (gastroesophageal reflux disease)     Other ill-defined conditions(799.89)     fibromylgia    PUD (peptic ulcer disease)     Unspecified sleep apnea         Current Facility-Administered Medications:     QUEtiapine (SEROquel) tablet 25 mg, 25 mg, Oral, BID, Ortiz Meyer PA-C, 25 mg at 04/27/22 0858    memantine (NAMENDA) tablet 10 mg, 10 mg, Oral, BID, Ortiz Meyer PA-C, 10 mg at 04/27/22 0858    glipiZIDE (GLUCOTROL) tablet 5 mg, 5 mg, Oral, ACB, Ortiz Meyer PA-C, 5 mg at 04/27/22 7217    galantamine (RAZADYNE) tablet 8 mg, 8 mg, Oral, BID WITH MEALS, Ortiz Meyer PA-C, 8 mg at 04/27/22 0800    pantoprazole (PROTONIX) tablet 40 mg, 40 mg, Oral, ACB&D, Ortiz Meyer PA-C, 40 mg at 04/27/22 0858    piperacillin-tazobactam (ZOSYN) 3.375 g in 0.9% sodium chloride (MBP/ADV) 100 mL MBP, 3.375 g, IntraVENous, Q8H, Ortiz Meyer PA-C, Last Rate: 25 mL/hr at 04/27/22 0858, 3.375 g at 04/27/22 0858    sodium chloride (NS) flush 5-40 mL, 5-40 mL, IntraVENous, Q8H, Ortiz Meyer PA-C, 10 mL at 04/27/22 0601    sodium chloride (NS) flush 5-40 mL, 5-40 mL, IntraVENous, PRN, Ortiz Meyer PA-C    acetaminophen (TYLENOL) tablet 650 mg, 650 mg, Oral, Q6H PRN, 650 mg at 04/26/22 2106 **OR** acetaminophen (TYLENOL) suppository 650 mg, 650 mg, Rectal, Q6H PRN, Ortiz Meyer PA-C    polyethylene glycol (MIRALAX) packet 17 g, 17 g, Oral, DAILY PRN, Emily Meyer PA-C   ondansetron (ZOFRAN ODT) tablet 4 mg, 4 mg, Oral, Q8H PRN **OR** ondansetron (ZOFRAN) injection 4 mg, 4 mg, IntraVENous, Q6H PRN, Ortiz Meyer PA-C    enoxaparin (LOVENOX) injection 30 mg, 30 mg, SubCUTAneous, DAILY, Ortiz Meyer PA-C, 30 mg at 04/27/22 0858    glucose chewable tablet 16 g, 4 Tablet, Oral, PRN, Ortiz Meyer PA-C    dextrose 10% infusion 0-250 mL, 0-250 mL, IntraVENous, PRN, Mau Meyer PA-C    glucagon (GLUCAGEN) injection 1 mg, 1 mg, IntraMUSCular, PRN, Mau Meyer PA-C    insulin lispro (HUMALOG) injection, , SubCUTAneous, AC&HS, Izaiah Anton PA-C, 3 Units at 04/27/22 1210    Objective:     Vitals:    04/26/22 1920 04/27/22 0015 04/27/22 0832 04/27/22 1124   BP: 133/73 125/62  133/78   Pulse: 90 91  84   Resp: 20 18  20   Temp: 98.5 °F (36.9 °C) 99.5 °F (37.5 °C)  98.6 °F (37 °C)   SpO2: 96% 96% 96% 98%   Weight:       Height:            Intake and Output:  Current Shift: No intake/output data recorded. Last three shifts: No intake/output data recorded. Physical Exam:   Physical Exam  Constitutional:       Appearance: She is ill-appearing. HENT:      Head: Atraumatic. Mouth/Throat:      Mouth: Mucous membranes are dry. Eyes:      General: No scleral icterus. Cardiovascular:      Rate and Rhythm: Rhythm irregular. Heart sounds: Normal heart sounds. Pulmonary:      Effort: Pulmonary effort is normal.   Abdominal:      General: Abdomen is flat. Musculoskeletal:         General: Normal range of motion. Cervical back: Neck supple. Skin:     General: Skin is warm. Neurological:      Mental Status: Mental status is at baseline.    Psychiatric:         Mood and Affect: Mood normal.          Lab/Data Review:  Recent Results (from the past 24 hour(s))   GLUCOSE, POC    Collection Time: 04/26/22  4:37 PM   Result Value Ref Range    Glucose (POC) 291 (H) 65 - 117 mg/dL    Performed by Maryanne Hooker 429, POC    Collection Time: 04/26/22  7:31 PM   Result Value Ref Range    Glucose (POC) 204 (H) 65 - 117 mg/dL    Performed by Kristi Viramontes    LACTIC ACID    Collection Time: 04/26/22  7:53 PM   Result Value Ref Range    Lactic acid 2.6 (HH) 0.4 - 2.0 mmol/L   CBC WITH AUTOMATED DIFF    Collection Time: 04/27/22  6:32 AM   Result Value Ref Range    WBC 12.3 (H) 3.6 - 11.0 K/uL    RBC 3.24 (L) 3.80 - 5.20 M/uL    HGB 9.6 (L) 11.5 - 16.0 g/dL    HCT 29.6 (L) 35.0 - 47.0 %    MCV 91.4 80.0 - 99.0 FL    MCH 29.6 26.0 - 34.0 PG    MCHC 32.4 30.0 - 36.5 g/dL    RDW 15.2 (H) 11.5 - 14.5 %    PLATELET 010 996 - 931 K/uL    MPV 13.0 (H) 8.9 - 12.9 FL    NRBC 0.0 0.0  WBC    ABSOLUTE NRBC 0.00 0.00 - 0.01 K/uL    NEUTROPHILS 81 (H) 32 - 75 %    LYMPHOCYTES 10 (L) 12 - 49 %    MONOCYTES 5 5 - 13 %    EOSINOPHILS 2 0 - 7 %    BASOPHILS 0 0 - 1 %    IMMATURE GRANULOCYTES 2 (H) 0 - 0.5 %    ABS. NEUTROPHILS 10.0 (H) 1.8 - 8.0 K/UL    ABS. LYMPHOCYTES 1.2 0.8 - 3.5 K/UL    ABS. MONOCYTES 0.7 0.0 - 1.0 K/UL    ABS. EOSINOPHILS 0.3 0.0 - 0.4 K/UL    ABS. BASOPHILS 0.0 0.0 - 0.1 K/UL    ABS. IMM. GRANS. 0.2 (H) 0.00 - 0.04 K/UL    DF AUTOMATED     METABOLIC PANEL, COMPREHENSIVE    Collection Time: 04/27/22  6:32 AM   Result Value Ref Range    Sodium 137 136 - 145 mmol/L    Potassium 3.6 3.5 - 5.1 mmol/L    Chloride 106 97 - 108 mmol/L    CO2 23 21 - 32 mmol/L    Anion gap 8 5 - 15 mmol/L    Glucose 187 (H) 65 - 100 mg/dL    BUN 16 6 - 20 mg/dL    Creatinine 0.75 0.55 - 1.02 mg/dL    BUN/Creatinine ratio 21 (H) 12 - 20      GFR est AA >60 >60 ml/min/1.73m2    GFR est non-AA >60 >60 ml/min/1.73m2    Calcium 9.1 8.5 - 10.1 mg/dL    Bilirubin, total 0.5 0.2 - 1.0 mg/dL    AST (SGOT) 9 (L) 15 - 37 U/L    ALT (SGPT) 12 12 - 78 U/L    Alk.  phosphatase 68 45 - 117 U/L    Protein, total 6.2 (L) 6.4 - 8.2 g/dL    Albumin 2.5 (L) 3.5 - 5.0 g/dL    Globulin 3.7 2.0 - 4.0 g/dL    A-G Ratio 0.7 (L) 1.1 - 2.2     NT-PRO BNP    Collection Time: 04/27/22  6:32 AM Result Value Ref Range    NT pro- <450 pg/mL   GLUCOSE, POC    Collection Time: 04/27/22  7:33 AM   Result Value Ref Range    Glucose (POC) 211 (H) 65 - 117 mg/dL    Performed by Catoflo Reza    GLUCOSE, POC    Collection Time: 04/27/22 11:31 AM   Result Value Ref Range    Glucose (POC) 224 (H) 65 - 117 mg/dL    Performed by Karen Reza         XR CHEST PORT   Final Result      CT ABD PELV WO CONT   Final Result   Prior abdominal surgery. No bowel obstruction. No ascites. Prior cholecystectomy. Abdominal aorta atherosclerosis. Findings regarding distal esophagus/GE junction region also described on recent   CT chest.               CT CHEST WO CONT   Final Result   1. Interval development of extensive predominantly central bilateral airspace   disease or edema. 2. Thickened esophagus with mild dilatation distally. Recommend clinical   evaluation and follow-up. 3. Increasing size of several small mediastinal lymph nodes one of which is now   borderline in size. XR CHEST PORT   Final Result      CT HEAD WO CONT   Final Result   Mild central and cortical atrophy. Moderate microvascular ischemic   changes         No acute intracranial abnormality. Assessment:     Active Problems:    Aspiration pneumonia ([address] (4/25/2022)      dysphagia and aspiration  hematomesis      CT of the chest:  distal esophageal wall thickening and mild distal esophageal distention   She had egd few months ago at outpatient setting in [facility] unknown result but no malignancy      Aspiration PNA  Renal insufficieny   Plan:    Continue zosyn  -aspiration precaution  CXR in am  PPI  Iv fluids       O2 sat improved with less O 2     May do an egd for evaluation in the morning, if patient oxygen saturation remain stable     Indication/risk /options discussed with her daughter.  High risk patient              Signed By: Chelsey Jarrett MD     April 27, 2022        Thank you for allowing me to participate in this patients care  Cc Referring Physician   Jazmine Lentz

## 2022-04-27 NOTE — PROGRESS NOTES
Chart reviewed.     DCP: home with Tino ESCALONA.  CM spoke with patients daughter, Ekta Christian, about DCP.     CM will follow for potential home O2 needs

## 2022-04-28 ENCOUNTER — ANESTHESIA (OUTPATIENT)
Dept: ENDOSCOPY | Age: 82
DRG: 871 | End: 2022-04-28
Payer: MEDICARE

## 2022-04-28 LAB
ALBUMIN SERPL-MCNC: 2.4 G/DL (ref 3.5–5)
ALBUMIN/GLOB SERPL: 0.6 {RATIO} (ref 1.1–2.2)
ALP SERPL-CCNC: 65 U/L (ref 45–117)
ALT SERPL-CCNC: 13 U/L (ref 12–78)
ANION GAP SERPL CALC-SCNC: 6 MMOL/L (ref 5–15)
AST SERPL W P-5'-P-CCNC: 13 U/L (ref 15–37)
BACTERIA SPEC CULT: NORMAL
BASOPHILS # BLD: 0 K/UL (ref 0–0.1)
BASOPHILS NFR BLD: 0 % (ref 0–1)
BILIRUB SERPL-MCNC: 0.3 MG/DL (ref 0.2–1)
BUN SERPL-MCNC: 16 MG/DL (ref 6–20)
BUN/CREAT SERPL: 20 (ref 12–20)
CA-I BLD-MCNC: 9 MG/DL (ref 8.5–10.1)
CHLORIDE SERPL-SCNC: 106 MMOL/L (ref 97–108)
CO2 SERPL-SCNC: 26 MMOL/L (ref 21–32)
CREAT SERPL-MCNC: 0.81 MG/DL (ref 0.55–1.02)
DIFFERENTIAL METHOD BLD: ABNORMAL
EOSINOPHIL # BLD: 0.3 K/UL (ref 0–0.4)
EOSINOPHIL NFR BLD: 3 % (ref 0–7)
ERYTHROCYTE [DISTWIDTH] IN BLOOD BY AUTOMATED COUNT: 14.7 % (ref 11.5–14.5)
GLOBULIN SER CALC-MCNC: 4 G/DL (ref 2–4)
GLUCOSE BLD STRIP.AUTO-MCNC: 187 MG/DL (ref 65–117)
GLUCOSE BLD STRIP.AUTO-MCNC: 207 MG/DL (ref 65–117)
GLUCOSE BLD STRIP.AUTO-MCNC: 207 MG/DL (ref 65–117)
GLUCOSE BLD STRIP.AUTO-MCNC: 406 MG/DL (ref 65–117)
GLUCOSE SERPL-MCNC: 153 MG/DL (ref 65–100)
HCT VFR BLD AUTO: 29.5 % (ref 35–47)
HGB BLD-MCNC: 9.6 G/DL (ref 11.5–16)
IMM GRANULOCYTES # BLD AUTO: 0.1 K/UL (ref 0–0.04)
IMM GRANULOCYTES NFR BLD AUTO: 1 % (ref 0–0.5)
LACTATE SERPL-SCNC: 0.9 MMOL/L (ref 0.4–2)
LYMPHOCYTES # BLD: 1.7 K/UL (ref 0.8–3.5)
LYMPHOCYTES NFR BLD: 23 % (ref 12–49)
MCH RBC QN AUTO: 29.1 PG (ref 26–34)
MCHC RBC AUTO-ENTMCNC: 32.5 G/DL (ref 30–36.5)
MCV RBC AUTO: 89.4 FL (ref 80–99)
MONOCYTES # BLD: 0.5 K/UL (ref 0–1)
MONOCYTES NFR BLD: 6 % (ref 5–13)
NEUTS SEG # BLD: 5.1 K/UL (ref 1.8–8)
NEUTS SEG NFR BLD: 67 % (ref 32–75)
NRBC # BLD: 0 K/UL (ref 0–0.01)
NRBC BLD-RTO: 0 PER 100 WBC
PERFORMED BY, TECHID: ABNORMAL
PLATELET # BLD AUTO: 194 K/UL (ref 150–400)
PMV BLD AUTO: 12.7 FL (ref 8.9–12.9)
POTASSIUM SERPL-SCNC: 3.3 MMOL/L (ref 3.5–5.1)
PROT SERPL-MCNC: 6.4 G/DL (ref 6.4–8.2)
RBC # BLD AUTO: 3.3 M/UL (ref 3.8–5.2)
SODIUM SERPL-SCNC: 138 MMOL/L (ref 136–145)
SPECIAL REQUESTS,SREQ: NORMAL
WBC # BLD AUTO: 7.6 K/UL (ref 3.6–11)

## 2022-04-28 PROCEDURE — 97161 PT EVAL LOW COMPLEX 20 MIN: CPT

## 2022-04-28 PROCEDURE — 83605 ASSAY OF LACTIC ACID: CPT

## 2022-04-28 PROCEDURE — 80053 COMPREHEN METABOLIC PANEL: CPT

## 2022-04-28 PROCEDURE — 74011250636 HC RX REV CODE- 250/636: Performed by: PHYSICIAN ASSISTANT

## 2022-04-28 PROCEDURE — 85025 COMPLETE CBC W/AUTO DIFF WBC: CPT

## 2022-04-28 PROCEDURE — 74011000258 HC RX REV CODE- 258: Performed by: PHYSICIAN ASSISTANT

## 2022-04-28 PROCEDURE — 74011636637 HC RX REV CODE- 636/637: Performed by: PHYSICIAN ASSISTANT

## 2022-04-28 PROCEDURE — 94761 N-INVAS EAR/PLS OXIMETRY MLT: CPT

## 2022-04-28 PROCEDURE — 77010033678 HC OXYGEN DAILY

## 2022-04-28 PROCEDURE — 74011000250 HC RX REV CODE- 250: Performed by: PHYSICIAN ASSISTANT

## 2022-04-28 PROCEDURE — 74011250636 HC RX REV CODE- 250/636: Performed by: INTERNAL MEDICINE

## 2022-04-28 PROCEDURE — 74011250637 HC RX REV CODE- 250/637: Performed by: PHYSICIAN ASSISTANT

## 2022-04-28 PROCEDURE — 97530 THERAPEUTIC ACTIVITIES: CPT

## 2022-04-28 PROCEDURE — 36415 COLL VENOUS BLD VENIPUNCTURE: CPT

## 2022-04-28 PROCEDURE — 65270000032 HC RM SEMIPRIVATE

## 2022-04-28 PROCEDURE — 74011250637 HC RX REV CODE- 250/637: Performed by: INTERNAL MEDICINE

## 2022-04-28 PROCEDURE — 82962 GLUCOSE BLOOD TEST: CPT

## 2022-04-28 RX ORDER — INSULIN LISPRO 100 [IU]/ML
15 INJECTION, SOLUTION INTRAVENOUS; SUBCUTANEOUS ONCE
Status: COMPLETED | OUTPATIENT
Start: 2022-04-28 | End: 2022-04-28

## 2022-04-28 RX ORDER — FUROSEMIDE 10 MG/ML
40 INJECTION INTRAMUSCULAR; INTRAVENOUS ONCE
Status: COMPLETED | OUTPATIENT
Start: 2022-04-28 | End: 2022-04-28

## 2022-04-28 RX ORDER — POTASSIUM CHLORIDE 750 MG/1
40 TABLET, FILM COATED, EXTENDED RELEASE ORAL 2 TIMES DAILY
Status: DISCONTINUED | OUTPATIENT
Start: 2022-04-28 | End: 2022-05-01 | Stop reason: HOSPADM

## 2022-04-28 RX ADMIN — QUETIAPINE FUMARATE 25 MG: 25 TABLET, FILM COATED ORAL at 21:17

## 2022-04-28 RX ADMIN — PANTOPRAZOLE SODIUM 40 MG: 40 TABLET, DELAYED RELEASE ORAL at 09:49

## 2022-04-28 RX ADMIN — POTASSIUM CHLORIDE 40 MEQ: 750 TABLET, FILM COATED, EXTENDED RELEASE ORAL at 21:17

## 2022-04-28 RX ADMIN — POTASSIUM CHLORIDE 40 MEQ: 750 TABLET, FILM COATED, EXTENDED RELEASE ORAL at 14:29

## 2022-04-28 RX ADMIN — GALANTAMINE 8 MG: 4 TABLET, FILM COATED ORAL at 09:46

## 2022-04-28 RX ADMIN — PIPERACILLIN AND TAZOBACTAM 3.38 G: 3; .375 INJECTION, POWDER, LYOPHILIZED, FOR SOLUTION INTRAVENOUS at 09:16

## 2022-04-28 RX ADMIN — MEMANTINE HYDROCHLORIDE 10 MG: 10 TABLET ORAL at 21:17

## 2022-04-28 RX ADMIN — SODIUM CHLORIDE, PRESERVATIVE FREE 10 ML: 5 INJECTION INTRAVENOUS at 05:10

## 2022-04-28 RX ADMIN — INSULIN LISPRO 15 UNITS: 100 INJECTION, SOLUTION INTRAVENOUS; SUBCUTANEOUS at 18:50

## 2022-04-28 RX ADMIN — QUETIAPINE FUMARATE 25 MG: 25 TABLET, FILM COATED ORAL at 09:49

## 2022-04-28 RX ADMIN — SODIUM CHLORIDE, PRESERVATIVE FREE 10 ML: 5 INJECTION INTRAVENOUS at 18:31

## 2022-04-28 RX ADMIN — SODIUM CHLORIDE, PRESERVATIVE FREE 10 ML: 5 INJECTION INTRAVENOUS at 21:21

## 2022-04-28 RX ADMIN — MEMANTINE HYDROCHLORIDE 10 MG: 10 TABLET ORAL at 09:49

## 2022-04-28 RX ADMIN — GLIPIZIDE 5 MG: 5 TABLET ORAL at 09:49

## 2022-04-28 RX ADMIN — FUROSEMIDE 40 MG: 10 INJECTION, SOLUTION INTRAMUSCULAR; INTRAVENOUS at 14:29

## 2022-04-28 RX ADMIN — INSULIN LISPRO 15 UNITS: 100 INJECTION, SOLUTION INTRAVENOUS; SUBCUTANEOUS at 18:58

## 2022-04-28 RX ADMIN — PIPERACILLIN AND TAZOBACTAM 3.38 G: 3; .375 INJECTION, POWDER, LYOPHILIZED, FOR SOLUTION INTRAVENOUS at 23:33

## 2022-04-28 RX ADMIN — PIPERACILLIN AND TAZOBACTAM 3.38 G: 3; .375 INJECTION, POWDER, LYOPHILIZED, FOR SOLUTION INTRAVENOUS at 18:50

## 2022-04-28 RX ADMIN — PANTOPRAZOLE SODIUM 40 MG: 40 TABLET, DELAYED RELEASE ORAL at 18:50

## 2022-04-28 RX ADMIN — INSULIN LISPRO 2 UNITS: 100 INJECTION, SOLUTION INTRAVENOUS; SUBCUTANEOUS at 22:00

## 2022-04-28 NOTE — PROGRESS NOTES
Hospitalist Progress Note    Subjective:   Daily Progress Note: 4/28/2022 9:31 AM    Patient examined at bedside, resting comfortably. She has no current complaints, but history is limited by dementia. Current Facility-Administered Medications   Medication Dose Route Frequency    furosemide (LASIX) injection 40 mg  40 mg IntraVENous ONCE    potassium chloride SR (KLOR-CON 10) tablet 40 mEq  40 mEq Oral BID    QUEtiapine (SEROquel) tablet 25 mg  25 mg Oral BID    memantine (NAMENDA) tablet 10 mg  10 mg Oral BID    glipiZIDE (GLUCOTROL) tablet 5 mg  5 mg Oral ACB    galantamine (RAZADYNE) tablet 8 mg  8 mg Oral BID WITH MEALS    pantoprazole (PROTONIX) tablet 40 mg  40 mg Oral ACB&D    piperacillin-tazobactam (ZOSYN) 3.375 g in 0.9% sodium chloride (MBP/ADV) 100 mL MBP  3.375 g IntraVENous Q8H    sodium chloride (NS) flush 5-40 mL  5-40 mL IntraVENous Q8H    sodium chloride (NS) flush 5-40 mL  5-40 mL IntraVENous PRN    acetaminophen (TYLENOL) tablet 650 mg  650 mg Oral Q6H PRN    Or    acetaminophen (TYLENOL) suppository 650 mg  650 mg Rectal Q6H PRN    polyethylene glycol (MIRALAX) packet 17 g  17 g Oral DAILY PRN    ondansetron (ZOFRAN ODT) tablet 4 mg  4 mg Oral Q8H PRN    Or    ondansetron (ZOFRAN) injection 4 mg  4 mg IntraVENous Q6H PRN    enoxaparin (LOVENOX) injection 30 mg  30 mg SubCUTAneous DAILY    glucose chewable tablet 16 g  4 Tablet Oral PRN    dextrose 10% infusion 0-250 mL  0-250 mL IntraVENous PRN    glucagon (GLUCAGEN) injection 1 mg  1 mg IntraMUSCular PRN    insulin lispro (HUMALOG) injection   SubCUTAneous AC&HS        Review of Systems  Review of Systems   Respiratory: Positive for shortness of breath. Cardiovascular: Negative for chest pain. Gastrointestinal: Negative for abdominal pain, nausea and vomiting.     ROS limited due to dementia      Objective:     Visit Vitals  /60   Pulse 79   Temp 99.2 °F (37.3 °C)   Resp 16   Ht 5' (1.524 m)   Wt 70.3 kg (155 lb)   SpO2 96%   BMI 30.27 kg/m²    O2 Flow Rate (L/min): 2 l/min O2 Device: Nasal cannula    Temp (24hrs), Av.6 °F (37 °C), Min:98 °F (36.7 °C), Max:99.2 °F (37.3 °C)      No intake/output data recorded. No intake/output data recorded.     Recent Results (from the past 24 hour(s))   ECHO ADULT COMPLETE    Collection Time: 22  3:57 PM   Result Value Ref Range    AV Peak Velocity 1.5 m/s    AV Peak Gradient 9 mmHg    Aortic Root 2.9 cm    IVSd 1.3 (A) 0.6 - 0.9 cm    LVIDd 4.0 3.9 - 5.3 cm    LVIDs 2.4 cm    LVOT Peak Velocity 0.7 m/s    LVOT Peak Gradient 2 mmHg    LVPWd 1.1 (A) 0.6 - 0.9 cm    LV E' Lateral Velocity 7 cm/s    LV E' Septal Velocity 6 cm/s    LA Diameter 3.8 cm    MV E Wave Deceleration Time 313.0 ms    MV A Velocity 1.40 m/s    MV E Velocity 0.83 m/s    MV Mean Gradient 3 mmHg    MV VTI 44.0 cm    MV Mean Velocity 0.8 m/s    MV Max Velocity 1.7 m/s    MV Peak Gradient 11 mmHg    PV Max Velocity 1.2 m/s    PV Peak Gradient 5 mmHg    Est. RA Pressure 3 mmHg    RVIDd 3.1 cm    TR Max Velocity 2.26 m/s    TR Peak Gradient 20 mmHg    Fractional Shortening 2D 40 28 - 44 %    LVIDd Index 2.40 cm/m2    LVIDs Index 1.44 cm/m2    LV RWT Ratio 0.55     LV Mass 2D 165.5 (A) 67 - 162 g    LV Mass 2D Index 99.1 (A) 43 - 95 g/m2    MV E/A 0.59     E/E' Ratio (Averaged) 12.85     E/E' Lateral 11.86     E/E' Septal 13.83     LA Size Index 2.28 cm/m2    LA/AO Root Ratio 1.31     Ao Root Index 1.74 cm/m2    AV Velocity Ratio 0.47     RVSP 23 mmHg    EF BP 71 55 - 100 %   GLUCOSE, POC    Collection Time: 22  4:06 PM   Result Value Ref Range    Glucose (POC) 217 (H) 65 - 117 mg/dL    Performed by Karli Pierre    BLOOD GAS, ARTERIAL    Collection Time: 22  6:09 PM   Result Value Ref Range    pH 7.48 (H) 7.35 - 7.45      PCO2 33 (L) 35 - 45 mmHg    PO2 55 (L) 75 - 100 mmHg    O2 SAT 91 (L) >95 %    BICARBONATE 26 22 - 26 mmol/L    BASE EXCESS 1.3 0 - 2 mmol/L    O2 METHOD Room air      FIO2 21.0 % Sample source Arterial      SITE Right Brachial      LAUREN'S TEST PASS      Critical value read back INOCENCIO COSTA RN    GLUCOSE, POC    Collection Time: 04/27/22  7:26 PM   Result Value Ref Range    Glucose (POC) 230 (H) 65 - 117 mg/dL    Performed by Mitch Lorenzo    CBC WITH AUTOMATED DIFF    Collection Time: 04/28/22  6:34 AM   Result Value Ref Range    WBC 7.6 3.6 - 11.0 K/uL    RBC 3.30 (L) 3.80 - 5.20 M/uL    HGB 9.6 (L) 11.5 - 16.0 g/dL    HCT 29.5 (L) 35.0 - 47.0 %    MCV 89.4 80.0 - 99.0 FL    MCH 29.1 26.0 - 34.0 PG    MCHC 32.5 30.0 - 36.5 g/dL    RDW 14.7 (H) 11.5 - 14.5 %    PLATELET 491 815 - 568 K/uL    MPV 12.7 8.9 - 12.9 FL    NRBC 0.0 0.0  WBC    ABSOLUTE NRBC 0.00 0.00 - 0.01 K/uL    NEUTROPHILS 67 32 - 75 %    LYMPHOCYTES 23 12 - 49 %    MONOCYTES 6 5 - 13 %    EOSINOPHILS 3 0 - 7 %    BASOPHILS 0 0 - 1 %    IMMATURE GRANULOCYTES 1 (H) 0 - 0.5 %    ABS. NEUTROPHILS 5.1 1.8 - 8.0 K/UL    ABS. LYMPHOCYTES 1.7 0.8 - 3.5 K/UL    ABS. MONOCYTES 0.5 0.0 - 1.0 K/UL    ABS. EOSINOPHILS 0.3 0.0 - 0.4 K/UL    ABS. BASOPHILS 0.0 0.0 - 0.1 K/UL    ABS. IMM. GRANS. 0.1 (H) 0.00 - 0.04 K/UL    DF AUTOMATED     METABOLIC PANEL, COMPREHENSIVE    Collection Time: 04/28/22  6:34 AM   Result Value Ref Range    Sodium 138 136 - 145 mmol/L    Potassium 3.3 (L) 3.5 - 5.1 mmol/L    Chloride 106 97 - 108 mmol/L    CO2 26 21 - 32 mmol/L    Anion gap 6 5 - 15 mmol/L    Glucose 153 (H) 65 - 100 mg/dL    BUN 16 6 - 20 mg/dL    Creatinine 0.81 0.55 - 1.02 mg/dL    BUN/Creatinine ratio 20 12 - 20      GFR est AA >60 >60 ml/min/1.73m2    GFR est non-AA >60 >60 ml/min/1.73m2    Calcium 9.0 8.5 - 10.1 mg/dL    Bilirubin, total 0.3 0.2 - 1.0 mg/dL    AST (SGOT) 13 (L) 15 - 37 U/L    ALT (SGPT) 13 12 - 78 U/L    Alk.  phosphatase 65 45 - 117 U/L    Protein, total 6.4 6.4 - 8.2 g/dL    Albumin 2.4 (L) 3.5 - 5.0 g/dL    Globulin 4.0 2.0 - 4.0 g/dL    A-G Ratio 0.6 (L) 1.1 - 2.2     LACTIC ACID    Collection Time: 04/28/22 6:34 AM   Result Value Ref Range    Lactic acid 0.9 0.4 - 2.0 mmol/L   GLUCOSE, POC    Collection Time: 04/28/22  9:13 AM   Result Value Ref Range    Glucose (POC) 187 (H) 65 - 117 mg/dL    Performed by Jose Delarosa    GLUCOSE, POC    Collection Time: 04/28/22 11:09 AM   Result Value Ref Range    Glucose (POC) 207 (H) 65 - 117 mg/dL    Performed by Roxana Soto         XR CHEST PORT   Final Result      CT ABD PELV WO CONT   Final Result   Prior abdominal surgery. No bowel obstruction. No ascites. Prior cholecystectomy. Abdominal aorta atherosclerosis. Findings regarding distal esophagus/GE junction region also described on recent   CT chest.               CT CHEST WO CONT   Final Result   1. Interval development of extensive predominantly central bilateral airspace   disease or edema. 2. Thickened esophagus with mild dilatation distally. Recommend clinical   evaluation and follow-up. 3. Increasing size of several small mediastinal lymph nodes one of which is now   borderline in size. XR CHEST PORT   Final Result      CT HEAD WO CONT   Final Result   Mild central and cortical atrophy. Moderate microvascular ischemic   changes         No acute intracranial abnormality. PHYSICAL EXAM:    Physical Exam  Vitals reviewed. Constitutional:       Appearance: She is not ill-appearing. Cardiovascular:      Rate and Rhythm: Normal rate and regular rhythm. Heart sounds: Normal heart sounds. Pulmonary:      Effort: Pulmonary effort is normal.      Comments: Mild rhonchi in bilateral bases. Abdominal:      General: Abdomen is flat. There is no distension. Palpations: Abdomen is soft. Tenderness: There is no abdominal tenderness. Musculoskeletal:      Right lower leg: Edema (mild) present. Left lower leg: Edema (mild) present. Skin:     General: Skin is warm and dry. Neurological:      Mental Status: She is alert. Comments: Pleasantly demented. Data Review    Recent Results (from the past 24 hour(s))   ECHO ADULT COMPLETE    Collection Time: 04/27/22  3:57 PM   Result Value Ref Range    AV Peak Velocity 1.5 m/s    AV Peak Gradient 9 mmHg    Aortic Root 2.9 cm    IVSd 1.3 (A) 0.6 - 0.9 cm    LVIDd 4.0 3.9 - 5.3 cm    LVIDs 2.4 cm    LVOT Peak Velocity 0.7 m/s    LVOT Peak Gradient 2 mmHg    LVPWd 1.1 (A) 0.6 - 0.9 cm    LV E' Lateral Velocity 7 cm/s    LV E' Septal Velocity 6 cm/s    LA Diameter 3.8 cm    MV E Wave Deceleration Time 313.0 ms    MV A Velocity 1.40 m/s    MV E Velocity 0.83 m/s    MV Mean Gradient 3 mmHg    MV VTI 44.0 cm    MV Mean Velocity 0.8 m/s    MV Max Velocity 1.7 m/s    MV Peak Gradient 11 mmHg    PV Max Velocity 1.2 m/s    PV Peak Gradient 5 mmHg    Est. RA Pressure 3 mmHg    RVIDd 3.1 cm    TR Max Velocity 2.26 m/s    TR Peak Gradient 20 mmHg    Fractional Shortening 2D 40 28 - 44 %    LVIDd Index 2.40 cm/m2    LVIDs Index 1.44 cm/m2    LV RWT Ratio 0.55     LV Mass 2D 165.5 (A) 67 - 162 g    LV Mass 2D Index 99.1 (A) 43 - 95 g/m2    MV E/A 0.59     E/E' Ratio (Averaged) 12.85     E/E' Lateral 11.86     E/E' Septal 13.83     LA Size Index 2.28 cm/m2    LA/AO Root Ratio 1.31     Ao Root Index 1.74 cm/m2    AV Velocity Ratio 0.47     RVSP 23 mmHg    EF BP 71 55 - 100 %   GLUCOSE, POC    Collection Time: 04/27/22  4:06 PM   Result Value Ref Range    Glucose (POC) 217 (H) 65 - 117 mg/dL    Performed by Tom Leone    BLOOD GAS, ARTERIAL    Collection Time: 04/27/22  6:09 PM   Result Value Ref Range    pH 7.48 (H) 7.35 - 7.45      PCO2 33 (L) 35 - 45 mmHg    PO2 55 (L) 75 - 100 mmHg    O2 SAT 91 (L) >95 %    BICARBONATE 26 22 - 26 mmol/L    BASE EXCESS 1.3 0 - 2 mmol/L    O2 METHOD Room air      FIO2 21.0 %    Sample source Arterial      SITE Right Brachial      LAUREN'S TEST PASS      Critical value read back EJase Scott RN    GLUCOSE, POC    Collection Time: 04/27/22  7:26 PM   Result Value Ref Range    Glucose (POC) 230 (H) 65 - 117 mg/dL    Performed by Savanna Castle    CBC WITH AUTOMATED DIFF    Collection Time: 04/28/22  6:34 AM   Result Value Ref Range    WBC 7.6 3.6 - 11.0 K/uL    RBC 3.30 (L) 3.80 - 5.20 M/uL    HGB 9.6 (L) 11.5 - 16.0 g/dL    HCT 29.5 (L) 35.0 - 47.0 %    MCV 89.4 80.0 - 99.0 FL    MCH 29.1 26.0 - 34.0 PG    MCHC 32.5 30.0 - 36.5 g/dL    RDW 14.7 (H) 11.5 - 14.5 %    PLATELET 555 841 - 083 K/uL    MPV 12.7 8.9 - 12.9 FL    NRBC 0.0 0.0  WBC    ABSOLUTE NRBC 0.00 0.00 - 0.01 K/uL    NEUTROPHILS 67 32 - 75 %    LYMPHOCYTES 23 12 - 49 %    MONOCYTES 6 5 - 13 %    EOSINOPHILS 3 0 - 7 %    BASOPHILS 0 0 - 1 %    IMMATURE GRANULOCYTES 1 (H) 0 - 0.5 %    ABS. NEUTROPHILS 5.1 1.8 - 8.0 K/UL    ABS. LYMPHOCYTES 1.7 0.8 - 3.5 K/UL    ABS. MONOCYTES 0.5 0.0 - 1.0 K/UL    ABS. EOSINOPHILS 0.3 0.0 - 0.4 K/UL    ABS. BASOPHILS 0.0 0.0 - 0.1 K/UL    ABS. IMM. GRANS. 0.1 (H) 0.00 - 0.04 K/UL    DF AUTOMATED     METABOLIC PANEL, COMPREHENSIVE    Collection Time: 04/28/22  6:34 AM   Result Value Ref Range    Sodium 138 136 - 145 mmol/L    Potassium 3.3 (L) 3.5 - 5.1 mmol/L    Chloride 106 97 - 108 mmol/L    CO2 26 21 - 32 mmol/L    Anion gap 6 5 - 15 mmol/L    Glucose 153 (H) 65 - 100 mg/dL    BUN 16 6 - 20 mg/dL    Creatinine 0.81 0.55 - 1.02 mg/dL    BUN/Creatinine ratio 20 12 - 20      GFR est AA >60 >60 ml/min/1.73m2    GFR est non-AA >60 >60 ml/min/1.73m2    Calcium 9.0 8.5 - 10.1 mg/dL    Bilirubin, total 0.3 0.2 - 1.0 mg/dL    AST (SGOT) 13 (L) 15 - 37 U/L    ALT (SGPT) 13 12 - 78 U/L    Alk.  phosphatase 65 45 - 117 U/L    Protein, total 6.4 6.4 - 8.2 g/dL    Albumin 2.4 (L) 3.5 - 5.0 g/dL    Globulin 4.0 2.0 - 4.0 g/dL    A-G Ratio 0.6 (L) 1.1 - 2.2     LACTIC ACID    Collection Time: 04/28/22  6:34 AM   Result Value Ref Range    Lactic acid 0.9 0.4 - 2.0 mmol/L   GLUCOSE, POC    Collection Time: 04/28/22  9:13 AM   Result Value Ref Range    Glucose (POC) 187 (H) 65 - 117 mg/dL    Performed by [employer] GLUCOSE, POC    Collection Time: 04/28/22 11:09 AM   Result Value Ref Range    Glucose (POC) 207 (H) 65 - 117 mg/dL    Performed by Kendra Begum         Assessment/Plan:     Hospital course:   Marco Meek is a 80 y.o. female with a hx of DMII, alzheimer's dementia, HTN, HLD who was brought in for shortness of breath and was admitted for management of aspiration PNA and acute respiratory failure with hypoxia. Pt is not on supplemental O2 at baseline but has been maintaining O2 saturation >92% on 4L NC. In the ED workup showed WBC of 13.5, CAROL with Cr of 1.4. CXR and CT of the chest concerning for developing aspiration PNA, patient treated empirically with Rocephin. Patient was started on Zosyn and Pulmonology consulted. CT of the head/neck with no acute findings. UA consistent with UTI, urine culture pending. Given CT of the chest and CT A/P demonstrating distal esophageal wall thickening and mild distal esophageal distention in the setting of new aspiration PNA and RUQ abdominal pain GI has been consulted. GI will consider completing EGD for evaluation once the O2 sat improves with less O2 equipment required. Pulmonology has ordered one dose of lasix and an ECHO revealed  EF of 66 5% with septal thickening and abnormal diastolic dysfunctionrule out a CHF fluid overload. ABG complete on 04/26 revealed a PO2 of 70. Blood and urine culture have shown no growth in the preliminary results, will continue to monitor. Aspiration PNA  - Continue zosyn  - Repeat CXR with persistent aspiration PNA  - Sputum culture pending  - Pulmonology consulted, obtain ABG   - ABG, PO2 is 70  - SLP consulted progress diet to full liquids     Acute respiratory failure with hypoxia  - Supplemental O2, keep >92%.    - Pulmonary following patient  - RT consult  - Continuous pulse OX    Diastolic CHF  -  on 04/27  - ECHO EF of 66 5% with septal thickening and abnormal diastolic dysfunction  - continue to monitor     Sepsis  Duration pneumonia and UTI  - WBC 19 --> 12.3-->7  - Lactate normal  - Blood culture; blood culture with Staphylococcus species coag negative 1 out of 4 bottles most likely contaminant  - IVF NS   - Zosyn     UTI   - Urine culture; no growth in preliminary results, will continue to follow  - Zosyn    Esophagitis  - CT A/P 04/25/22 demonstrating distal esophageal wall thickening and mild distal esophageal distention. - GI consult: EGD  -Protonix     CAROL  Resolved     Mild hyponatremia  Resolved    Mild normocytic anemia   - Hgb 13.0 --> 10.7 --> 9.6  - Continue to monitor   - Continue iron supplementation       DM  - Glucose 419 --> 279 --> 187  - Insuline sliding scale      Dementia/worsening AMS  - CT head and neck 04/25/22 without acute abnormality   - Continue home medications, Memantine      Code status: DNR  DVT prophylaxis: Lovenox   GI prophylaxis: Protonix  POA: Daughter, Dillan Martinez. [phone], updated    Care Plan discussed with: Patient/Family    Total time spent with patient: >35 minutes.

## 2022-04-28 NOTE — PROGRESS NOTES
Progress Note    Patient: Sheryl Evans MRN: [id number]  SSN: [id number]    YOB: 1940  Age: 80 y.o.   Sex: female      Admit Date: 4/25/2022    LOS: 3 days     Subjective:    examined in hospital room, her daughter in the room,  She is a pleasant confused,   On 2 liter O2    no complaint shortness of breath, no cough no sputum production    Past Medical History:   Diagnosis Date    Arthritis     Cancer ([facility] 2006    colon cancer    COPD     sleep apnea    Diabetes ([facility]     GERD (gastroesophageal reflux disease)     Other ill-defined conditions(799.89)     fibromylgia    PUD (peptic ulcer disease)     Unspecified sleep apnea         Current Facility-Administered Medications:     potassium chloride SR (KLOR-CON 10) tablet 40 mEq, 40 mEq, Oral, BID, Narinder Wahl MD, 40 mEq at 04/28/22 1429    insulin lispro (HUMALOG) injection 15 Units, 15 Units, SubCUTAneous, ONCE, Ortiz Meyer PA-C    QUEtiapine (SEROquel) tablet 25 mg, 25 mg, Oral, BID, Ortiz Meyer PA-C, 25 mg at 04/28/22 0949    memantine (NAMENDA) tablet 10 mg, 10 mg, Oral, BID, Ortiz Meyer PA-C, 10 mg at 04/28/22 0949    glipiZIDE (GLUCOTROL) tablet 5 mg, 5 mg, Oral, ACB, Ortiz Meyer PA-C, 5 mg at 04/28/22 1597    galantamine (RAZADYNE) tablet 8 mg, 8 mg, Oral, BID WITH MEALS, Ortiz Meyer PA-C, 8 mg at 04/28/22 0946    pantoprazole (PROTONIX) tablet 40 mg, 40 mg, Oral, ACB&D, Ortiz Meyer PA-C, 40 mg at 04/28/22 0949    piperacillin-tazobactam (ZOSYN) 3.375 g in 0.9% sodium chloride (MBP/ADV) 100 mL MBP, 3.375 g, IntraVENous, Q8H, Ortiz Meyer PA-C, Last Rate: 25 mL/hr at 04/28/22 0916, 3.375 g at 04/28/22 0916    sodium chloride (NS) flush 5-40 mL, 5-40 mL, IntraVENous, Q8H, Ortiz Meyer PA-C, 10 mL at 04/28/22 0510    sodium chloride (NS) flush 5-40 mL, 5-40 mL, IntraVENous, PRN, Bharathi Meyer PA-C    acetaminophen (TYLENOL) tablet 650 mg, 650 mg, Oral, Q6H PRN, 650 mg at 04/26/22 2106 **OR** acetaminophen (TYLENOL) suppository 650 mg, 650 mg, Rectal, Q6H PRN, Carmine Meyer PA-C    polyethylene glycol (MIRALAX) packet 17 g, 17 g, Oral, DAILY PRN, Ortiz Meyer PA-C    ondansetron (ZOFRAN ODT) tablet 4 mg, 4 mg, Oral, Q8H PRN **OR** ondansetron (ZOFRAN) injection 4 mg, 4 mg, IntraVENous, Q6H PRN, Ortiz Meyer PA-C    enoxaparin (LOVENOX) injection 30 mg, 30 mg, SubCUTAneous, DAILY, Ortiz Meyer PA-C, 30 mg at 04/27/22 0858    glucose chewable tablet 16 g, 4 Tablet, Oral, PRN, Ortiz Meyer PA-C    dextrose 10% infusion 0-250 mL, 0-250 mL, IntraVENous, PRN, Carmine Meyer PA-C    glucagon (GLUCAGEN) injection 1 mg, 1 mg, IntraMUSCular, PRN, Carimne Meyer PA-C    insulin lispro (HUMALOG) injection, , SubCUTAneous, AC&HS, Josse Flores PA-C, 2 Units at 04/27/22 2105    Objective:     Vitals:    04/27/22 1124 04/27/22 1921 04/28/22 0718 04/28/22 0744   BP: 133/78 (!) 157/54 139/60    Pulse: 84 70 79    Resp: 20 18 16    Temp: 98.6 °F (37 °C) 98 °F (36.7 °C) 99.2 °F (37.3 °C)    SpO2: 98% 100% 96% 96%   Weight:       Height:            Intake and Output:  Current Shift: 04/28 0701 - 04/28 1900  In: 450 [P.O.:450]  Out: -   Last three shifts: No intake/output data recorded. Physical Exam:   Physical Exam  Constitutional:       Appearance: She is ill-appearing. HENT:      Head: Atraumatic. Mouth/Throat:      Mouth: Mucous membranes are dry. Eyes:      General: No scleral icterus. Cardiovascular:      Rate and Rhythm: Rhythm irregular. Heart sounds: Normal heart sounds. Pulmonary:      Effort: Pulmonary effort is normal.   Abdominal:      General: Abdomen is flat. Musculoskeletal:         General: Normal range of motion. Cervical back: Neck supple. Skin:     General: Skin is warm. Neurological:      Mental Status: Mental status is at baseline.    Psychiatric:         Mood and Affect: Mood normal.          Lab/Data Review:  Recent Results (from the past 24 hour(s))   BLOOD GAS, ARTERIAL    Collection Time: 04/27/22  6:09 PM   Result Value Ref Range    pH 7.48 (H) 7.35 - 7.45      PCO2 33 (L) 35 - 45 mmHg    PO2 55 (L) 75 - 100 mmHg    O2 SAT 91 (L) >95 %    BICARBONATE 26 22 - 26 mmol/L    BASE EXCESS 1.3 0 - 2 mmol/L    O2 METHOD Room air      FIO2 21.0 %    Sample source Arterial      SITE Right Brachial      LAUREN'S TEST PASS      Critical value read back INOCENCIO COSTA RN    GLUCOSE, POC    Collection Time: 04/27/22  7:26 PM   Result Value Ref Range    Glucose (POC) 230 (H) 65 - 117 mg/dL    Performed by Shivani Alexis    CBC WITH AUTOMATED DIFF    Collection Time: 04/28/22  6:34 AM   Result Value Ref Range    WBC 7.6 3.6 - 11.0 K/uL    RBC 3.30 (L) 3.80 - 5.20 M/uL    HGB 9.6 (L) 11.5 - 16.0 g/dL    HCT 29.5 (L) 35.0 - 47.0 %    MCV 89.4 80.0 - 99.0 FL    MCH 29.1 26.0 - 34.0 PG    MCHC 32.5 30.0 - 36.5 g/dL    RDW 14.7 (H) 11.5 - 14.5 %    PLATELET 393 013 - 913 K/uL    MPV 12.7 8.9 - 12.9 FL    NRBC 0.0 0.0  WBC    ABSOLUTE NRBC 0.00 0.00 - 0.01 K/uL    NEUTROPHILS 67 32 - 75 %    LYMPHOCYTES 23 12 - 49 %    MONOCYTES 6 5 - 13 %    EOSINOPHILS 3 0 - 7 %    BASOPHILS 0 0 - 1 %    IMMATURE GRANULOCYTES 1 (H) 0 - 0.5 %    ABS. NEUTROPHILS 5.1 1.8 - 8.0 K/UL    ABS. LYMPHOCYTES 1.7 0.8 - 3.5 K/UL    ABS. MONOCYTES 0.5 0.0 - 1.0 K/UL    ABS. EOSINOPHILS 0.3 0.0 - 0.4 K/UL    ABS. BASOPHILS 0.0 0.0 - 0.1 K/UL    ABS. IMM.  GRANS. 0.1 (H) 0.00 - 0.04 K/UL    DF AUTOMATED     METABOLIC PANEL, COMPREHENSIVE    Collection Time: 04/28/22  6:34 AM   Result Value Ref Range    Sodium 138 136 - 145 mmol/L    Potassium 3.3 (L) 3.5 - 5.1 mmol/L    Chloride 106 97 - 108 mmol/L    CO2 26 21 - 32 mmol/L    Anion gap 6 5 - 15 mmol/L    Glucose 153 (H) 65 - 100 mg/dL    BUN 16 6 - 20 mg/dL    Creatinine 0.81 0.55 - 1.02 mg/dL    BUN/Creatinine ratio 20 12 - 20      GFR est AA >60 >60 ml/min/1.73m2    GFR est non-AA >60 >60 ml/min/1.73m2    Calcium 9.0 8.5 - 10.1 mg/dL    Bilirubin, total 0.3 0.2 - 1.0 mg/dL    AST (SGOT) 13 (L) 15 - 37 U/L    ALT (SGPT) 13 12 - 78 U/L    Alk. phosphatase 65 45 - 117 U/L    Protein, total 6.4 6.4 - 8.2 g/dL    Albumin 2.4 (L) 3.5 - 5.0 g/dL    Globulin 4.0 2.0 - 4.0 g/dL    A-G Ratio 0.6 (L) 1.1 - 2.2     LACTIC ACID    Collection Time: 04/28/22  6:34 AM   Result Value Ref Range    Lactic acid 0.9 0.4 - 2.0 mmol/L   GLUCOSE, POC    Collection Time: 04/28/22  9:13 AM   Result Value Ref Range    Glucose (POC) 187 (H) 65 - 117 mg/dL    Performed by [address] [place] [address], POC    Collection Time: 04/28/22 11:09 AM   Result Value Ref Range    Glucose (POC) 207 (H) 65 - 117 mg/dL    Performed by Mohamud Mata    GLUCOSE, POC    Collection Time: 04/28/22  4:44 PM   Result Value Ref Range    Glucose (POC) 406 (H) 65 - 117 mg/dL    Performed by Agustin Ku         XR CHEST PORT   Final Result      CT ABD PELV WO CONT   Final Result   Prior abdominal surgery. No bowel obstruction. No ascites. Prior cholecystectomy. Abdominal aorta atherosclerosis. Findings regarding distal esophagus/GE junction region also described on recent   CT chest.               CT CHEST WO CONT   Final Result   1. Interval development of extensive predominantly central bilateral airspace   disease or edema. 2. Thickened esophagus with mild dilatation distally. Recommend clinical   evaluation and follow-up. 3. Increasing size of several small mediastinal lymph nodes one of which is now   borderline in size. XR CHEST PORT   Final Result      CT HEAD WO CONT   Final Result   Mild central and cortical atrophy. Moderate microvascular ischemic   changes         No acute intracranial abnormality.                      Assessment:     Active Problems:    Aspiration pneumonia ([address] (4/25/2022)      dysphagia and aspiration  hematomesis      CT of the chest:  distal esophageal wall thickening and mild distal esophageal distention   Try the EGD day but the patient was determined unstable,   Aspiration PNA  Renal insufficieny   Plan:    Continue zosyn  -aspiration precaution  CXR in am  PPI  Iv fluids      May do an egd for evaluation in the morning, if patient was determined by anesthesiologist   Indication/risk /options discussed with her daughter.  High risk patient              Signed By: Markell Ocampo MD     April 28, 2022        Thank you for allowing me to participate in this patients care  Cc Referring Physician   [place]

## 2022-04-28 NOTE — PROGRESS NOTES
PULMONARY NOTE  [facility]    Name: Juan Manuel Ron MRN: [id number]   : 1940 Hospital: [address] [place] [address]   Date: 2022  Admission date: 2022 Hospital Day: 4       HPI:     Hospital Problems  Date Reviewed: 2019          Codes Class Noted POA    Aspiration pneumonia [facility]) ICD-10-CM: J69.0  ICD-9-CM: 507.0  2022 Unknown                   [x] High complexity decision making was performed  [x] See my orders for details      Subjective/Initial History:     I was asked by Radha Rogers MD to see Juan Manuel Ron  a 80 y.o.  female in consultation     Excerpts from admission 2022 or consult notes as follows:   Patient is a 79yo female with a hx of COPD, sleep apnea, Type 2 diabetes, HTN, hyperlipidemia, Alzheimer's dementia, and colon caner that presented to the ED with shortness of breath. The history was given by her daughter. Patient's daughter states that she has had two episodes of non-bloody emesis with RUQ abdominal pain. Denies bowel/bladder changes. Daughter states that her SpO2 was 88% and she is not on supplemental oxygen. CXR and chest CT taken in the ED is concerning for aspiration PNA. Rocephin was started empirically, but changed later to Zosyn. Patient placed on 4L O2 via NC. Pulmonary was consulted for reasons stated above.        No Known Allergies     MAR reviewed and pertinent medications noted or modified as needed     Current Facility-Administered Medications   Medication    QUEtiapine (SEROquel) tablet 25 mg    memantine (NAMENDA) tablet 10 mg    glipiZIDE (GLUCOTROL) tablet 5 mg    galantamine (RAZADYNE) tablet 8 mg    pantoprazole (PROTONIX) tablet 40 mg    piperacillin-tazobactam (ZOSYN) 3.375 g in 0.9% sodium chloride (MBP/ADV) 100 mL MBP    sodium chloride (NS) flush 5-40 mL    sodium chloride (NS) flush 5-40 mL    acetaminophen (TYLENOL) tablet 650 mg    Or    acetaminophen (TYLENOL) suppository 650 mg    polyethylene glycol (MIRALAX) packet 17 g    ondansetron (ZOFRAN ODT) tablet 4 mg    Or    ondansetron (ZOFRAN) injection 4 mg    enoxaparin (LOVENOX) injection 30 mg    glucose chewable tablet 16 g    dextrose 10% infusion 0-250 mL    glucagon (GLUCAGEN) injection 1 mg    insulin lispro (HUMALOG) injection      Patient PCP: Rosa M Nixon  PMH:  has a past medical history of Arthritis, Cancer ([facility] [address] (), COPD, Diabetes ([facility] [address] GERD (gastroesophageal reflux disease), Other ill-defined conditions(799.89), PUD (peptic ulcer disease), and Unspecified sleep apnea. PSH:   has a past surgical history that includes hx hysterectomy; pr abdomen surgery proc unlisted (); hx appendectomy; hx other surgical; hx orthopaedic (); pr wrist arthroscop,diagnostic; and hx back surgery. FHX: family history includes Diabetes in her brother, mother, and sister; Heart Disease in her brother. SHX:  reports that she has quit smoking. She has never used smokeless tobacco. She reports that she does not drink alcohol. ROS:    Review of Systems   Constitutional: Negative. HENT: Negative. Eyes: Negative. Respiratory: Positive for shortness of breath. Cardiovascular: Negative. Genitourinary: Negative. Skin: Negative. Objective:     Vital Signs: Telemetry:    normal sinus rhythm Intake/Output:   Visit Vitals  /60   Pulse 79   Temp 99.2 °F (37.3 °C)   Resp 16   Ht 5' (1.524 m)   Wt 70.3 kg (155 lb)   SpO2 96%   BMI 30.27 kg/m²       Temp (24hrs), Av.6 °F (37 °C), Min:98 °F (36.7 °C), Max:99.2 °F (37.3 °C)        O2 Device: Nasal cannula O2 Flow Rate (L/min): 2 l/min       Wt Readings from Last 4 Encounters:   22 70.3 kg (155 lb)   21 74.4 kg (164 lb)   19 92.4 kg (203 lb 12.8 oz)   09/10/18 88.1 kg (194 lb 3.2 oz)        No intake or output data in the 24 hours ending 22 3258    Last shift:      No intake/output data recorded.   Last 3 shifts: No intake/output data recorded. Physical Exam:     Physical Exam  Constitutional:       Appearance: She is not ill-appearing. HENT:      Head: Normocephalic and atraumatic. Cardiovascular:      Rate and Rhythm: Normal rate and regular rhythm. Pulses: Normal pulses. Heart sounds: Normal heart sounds. Pulmonary:      Breath sounds: Rhonchi present. Musculoskeletal:      Right lower leg: No edema. Left lower leg: No edema. Skin:     General: Skin is warm and dry. Labs:    Recent Labs     04/28/22  0634 04/27/22  0632 04/26/22  0046   WBC 7.6 12.3* 19.4*   HGB 9.6* 9.6* 10.7*    170 207     Recent Labs     04/27/22  0632 04/26/22  1953 04/26/22  1250 04/26/22  0047 04/26/22  0046 04/25/22  1847 04/25/22  1134     --   --   --  135*  --  133*   K 3.6  --   --   --  3.8  --  4.4     --   --   --  104  --  99   CO2 23  --   --   --  26  --  25   *  --   --   --  279*  --  419*   BUN 16  --   --   --  32*  --  24*   CREA 0.75  --   --   --  1.17*  --  1.40*   CA 9.1  --   --   --  8.9  --  10.0   LAC  --  2.6* 4.8* 3.2*  --    < >  --    ALB 2.5*  --   --   --  3.1*  --  3.7   ALT 12  --   --   --  19  --  22    < > = values in this interval not displayed. Recent Labs     04/27/22  1809 04/26/22  1150   PH 7.48* 7.40   PCO2 33* 36   PO2 55* 70*   HCO3 26 22   FIO2 21.0  --      No results for input(s): CPK, CKNDX, TROIQ in the last 72 hours. No lab exists for component: CPKMB  No results found for: BNPP, BNP   Lab Results   Component Value Date/Time    Culture result: No Growth (<1000 cfu/mL) 04/25/2022 03:52 PM    Culture result: (A) 04/25/2022 02:15 PM     Staphylococcus species, coagulase negative growing in 1 of 4 bottles drawn No Site Indicated    Culture result:  04/25/2022 02:15 PM     (NOTE) [facility] IN [facility] 1 OUT OF 4 BOTTLES CALLED TO AND READ BACK BY GENOVEVA LOZADA MT AT 1331 ON 4/27/2022 RE.    No results found for: TSH, TSHEXT, TSHEXT    Imaging:    CXR Results (Last 48 hours)               04/26/22 0929  XR CHEST PORT Final result    Narrative:  Chest single view. Comparison single view chest April 25, 2022. Hazy, somewhat patchy reticular markings throughout the lungs persist, same   distribution. Differential considerations would include infectious/inflammatory   change. Cardiac and mediastinal structures unchanged noting thoracic aorta   atherosclerosis. No pneumothorax or sizable pleural effusion. Results from [place] encounter on 04/25/22    CT ABD PELV WO CONT    Narrative  CT abdomen and pelvis without IV contrast.    Comparison CT abdomen and pelvis November 2, 2021. Axial images are reviewed along with reformatted sagittal/coronal images. No IV  contrast administered. Sensitivity for detection of neoplastic process limited  in the absence of IV contrast.  Dose reduction: All CT scans at this facility are performed using dose reduction  optimization techniques as appropriate to a performed exam including the  following-  automated exposure control, adjustments of mA and/or Kv according to patient  size, or use of iterative reconstructive technique. Nonspecific hazy reticular markings through basilar lungs. Distal esophageal wall thickening. Mild distal esophageal distention. Small  hiatal hernia. See recent CT chest report. Liver appears unremarkable on this nonenhanced study. Prior cholecystectomy. Pancreas, spleen, and bilateral adrenal glands appear unremarkable. Each kidney  appears unremarkable on this nonenhanced study. Stomach and small bowel loops are nondistended. Prior partial colectomy. Surgical anastomosis right mid abdomen. Stool and air through colon. Sigmoid  colon diverticula. No CT evidence for appendicitis or diverticulitis. No  ascites. Prior hysterectomy. Atherosclerotic change normal caliber abdominal aorta.     Unchanged appearance anterior abdominal wall noted broad-based hernia containing  omental fat but no bowel. Lumbar hardware. Impression  Prior abdominal surgery. No bowel obstruction. No ascites. Prior cholecystectomy. Abdominal aorta atherosclerosis. Findings regarding distal esophagus/GE junction region also described on recent  CT chest.        IMPRESSION:   1. Acute respiratory failure with hypoxia  2. Concern for aspiration pneumonia  3. Chronic Obstructive Pulmonary Disease  4. Rule out CHF fluid overload  5. Sleep apnea  6. GERD  7. Alzheimer's Dementia  8. Colon cancer  9. Type 2 Diabetes  10. GERD  11. PUD  12. Fibromyalgia  13. Hypertension  14. Hyperlipidemia  Body mass index is 30.27 kg/m². 15. Additional workup outlined below  16. Prognosis guarded       RECOMMENDATIONS/PLAN:     1. Decreased from 4L to 2L O2 via nasal cannula as salvage oxygen delivery device to provide high concentration of oxygen to overcome refractory hypoxia. Patient SpO2 improved after weaning. 1. Patient vomited the morning of her admission. Her condition worsened post emesis. 2. Repeat chest x-ray still shows hazy reticular markings at basilar lungs. 2. WBC now at 7.6. Patient was given Rocephin in the ED and changed to Zosyn. 3. BNP completed 4/27 was 420.  4. 2D Echo completed 4/27 showed left ventricular systolic function EF 41-49% with normal right ventricular systolic function. 3. ABG completed 4/27. PCO2 is 33. PO2 is 55.  5. CAT scan of the chest shows esophageal wall thickening possible reflux and GERD disease agree with Protonix. Rule out diabetic gastroparesis. EGD to be completed today if patient remains stable.                 [facility]

## 2022-04-28 NOTE — PROGRESS NOTES
Chart reviewed. Spoke with nsg. Pt is NPO pending possible EGD this date per nsg. Will hold tx and cont to follow.

## 2022-04-28 NOTE — PROGRESS NOTES
Problem: Self Care Deficits Care Plan (Adult)  Goal: *Acute Goals and Plan of Care (Insert Text)  Description: Pt will be SUP sup <>sit in prep for EOB ADLs  Pt will be SUP grooming standing at sinktop  Pt will be SUP LE dressing sitting EOB/long sit  Pt will be SUP sit <>  prep for toileting LRAD  Pt will be SUP toileting/toilet transfer/cloth mgmt LRAD  Pt will be IND following UE HEP in prep for self care tasks     Outcome: Progressing Towards Goal   OCCUPATIONAL THERAPY TREATMENT  Patient: Jose Tovar (67 y.o. female)  Date: 4/28/2022  Diagnosis: Aspiration pneumonia ([facility] [address] [J69.0] <principal problem not specified>  Procedure(s) (LRB):  ESOPHAGOGASTRODUODENOSCOPY (EGD) anesthesia to check  to ok procedure please (N/A)    Precautions: Fall  Chart, occupational therapy assessment, plan of care, and goals were reviewed. ASSESSMENT  Patient continues with skilled OT services and is progressing towards goals. Patient received semi supine in bed upon ARRIOLA'S arrival and agreeable to work with therapist with encouragement and daughter present. Pt declining EOB self care at this time as she performed earlier with daughter. Pt agreed to UE exercises. Patient educated on and completed bilateral UE HEP to increase strength/endurance needed for ADL'S and functional transfers, see grid below. Discussed with daughter to hold off with self care assistance so that OT services could evaluate pt's progress for tomorrows treatment. Pt limited by decreased activity tolerance in today's session. Pt left resting in bed with call bell with daughter present. Patient would benefit from continued skilled Occupational services while at [facility] in order to increase safety and independence with self care and functional tranfers/mobility. Recommend at discharge to home with 24/7 [facility] when medically appropriate.          Other factors to consider for discharge: Time of onset, medical prognosis/diagnosis, severity of deficits, PLOF, home environment, and family support          PLAN :  Patient continues to benefit from skilled intervention to address the above impairments. Continue treatment per established plan of care. to address goals. Recommend with staff: chair level grooming    Recommend next OT session: sink level grooming    Recommendation for discharge: (in order for the patient to meet his/her long term goals)  ome with 24/7 supervision [facility]     This discharge recommendation:  Has been made in collaboration with the attending provider and/or case management    IF patient discharges home will need the following DME:TBD       SUBJECTIVE:   Patient stated I just tired.     OBJECTIVE DATA SUMMARY:   Cognitive/Behavioral Status:  Neurologic State: Alert  Orientation Level: Oriented to person;Disoriented to time;Disoriented to situation;Disoriented to place  Cognition: Follows commands            Functional Mobility and Transfers for ADLs:  Bed Mobility:       Transfers:    ADL Intervention:       Cognitive Retraining  Safety/Judgement: Decreased insight into deficits; Decreased awareness of need for safety;Decreased awareness of need for assistance;Decreased awareness of environment    Therapeutic Exercises:   Exercise Sets Reps AROM AAROM PROM Self PROM Comments   Shoulder flex/ext 2 10 [x] [] [] [] Semi supine, vc's for correct technique   Elbow flex/ext 2 10 [x] [] [] []    Wrist flex/ext 2 10 [x] [] [] []    Digit E/F 2 10 X       Chest press 2 10 [x] [] [] []         Pain:  0/10    Activity Tolerance:   Fair and requires rest breaks  Please refer to the flowsheet for vital signs taken during this treatment. After treatment patient left in no apparent distress:   Supine in bed, Call bell within reach, Bed / chair alarm activated, Caregiver / family present, and Side rails x 3    COMMUNICATION/COLLABORATION:   The patients plan of care was discussed with: Registered nurse.      Lara Gamino FLAKO Starkey  Time Calculation: 9 mins

## 2022-04-28 NOTE — PROGRESS NOTES
PHYSICAL THERAPY EVALUATION  Patient: Néstor Amaya (81 y.o. female)  Date: 4/28/2022  Primary Diagnosis: Aspiration pneumonia ([facility] [address] [J69.0]  Procedure(s) (LRB):  ESOPHAGOGASTRODUODENOSCOPY (EGD) anesthesia to check  to ok procedure please (N/A)     Precautions: falls  ASSESSMENT  This is a 81 y/o female who came to  [facility]  ED with c/o SOB and abdominal pain w/ two episodes of emesis, admitted on 4/25  for aspiration PNA. Pt placed on 4 L O2 via NC. CT shows distal esophageal wall thickening. Mild distal esophageal distention. Pt with PMH of COPD, sleep apnea, type 2 diabetes, HTN, hyperlipidemia, Alzheimer's dementia, and colon cancer   . Pt received sitting up on the chair , daughter in the room, agreeable for PT eval/tx . Pt is A& O x self, disoriented to place, situation and time,  unable to provide information about PLOF sec to cognitive deficits. Per pt's daughter report , pt lives with daughter in a single story home with 3 steps to enter , HR on bilateral sides , has w/c ramp entrance, required SUP for ADL's and mod I for functional transfers/mobility with rollator for community ambulation and did furniture walking indoors prior to admission. Other DME owned: hospital bed, RW, shower chair    Based on the objective data described below, currently pt on 2L O2,  presents with  decreased strength , 3+/5 grossly in  b/l LE , balance deficits , generalized weakness , decreased safety awareness , decreased activity tolerance and increased need for assist with functional mobility ( demonstrates intact  static sitting balance , CGA for sit <>stand and bed<>chair transfers , good  static  standing balance with support , is able to ambulate - [de-identified]' with RW, CGA with slow erika and decreased step length b/l Le, needs verbal cues to stay close to the walker. SPO2 - 93% post ambulation with no c/o SOB ) .  Pt would benefit from continued skilled PT services to address current impairments and improve overall IND and safety with  functional transfers/mobility. Recommend d/c to home with HHPT and 24 x 7 SUP with prior level of care when medically appropriate . Current Level of Function Impacting Discharge (mobility/balance): Pt requires CGA for transfers/mobility    Functional Outcome Measure: The patient scored 18 on the AM PAC basic mobility outcome measure which is indicative of medium complexity. Other factors to consider for discharge: family support, DME support        PLAN :  Recommendations and Planned Interventions: bed mobility training, transfer training, gait training, therapeutic exercises, neuromuscular re-education, patient and family training/education, and therapeutic activities      Frequency/Duration: Patient will be followed by physical therapy:  2-3x/week to address goals.     Recommendation for discharge: (in order for the patient to meet his/her long term goals)  Home with [address] and 24 x 7 care    This discharge recommendation:  Has been made in collaboration with the attending provider and/or case management    IF patient discharges home will need the following DME: none         SUBJECTIVE:   Patient stated  I feel alright     OBJECTIVE DATA SUMMARY:   HISTORY:    Past Medical History:   Diagnosis Date    Arthritis     Cancer ([facility] [address] 2006    colon cancer    COPD     sleep apnea    Diabetes ([facility] [address]     GERD (gastroesophageal reflux disease)     Other ill-defined conditions(799.89)     fibromylgia    PUD (peptic ulcer disease)     Unspecified sleep apnea      Past Surgical History:   Procedure Laterality Date    HX APPENDECTOMY      HX BACK SURGERY      neck and back    HX HYSTERECTOMY      HX ORTHOPAEDIC  2010    right tkr    HX OTHER SURGICAL      tonsil    HI ABDOMEN SURGERY PROC UNLISTED  2006    colectomy    HI WRIST ARTHROSCOP,DIAGNOSTIC      bilateral carpal tunell       Personal factors and/or comorbidities impacting plan of care:     Home Situation  Home Environment: Private residence  # Steps to Enter: 3  Rails to Enter: Yes  Hand Rails : Bilateral  Wheelchair Ramp: Yes  One/Two Story Residence: One story  Living Alone: No  Support Systems: Child(germaine)  Patient Expects to be Discharged to[de-identified] Home with home health  Current DME Used/Available at Home: Walker, rollator,Walker, rolling,Shower chair,Hospital bed  Tub or Shower Type: Tub/Shower combination    PLOF: Pt requires SUP for ADLS/IADLS, mod I with mobility with rollator  for community ambulation and did furniture walking prior to admission. EXAMINATION/PRESENTATION/DECISION MAKING:   Critical Behavior:  Neurologic State: Alert,Confused  Orientation Level: Oriented to person,Disoriented to time,Disoriented to situation,Disoriented to place  Cognition: Follows commands  Safety/Judgement: Decreased insight into deficits,Decreased awareness of need for safety,Decreased awareness of need for assistance,Decreased awareness of environment    Skin:  intact where exposed    Range Of Motion:  AROM: Generally decreased, functional    Strength:    Strength: Generally decreased, functional    Tone & Sensation:   Tone: Normal    Sensation: Intact    Coordination:  Coordination: Generally decreased, functional    Functional Mobility:    Transfers:  Sit to Stand: Contact guard assistance  Stand to Sit: Contact guard assistance        Bed to Chair: Contact guard assistance    Balance:   Sitting: Intact; Without support  Standing: Impaired; With support  Standing - Static: Good;Constant support  Standing - Dynamic : Fair;Constant support  Ambulation/Gait Training:  Distance (ft): 80 Feet (ft)  Assistive Device: Walker, rolling;Gait belt  Ambulation - Level of Assistance: Contact guard assistance    Speed/Tata: Slow  Step Length: Right shortened;Left shortened    Functional Measure:    [employer] AM-PAC 6 Clicks         Basic Mobility Inpatient Short Form  How much difficulty does the patient currently have. ..  Unable A Lot A Little None 1.  Turning over in bed (including adjusting bedclothes, sheets and blankets)? [] 1   [] 2   [x] 3   [] 4   2. Sitting down on and standing up from a chair with arms ( e.g., wheelchair, bedside commode, etc.)   [] 1   [] 2   [x] 3   [] 4   3. Moving from lying on back to sitting on the side of the bed? [] 1   [] 2   [x] 3   [] 4          How much help from another person does the patient currently need. .. Total A Lot A Little None   4. Moving to and from a bed to a chair (including a wheelchair)? [] 1   [] 2   [x] 3   [] 4   5. Need to walk in hospital room? [] 1   [] 2   [x] 3   [] 4   6. Climbing 3-5 steps with a railing? [] 1   [] 2   [x] 3   [] 4   © , Trustees of [facility] [employer], under license to [employer]. All rights reserved     Score:  Initial: 18 Most Recent: X (Date: -22- )   Interpretation of Tool:  Represents activities that are increasingly more difficult (i.e. Bed mobility, Transfers, Gait). Score 24 23 22-20 19-15 14-10 9-7 6   Modifier CH CI CJ CK CL CM CN          Physical Therapy Evaluation Charge Determination   History Examination Presentation Decision-Making   MEDIUM  Complexity : 1-2 comorbidities / personal factors will impact the outcome/ POC  LOW Complexity : 1-2 Standardized tests and measures addressing body structure, function, activity limitation and / or participation in recreation  LOW Complexity : Stable, uncomplicated  Other Functional Measure [facility] 6 medium complexity      Based on the above components, the patient evaluation is determined to be of the following complexity level: LOW     Pain Ratin/10    Activity Tolerance:   Good  Please refer to the flowsheet for vital signs taken during this treatment. After treatment patient left in no apparent distress:   Sitting in chair and Call bell within reach    COMMUNICATION/EDUCATION:   The patients plan of care was discussed with: Registered nurse.      Fall prevention education was provided and the patient/caregiver indicated understanding. and Patient/family agree to work toward stated goals and plan of care. Problem: Mobility Impaired (Adult and Pediatric)  Goal: *Acute Goals and Plan of Care (Insert Text)  Description: Pt will be I with LE HEP in 7 days. Pt will perform bed mobility with mod I in 7 days. Pt will perform transfers with mod I in 7 days. Pt will amb >200 feet with LRAD safely with mod I, SPO2>90%  in 7 days.    Outcome: Not Met       Thank you for this referral.  Woo Paula   Time Calculation: 31 mins

## 2022-04-28 NOTE — PROGRESS NOTES
PULMONARY NOTE  [facility]    Name: Marco Meek MRN: [id number]   : 1940 Hospital: [address] [place] [address]   Date: 2022  Admission date: 2022 Hospital Day: 4       HPI:     Hospital Problems  Date Reviewed: 2019          Codes Class Noted POA    Aspiration pneumonia [facility]) ICD-10-CM: J69.0  ICD-9-CM: 507.0  2022 Unknown                   [x] High complexity decision making was performed  [x] See my orders for details      Subjective/Initial History:     I was asked by Yolis Fall MD to see Marco Meek  a 80 y.o.  female in consultation     Excerpts from admission 2022 or consult notes as follows:   Patient is a 81yo female with a hx of COPD, sleep apnea, Type 2 diabetes, HTN, hyperlipidemia, Alzheimer's dementia, and colon caner that presented to the ED with shortness of breath. The history was given by her daughter. Patient's daughter states that she has had two episodes of non-bloody emesis with RUQ abdominal pain. Denies bowel/bladder changes. Daughter states that her SpO2 was 88% and she is not on supplemental oxygen. CXR and chest CT taken in the ED is concerning for aspiration PNA. Rocephin was started empirically, but changed later to Zosyn. Patient placed on 4L O2 via NC. Pulmonary was consulted for reasons stated above.        No Known Allergies     MAR reviewed and pertinent medications noted or modified as needed     Current Facility-Administered Medications   Medication    QUEtiapine (SEROquel) tablet 25 mg    memantine (NAMENDA) tablet 10 mg    glipiZIDE (GLUCOTROL) tablet 5 mg    galantamine (RAZADYNE) tablet 8 mg    pantoprazole (PROTONIX) tablet 40 mg    piperacillin-tazobactam (ZOSYN) 3.375 g in 0.9% sodium chloride (MBP/ADV) 100 mL MBP    sodium chloride (NS) flush 5-40 mL    sodium chloride (NS) flush 5-40 mL    acetaminophen (TYLENOL) tablet 650 mg    Or    acetaminophen (TYLENOL) suppository 650 mg    polyethylene glycol (MIRALAX) packet 17 g    ondansetron (ZOFRAN ODT) tablet 4 mg    Or    ondansetron (ZOFRAN) injection 4 mg    enoxaparin (LOVENOX) injection 30 mg    glucose chewable tablet 16 g    dextrose 10% infusion 0-250 mL    glucagon (GLUCAGEN) injection 1 mg    insulin lispro (HUMALOG) injection      Patient PCP: Fidencio Mendoza  PMH:  has a past medical history of Arthritis, Cancer ([facility] [address] (), COPD, Diabetes ([facility] [address] GERD (gastroesophageal reflux disease), Other ill-defined conditions(799.89), PUD (peptic ulcer disease), and Unspecified sleep apnea. PSH:   has a past surgical history that includes hx hysterectomy; pr abdomen surgery proc unlisted (); hx appendectomy; hx other surgical; hx orthopaedic (); pr wrist arthroscop,diagnostic; and hx back surgery. FHX: family history includes Diabetes in her brother, mother, and sister; Heart Disease in her brother. SHX:  reports that she has quit smoking. She has never used smokeless tobacco. She reports that she does not drink alcohol. ROS:    Review of Systems   Constitutional: Negative. HENT: Negative. Eyes: Negative. Respiratory: Positive for shortness of breath. Cardiovascular: Negative. Genitourinary: Negative. Skin: Negative. Objective:     Vital Signs: Telemetry:    normal sinus rhythm Intake/Output:   Visit Vitals  /60   Pulse 79   Temp 99.2 °F (37.3 °C)   Resp 16   Ht 5' (1.524 m)   Wt 70.3 kg (155 lb)   SpO2 96%   BMI 30.27 kg/m²       Temp (24hrs), Av.6 °F (37 °C), Min:98 °F (36.7 °C), Max:99.2 °F (37.3 °C)        O2 Device: Nasal cannula O2 Flow Rate (L/min): 2 l/min       Wt Readings from Last 4 Encounters:   22 70.3 kg (155 lb)   21 74.4 kg (164 lb)   19 92.4 kg (203 lb 12.8 oz)   09/10/18 88.1 kg (194 lb 3.2 oz)        No intake or output data in the 24 hours ending 22 1029    Last shift:      No intake/output data recorded.   Last 3 shifts: No intake/output data recorded. Physical Exam:     Physical Exam  Constitutional:       Appearance: She is not ill-appearing. HENT:      Head: Normocephalic and atraumatic. Cardiovascular:      Rate and Rhythm: Normal rate and regular rhythm. Pulses: Normal pulses. Heart sounds: Normal heart sounds. Pulmonary:      Breath sounds: Rhonchi present. Musculoskeletal:      Right lower leg: No edema. Left lower leg: No edema. Skin:     General: Skin is warm and dry. Labs:    Recent Labs     04/28/22  0634 04/27/22  0632 04/26/22  0046   WBC 7.6 12.3* 19.4*   HGB 9.6* 9.6* 10.7*    170 207     Recent Labs     04/28/22  0634 04/27/22  0632 04/26/22  1953 04/26/22  1250 04/26/22  0047 04/26/22  0046    137  --   --   --  135*   K 3.3* 3.6  --   --   --  3.8    106  --   --   --  104   CO2 26 23  --   --   --  26   * 187*  --   --   --  279*   BUN 16 16  --   --   --  32*   CREA 0.81 0.75  --   --   --  1.17*   CA 9.0 9.1  --   --   --  8.9   LAC 0.9  --  2.6* 4.8*   < >  --    ALB 2.4* 2.5*  --   --   --  3.1*   ALT 13 12  --   --   --  19    < > = values in this interval not displayed. Recent Labs     04/27/22  1809 04/26/22  1150   PH 7.48* 7.40   PCO2 33* 36   PO2 55* 70*   HCO3 26 22   FIO2 21.0  --      No results for input(s): CPK, CKNDX, TROIQ in the last 72 hours. No lab exists for component: CPKMB  No results found for: BNPP, BNP   Lab Results   Component Value Date/Time    Culture result: No Growth (<1000 cfu/mL) 04/25/2022 03:52 PM    Culture result: (A) 04/25/2022 02:15 PM     Staphylococcus species, coagulase negative growing in 1 of 4 bottles drawn No Site Indicated    Culture result:  04/25/2022 02:15 PM     (NOTE) [facility] IN [facility] 1 OUT OF 4 BOTTLES CALLED TO AND READ BACK BY GENOVEVA LOZADA MT AT 1331 ON 4/27/2022 RE.    No results found for: TSH, TSHEXT, TSHEXT    Imaging:    CXR Results  (Last 48 hours)    None          Results from [facility] on 04/25/22    CT ABD PELV WO CONT    Narrative  CT abdomen and pelvis without IV contrast.    Comparison CT abdomen and pelvis November 2, 2021. Axial images are reviewed along with reformatted sagittal/coronal images. No IV  contrast administered. Sensitivity for detection of neoplastic process limited  in the absence of IV contrast.  Dose reduction: All CT scans at this facility are performed using dose reduction  optimization techniques as appropriate to a performed exam including the  following-  automated exposure control, adjustments of mA and/or Kv according to patient  size, or use of iterative reconstructive technique. Nonspecific hazy reticular markings through basilar lungs. Distal esophageal wall thickening. Mild distal esophageal distention. Small  hiatal hernia. See recent CT chest report. Liver appears unremarkable on this nonenhanced study. Prior cholecystectomy. Pancreas, spleen, and bilateral adrenal glands appear unremarkable. Each kidney  appears unremarkable on this nonenhanced study. Stomach and small bowel loops are nondistended. Prior partial colectomy. Surgical anastomosis right mid abdomen. Stool and air through colon. Sigmoid  colon diverticula. No CT evidence for appendicitis or diverticulitis. No  ascites. Prior hysterectomy. Atherosclerotic change normal caliber abdominal aorta. Unchanged appearance anterior abdominal wall noted broad-based hernia containing  omental fat but no bowel. Lumbar hardware. Impression  Prior abdominal surgery. No bowel obstruction. No ascites. Prior cholecystectomy. Abdominal aorta atherosclerosis. Findings regarding distal esophagus/GE junction region also described on recent  CT chest.        IMPRESSION:   1. Acute respiratory failure with hypoxia  2. Concern for aspiration pneumonia  3. Chronic Obstructive Pulmonary Disease  4. Rule out CHF fluid overload  5. Sleep apnea  6. GERD  7. Alzheimer's Dementia  8.  Colon cancer  9. Type 2 Diabetes  10. GERD  11. PUD  12. Fibromyalgia  13. Hypertension  14. Hyperlipidemia  Body mass index is 30.27 kg/m². 15. Additional workup outlined below  16. Prognosis guarded       RECOMMENDATIONS/PLAN:     1. Decreased from 4L to 2L O2 via nasal cannula as salvage oxygen delivery device to provide high concentration of oxygen to overcome refractory hypoxia. Patient SpO2 improved after weaning. 1. Patient vomited the morning of her admission. Her condition worsened post emesis. 2. Repeat chest x-ray still shows hazy reticular markings at basilar lungs. Received Lasix yesterday good urine output feeling much better repeat Lasix again today and supplement potassium   2. WBC now at 7.6. Patient was given Rocephin in the ED and changed to Zosyn. 3. BNP completed 4/27 was 420.  4. 2D Echo completed 4/27 showed left ventricular systolic function EF 56-77% with normal right ventricular systolic function. 3. ABG completed 4/27. PCO2 is 33. PO2 is 55.  5. CAT scan of the chest shows esophageal wall thickening possible reflux and GERD disease agree with Protonix. Rule out diabetic gastroparesis. EGD to be completed today if patient remains stable.                 Jae Hinojosa MD

## 2022-04-29 ENCOUNTER — APPOINTMENT (OUTPATIENT)
Dept: ENDOSCOPY | Age: 82
DRG: 871 | End: 2022-04-29
Attending: INTERNAL MEDICINE
Payer: MEDICARE

## 2022-04-29 LAB
ALBUMIN SERPL-MCNC: 2.6 G/DL (ref 3.5–5)
ALBUMIN/GLOB SERPL: 0.7 {RATIO} (ref 1.1–2.2)
ALP SERPL-CCNC: 65 U/L (ref 45–117)
ALT SERPL-CCNC: 13 U/L (ref 12–78)
ANION GAP SERPL CALC-SCNC: 7 MMOL/L (ref 5–15)
ARTERIAL PATENCY WRIST A: ABNORMAL
AST SERPL W P-5'-P-CCNC: 15 U/L (ref 15–37)
BASE EXCESS BLDA CALC-SCNC: 2.4 MMOL/L (ref 0–2)
BASOPHILS # BLD: 0 K/UL (ref 0–0.1)
BASOPHILS NFR BLD: 0 % (ref 0–1)
BILIRUB SERPL-MCNC: 0.5 MG/DL (ref 0.2–1)
BUN SERPL-MCNC: 13 MG/DL (ref 6–20)
BUN/CREAT SERPL: 19 (ref 12–20)
CA-I BLD-MCNC: 8.6 MG/DL (ref 8.5–10.1)
CHLORIDE SERPL-SCNC: 106 MMOL/L (ref 97–108)
CO2 SERPL-SCNC: 26 MMOL/L (ref 21–32)
CREAT SERPL-MCNC: 0.69 MG/DL (ref 0.55–1.02)
DIFFERENTIAL METHOD BLD: ABNORMAL
EOSINOPHIL # BLD: 0.3 K/UL (ref 0–0.4)
EOSINOPHIL NFR BLD: 4 % (ref 0–7)
ERYTHROCYTE [DISTWIDTH] IN BLOOD BY AUTOMATED COUNT: 14.8 % (ref 11.5–14.5)
GAS FLOW.O2 O2 DELIVERY SYS: 2 L/MIN
GLOBULIN SER CALC-MCNC: 3.5 G/DL (ref 2–4)
GLUCOSE BLD STRIP.AUTO-MCNC: 142 MG/DL (ref 65–117)
GLUCOSE BLD STRIP.AUTO-MCNC: 157 MG/DL (ref 65–117)
GLUCOSE BLD STRIP.AUTO-MCNC: 172 MG/DL (ref 65–117)
GLUCOSE BLD STRIP.AUTO-MCNC: 319 MG/DL (ref 65–117)
GLUCOSE SERPL-MCNC: 127 MG/DL (ref 65–100)
HCO3 BLDA-SCNC: 26 MMOL/L (ref 22–26)
HCT VFR BLD AUTO: 29.1 % (ref 35–47)
HGB BLD-MCNC: 9.6 G/DL (ref 11.5–16)
IMM GRANULOCYTES # BLD AUTO: 0.1 K/UL (ref 0–0.04)
IMM GRANULOCYTES NFR BLD AUTO: 1 % (ref 0–0.5)
LYMPHOCYTES # BLD: 1.6 K/UL (ref 0.8–3.5)
LYMPHOCYTES NFR BLD: 24 % (ref 12–49)
MCH RBC QN AUTO: 29.4 PG (ref 26–34)
MCHC RBC AUTO-ENTMCNC: 33 G/DL (ref 30–36.5)
MCV RBC AUTO: 89 FL (ref 80–99)
MONOCYTES # BLD: 0.6 K/UL (ref 0–1)
MONOCYTES NFR BLD: 8 % (ref 5–13)
NEUTS SEG # BLD: 4.3 K/UL (ref 1.8–8)
NEUTS SEG NFR BLD: 63 % (ref 32–75)
NRBC # BLD: 0 K/UL (ref 0–0.01)
NRBC BLD-RTO: 0 PER 100 WBC
PCO2 BLDA: 37 MMHG (ref 35–45)
PERFORMED BY, TECHID: ABNORMAL
PH BLDA: 7.46 [PH] (ref 7.35–7.45)
PLATELET # BLD AUTO: 204 K/UL (ref 150–400)
PMV BLD AUTO: 12.5 FL (ref 8.9–12.9)
PO2 BLDA: 80 MMHG (ref 75–100)
POTASSIUM SERPL-SCNC: 4 MMOL/L (ref 3.5–5.1)
PROT SERPL-MCNC: 6.1 G/DL (ref 6.4–8.2)
RBC # BLD AUTO: 3.27 M/UL (ref 3.8–5.2)
SAO2 % BLD: 96 %
SAO2% DEVICE SAO2% SENSOR NAME: ABNORMAL
SODIUM SERPL-SCNC: 139 MMOL/L (ref 136–145)
SPECIMEN SITE: ABNORMAL
WBC # BLD AUTO: 6.9 K/UL (ref 3.6–11)

## 2022-04-29 PROCEDURE — 36600 WITHDRAWAL OF ARTERIAL BLOOD: CPT | Performed by: INTERNAL MEDICINE

## 2022-04-29 PROCEDURE — 74011250637 HC RX REV CODE- 250/637: Performed by: PHYSICIAN ASSISTANT

## 2022-04-29 PROCEDURE — 88305 TISSUE EXAM BY PATHOLOGIST: CPT

## 2022-04-29 PROCEDURE — 82962 GLUCOSE BLOOD TEST: CPT

## 2022-04-29 PROCEDURE — 36415 COLL VENOUS BLD VENIPUNCTURE: CPT

## 2022-04-29 PROCEDURE — 77030021593 HC FCPS BIOP ENDOSC BSC -A: Performed by: INTERNAL MEDICINE

## 2022-04-29 PROCEDURE — 80053 COMPREHEN METABOLIC PANEL: CPT

## 2022-04-29 PROCEDURE — 77010033678 HC OXYGEN DAILY

## 2022-04-29 PROCEDURE — 74011250637 HC RX REV CODE- 250/637: Performed by: INTERNAL MEDICINE

## 2022-04-29 PROCEDURE — 76060000032 HC ANESTHESIA 0.5 TO 1 HR: Performed by: INTERNAL MEDICINE

## 2022-04-29 PROCEDURE — 74011000250 HC RX REV CODE- 250: Performed by: INTERNAL MEDICINE

## 2022-04-29 PROCEDURE — 65270000032 HC RM SEMIPRIVATE

## 2022-04-29 PROCEDURE — 76040000007: Performed by: INTERNAL MEDICINE

## 2022-04-29 PROCEDURE — 74011250636 HC RX REV CODE- 250/636: Performed by: INTERNAL MEDICINE

## 2022-04-29 PROCEDURE — 74011000258 HC RX REV CODE- 258: Performed by: PHYSICIAN ASSISTANT

## 2022-04-29 PROCEDURE — 2709999900 HC NON-CHARGEABLE SUPPLY: Performed by: INTERNAL MEDICINE

## 2022-04-29 PROCEDURE — 74011000250 HC RX REV CODE- 250: Performed by: PHYSICIAN ASSISTANT

## 2022-04-29 PROCEDURE — 0DB58ZX EXCISION OF ESOPHAGUS, VIA NATURAL OR ARTIFICIAL OPENING ENDOSCOPIC, DIAGNOSTIC: ICD-10-PCS | Performed by: INTERNAL MEDICINE

## 2022-04-29 PROCEDURE — 88312 SPECIAL STAINS GROUP 1: CPT

## 2022-04-29 PROCEDURE — 74011250636 HC RX REV CODE- 250/636

## 2022-04-29 PROCEDURE — 74011636637 HC RX REV CODE- 636/637: Performed by: PHYSICIAN ASSISTANT

## 2022-04-29 PROCEDURE — 85025 COMPLETE CBC W/AUTO DIFF WBC: CPT

## 2022-04-29 PROCEDURE — 94761 N-INVAS EAR/PLS OXIMETRY MLT: CPT

## 2022-04-29 PROCEDURE — 82803 BLOOD GASES ANY COMBINATION: CPT

## 2022-04-29 PROCEDURE — 74011250636 HC RX REV CODE- 250/636: Performed by: PHYSICIAN ASSISTANT

## 2022-04-29 PROCEDURE — C9113 INJ PANTOPRAZOLE SODIUM, VIA: HCPCS | Performed by: INTERNAL MEDICINE

## 2022-04-29 PROCEDURE — 74011000250 HC RX REV CODE- 250

## 2022-04-29 RX ORDER — SODIUM CHLORIDE, SODIUM LACTATE, POTASSIUM CHLORIDE, CALCIUM CHLORIDE 600; 310; 30; 20 MG/100ML; MG/100ML; MG/100ML; MG/100ML
INJECTION, SOLUTION INTRAVENOUS
Status: DISCONTINUED | OUTPATIENT
Start: 2022-04-29 | End: 2022-04-29 | Stop reason: HOSPADM

## 2022-04-29 RX ORDER — LIDOCAINE HYDROCHLORIDE 20 MG/ML
INJECTION, SOLUTION EPIDURAL; INFILTRATION; INTRACAUDAL; PERINEURAL AS NEEDED
Status: DISCONTINUED | OUTPATIENT
Start: 2022-04-29 | End: 2022-04-29 | Stop reason: HOSPADM

## 2022-04-29 RX ORDER — PROPOFOL 10 MG/ML
INJECTION, EMULSION INTRAVENOUS
Status: DISPENSED
Start: 2022-04-29 | End: 2022-04-29

## 2022-04-29 RX ORDER — PHENYLEPHRINE HCL IN 0.9% NACL 1 MG/10 ML
SYRINGE (ML) INTRAVENOUS
Status: DISPENSED
Start: 2022-04-29 | End: 2022-04-29

## 2022-04-29 RX ORDER — LIDOCAINE HYDROCHLORIDE 20 MG/ML
INJECTION, SOLUTION EPIDURAL; INFILTRATION; INTRACAUDAL; PERINEURAL
Status: COMPLETED
Start: 2022-04-29 | End: 2022-04-29

## 2022-04-29 RX ORDER — DEXMEDETOMIDINE HYDROCHLORIDE 100 UG/ML
INJECTION, SOLUTION INTRAVENOUS
Status: COMPLETED
Start: 2022-04-29 | End: 2022-04-29

## 2022-04-29 RX ORDER — PROPOFOL 10 MG/ML
INJECTION, EMULSION INTRAVENOUS AS NEEDED
Status: DISCONTINUED | OUTPATIENT
Start: 2022-04-29 | End: 2022-04-29 | Stop reason: HOSPADM

## 2022-04-29 RX ORDER — PROPOFOL 10 MG/ML
INJECTION, EMULSION INTRAVENOUS
Status: COMPLETED
Start: 2022-04-29 | End: 2022-04-29

## 2022-04-29 RX ORDER — FUROSEMIDE 10 MG/ML
40 INJECTION INTRAMUSCULAR; INTRAVENOUS ONCE
Status: COMPLETED | OUTPATIENT
Start: 2022-04-29 | End: 2022-04-29

## 2022-04-29 RX ORDER — GLYCOPYRROLATE 0.2 MG/ML
INJECTION INTRAMUSCULAR; INTRAVENOUS
Status: DISPENSED
Start: 2022-04-29 | End: 2022-04-29

## 2022-04-29 RX ORDER — DEXMEDETOMIDINE HYDROCHLORIDE 100 UG/ML
INJECTION, SOLUTION INTRAVENOUS AS NEEDED
Status: DISCONTINUED | OUTPATIENT
Start: 2022-04-29 | End: 2022-04-29 | Stop reason: HOSPADM

## 2022-04-29 RX ADMIN — QUETIAPINE FUMARATE 25 MG: 25 TABLET, FILM COATED ORAL at 21:07

## 2022-04-29 RX ADMIN — QUETIAPINE FUMARATE 25 MG: 25 TABLET, FILM COATED ORAL at 08:49

## 2022-04-29 RX ADMIN — INSULIN LISPRO 7 UNITS: 100 INJECTION, SOLUTION INTRAVENOUS; SUBCUTANEOUS at 21:07

## 2022-04-29 RX ADMIN — PANTOPRAZOLE SODIUM 40 MG: 40 TABLET, DELAYED RELEASE ORAL at 08:49

## 2022-04-29 RX ADMIN — PIPERACILLIN AND TAZOBACTAM 3.38 G: 3; .375 INJECTION, POWDER, LYOPHILIZED, FOR SOLUTION INTRAVENOUS at 08:49

## 2022-04-29 RX ADMIN — SODIUM CHLORIDE, POTASSIUM CHLORIDE, SODIUM LACTATE AND CALCIUM CHLORIDE: 600; 310; 30; 20 INJECTION, SOLUTION INTRAVENOUS at 13:29

## 2022-04-29 RX ADMIN — PIPERACILLIN AND TAZOBACTAM 3.38 G: 3; .375 INJECTION, POWDER, LYOPHILIZED, FOR SOLUTION INTRAVENOUS at 17:14

## 2022-04-29 RX ADMIN — POTASSIUM CHLORIDE 40 MEQ: 750 TABLET, FILM COATED, EXTENDED RELEASE ORAL at 21:07

## 2022-04-29 RX ADMIN — MEMANTINE HYDROCHLORIDE 10 MG: 10 TABLET ORAL at 08:50

## 2022-04-29 RX ADMIN — POTASSIUM CHLORIDE 40 MEQ: 750 TABLET, FILM COATED, EXTENDED RELEASE ORAL at 08:49

## 2022-04-29 RX ADMIN — LIDOCAINE HYDROCHLORIDE 100 MG: 20 INJECTION, SOLUTION EPIDURAL; INFILTRATION; INTRACAUDAL; PERINEURAL at 13:29

## 2022-04-29 RX ADMIN — SODIUM CHLORIDE, PRESERVATIVE FREE 40 MG: 5 INJECTION INTRAVENOUS at 21:06

## 2022-04-29 RX ADMIN — SODIUM CHLORIDE, PRESERVATIVE FREE 10 ML: 5 INJECTION INTRAVENOUS at 05:08

## 2022-04-29 RX ADMIN — PIPERACILLIN AND TAZOBACTAM 3.38 G: 3; .375 INJECTION, POWDER, LYOPHILIZED, FOR SOLUTION INTRAVENOUS at 21:06

## 2022-04-29 RX ADMIN — GLIPIZIDE 5 MG: 5 TABLET ORAL at 08:49

## 2022-04-29 RX ADMIN — FUROSEMIDE 40 MG: 10 INJECTION, SOLUTION INTRAMUSCULAR; INTRAVENOUS at 17:11

## 2022-04-29 RX ADMIN — MEMANTINE HYDROCHLORIDE 10 MG: 10 TABLET ORAL at 21:07

## 2022-04-29 RX ADMIN — DEXMEDETOMIDINE HYDROCHLORIDE 5 MCG: 100 INJECTION, SOLUTION INTRAVENOUS at 13:29

## 2022-04-29 RX ADMIN — SODIUM CHLORIDE, PRESERVATIVE FREE 10 ML: 5 INJECTION INTRAVENOUS at 22:00

## 2022-04-29 RX ADMIN — SODIUM CHLORIDE, PRESERVATIVE FREE 40 MG: 5 INJECTION INTRAVENOUS at 17:12

## 2022-04-29 RX ADMIN — SODIUM CHLORIDE, PRESERVATIVE FREE 10 ML: 5 INJECTION INTRAVENOUS at 14:44

## 2022-04-29 RX ADMIN — PROPOFOL 30 MG: 10 INJECTION, EMULSION INTRAVENOUS at 13:29

## 2022-04-29 NOTE — PROGRESS NOTES
Hospitalist Progress Note    Subjective:   Daily Progress Note: 4/29/2022 9:31 AM    Patient examined at bedside, resting comfortably. She has no current complaints, but history is limited by dementia.     Current Facility-Administered Medications   Medication Dose Route Frequency    dexmedeTOMidine (PRECEDEX) 100 mcg/mL iv solution        lidocaine (PF) (XYLOCAINE) 20 mg/mL (2 %) injection        glycopyrrolate (ROBINUL) 0.2 mg/mL injection        PHENYLephrine (TIGIST-SYNEPHRINE) 1 mg/10 mL (100 mcg/mL) 100 mcg/mL in NS syringe        propofoL (DIPRIVAN) 10 mg/mL injection        propofoL (DIPRIVAN) 10 mg/mL injection        propofoL (DIPRIVAN) 10 mg/mL injection        propofoL (DIPRIVAN) 10 mg/mL injection        furosemide (LASIX) injection 40 mg  40 mg IntraVENous ONCE    potassium chloride SR (KLOR-CON 10) tablet 40 mEq  40 mEq Oral BID    QUEtiapine (SEROquel) tablet 25 mg  25 mg Oral BID    memantine (NAMENDA) tablet 10 mg  10 mg Oral BID    glipiZIDE (GLUCOTROL) tablet 5 mg  5 mg Oral ACB    galantamine (RAZADYNE) tablet 8 mg  8 mg Oral BID WITH MEALS    pantoprazole (PROTONIX) tablet 40 mg  40 mg Oral ACB&D    piperacillin-tazobactam (ZOSYN) 3.375 g in 0.9% sodium chloride (MBP/ADV) 100 mL MBP  3.375 g IntraVENous Q8H    sodium chloride (NS) flush 5-40 mL  5-40 mL IntraVENous Q8H    sodium chloride (NS) flush 5-40 mL  5-40 mL IntraVENous PRN    acetaminophen (TYLENOL) tablet 650 mg  650 mg Oral Q6H PRN    Or    acetaminophen (TYLENOL) suppository 650 mg  650 mg Rectal Q6H PRN    polyethylene glycol (MIRALAX) packet 17 g  17 g Oral DAILY PRN    ondansetron (ZOFRAN ODT) tablet 4 mg  4 mg Oral Q8H PRN    Or    ondansetron (ZOFRAN) injection 4 mg  4 mg IntraVENous Q6H PRN    enoxaparin (LOVENOX) injection 30 mg  30 mg SubCUTAneous DAILY    glucose chewable tablet 16 g  4 Tablet Oral PRN    dextrose 10% infusion 0-250 mL  0-250 mL IntraVENous PRN    glucagon (GLUCAGEN) injection 1 mg 1 mg IntraMUSCular PRN    insulin lispro (HUMALOG) injection   SubCUTAneous AC&HS        Review of Systems  Review of Systems   Respiratory: Positive for shortness of breath. Cardiovascular: Negative for chest pain. Gastrointestinal: Negative for abdominal pain, nausea and vomiting.     ROS limited due to dementia      Objective:     Visit Vitals  BP (!) 113/49   Pulse 69   Temp 97.6 °F (36.4 °C)   Resp 18   Ht 5' (1.524 m)   Wt 70.3 kg (155 lb)   SpO2 96%   BMI 30.27 kg/m²    O2 Flow Rate (L/min): 2 l/min O2 Device: Nasal cannula    Temp (24hrs), Av.6 °F (36.4 °C), Min:97.6 °F (36.4 °C), Max:97.6 °F (36.4 °C)      701 - 1900  In: 100 [I.V.:100]  Out: -    190 -  0700  In: 450 [P.O.:450]  Out: -     Recent Results (from the past 24 hour(s))   GLUCOSE, POC    Collection Time: 22  4:44 PM   Result Value Ref Range    Glucose (POC) 406 (H) 65 - 117 mg/dL    Performed by Juanita Augustin, POC    Collection Time: 22  8:37 PM   Result Value Ref Range    Glucose (POC) 207 (H) 65 - 117 mg/dL    Performed by Yoly CROUCH [place])    BLOOD GAS, ARTERIAL    Collection Time: 22  5:35 AM   Result Value Ref Range    pH 7.46 (H) 7.35 - 7.45      PCO2 37 35 - 45 mmHg    PO2 80 75 - 100 mmHg    O2 SAT 96 >95 %    BICARBONATE 26 22 - 26 mmol/L    BASE EXCESS 2.4 (H) 0 - 2 mmol/L    O2 METHOD Nasal Cannula      O2 FLOW RATE 2 L/min    Sample source Arterial      LAUREN'S TEST PASS     CBC WITH AUTOMATED DIFF    Collection Time: 22  6:51 AM   Result Value Ref Range    WBC 6.9 3.6 - 11.0 K/uL    RBC 3.27 (L) 3.80 - 5.20 M/uL    HGB 9.6 (L) 11.5 - 16.0 g/dL    HCT 29.1 (L) 35.0 - 47.0 %    MCV 89.0 80.0 - 99.0 FL    MCH 29.4 26.0 - 34.0 PG    MCHC 33.0 30.0 - 36.5 g/dL    RDW 14.8 (H) 11.5 - 14.5 %    PLATELET 343 260 - 888 K/uL    MPV 12.5 8.9 - 12.9 FL    NRBC 0.0 0.0  WBC    ABSOLUTE NRBC 0.00 0.00 - 0.01 K/uL    NEUTROPHILS 63 32 - 75 %    LYMPHOCYTES 24 12 - 49 %    MONOCYTES 8 5 - 13 %    EOSINOPHILS 4 0 - 7 %    BASOPHILS 0 0 - 1 %    IMMATURE GRANULOCYTES 1 (H) 0 - 0.5 %    ABS. NEUTROPHILS 4.3 1.8 - 8.0 K/UL    ABS. LYMPHOCYTES 1.6 0.8 - 3.5 K/UL    ABS. MONOCYTES 0.6 0.0 - 1.0 K/UL    ABS. EOSINOPHILS 0.3 0.0 - 0.4 K/UL    ABS. BASOPHILS 0.0 0.0 - 0.1 K/UL    ABS. IMM. GRANS. 0.1 (H) 0.00 - 0.04 K/UL    DF AUTOMATED     METABOLIC PANEL, COMPREHENSIVE    Collection Time: 04/29/22  6:51 AM   Result Value Ref Range    Sodium 139 136 - 145 mmol/L    Potassium 4.0 3.5 - 5.1 mmol/L    Chloride 106 97 - 108 mmol/L    CO2 26 21 - 32 mmol/L    Anion gap 7 5 - 15 mmol/L    Glucose 127 (H) 65 - 100 mg/dL    BUN 13 6 - 20 mg/dL    Creatinine 0.69 0.55 - 1.02 mg/dL    BUN/Creatinine ratio 19 12 - 20      GFR est AA >60 >60 ml/min/1.73m2    GFR est non-AA >60 >60 ml/min/1.73m2    Calcium 8.6 8.5 - 10.1 mg/dL    Bilirubin, total 0.5 0.2 - 1.0 mg/dL    AST (SGOT) 15 15 - 37 U/L    ALT (SGPT) 13 12 - 78 U/L    Alk. phosphatase 65 45 - 117 U/L    Protein, total 6.1 (L) 6.4 - 8.2 g/dL    Albumin 2.6 (L) 3.5 - 5.0 g/dL    Globulin 3.5 2.0 - 4.0 g/dL    A-G Ratio 0.7 (L) 1.1 - 2.2     GLUCOSE, POC    Collection Time: 04/29/22  8:03 AM   Result Value Ref Range    Glucose (POC) 142 (H) 65 - 117 mg/dL    Performed by [facility]    GLUCOSE, POC    Collection Time: 04/29/22 11:08 AM   Result Value Ref Range    Glucose (POC) 157 (H) 65 - 117 mg/dL    Performed by Wei Delgado         XR CHEST PORT   Final Result      CT ABD PELV WO CONT   Final Result   Prior abdominal surgery. No bowel obstruction. No ascites. Prior cholecystectomy. Abdominal aorta atherosclerosis. Findings regarding distal esophagus/GE junction region also described on recent   CT chest.               CT CHEST WO CONT   Final Result   1. Interval development of extensive predominantly central bilateral airspace   disease or edema. 2. Thickened esophagus with mild dilatation distally.  Recommend clinical evaluation and follow-up. 3. Increasing size of several small mediastinal lymph nodes one of which is now   borderline in size. XR CHEST PORT   Final Result      CT HEAD WO CONT   Final Result   Mild central and cortical atrophy. Moderate microvascular ischemic   changes         No acute intracranial abnormality. PHYSICAL EXAM:    Physical Exam  Vitals reviewed. Constitutional:       Appearance: She is not ill-appearing. Cardiovascular:      Rate and Rhythm: Normal rate and regular rhythm. Heart sounds: Normal heart sounds. Pulmonary:      Effort: Pulmonary effort is normal.      Comments: Mild rhonchi in bilateral bases. Abdominal:      General: Abdomen is flat. There is no distension. Palpations: Abdomen is soft. Tenderness: There is no abdominal tenderness. Musculoskeletal:      Right lower leg: Edema (mild) present. Left lower leg: Edema (mild) present. Skin:     General: Skin is warm and dry. Neurological:      Mental Status: She is alert. Comments: Pleasantly demented.            Data Review    Recent Results (from the past 24 hour(s))   GLUCOSE, POC    Collection Time: 04/28/22  4:44 PM   Result Value Ref Range    Glucose (POC) 406 (H) 65 - 117 mg/dL    Performed by [address] [place], [place] [id number] E, POC    Collection Time: 04/28/22  8:37 PM   Result Value Ref Range    Glucose (POC) 207 (H) 65 - 117 mg/dL    Performed by Dory Alas [facility])    BLOOD GAS, ARTERIAL    Collection Time: 04/29/22  5:35 AM   Result Value Ref Range    pH 7.46 (H) 7.35 - 7.45      PCO2 37 35 - 45 mmHg    PO2 80 75 - 100 mmHg    O2 SAT 96 >95 %    BICARBONATE 26 22 - 26 mmol/L    BASE EXCESS 2.4 (H) 0 - 2 mmol/L    O2 METHOD Nasal Cannula      O2 FLOW RATE 2 L/min    Sample source Arterial      LAUREN'S TEST PASS     CBC WITH AUTOMATED DIFF    Collection Time: 04/29/22  6:51 AM   Result Value Ref Range    WBC 6.9 3.6 - 11.0 K/uL    RBC 3.27 (L) 3.80 - 5.20 M/uL    HGB 9.6 (L) 11.5 - 16.0 g/dL    HCT 29.1 (L) 35.0 - 47.0 %    MCV 89.0 80.0 - 99.0 FL    MCH 29.4 26.0 - 34.0 PG    MCHC 33.0 30.0 - 36.5 g/dL    RDW 14.8 (H) 11.5 - 14.5 %    PLATELET 743 742 - 320 K/uL    MPV 12.5 8.9 - 12.9 FL    NRBC 0.0 0.0  WBC    ABSOLUTE NRBC 0.00 0.00 - 0.01 K/uL    NEUTROPHILS 63 32 - 75 %    LYMPHOCYTES 24 12 - 49 %    MONOCYTES 8 5 - 13 %    EOSINOPHILS 4 0 - 7 %    BASOPHILS 0 0 - 1 %    IMMATURE GRANULOCYTES 1 (H) 0 - 0.5 %    ABS. NEUTROPHILS 4.3 1.8 - 8.0 K/UL    ABS. LYMPHOCYTES 1.6 0.8 - 3.5 K/UL    ABS. MONOCYTES 0.6 0.0 - 1.0 K/UL    ABS. EOSINOPHILS 0.3 0.0 - 0.4 K/UL    ABS. BASOPHILS 0.0 0.0 - 0.1 K/UL    ABS. IMM. GRANS. 0.1 (H) 0.00 - 0.04 K/UL    DF AUTOMATED     METABOLIC PANEL, COMPREHENSIVE    Collection Time: 04/29/22  6:51 AM   Result Value Ref Range    Sodium 139 136 - 145 mmol/L    Potassium 4.0 3.5 - 5.1 mmol/L    Chloride 106 97 - 108 mmol/L    CO2 26 21 - 32 mmol/L    Anion gap 7 5 - 15 mmol/L    Glucose 127 (H) 65 - 100 mg/dL    BUN 13 6 - 20 mg/dL    Creatinine 0.69 0.55 - 1.02 mg/dL    BUN/Creatinine ratio 19 12 - 20      GFR est AA >60 >60 ml/min/1.73m2    GFR est non-AA >60 >60 ml/min/1.73m2    Calcium 8.6 8.5 - 10.1 mg/dL    Bilirubin, total 0.5 0.2 - 1.0 mg/dL    AST (SGOT) 15 15 - 37 U/L    ALT (SGPT) 13 12 - 78 U/L    Alk.  phosphatase 65 45 - 117 U/L    Protein, total 6.1 (L) 6.4 - 8.2 g/dL    Albumin 2.6 (L) 3.5 - 5.0 g/dL    Globulin 3.5 2.0 - 4.0 g/dL    A-G Ratio 0.7 (L) 1.1 - 2.2     GLUCOSE, POC    Collection Time: 04/29/22  8:03 AM   Result Value Ref Range    Glucose (POC) 142 (H) 65 - 117 mg/dL    Performed by [facility]    GLUCOSE, POC    Collection Time: 04/29/22 11:08 AM   Result Value Ref Range    Glucose (POC) 157 (H) 65 - 117 mg/dL    Performed by Latasha Shea         Assessment/Plan:     Hospital course:   Rosendo Melvin is a 80 y.o. female with a hx of DMII, alzheimer's dementia, HTN, HLD who was brought in for shortness of breath and was admitted for management of aspiration PNA and acute respiratory failure with hypoxia. Pt is not on supplemental O2 at baseline but has been maintaining O2 saturation >92% on 4L NC. In the ED workup showed WBC of 13.5, CAROL with Cr of 1.4. CXR and CT of the chest concerning for developing aspiration PNA, patient treated empirically with Rocephin. Patient was started on Zosyn and Pulmonology consulted. CT of the head/neck with no acute findings. UA consistent with UTI, urine culture pending. Given CT of the chest and CT A/P demonstrating distal esophageal wall thickening and mild distal esophageal distention in the setting of new aspiration PNA and RUQ abdominal pain GI has been consulted. GI will consider completing EGD for evaluation once the O2 sat improves with less O2 equipment required. Pulmonology has ordered one dose of lasix and an ECHO revealed  EF of 66 5% with septal thickening and abnormal diastolic dysfunctionrule out a CHF fluid overload. ABG complete on 04/26 revealed a PO2 of 70. Blood and urine culture have shown no growth in the preliminary results, will continue to monitor. Aspiration PNA  - Continue zosyn  - Repeat CXR with persistent aspiration PNA  - Sputum culture pending  - Pulmonology consulted, obtain ABG   - SLP consulted progress diet to full liquids     Acute respiratory failure with hypoxia  - Supplemental O2, keep >92%.    - Pulmonary following patient  - RT consult  - Continuous pulse OX    Diastolic CHF  -  on 04/27  - ECHO EF of 66 5% with septal thickening and abnormal diastolic dysfunction  - continue to monitor     Sepsis pneumonia and UTI  - WBC 19 --> 12.3-->7  - Lactate normal  - Blood culture; blood culture with Staphylococcus species coag negative 1 out of 4 bottles most likely contaminant  - IVF NS   - Zosyn     UTI   - Urine culture; no growth in preliminary results, will continue to follow  - Zosyn    Esophagitis  - CT A/P 04/25/22 demonstrating distal esophageal wall thickening and mild distal esophageal distention. - GI consult: EGD  -Protonix     CAROL  Resolved     Mild hyponatremia  Resolved    Mild normocytic anemia   - Hgb 13.0 --> 10.7 --> 9.6  - Continue to monitor   - Continue iron supplementation       DM  - Glucose 419 --> 279 --> 187  - Insuline sliding scale      Dementia/worsening AMS  - CT head and neck 04/25/22 without acute abnormality   - Continue home medications, Memantine      Code status: DNR  DVT prophylaxis: Lovenox   GI prophylaxis: Protonix  POA: Daughter, Memory Floor. [id number], updated    Care Plan discussed with: Patient/Family    Total time spent with patient: >35 minutes.

## 2022-04-29 NOTE — PROGRESS NOTES
PULMONARY NOTE  [facility]    Name: Drew Calle MRN: [id number]   : 1940 [facility]   Date: 2022  Admission date: 2022 Hospital Day: 5       HPI:     Hospital Problems  Date Reviewed: 2019          Codes Class Noted POA    Aspiration pneumonia [facility]) ICD-10-CM: J69.0  ICD-9-CM: 507.0  2022 Unknown                   [x] High complexity decision making was performed  [x] See my orders for details      Subjective/Initial History:     I was asked by Marsha Sears MD to see Drew Calle  a 80 y.o.  female in consultation     Excerpts from admission 2022 or consult notes as follows:   Patient is a 81yo female with a hx of COPD, sleep apnea, Type 2 diabetes, HTN, hyperlipidemia, Alzheimer's dementia, and colon caner that presented to the ED with shortness of breath. The history was given by her daughter. Patient's daughter states that she has had two episodes of non-bloody emesis with RUQ abdominal pain. Denies bowel/bladder changes. Daughter states that her SpO2 was 88% and she is not on supplemental oxygen. CXR and chest CT taken in the ED is concerning for aspiration PNA. Rocephin was started empirically, but changed later to Zosyn. Patient placed on 4L O2 via NC. Pulmonary was consulted for reasons stated above.        No Known Allergies     MAR reviewed and pertinent medications noted or modified as needed     Current Facility-Administered Medications   Medication    dexmedeTOMidine (PRECEDEX) 100 mcg/mL iv solution    lidocaine (PF) (XYLOCAINE) 20 mg/mL (2 %) injection    glycopyrrolate (ROBINUL) 0.2 mg/mL injection    PHENYLephrine (TIGIST-SYNEPHRINE) 1 mg/10 mL (100 mcg/mL) 100 mcg/mL in NS syringe    propofoL (DIPRIVAN) 10 mg/mL injection    propofoL (DIPRIVAN) 10 mg/mL injection    propofoL (DIPRIVAN) 10 mg/mL injection    propofoL (DIPRIVAN) 10 mg/mL injection    potassium chloride SR (KLOR-CON 10) tablet 40 mEq    QUEtiapine (SEROquel) tablet 25 mg    memantine (NAMENDA) tablet 10 mg    glipiZIDE (GLUCOTROL) tablet 5 mg    galantamine (RAZADYNE) tablet 8 mg    pantoprazole (PROTONIX) tablet 40 mg    piperacillin-tazobactam (ZOSYN) 3.375 g in 0.9% sodium chloride (MBP/ADV) 100 mL MBP    sodium chloride (NS) flush 5-40 mL    sodium chloride (NS) flush 5-40 mL    acetaminophen (TYLENOL) tablet 650 mg    Or    acetaminophen (TYLENOL) suppository 650 mg    polyethylene glycol (MIRALAX) packet 17 g    ondansetron (ZOFRAN ODT) tablet 4 mg    Or    ondansetron (ZOFRAN) injection 4 mg    enoxaparin (LOVENOX) injection 30 mg    glucose chewable tablet 16 g    dextrose 10% infusion 0-250 mL    glucagon (GLUCAGEN) injection 1 mg    insulin lispro (HUMALOG) injection      Patient PCP: Neptali Bearden  PMH:  has a past medical history of Arthritis, Cancer ([facility] [address] (2006), COPD, Diabetes ([facility] [address] GERD (gastroesophageal reflux disease), Other ill-defined conditions(799.89), PUD (peptic ulcer disease), and Unspecified sleep apnea. PSH:   has a past surgical history that includes hx hysterectomy; pr abdomen surgery proc unlisted (2006); hx appendectomy; hx other surgical; hx orthopaedic (2010); pr wrist arthroscop,diagnostic; and hx back surgery. FHX: family history includes Diabetes in her brother, mother, and sister; Heart Disease in her brother. SHX:  reports that she has quit smoking. She has never used smokeless tobacco. She reports that she does not drink alcohol. ROS:    Review of Systems   Constitutional: Negative. HENT: Negative. Eyes: Negative. Respiratory: Positive for shortness of breath. Cardiovascular: Negative. Genitourinary: Negative. Skin: Negative.          Objective:     Vital Signs: Telemetry:    normal sinus rhythm Intake/Output:   Visit Vitals  BP (!) 113/49   Pulse 69   Temp 97.6 °F (36.4 °C)   Resp 18   Ht 5' (1.524 m)   Wt 70.3 kg (155 lb)   SpO2 96%   BMI 30.27 kg/m²       Temp (24hrs), Av.6 °F (36.4 °C), Min:97.6 °F (36.4 °C), Max:97.6 °F (36.4 °C)        O2 Device: Nasal cannula O2 Flow Rate (L/min): 2 l/min       Wt Readings from Last 4 Encounters:   22 70.3 kg (155 lb)   21 74.4 kg (164 lb)   19 92.4 kg (203 lb 12.8 oz)   09/10/18 88.1 kg (194 lb 3.2 oz)          Intake/Output Summary (Last 24 hours) at 2022 1037  Last data filed at 2022 1431  Gross per 24 hour   Intake 450 ml   Output --   Net 450 ml       Last shift:      No intake/output data recorded. Last 3 shifts:  1901 -  0700  In: 450 [P.O.:450]  Out: -        Physical Exam:     Physical Exam  Constitutional:       Appearance: She is not ill-appearing. HENT:      Head: Normocephalic and atraumatic. Cardiovascular:      Rate and Rhythm: Normal rate and regular rhythm. Pulses: Normal pulses. Heart sounds: Normal heart sounds. Pulmonary:      Breath sounds: Rhonchi present. Musculoskeletal:      Right lower leg: No edema. Left lower leg: No edema. Skin:     General: Skin is warm and dry. Labs:    Recent Labs     22  0651 22  0634 22  0632   WBC 6.9 7.6 12.3*   HGB 9.6* 9.6* 9.6*    194 170     Recent Labs     22  0651 22  0634 22  0632 22  1953 22  1250    138 137  --   --    K 4.0 3.3* 3.6  --   --     106 106  --   --    CO2 26 26 23  --   --    * 153* 187*  --   --    BUN 13 16 16  --   --    CREA 0.69 0.81 0.75  --   --    CA 8.6 9.0 9.1  --   --    LAC  --  0.9  --  2.6* 4.8*   ALB 2.6* 2.4* 2.5*  --   --    ALT 13 13 12  --   --      Recent Labs     22  0535 22  1809 22  1150   PH 7.46* 7.48* 7.40   PCO2 37 33* 36   PO2 80 55* 70*   HCO3 26 26 22   FIO2  --  21.0  --      No results for input(s): CPK, CKNDX, TROIQ in the last 72 hours.     No lab exists for component: CPKMB  No results found for: BNPP, BNP   Lab Results   Component Value Date/Time    Culture result: No Growth (<1000 cfu/mL) 04/25/2022 03:52 PM    Culture result:  04/25/2022 02:15 PM     Staphylococcus species, coagulase negative growing in 1 of 4 bottles drawn No Site Indicated    Culture result:  04/25/2022 02:15 PM     (NOTE) [facility] IN [facility] 1 OUT OF 4 BOTTLES CALLED TO AND READ BACK BY GENOVEVA LOZADA MT AT 1331 ON 4/27/2022 RE. Culture result:  04/25/2022 02:15 PM     SEE NOTE  CALLED TO AND READ BACK BY  Aure Kilpatrick RN AT [id number] BY MO     No results found for: TSH, TSHEXT, TSHEXT    Imaging:    CXR Results  (Last 48 hours)    None          Results from [place] encounter on 04/25/22    CT ABD PELV WO CONT    Narrative  CT abdomen and pelvis without IV contrast.    Comparison CT abdomen and pelvis November 2, 2021. Axial images are reviewed along with reformatted sagittal/coronal images. No IV  contrast administered. Sensitivity for detection of neoplastic process limited  in the absence of IV contrast.  Dose reduction: All CT scans at this facility are performed using dose reduction  optimization techniques as appropriate to a performed exam including the  following-  automated exposure control, adjustments of mA and/or Kv according to patient  size, or use of iterative reconstructive technique. Nonspecific hazy reticular markings through basilar lungs. Distal esophageal wall thickening. Mild distal esophageal distention. Small  hiatal hernia. See recent CT chest report. Liver appears unremarkable on this nonenhanced study. Prior cholecystectomy. Pancreas, spleen, and bilateral adrenal glands appear unremarkable. Each kidney  appears unremarkable on this nonenhanced study. Stomach and small bowel loops are nondistended. Prior partial colectomy. Surgical anastomosis right mid abdomen. Stool and air through colon. Sigmoid  colon diverticula. No CT evidence for appendicitis or diverticulitis. No  ascites. Prior hysterectomy.   Atherosclerotic change normal caliber abdominal aorta. Unchanged appearance anterior abdominal wall noted broad-based hernia containing  omental fat but no bowel. Lumbar hardware. Impression  Prior abdominal surgery. No bowel obstruction. No ascites. Prior cholecystectomy. Abdominal aorta atherosclerosis. Findings regarding distal esophagus/GE junction region also described on recent  CT chest.        IMPRESSION:   1. Acute respiratory failure with hypoxia  2. Concern for aspiration pneumonia  3. Chronic Obstructive Pulmonary Disease  4. Rule out CHF fluid overload  5. Sleep apnea  6. GERD  7. Alzheimer's Dementia  8. Colon cancer  9. Type 2 Diabetes  10. GERD  11. PUD  12. Fibromyalgia  13. Hypertension  14. Hyperlipidemia  Body mass index is 30.27 kg/m². 15. Additional workup outlined below  16. Prognosis guarded       RECOMMENDATIONS/PLAN:     1. Decreased from 4L to 2L O2 via nasal cannula as salvage oxygen delivery device to provide high concentration of oxygen to overcome refractory hypoxia. Patient SpO2 improved after weaning. 1. Patient vomited the morning of her admission. Her condition worsened post emesis. 2. Repeat chest x-ray still shows hazy reticular markings at basilar lungs. Received Lasix yesterday good urine output feeling much better repeat Lasix again today and supplement potassium. BNP completed 4/27 was 420.  2. WBC now at 6.9. Patient was given Rocephin in the ED and changed to Zosyn. 3. BNP completed 4/27 was 420.  4. 2D Echo completed 4/27 showed left ventricular systolic function EF 42-49% with normal right ventricular systolic function. 3. ABG completed 4/29. PCO2 is now 37. PO2 is 80.  5. CAT scan of the chest shows esophageal wall thickening possible reflux and GERD disease agree with Protonix. Rule out diabetic gastroparesis. Schedule EGD.                 Sulaiman Rubio MD

## 2022-04-29 NOTE — PROGRESS NOTES
Per chart review, pt with NPO orders for possible procedure. Will hold SLP intervention this date and cont to follow. Please refer to initial bedside sw evaluation for SLP diet recs when PO diet is resumed.

## 2022-04-29 NOTE — PROGRESS NOTES
PULMONARY NOTE  [facility]    Name: Fidencio Yuen MRN: [id number]   : 1940 Hospital: [address] [place] [address]   Date: 2022  Admission date: 2022 Hospital Day: 5       HPI:     Hospital Problems  Date Reviewed: 2019          Codes Class Noted POA    Aspiration pneumonia [facility]) ICD-10-CM: J69.0  ICD-9-CM: 507.0  2022 Unknown                   [x] High complexity decision making was performed  [x] See my orders for details      Subjective/Initial History:     I was asked by Dimitri Fisher MD to see Fidencio Yuen  a 80 y.o.  female in consultation     Excerpts from admission 2022 or consult notes as follows:   Patient is a 79yo female with a hx of COPD, sleep apnea, Type 2 diabetes, HTN, hyperlipidemia, Alzheimer's dementia, and colon caner that presented to the ED with shortness of breath. The history was given by her daughter. Patient's daughter states that she has had two episodes of non-bloody emesis with RUQ abdominal pain. Denies bowel/bladder changes. Daughter states that her SpO2 was 88% and she is not on supplemental oxygen. CXR and chest CT taken in the ED is concerning for aspiration PNA. Rocephin was started empirically, but changed later to Zosyn. Patient placed on 4L O2 via NC. Pulmonary was consulted for reasons stated above.        No Known Allergies     MAR reviewed and pertinent medications noted or modified as needed     Current Facility-Administered Medications   Medication    dexmedeTOMidine (PRECEDEX) 100 mcg/mL iv solution    lidocaine (PF) (XYLOCAINE) 20 mg/mL (2 %) injection    glycopyrrolate (ROBINUL) 0.2 mg/mL injection    PHENYLephrine (TIGIST-SYNEPHRINE) 1 mg/10 mL (100 mcg/mL) 100 mcg/mL in NS syringe    propofoL (DIPRIVAN) 10 mg/mL injection    propofoL (DIPRIVAN) 10 mg/mL injection    propofoL (DIPRIVAN) 10 mg/mL injection    propofoL (DIPRIVAN) 10 mg/mL injection    potassium chloride SR (KLOR-CON 10) tablet 40 mEq    QUEtiapine (SEROquel) tablet 25 mg    memantine (NAMENDA) tablet 10 mg    glipiZIDE (GLUCOTROL) tablet 5 mg    galantamine (RAZADYNE) tablet 8 mg    pantoprazole (PROTONIX) tablet 40 mg    piperacillin-tazobactam (ZOSYN) 3.375 g in 0.9% sodium chloride (MBP/ADV) 100 mL MBP    sodium chloride (NS) flush 5-40 mL    sodium chloride (NS) flush 5-40 mL    acetaminophen (TYLENOL) tablet 650 mg    Or    acetaminophen (TYLENOL) suppository 650 mg    polyethylene glycol (MIRALAX) packet 17 g    ondansetron (ZOFRAN ODT) tablet 4 mg    Or    ondansetron (ZOFRAN) injection 4 mg    enoxaparin (LOVENOX) injection 30 mg    glucose chewable tablet 16 g    dextrose 10% infusion 0-250 mL    glucagon (GLUCAGEN) injection 1 mg    insulin lispro (HUMALOG) injection      Patient PCP: Jazmine Lentz  PMH:  has a past medical history of Arthritis, Cancer ([facility] [address] (2006), COPD, Diabetes ([facility] [address] GERD (gastroesophageal reflux disease), Other ill-defined conditions(799.89), PUD (peptic ulcer disease), and Unspecified sleep apnea. PSH:   has a past surgical history that includes hx hysterectomy; pr abdomen surgery proc unlisted (2006); hx appendectomy; hx other surgical; hx orthopaedic (2010); pr wrist arthroscop,diagnostic; and hx back surgery. FHX: family history includes Diabetes in her brother, mother, and sister; Heart Disease in her brother. SHX:  reports that she has quit smoking. She has never used smokeless tobacco. She reports that she does not drink alcohol. ROS:    Review of Systems   Constitutional: Negative. HENT: Negative. Eyes: Negative. Respiratory: Positive for shortness of breath. Cardiovascular: Negative. Genitourinary: Negative. Skin: Negative.          Objective:     Vital Signs: Telemetry:    normal sinus rhythm Intake/Output:   Visit Vitals  BP (!) 113/49   Pulse 69   Temp 97.6 °F (36.4 °C)   Resp 18   Ht 5' (1.524 m)   Wt 70.3 kg (155 lb)   SpO2 96%   BMI 30.27 kg/m²       Temp (24hrs), Av.6 °F (36.4 °C), Min:97.6 °F (36.4 °C), Max:97.6 °F (36.4 °C)        O2 Device: Nasal cannula O2 Flow Rate (L/min): 2 l/min       Wt Readings from Last 4 Encounters:   22 70.3 kg (155 lb)   21 74.4 kg (164 lb)   19 92.4 kg (203 lb 12.8 oz)   09/10/18 88.1 kg (194 lb 3.2 oz)          Intake/Output Summary (Last 24 hours) at 2022 0924  Last data filed at 2022 1431  Gross per 24 hour   Intake 450 ml   Output --   Net 450 ml       Last shift:      No intake/output data recorded. Last 3 shifts:  190 -  0700  In: 450 [P.O.:450]  Out: -        Physical Exam:     Physical Exam  Constitutional:       Appearance: She is not ill-appearing. HENT:      Head: Normocephalic and atraumatic. Cardiovascular:      Rate and Rhythm: Normal rate and regular rhythm. Pulses: Normal pulses. Heart sounds: Normal heart sounds. Pulmonary:      Breath sounds: Rhonchi present. Musculoskeletal:      Right lower leg: No edema. Left lower leg: No edema. Skin:     General: Skin is warm and dry. Labs:    Recent Labs     22  0651 22  0634 22  0632   WBC 6.9 7.6 12.3*   HGB 9.6* 9.6* 9.6*    194 170     Recent Labs     22  0651 22  0634 22  0632 22  1953 22  1250    138 137  --   --    K 4.0 3.3* 3.6  --   --     106 106  --   --    CO2 26 26 23  --   --    * 153* 187*  --   --    BUN 13 16 16  --   --    CREA 0.69 0.81 0.75  --   --    CA 8.6 9.0 9.1  --   --    LAC  --  0.9  --  2.6* 4.8*   ALB 2.6* 2.4* 2.5*  --   --    ALT 13 13 12  --   --      Recent Labs     22  0535 22  1809 22  1150   PH 7.46* 7.48* 7.40   PCO2 37 33* 36   PO2 80 55* 70*   HCO3 26 26 22   FIO2  --  21.0  --      No results for input(s): CPK, CKNDX, TROIQ in the last 72 hours.     No lab exists for component: CPKMB  No results found for: BNPP, BNP   Lab Results   Component Value Date/Time    Culture result: No Growth (<1000 cfu/mL) 04/25/2022 03:52 PM    Culture result:  04/25/2022 02:15 PM     Staphylococcus species, coagulase negative growing in 1 of 4 bottles drawn No Site Indicated    Culture result:  04/25/2022 02:15 PM     (NOTE) [facility] IN [facility] 1 OUT OF 4 BOTTLES CALLED TO AND READ BACK BY GENOVEVA LOZADA MT AT 1331 ON 4/27/2022 RE. Culture result:  04/25/2022 02:15 PM     SEE NOTE  CALLED TO AND READ BACK BY  Margit Fothergill RN AT 4807 BY MO     No results found for: TSH, TSHEXT, TSHEXT    Imaging:    CXR Results  (Last 48 hours)    None          Results from [place] encounter on 04/25/22    CT ABD PELV WO CONT    Narrative  CT abdomen and pelvis without IV contrast.    Comparison CT abdomen and pelvis November 2, 2021. Axial images are reviewed along with reformatted sagittal/coronal images. No IV  contrast administered. Sensitivity for detection of neoplastic process limited  in the absence of IV contrast.  Dose reduction: All CT scans at this facility are performed using dose reduction  optimization techniques as appropriate to a performed exam including the  following-  automated exposure control, adjustments of mA and/or Kv according to patient  size, or use of iterative reconstructive technique. Nonspecific hazy reticular markings through basilar lungs. Distal esophageal wall thickening. Mild distal esophageal distention. Small  hiatal hernia. See recent CT chest report. Liver appears unremarkable on this nonenhanced study. Prior cholecystectomy. Pancreas, spleen, and bilateral adrenal glands appear unremarkable. Each kidney  appears unremarkable on this nonenhanced study. Stomach and small bowel loops are nondistended. Prior partial colectomy. Surgical anastomosis right mid abdomen. Stool and air through colon. Sigmoid  colon diverticula. No CT evidence for appendicitis or diverticulitis. No  ascites. Prior hysterectomy.   Atherosclerotic change normal caliber abdominal aorta. Unchanged appearance anterior abdominal wall noted broad-based hernia containing  omental fat but no bowel. Lumbar hardware. Impression  Prior abdominal surgery. No bowel obstruction. No ascites. Prior cholecystectomy. Abdominal aorta atherosclerosis. Findings regarding distal esophagus/GE junction region also described on recent  CT chest.        IMPRESSION:   1. Acute respiratory failure with hypoxia  2. Concern for aspiration pneumonia  3. Chronic Obstructive Pulmonary Disease  4. Rule out CHF fluid overload  5. Sleep apnea  6. GERD  7. Alzheimer's Dementia  8. Colon cancer  9. Type 2 Diabetes  10. GERD  11. PUD  12. Fibromyalgia  13. Hypertension  14. Hyperlipidemia  Body mass index is 30.27 kg/m². 15. Additional workup outlined below  16. Prognosis guarded       RECOMMENDATIONS/PLAN:     1. Decreased from 4L to 2L O2 via nasal cannula as salvage oxygen delivery device to provide high concentration of oxygen to overcome refractory hypoxia. Patient SpO2 improved after weaning. 1. Patient vomited the morning of her admission. Her condition worsened post emesis. 2. Repeat chest x-ray still shows hazy reticular markings at basilar lungs. Received Lasix yesterday good urine output feeling much better repeat Lasix again today and supplement potassium. BNP completed 4/27 was 420.  2. WBC now at 6.9. Patient was given Rocephin in the ED and changed to Zosyn. 3. BNP completed 4/27 was 420.  4. 2D Echo completed 4/27 showed left ventricular systolic function EF 36-53% with normal right ventricular systolic function. 3. ABG completed 4/29. PCO2 is now 37. PO2 is 80.  5. CAT scan of the chest shows esophageal wall thickening possible reflux and GERD disease agree with Protonix. Rule out diabetic gastroparesis. Schedule EGD.                 [facility]

## 2022-04-29 NOTE — PROCEDURES
Summary of EGD report    See full report at the chart reviewed-procedure- scanned EGD documentation    Esophagus: Severe erosive esophagitis at the middle esophagus, with superficial ulcer formation, consistent with reflux induced esophagitis, biopsy taken to rule out the fungi, virus infection    Small hiatal hernia noted the EG junction    Mild/moderate the bile reflux gastritis noted in antrum body of stomach, no ulcers, no ulceration     First second part of duodenum was normal    Protonix 40 mg  twice daily IV for 3 days  Carafate 10 mL p.o. 3 times daily  Soft dysphagia diet    Finding and plan discussed with patient's daughter

## 2022-04-29 NOTE — ANESTHESIA POSTPROCEDURE EVALUATION
Procedure(s):  ESOPHAGOGASTRODUODENOSCOPY (EGD) anesthesia to check  to ok procedure please  ESOPHAGOGASTRODUODENAL (EGD) BIOPSY. general    Anesthesia Post Evaluation      Multimodal analgesia: multimodal analgesia not used between 6 hours prior to anesthesia start to PACU discharge  Patient location during evaluation: bedside (Endoscopy suite)  Patient participation: complete - patient cannot participate  Level of consciousness: sleepy but conscious  Pain score: 0  Pain management: adequate  Airway patency: patent  Anesthetic complications: no  Cardiovascular status: acceptable  Respiratory status: acceptable and nasal cannula  Hydration status: acceptable  Comments: This patient remained on the stretcher. The patient was handed off to the endoscopy nursing team.  All questions regarding pre-, intra-, and postoperative care were answered.   Post anesthesia nausea and vomiting:  none      INITIAL Post-op Vital signs:   Vitals Value Taken Time   BP 99/52 04/29/22 1350   Temp 36.2 °C (97.2 °F) 04/29/22 1345   Pulse 57 04/29/22 1350   Resp 19 04/29/22 1350   SpO2

## 2022-04-29 NOTE — ANESTHESIA PREPROCEDURE EVALUATION
Relevant Problems   RESPIRATORY SYSTEM   (+) Aspiration pneumonia (HCC)      NEUROLOGY   (+) Alzheimer's dementia without behavioral disturbance (HCC)      CARDIOVASCULAR   (+) Essential hypertension      ENDOCRINE   (+) Severe obesity (BMI 35.0-39.9)   (+) Type 2 diabetes mellitus with nephropathy (HCC)   (+) Uncontrolled type 2 diabetes mellitus with hyperglycemia, with long-term current use of insulin (HCC)       Anesthetic History   No history of anesthetic complications            Review of Systems / Medical History  Patient summary reviewed, nursing notes reviewed and pertinent labs reviewed    Pulmonary        Sleep apnea: No treatment           Neuro/Psych         TIA     Cardiovascular                    Comments: 4/25/2022 EKG:    Sinus tachycardia   Low voltage QRS   Cannot rule out Anterior infarct , age undetermined   Abnormal ECG   No previous ECGs available   Confirmed by Pat Augustin ([id number]) on 4/25/2022 4:16:05 PM       4/272022 TTE:    Interpretation Summary         Left Ventricle: Normal left ventricular systolic function with a visually estimated EF of 65 - 70%. Left ventricle size is normal. Septal thickening. Normal wall motion. Abnormal diastolic function.   Aortic Valve: Mildly thickened left, right and noncoronary cusps. Mild sclerosis of the aortic valve cusp.   Mitral Valve: Severe annular calcification at the posterior leaflet of the mitral valve. Mild regurgitation.      GI/Hepatic/Renal     GERD      PUD     Endo/Other    Diabetes    Obesity     Other Findings   Comments: Procedure Information    Case: [id number] Date/Time: 04/29/22 1400  Procedure: ESOPHAGOGASTRODUODENOSCOPY (EGD) anesthesia to check  to ok procedure please (N/A Upper GI Region)  Anesthesia type: general  Diagnosis: Aspiration pneumonia, unspecified aspiration pneumonia type, unspecified laterality, unspecified part of lung ([facility] [address] (J69.0)  Pre-op diagnosis: Aspiration pneumonia, unspecified aspiration pneumonia type, unspecified laterality, unspecified part of lung ([address] (J69.0)  Location: [facility] ENDO 04 / [facility] ENDOSCOPY  Providers: Erica Phelan MD             Physical Exam    Airway  Mallampati: I  TM Distance: 4 - 6 cm  Neck ROM: normal range of motion   Mouth opening: Normal     Cardiovascular    Rhythm: regular  Rate: normal         Dental    Dentition: Edentulous     Pulmonary  Breath sounds clear to auscultation               Abdominal  GI exam deferred       Other Findings            Anesthetic Plan    ASA: 4  Anesthesia type: total IV anesthesia and MAC          Induction: Intravenous  Anesthetic plan and risks discussed with: Patient and Son / Daughter      Consent via daughter and [address].

## 2022-04-29 NOTE — PROGRESS NOTES
Chart reviewed.     DCP: home with [facility] and patients daughter will provide transportation upon DC.     CM will follow for potential home O2 needs.

## 2022-04-30 PROBLEM — J96.01 ACUTE RESPIRATORY FAILURE WITH HYPOXIA (HCC): Status: ACTIVE | Noted: 2022-04-30

## 2022-04-30 PROBLEM — A41.9 SEPSIS (HCC): Status: ACTIVE | Noted: 2022-04-30

## 2022-04-30 PROBLEM — N17.9 AKI (ACUTE KIDNEY INJURY) (HCC): Status: ACTIVE | Noted: 2022-04-30

## 2022-04-30 PROBLEM — K20.90 ESOPHAGITIS: Status: ACTIVE | Noted: 2022-04-30

## 2022-04-30 LAB
ALBUMIN SERPL-MCNC: 2.9 G/DL (ref 3.5–5)
ALBUMIN/GLOB SERPL: 0.8 {RATIO} (ref 1.1–2.2)
ALP SERPL-CCNC: 66 U/L (ref 45–117)
ALT SERPL-CCNC: 12 U/L (ref 12–78)
ANION GAP SERPL CALC-SCNC: 7 MMOL/L (ref 5–15)
AST SERPL W P-5'-P-CCNC: 17 U/L (ref 15–37)
BASOPHILS # BLD: 0 K/UL (ref 0–0.1)
BASOPHILS NFR BLD: 1 % (ref 0–1)
BILIRUB SERPL-MCNC: 0.3 MG/DL (ref 0.2–1)
BUN SERPL-MCNC: 10 MG/DL (ref 6–20)
BUN/CREAT SERPL: 14 (ref 12–20)
CA-I BLD-MCNC: 9.6 MG/DL (ref 8.5–10.1)
CHLORIDE SERPL-SCNC: 104 MMOL/L (ref 97–108)
CO2 SERPL-SCNC: 28 MMOL/L (ref 21–32)
CREAT SERPL-MCNC: 0.74 MG/DL (ref 0.55–1.02)
DIFFERENTIAL METHOD BLD: ABNORMAL
EOSINOPHIL # BLD: 0.3 K/UL (ref 0–0.4)
EOSINOPHIL NFR BLD: 5 % (ref 0–7)
ERYTHROCYTE [DISTWIDTH] IN BLOOD BY AUTOMATED COUNT: 14.7 % (ref 11.5–14.5)
GLOBULIN SER CALC-MCNC: 3.7 G/DL (ref 2–4)
GLUCOSE BLD STRIP.AUTO-MCNC: 150 MG/DL (ref 65–117)
GLUCOSE BLD STRIP.AUTO-MCNC: 152 MG/DL (ref 65–117)
GLUCOSE BLD STRIP.AUTO-MCNC: 162 MG/DL (ref 65–117)
GLUCOSE BLD STRIP.AUTO-MCNC: 352 MG/DL (ref 65–117)
GLUCOSE SERPL-MCNC: 141 MG/DL (ref 65–100)
HCT VFR BLD AUTO: 32.1 % (ref 35–47)
HGB BLD-MCNC: 10.5 G/DL (ref 11.5–16)
IMM GRANULOCYTES # BLD AUTO: 0.1 K/UL (ref 0–0.04)
IMM GRANULOCYTES NFR BLD AUTO: 1 % (ref 0–0.5)
LYMPHOCYTES # BLD: 2.1 K/UL (ref 0.8–3.5)
LYMPHOCYTES NFR BLD: 30 % (ref 12–49)
MCH RBC QN AUTO: 29.2 PG (ref 26–34)
MCHC RBC AUTO-ENTMCNC: 32.7 G/DL (ref 30–36.5)
MCV RBC AUTO: 89.4 FL (ref 80–99)
MONOCYTES # BLD: 0.6 K/UL (ref 0–1)
MONOCYTES NFR BLD: 8 % (ref 5–13)
NEUTS SEG # BLD: 3.8 K/UL (ref 1.8–8)
NEUTS SEG NFR BLD: 55 % (ref 32–75)
NRBC # BLD: 0 K/UL (ref 0–0.01)
NRBC BLD-RTO: 0 PER 100 WBC
PERFORMED BY, TECHID: ABNORMAL
PLATELET # BLD AUTO: 226 K/UL (ref 150–400)
PMV BLD AUTO: 11.3 FL (ref 8.9–12.9)
POTASSIUM SERPL-SCNC: 4 MMOL/L (ref 3.5–5.1)
PROT SERPL-MCNC: 6.6 G/DL (ref 6.4–8.2)
RBC # BLD AUTO: 3.59 M/UL (ref 3.8–5.2)
SODIUM SERPL-SCNC: 139 MMOL/L (ref 136–145)
WBC # BLD AUTO: 6.9 K/UL (ref 3.6–11)

## 2022-04-30 PROCEDURE — 74011250636 HC RX REV CODE- 250/636: Performed by: PHYSICIAN ASSISTANT

## 2022-04-30 PROCEDURE — 94761 N-INVAS EAR/PLS OXIMETRY MLT: CPT

## 2022-04-30 PROCEDURE — 82962 GLUCOSE BLOOD TEST: CPT

## 2022-04-30 PROCEDURE — 74011636637 HC RX REV CODE- 636/637: Performed by: PHYSICIAN ASSISTANT

## 2022-04-30 PROCEDURE — 85025 COMPLETE CBC W/AUTO DIFF WBC: CPT

## 2022-04-30 PROCEDURE — 74011250637 HC RX REV CODE- 250/637: Performed by: INTERNAL MEDICINE

## 2022-04-30 PROCEDURE — 74011000272 HC RX REV CODE- 272: Performed by: INTERNAL MEDICINE

## 2022-04-30 PROCEDURE — 74011000250 HC RX REV CODE- 250: Performed by: INTERNAL MEDICINE

## 2022-04-30 PROCEDURE — 80053 COMPREHEN METABOLIC PANEL: CPT

## 2022-04-30 PROCEDURE — 36415 COLL VENOUS BLD VENIPUNCTURE: CPT

## 2022-04-30 PROCEDURE — 74011250636 HC RX REV CODE- 250/636: Performed by: INTERNAL MEDICINE

## 2022-04-30 PROCEDURE — 65270000032 HC RM SEMIPRIVATE

## 2022-04-30 PROCEDURE — 77010033678 HC OXYGEN DAILY

## 2022-04-30 PROCEDURE — C9113 INJ PANTOPRAZOLE SODIUM, VIA: HCPCS | Performed by: INTERNAL MEDICINE

## 2022-04-30 PROCEDURE — 74011250637 HC RX REV CODE- 250/637: Performed by: PHYSICIAN ASSISTANT

## 2022-04-30 PROCEDURE — 74011000250 HC RX REV CODE- 250: Performed by: PHYSICIAN ASSISTANT

## 2022-04-30 PROCEDURE — 74011000258 HC RX REV CODE- 258: Performed by: PHYSICIAN ASSISTANT

## 2022-04-30 RX ORDER — AMOXICILLIN AND CLAVULANATE POTASSIUM 500; 125 MG/1; MG/1
1 TABLET, FILM COATED ORAL 2 TIMES DAILY
Qty: 20 TABLET | Refills: 0 | Status: ON HOLD | OUTPATIENT
Start: 2022-04-30 | End: 2022-07-20 | Stop reason: SDUPTHER

## 2022-04-30 RX ORDER — SUCRALFATE 1 G/1
1 TABLET ORAL 4 TIMES DAILY
Qty: 60 TABLET | Refills: 1 | Status: SHIPPED | OUTPATIENT
Start: 2022-04-30

## 2022-04-30 RX ORDER — PANTOPRAZOLE SODIUM 40 MG/1
40 TABLET, DELAYED RELEASE ORAL 2 TIMES DAILY
Qty: 60 TABLET | Refills: 1 | Status: SHIPPED | OUTPATIENT
Start: 2022-04-30

## 2022-04-30 RX ADMIN — MEMANTINE HYDROCHLORIDE 10 MG: 10 TABLET ORAL at 09:47

## 2022-04-30 RX ADMIN — PIPERACILLIN AND TAZOBACTAM 3.38 G: 3; .375 INJECTION, POWDER, LYOPHILIZED, FOR SOLUTION INTRAVENOUS at 16:41

## 2022-04-30 RX ADMIN — QUETIAPINE FUMARATE 25 MG: 25 TABLET, FILM COATED ORAL at 09:00

## 2022-04-30 RX ADMIN — SODIUM CHLORIDE, PRESERVATIVE FREE 40 MG: 5 INJECTION INTRAVENOUS at 20:41

## 2022-04-30 RX ADMIN — MEMANTINE HYDROCHLORIDE 10 MG: 10 TABLET ORAL at 20:41

## 2022-04-30 RX ADMIN — INSULIN LISPRO 5 UNITS: 100 INJECTION, SOLUTION INTRAVENOUS; SUBCUTANEOUS at 17:49

## 2022-04-30 RX ADMIN — QUETIAPINE FUMARATE 25 MG: 25 TABLET, FILM COATED ORAL at 20:41

## 2022-04-30 RX ADMIN — SODIUM CHLORIDE, PRESERVATIVE FREE 10 ML: 5 INJECTION INTRAVENOUS at 21:00

## 2022-04-30 RX ADMIN — WATER 10 ML: 1 IRRIGANT IRRIGATION at 16:41

## 2022-04-30 RX ADMIN — GALANTAMINE 8 MG: 4 TABLET, FILM COATED ORAL at 16:41

## 2022-04-30 RX ADMIN — SODIUM CHLORIDE, PRESERVATIVE FREE 10 ML: 5 INJECTION INTRAVENOUS at 16:51

## 2022-04-30 RX ADMIN — ACETAMINOPHEN 650 MG: 325 TABLET ORAL at 20:40

## 2022-04-30 RX ADMIN — INSULIN LISPRO 2 UNITS: 100 INJECTION, SOLUTION INTRAVENOUS; SUBCUTANEOUS at 11:43

## 2022-04-30 RX ADMIN — GLIPIZIDE 5 MG: 5 TABLET ORAL at 09:48

## 2022-04-30 RX ADMIN — INSULIN LISPRO 2 UNITS: 100 INJECTION, SOLUTION INTRAVENOUS; SUBCUTANEOUS at 09:47

## 2022-04-30 RX ADMIN — POTASSIUM CHLORIDE 40 MEQ: 750 TABLET, FILM COATED, EXTENDED RELEASE ORAL at 20:40

## 2022-04-30 RX ADMIN — SODIUM CHLORIDE, PRESERVATIVE FREE 10 ML: 5 INJECTION INTRAVENOUS at 09:57

## 2022-04-30 RX ADMIN — GALANTAMINE 8 MG: 4 TABLET, FILM COATED ORAL at 09:46

## 2022-04-30 RX ADMIN — PIPERACILLIN AND TAZOBACTAM 3.38 G: 3; .375 INJECTION, POWDER, LYOPHILIZED, FOR SOLUTION INTRAVENOUS at 09:46

## 2022-04-30 RX ADMIN — INSULIN LISPRO 7 UNITS: 100 INJECTION, SOLUTION INTRAVENOUS; SUBCUTANEOUS at 16:41

## 2022-04-30 RX ADMIN — PIPERACILLIN AND TAZOBACTAM 3.38 G: 3; .375 INJECTION, POWDER, LYOPHILIZED, FOR SOLUTION INTRAVENOUS at 23:29

## 2022-04-30 RX ADMIN — ENOXAPARIN SODIUM 30 MG: 100 INJECTION SUBCUTANEOUS at 09:47

## 2022-04-30 RX ADMIN — WATER 10 ML: 1 IRRIGANT IRRIGATION at 11:44

## 2022-04-30 RX ADMIN — SODIUM CHLORIDE, PRESERVATIVE FREE 40 MG: 5 INJECTION INTRAVENOUS at 09:46

## 2022-04-30 RX ADMIN — POTASSIUM CHLORIDE 40 MEQ: 750 TABLET, FILM COATED, EXTENDED RELEASE ORAL at 09:47

## 2022-04-30 NOTE — DISCHARGE SUMMARY
Admit date: 4/25/2022   Admitting Provider: Tracy Grullon MD    Discharge date: 4/30/2022  Discharging Provider: Rebecca Hayes PA-C      * Admission Diagnoses: Aspiration pneumonia [facility]) [J69.0]    * Discharge Diagnoses:    Hospital Problems as of 4/30/2022 Date Reviewed: 4/29/2022          Codes Class Noted - Resolved POA    Acute respiratory failure with hypoxia ([facility] ICD-10-CM: J96.01  ICD-9-CM: 518.81  4/30/2022 - Present Unknown        Sepsis ([facility] ICD-10-CM: A41.9  ICD-9-CM: 038.9, 995.91  4/30/2022 - Present Unknown        Esophagitis ICD-10-CM: K20.90  ICD-9-CM: 530.10  4/30/2022 - Present Unknown        CAROL (acute kidney injury) ([facility] ICD-10-CM: N17.9  ICD-9-CM: 584.9  4/30/2022 - Present Unknown        Aspiration pneumonia ([facility] ICD-10-CM: J69.0  ICD-9-CM: 507.0  4/25/2022 - Present Unknown              * Hospital Course:   Calderon Magana a 80 y.o. female with a hx of DMII, alzheimer's dementia, HTN, HLD who was brought in for shortness of breath and was admitted for management of aspiration PNA and acute respiratory failure with hypoxia. Pt is not on supplemental O2 at baseline but has been maintaining O2 saturation >92% on 4L NC. In the ED workup showed WBC of 13.5, CAROL with Cr of 1.4. CXR and CT of the chest concerning for developing aspiration PNA, patient treated empirically with Rocephin. Patient was started on Zosyn and Pulmonology consulted. CT of the head/neck with no acute findings. UA consistent with UTI, urine culture negative. Given CT of the chest and CT A/P demonstrating distal esophageal wall thickening and mild distal esophageal distention in the setting of new aspiration PNA and RUQ abdominal pain GI has been consulted. GI performed EGD revealing severe erosive esophagitis with a hiatal hernia. Biopsies performed and will need further follow-up with Dr. Bárbara Kerr. We will continue Protonix and Carafate. Pulmonary consult, Dr. Ulysses Jacome.  EF of 66 5% with septal thickening and abnormal diastolic dysfunctionrule out a CHF fluid overload. ABG complete on 04/26 revealed a PO2 of 70. Blood and urine culture have shown no growth in the preliminary results, will continue to monitor. If patient passes her exercise oximetry may be discharged home on room air with Augmentin for 10 days. Discussed discharge with patient's daughter at bedside. * Procedures:   Procedure(s):  ESOPHAGOGASTRODUODENOSCOPY (EGD) anesthesia to check  to ok procedure please  ESOPHAGOGASTRODUODENAL (EGD) BIOPSY      Consults:   GI and pulmonary    Significant Diagnostic Studies: As discussed in hospital course    Discharge Exam:  Visit Vitals  BP (!) 121/54   Pulse 73   Temp 98.1 °F (36.7 °C)   Resp 18   Ht 5' (1.524 m)   Wt 70.3 kg (155 lb)   SpO2 94%   BMI 30.27 kg/m²     PHYSICAL EXAM:  Constitutional: Alert in no acute distress   HEENT: Sclerae anicteric, The neck is supple. Cardiovascular: Regular rate and rhythm. No murmurs, gallops, or rubs. Eliseo Brocks Respiratory: Clear breath sounds with no wheezes, rales, or rhonchi. GI: Abdomen nondistended, soft, and nontender. Normal active bowel sounds. Rectal: Deferred   Musculoskeletal: No pitting edema of the lower legs. Extremities have good range of motion. Neurological:  Patient is alert and oriented. Cranial nerves II-XII intact  Psychiatric: Mood appears appropriate with judgement intact. Lymphatic: No cervical or supraclavicular adenopathy. Skin: No rashes or breakdown of the skin      * Discharge Condition: stable  * Disposition: Home    Discharge Medications:  Current Discharge Medication List      START taking these medications    Details   sucralfate (Carafate) 1 gram tablet Take 1 Tablet by mouth four (4) times daily. Qty: 60 Tablet, Refills: 1  Start date: 4/30/2022      pantoprazole (Protonix) 40 mg tablet Take 1 Tablet by mouth two (2) times a day.   Qty: 60 Tablet, Refills: 1  Start date: 4/30/2022      amoxicillin-clavulanate (Augmentin) 500-125 mg per tablet Take 1 Tablet by mouth two (2) times a day. Qty: 20 Tablet, Refills: 0  Start date: 4/30/2022         CONTINUE these medications which have NOT CHANGED    Details   aspirin delayed-release (Aspir-81) 81 mg tablet Aspir-81   1 tablet daily      glipiZIDE SR (GLUCOTROL XL) 5 mg CR tablet Take 5 mg by mouth daily. galantamine (RAZADYNE) 16 mg SR capsule Take 16 mg by mouth Daily (before breakfast). ferrous sulfate (IRON) 325 mg (65 mg iron) EC tablet Take 325 mg by mouth two (2) times a day. memantine (NAMENDA) 10 mg tablet Take 10 mg by mouth two (2) times a day. multivitamin (ONE A DAY) tablet Take 1 Tablet by mouth daily. QUEtiapine (SEROqueL) 25 mg tablet Take 25 mg by mouth two (2) times a day. Take 2 per dose      guaiFENesin-dextromethorphan SR (Mucinex DM) 600-30 mg per tablet Take 1 Tablet by mouth two (2) times a day. Qty: 12 Tablet, Refills: 0      atorvastatin (LIPITOR) 10 mg tablet Take 1 Tab by mouth daily. Associated Diagnoses: Type 2 diabetes mellitus without complication, unspecified long term insulin use status             * Follow-up Care/Patient Instructions: Activity: Activity as tolerated  Diet: Soft diet  Wound Care: None needed    Follow-up Information     Follow up With Specialties Details Why [address] [place], [address] [facility] In 1 week Patient must call the  to make an appointment.  [facility]  [phone]      Jarek East MD Pulmonary Disease In 1 week  [address] [address] [phone]      Cortez Noriega MD Gastroenterology, Internal Medicine In 1 week  [address] [place] [address]  [phone]            Discharge summary greater than 35 minutes spent with the patient performing discharge instructions, medication review and physical exam    Signed:  Kaylyn Lias PA-C  4/30/2022  8:37 AM

## 2022-04-30 NOTE — PROGRESS NOTES
Bedside shift change report given to Jessica Cunningham (oncoming nurse) by Brayden Miguel RN (offgoing nurse). Report included the following information SBAR, Kardex, OR Summary, Procedure Summary, MAR, Recent Results, Med Rec Status and Alarm Parameters .

## 2022-04-30 NOTE — PROGRESS NOTES
PULMONARY NOTE  [facility]    Name: Krystin Davidson MRN: [id number]   : 1940 Hospital: [facility]   Date: 2022  Admission date: 2022 Hospital Day: 6       HPI:     Hospital Problems  Date Reviewed: 2022          Codes Class Noted POA    Acute respiratory failure with hypoxia [facility]) ICD-10-CM: J96.01  ICD-9-CM: 518.81  2022 Unknown        Sepsis ([facility] ICD-10-CM: A41.9  ICD-9-CM: 038.9, 995.91  2022 Unknown        Esophagitis ICD-10-CM: K20.90  ICD-9-CM: 530.10  2022 Unknown        CAROL (acute kidney injury) ([facility] ICD-10-CM: N17.9  ICD-9-CM: 584.9  2022 Unknown        Aspiration pneumonia ([facility] ICD-10-CM: J69.0  ICD-9-CM: 507.0  2022 Unknown                   [x] High complexity decision making was performed  [x] See my orders for details      Subjective/Initial History:     I was asked by Honey Kumar MD to see Krystin Davidson  a 80 y.o.  female in consultation     Excerpts from admission 2022 or consult notes as follows:   Patient is a 81yo female with a hx of COPD, sleep apnea, Type 2 diabetes, HTN, hyperlipidemia, Alzheimer's dementia, and colon caner that presented to the ED with shortness of breath. The history was given by her daughter. Patient's daughter states that she has had two episodes of non-bloody emesis with RUQ abdominal pain. Denies bowel/bladder changes. Daughter states that her SpO2 was 88% and she is not on supplemental oxygen. CXR and chest CT taken in the ED is concerning for aspiration PNA. Rocephin was started empirically, but changed later to Zosyn. Patient placed on 4L O2 via NC. Pulmonary was consulted for reasons stated above.        No Known Allergies     MAR reviewed and pertinent medications noted or modified as needed     Current Facility-Administered Medications   Medication    pantoprazole (PROTONIX) 40 mg in 0.9% sodium chloride 10 mL injection    cherry syrup 20 mL in sterile water irrigation 20 mL oral suspension    potassium chloride SR (KLOR-CON 10) tablet 40 mEq    QUEtiapine (SEROquel) tablet 25 mg    memantine (NAMENDA) tablet 10 mg    glipiZIDE (GLUCOTROL) tablet 5 mg    galantamine (RAZADYNE) tablet 8 mg    piperacillin-tazobactam (ZOSYN) 3.375 g in 0.9% sodium chloride (MBP/ADV) 100 mL MBP    sodium chloride (NS) flush 5-40 mL    sodium chloride (NS) flush 5-40 mL    acetaminophen (TYLENOL) tablet 650 mg    Or    acetaminophen (TYLENOL) suppository 650 mg    polyethylene glycol (MIRALAX) packet 17 g    ondansetron (ZOFRAN ODT) tablet 4 mg    Or    ondansetron (ZOFRAN) injection 4 mg    enoxaparin (LOVENOX) injection 30 mg    glucose chewable tablet 16 g    dextrose 10% infusion 0-250 mL    glucagon (GLUCAGEN) injection 1 mg    insulin lispro (HUMALOG) injection      Patient PCP: Edith Monday  PMH:  has a past medical history of Arthritis, Cancer ([facility] (2006), COPD, Diabetes ([facility] GERD (gastroesophageal reflux disease), Other ill-defined conditions(799.89), PUD (peptic ulcer disease), and Unspecified sleep apnea. PSH:   has a past surgical history that includes hx hysterectomy; pr abdomen surgery proc unlisted (2006); hx appendectomy; hx other surgical; hx orthopaedic (2010); pr wrist arthroscop,diagnostic; and hx back surgery. FHX: family history includes Diabetes in her brother, mother, and sister; Heart Disease in her brother. SHX:  reports that she has quit smoking. She has never used smokeless tobacco. She reports that she does not drink alcohol. ROS:    Review of Systems   Constitutional: Negative. HENT: Negative. Eyes: Negative. Respiratory: Positive for shortness of breath. Cardiovascular: Negative. Genitourinary: Negative. Skin: Negative.          Objective:     Vital Signs: Telemetry:    normal sinus rhythm Intake/Output:   Visit Vitals  BP (!) 121/54   Pulse 73   Temp 98.1 °F (36.7 °C)   Resp 18   Ht 5' (1.524 m) Wt 70.3 kg (155 lb)   SpO2 93%   BMI 30.27 kg/m²       Temp (24hrs), Av.8 °F (36.6 °C), Min:97.2 °F (36.2 °C), Max:98.1 °F (36.7 °C)        O2 Device: Nasal cannula O2 Flow Rate (L/min): 1 l/min       Wt Readings from Last 4 Encounters:   22 70.3 kg (155 lb)   21 74.4 kg (164 lb)   19 92.4 kg (203 lb 12.8 oz)   09/10/18 88.1 kg (194 lb 3.2 oz)          Intake/Output Summary (Last 24 hours) at 2022 1037  Last data filed at 2022 1338  Gross per 24 hour   Intake 600 ml   Output --   Net 600 ml       Last shift:      No intake/output data recorded. Last 3 shifts:  1901 -  0700  In: 600 [I.V.:600]  Out: -        Physical Exam:     Physical Exam  Constitutional:       Appearance: She is not ill-appearing. HENT:      Head: Normocephalic and atraumatic. Cardiovascular:      Rate and Rhythm: Normal rate and regular rhythm. Pulses: Normal pulses. Heart sounds: Normal heart sounds. Pulmonary:      Breath sounds: Rhonchi present. Musculoskeletal:      Right lower leg: No edema. Left lower leg: No edema. Skin:     General: Skin is warm and dry. Labs:    Recent Labs     22  0710 22  0651 22  0634   WBC 6.9 6.9 7.6   HGB 10.5* 9.6* 9.6*    204 194     Recent Labs     22  0710 22  0651 22  0634    139 138   K 4.0 4.0 3.3*    106 106   CO2 28 26 26   * 127* 153*   BUN 10 13 16   CREA 0.74 0.69 0.81   CA 9.6 8.6 9.0   LAC  --   --  0.9   ALB 2.9* 2.6* 2.4*   ALT 12 13 13     Recent Labs     22  0535 22  1809   PH 7.46* 7.48*   PCO2 37 33*   PO2 80 55*   HCO3 26 26   FIO2  --  21.0     No results for input(s): CPK, CKNDX, TROIQ in the last 72 hours.     No lab exists for component: CPKMB  No results found for: BNPP, BNP   Lab Results   Component Value Date/Time    Culture result: No Growth (<1000 cfu/mL) 2022 03:52 PM    Culture result:  2022 02:15 PM     Staphylococcus species, coagulase negative growing in 1 of 4 bottles drawn No Site Indicated    Culture result:  04/25/2022 02:15 PM     (NOTE) [facility] IN [facility] 1 OUT OF 4 BOTTLES CALLED TO AND READ BACK BY GENOVEVA LOZADA MT AT 1331 ON 4/27/2022 RE. Culture result:  04/25/2022 02:15 PM     SEE NOTE  CALLED TO AND READ BACK BY  Rayna Yang RN AT 7661 BY MO     No results found for: TSH, TSHEXT, TSHEXT    Imaging:    CXR Results  (Last 48 hours)    None          Results from [place] encounter on 04/25/22    CT ABD PELV WO CONT    Narrative  CT abdomen and pelvis without IV contrast.    Comparison CT abdomen and pelvis November 2, 2021. Axial images are reviewed along with reformatted sagittal/coronal images. No IV  contrast administered. Sensitivity for detection of neoplastic process limited  in the absence of IV contrast.  Dose reduction: All CT scans at this facility are performed using dose reduction  optimization techniques as appropriate to a performed exam including the  following-  automated exposure control, adjustments of mA and/or Kv according to patient  size, or use of iterative reconstructive technique. Nonspecific hazy reticular markings through basilar lungs. Distal esophageal wall thickening. Mild distal esophageal distention. Small  hiatal hernia. See recent CT chest report. Liver appears unremarkable on this nonenhanced study. Prior cholecystectomy. Pancreas, spleen, and bilateral adrenal glands appear unremarkable. Each kidney  appears unremarkable on this nonenhanced study. Stomach and small bowel loops are nondistended. Prior partial colectomy. Surgical anastomosis right mid abdomen. Stool and air through colon. Sigmoid  colon diverticula. No CT evidence for appendicitis or diverticulitis. No  ascites. Prior hysterectomy. Atherosclerotic change normal caliber abdominal aorta.     Unchanged appearance anterior abdominal wall noted broad-based hernia containing  omental fat but no bowel.    Lumbar hardware. Impression  Prior abdominal surgery. No bowel obstruction. No ascites. Prior cholecystectomy. Abdominal aorta atherosclerosis. Findings regarding distal esophagus/GE junction region also described on recent  CT chest.        IMPRESSION:   1. Acute respiratory failure with hypoxia  2. Concern for aspiration pneumonia  3. Chronic Obstructive Pulmonary Disease  4. Rule out CHF fluid overload  5. Sleep apnea  6. GERD  7. Alzheimer's Dementia  8. Colon cancer  9. Type 2 Diabetes  10. GERD  11. PUD  12. Fibromyalgia  13. Hypertension  14. Hyperlipidemia      RECOMMENDATIONS/PLAN:     1. Decreased from 4L to 1L O2 via nasal cannula as salvage oxygen delivery device to provide high concentration of oxygen to overcome refractory hypoxia. Patient SpO2 improved after weaning. 1. Patient vomited the morning of her admission. Her condition worsened post emesis. 2. Repeat chest x-ray still shows hazy reticular markings at basilar lungs. Received Lasix yesterday good urine output feeling much better  BNP completed 4/27 was 420.  2. WBC now at 6.9. Patient was given Rocephin in the ED and changed to Zosyn. 3. BNP completed 4/27 was 420.  4. 2D Echo completed 4/27 showed left ventricular systolic function EF 76-25% with normal right ventricular systolic function. 3. ABG completed 4/29. PCO2 is now 37. PO2 is 80.  5. CAT scan of the chest shows esophageal wall thickening possible reflux and GERD disease agree with Protonix. EGD showed small hiatal hernia at EG junction with reflux induced esophagitis, and mild/mod bile reflux gastritis. Biopsies taken. 6. Seen by respiratory therapist. SpO2 dropped to 88% ORA at rest. Placed back on 1L with SpO2 at 93%.   7. She will probably need oxygen when discharged home now she is off Lasix             Leif Mcgraw MD

## 2022-04-30 NOTE — PROGRESS NOTES
Problem: Patient Education: Go to Patient Education Activity  Goal: Patient/Family Education  Outcome: Progressing Towards Goal     Problem: Pressure Injury - Risk of  Goal: *Prevention of pressure injury  Description: Document Scott Scale and appropriate interventions in the flowsheet.   Outcome: Progressing Towards Goal  Note: Pressure Injury Interventions:  Sensory Interventions: Assess changes in LOC         Activity Interventions: PT/OT evaluation    Mobility Interventions: PT/OT evaluation    Nutrition Interventions: Document food/fluid/supplement intake                     Problem: Patient Education: Go to Patient Education Activity  Goal: Patient/Family Education  Outcome: Progressing Towards Goal

## 2022-04-30 NOTE — PROGRESS NOTES
Patient discharge order in, but confirmed with Jase Carlene Kern that patient discharge on hold for today. Daughter also aware.

## 2022-04-30 NOTE — PROGRESS NOTES
PULMONARY NOTE  [facility]    Name: Imelda Herbert MRN: [id number]   : 1940 Hospital: [facility]   Date: 2022  Admission date: 2022 Hospital Day: 6       HPI:     Hospital Problems  Date Reviewed: 2022          Codes Class Noted POA    Acute respiratory failure with hypoxia [facility]) ICD-10-CM: J96.01  ICD-9-CM: 518.81  2022 Unknown        Sepsis ([facility] ICD-10-CM: A41.9  ICD-9-CM: 038.9, 995.91  2022 Unknown        Esophagitis ICD-10-CM: K20.90  ICD-9-CM: 530.10  2022 Unknown        CAROL (acute kidney injury) ([facility] ICD-10-CM: N17.9  ICD-9-CM: 584.9  2022 Unknown        Aspiration pneumonia ([facility] ICD-10-CM: J69.0  ICD-9-CM: 507.0  2022 Unknown                   [x] High complexity decision making was performed  [x] See my orders for details      Subjective/Initial History:     I was asked by Samaria Moore MD to see Imelda Herbert  a 80 y.o.  female in consultation     Excerpts from admission 2022 or consult notes as follows:   Patient is a 79yo female with a hx of COPD, sleep apnea, Type 2 diabetes, HTN, hyperlipidemia, Alzheimer's dementia, and colon caner that presented to the ED with shortness of breath. The history was given by her daughter. Patient's daughter states that she has had two episodes of non-bloody emesis with RUQ abdominal pain. Denies bowel/bladder changes. Daughter states that her SpO2 was 88% and she is not on supplemental oxygen. CXR and chest CT taken in the ED is concerning for aspiration PNA. Rocephin was started empirically, but changed later to Zosyn. Patient placed on 4L O2 via NC. Pulmonary was consulted for reasons stated above.        No Known Allergies     MAR reviewed and pertinent medications noted or modified as needed     Current Facility-Administered Medications   Medication    pantoprazole (PROTONIX) 40 mg in 0.9% sodium chloride 10 mL injection    cherry syrup 20 mL in sterile water irrigation 20 mL oral suspension    potassium chloride SR (KLOR-CON 10) tablet 40 mEq    QUEtiapine (SEROquel) tablet 25 mg    memantine (NAMENDA) tablet 10 mg    glipiZIDE (GLUCOTROL) tablet 5 mg    galantamine (RAZADYNE) tablet 8 mg    piperacillin-tazobactam (ZOSYN) 3.375 g in 0.9% sodium chloride (MBP/ADV) 100 mL MBP    sodium chloride (NS) flush 5-40 mL    sodium chloride (NS) flush 5-40 mL    acetaminophen (TYLENOL) tablet 650 mg    Or    acetaminophen (TYLENOL) suppository 650 mg    polyethylene glycol (MIRALAX) packet 17 g    ondansetron (ZOFRAN ODT) tablet 4 mg    Or    ondansetron (ZOFRAN) injection 4 mg    enoxaparin (LOVENOX) injection 30 mg    glucose chewable tablet 16 g    dextrose 10% infusion 0-250 mL    glucagon (GLUCAGEN) injection 1 mg    insulin lispro (HUMALOG) injection      Patient PCP: Mark Caldera  PMH:  has a past medical history of Arthritis, Cancer ([facility]) (2006), COPD, Diabetes ([facility]), GERD (gastroesophageal reflux disease), Other ill-defined conditions(799.89), PUD (peptic ulcer disease), and Unspecified sleep apnea. PSH:   has a past surgical history that includes hx hysterectomy; pr abdomen surgery proc unlisted (2006); hx appendectomy; hx other surgical; hx orthopaedic (2010); pr wrist arthroscop,diagnostic; and hx back surgery. FHX: family history includes Diabetes in her brother, mother, and sister; Heart Disease in her brother. SHX:  reports that she has quit smoking. She has never used smokeless tobacco. She reports that she does not drink alcohol. ROS:    Review of Systems   Constitutional: Negative. HENT: Negative. Eyes: Negative. Respiratory: Positive for shortness of breath. Cardiovascular: Negative. Genitourinary: Negative. Skin: Negative.          Objective:     Vital Signs: Telemetry:    normal sinus rhythm Intake/Output:   Visit Vitals  BP (!) 121/54   Pulse 73   Temp 98.1 °F (36.7 °C)   Resp 18   Ht 5' (1.524 m) Wt 70.3 kg (155 lb)   SpO2 93%   BMI 30.27 kg/m²       Temp (24hrs), Av.8 °F (36.6 °C), Min:97.2 °F (36.2 °C), Max:98.1 °F (36.7 °C)        O2 Device: Nasal cannula O2 Flow Rate (L/min): 1 l/min       Wt Readings from Last 4 Encounters:   22 70.3 kg (155 lb)   21 74.4 kg (164 lb)   19 92.4 kg (203 lb 12.8 oz)   09/10/18 88.1 kg (194 lb 3.2 oz)          Intake/Output Summary (Last 24 hours) at 2022 0916  Last data filed at 2022 1338  Gross per 24 hour   Intake 600 ml   Output --   Net 600 ml       Last shift:      No intake/output data recorded. Last 3 shifts:  1901 -  0700  In: 600 [I.V.:600]  Out: -        Physical Exam:     Physical Exam  Constitutional:       Appearance: She is not ill-appearing. HENT:      Head: Normocephalic and atraumatic. Cardiovascular:      Rate and Rhythm: Normal rate and regular rhythm. Pulses: Normal pulses. Heart sounds: Normal heart sounds. Pulmonary:      Breath sounds: Rhonchi present. Musculoskeletal:      Right lower leg: No edema. Left lower leg: No edema. Skin:     General: Skin is warm and dry. Labs:    Recent Labs     22  0710 22  0651 22  0634   WBC 6.9 6.9 7.6   HGB 10.5* 9.6* 9.6*    204 194     Recent Labs     22  0710 22  0651 22  0634    139 138   K 4.0 4.0 3.3*    106 106   CO2 28 26 26   * 127* 153*   BUN 10 13 16   CREA 0.74 0.69 0.81   CA 9.6 8.6 9.0   LAC  --   --  0.9   ALB 2.9* 2.6* 2.4*   ALT 12 13 13     Recent Labs     22  0535 22  1809   PH 7.46* 7.48*   PCO2 37 33*   PO2 80 55*   HCO3 26 26   FIO2  --  21.0     No results for input(s): CPK, CKNDX, TROIQ in the last 72 hours.     No lab exists for component: CPKMB  No results found for: BNPP, BNP   Lab Results   Component Value Date/Time    Culture result: No Growth (<1000 cfu/mL) 2022 03:52 PM    Culture result:  2022 02:15 PM     Staphylococcus species, coagulase negative growing in 1 of 4 bottles drawn No Site Indicated    Culture result:  04/25/2022 02:15 PM     (NOTE) [facility] 1 OUT OF 4 BOTTLES CALLED TO AND READ BACK BY GENOVEVA LOZADA MT AT 1331 ON 4/27/2022 RE. Culture result:  04/25/2022 02:15 PM     SEE NOTE  CALLED TO AND READ BACK BY  Missy Vera RN AT 0860 BY MO     No results found for: TSH, TSHEXT, TSHEXT    Imaging:    CXR Results  (Last 48 hours)    None          Results from [place] encounter on 04/25/22    CT ABD PELV WO CONT    Narrative  CT abdomen and pelvis without IV contrast.    Comparison CT abdomen and pelvis November 2, 2021. Axial images are reviewed along with reformatted sagittal/coronal images. No IV  contrast administered. Sensitivity for detection of neoplastic process limited  in the absence of IV contrast.  Dose reduction: All CT scans at this facility are performed using dose reduction  optimization techniques as appropriate to a performed exam including the  following-  automated exposure control, adjustments of mA and/or Kv according to patient  size, or use of iterative reconstructive technique. Nonspecific hazy reticular markings through basilar lungs. Distal esophageal wall thickening. Mild distal esophageal distention. Small  hiatal hernia. See recent CT chest report. Liver appears unremarkable on this nonenhanced study. Prior cholecystectomy. Pancreas, spleen, and bilateral adrenal glands appear unremarkable. Each kidney  appears unremarkable on this nonenhanced study. Stomach and small bowel loops are nondistended. Prior partial colectomy. Surgical anastomosis right mid abdomen. Stool and air through colon. Sigmoid  colon diverticula. No CT evidence for appendicitis or diverticulitis. No  ascites. Prior hysterectomy. Atherosclerotic change normal caliber abdominal aorta.     Unchanged appearance anterior abdominal wall noted broad-based hernia containing  omental fat but no bowel.    Lumbar hardware. Impression  Prior abdominal surgery. No bowel obstruction. No ascites. Prior cholecystectomy. Abdominal aorta atherosclerosis. Findings regarding distal esophagus/GE junction region also described on recent  CT chest.        IMPRESSION:   1. Acute respiratory failure with hypoxia  2. Concern for aspiration pneumonia  3. Chronic Obstructive Pulmonary Disease  4. Rule out CHF fluid overload  5. Sleep apnea  6. GERD  7. Alzheimer's Dementia  8. Colon cancer  9. Type 2 Diabetes  10. GERD  11. PUD  12. Fibromyalgia  13. Hypertension  14. Hyperlipidemia  Body mass index is 30.27 kg/m². 15. Additional workup outlined below  16. Prognosis guarded       RECOMMENDATIONS/PLAN:     1. Decreased from 4L to 1L O2 via nasal cannula as salvage oxygen delivery device to provide high concentration of oxygen to overcome refractory hypoxia. Patient SpO2 improved after weaning. 1. Patient vomited the morning of her admission. Her condition worsened post emesis. 2. Repeat chest x-ray still shows hazy reticular markings at basilar lungs. Received Lasix yesterday good urine output feeling much better repeat Lasix again today and supplement potassium. BNP completed 4/27 was 420.  2. WBC now at 6.9. Patient was given Rocephin in the ED and changed to Zosyn. 3. BNP completed 4/27 was 420.  4. 2D Echo completed 4/27 showed left ventricular systolic function EF 01-13% with normal right ventricular systolic function. 3. ABG completed 4/29. PCO2 is now 37. PO2 is 80.  5. CAT scan of the chest shows esophageal wall thickening possible reflux and GERD disease agree with Protonix. EGD showed small hiatal hernia at EG junction with reflux induced esophagitis, and mild/mod bile reflux gastritis. Biopsies taken. 6. Seen by respiratory therapist. SpO2 dropped to 88% ORA at rest. Placed back on 1L with SpO2 at 93%.                [facility]

## 2022-05-01 VITALS
SYSTOLIC BLOOD PRESSURE: 140 MMHG | HEIGHT: 60 IN | TEMPERATURE: 97.6 F | HEART RATE: 71 BPM | BODY MASS INDEX: 30.43 KG/M2 | OXYGEN SATURATION: 95 % | WEIGHT: 155 LBS | RESPIRATION RATE: 18 BRPM | DIASTOLIC BLOOD PRESSURE: 64 MMHG

## 2022-05-01 LAB
ALBUMIN SERPL-MCNC: 2.5 G/DL (ref 3.5–5)
ALBUMIN/GLOB SERPL: 0.6 {RATIO} (ref 1.1–2.2)
ALP SERPL-CCNC: 58 U/L (ref 45–117)
ALT SERPL-CCNC: 11 U/L (ref 12–78)
ANION GAP SERPL CALC-SCNC: 5 MMOL/L (ref 5–15)
AST SERPL W P-5'-P-CCNC: 11 U/L (ref 15–37)
BASOPHILS # BLD: 0.1 K/UL (ref 0–0.1)
BASOPHILS NFR BLD: 1 % (ref 0–1)
BILIRUB SERPL-MCNC: 0.2 MG/DL (ref 0.2–1)
BUN SERPL-MCNC: 12 MG/DL (ref 6–20)
BUN/CREAT SERPL: 15 (ref 12–20)
CA-I BLD-MCNC: 9.2 MG/DL (ref 8.5–10.1)
CHLORIDE SERPL-SCNC: 107 MMOL/L (ref 97–108)
CO2 SERPL-SCNC: 26 MMOL/L (ref 21–32)
CREAT SERPL-MCNC: 0.79 MG/DL (ref 0.55–1.02)
DIFFERENTIAL METHOD BLD: ABNORMAL
EOSINOPHIL # BLD: 0.3 K/UL (ref 0–0.4)
EOSINOPHIL NFR BLD: 6 % (ref 0–7)
ERYTHROCYTE [DISTWIDTH] IN BLOOD BY AUTOMATED COUNT: 14.8 % (ref 11.5–14.5)
GLOBULIN SER CALC-MCNC: 4.2 G/DL (ref 2–4)
GLUCOSE BLD STRIP.AUTO-MCNC: 139 MG/DL (ref 65–117)
GLUCOSE SERPL-MCNC: 136 MG/DL (ref 65–100)
HCT VFR BLD AUTO: 30.8 % (ref 35–47)
HGB BLD-MCNC: 9.8 G/DL (ref 11.5–16)
IMM GRANULOCYTES # BLD AUTO: 0.1 K/UL (ref 0–0.04)
IMM GRANULOCYTES NFR BLD AUTO: 1 % (ref 0–0.5)
LYMPHOCYTES # BLD: 2.3 K/UL (ref 0.8–3.5)
LYMPHOCYTES NFR BLD: 39 % (ref 12–49)
MCH RBC QN AUTO: 29.3 PG (ref 26–34)
MCHC RBC AUTO-ENTMCNC: 31.8 G/DL (ref 30–36.5)
MCV RBC AUTO: 91.9 FL (ref 80–99)
MONOCYTES # BLD: 0.5 K/UL (ref 0–1)
MONOCYTES NFR BLD: 8 % (ref 5–13)
NEUTS SEG # BLD: 2.7 K/UL (ref 1.8–8)
NEUTS SEG NFR BLD: 45 % (ref 32–75)
NRBC # BLD: 0 K/UL (ref 0–0.01)
NRBC BLD-RTO: 0 PER 100 WBC
PERFORMED BY, TECHID: ABNORMAL
PLATELET # BLD AUTO: 242 K/UL (ref 150–400)
PMV BLD AUTO: 12.1 FL (ref 8.9–12.9)
POTASSIUM SERPL-SCNC: 4 MMOL/L (ref 3.5–5.1)
PROT SERPL-MCNC: 6.7 G/DL (ref 6.4–8.2)
RBC # BLD AUTO: 3.35 M/UL (ref 3.8–5.2)
SODIUM SERPL-SCNC: 138 MMOL/L (ref 136–145)
WBC # BLD AUTO: 6 K/UL (ref 3.6–11)

## 2022-05-01 PROCEDURE — 74011250637 HC RX REV CODE- 250/637: Performed by: INTERNAL MEDICINE

## 2022-05-01 PROCEDURE — 74011250636 HC RX REV CODE- 250/636: Performed by: INTERNAL MEDICINE

## 2022-05-01 PROCEDURE — 74011000250 HC RX REV CODE- 250: Performed by: PHYSICIAN ASSISTANT

## 2022-05-01 PROCEDURE — 80053 COMPREHEN METABOLIC PANEL: CPT

## 2022-05-01 PROCEDURE — 74011000250 HC RX REV CODE- 250: Performed by: INTERNAL MEDICINE

## 2022-05-01 PROCEDURE — 74011250636 HC RX REV CODE- 250/636: Performed by: PHYSICIAN ASSISTANT

## 2022-05-01 PROCEDURE — 82962 GLUCOSE BLOOD TEST: CPT

## 2022-05-01 PROCEDURE — C9113 INJ PANTOPRAZOLE SODIUM, VIA: HCPCS | Performed by: INTERNAL MEDICINE

## 2022-05-01 PROCEDURE — 36415 COLL VENOUS BLD VENIPUNCTURE: CPT

## 2022-05-01 PROCEDURE — 74011250637 HC RX REV CODE- 250/637: Performed by: PHYSICIAN ASSISTANT

## 2022-05-01 PROCEDURE — 85025 COMPLETE CBC W/AUTO DIFF WBC: CPT

## 2022-05-01 RX ADMIN — SODIUM CHLORIDE, PRESERVATIVE FREE 10 ML: 5 INJECTION INTRAVENOUS at 05:00

## 2022-05-01 RX ADMIN — SODIUM CHLORIDE, PRESERVATIVE FREE 40 MG: 5 INJECTION INTRAVENOUS at 08:19

## 2022-05-01 RX ADMIN — GLIPIZIDE 5 MG: 5 TABLET ORAL at 08:19

## 2022-05-01 RX ADMIN — QUETIAPINE FUMARATE 25 MG: 25 TABLET, FILM COATED ORAL at 09:00

## 2022-05-01 RX ADMIN — MEMANTINE HYDROCHLORIDE 10 MG: 10 TABLET ORAL at 08:19

## 2022-05-01 RX ADMIN — ENOXAPARIN SODIUM 30 MG: 100 INJECTION SUBCUTANEOUS at 08:24

## 2022-05-01 RX ADMIN — GALANTAMINE 8 MG: 4 TABLET, FILM COATED ORAL at 08:18

## 2022-05-01 RX ADMIN — POTASSIUM CHLORIDE 40 MEQ: 750 TABLET, FILM COATED, EXTENDED RELEASE ORAL at 08:18

## 2022-05-01 RX ADMIN — WATER 10 ML: 1 IRRIGANT IRRIGATION at 08:22

## 2022-05-01 NOTE — PROGRESS NOTES
Problem: Falls - Risk of  Goal: *Absence of Falls  Description: Document Ashley Esparza Fall Risk and appropriate interventions in the flowsheet. Outcome: Progressing Towards Goal  Note: Fall Risk Interventions:  Mobility Interventions: Bed/chair exit alarm    Mentation Interventions: Adequate sleep, hydration, pain control    Medication Interventions: Bed/chair exit alarm    Elimination Interventions: Bed/chair exit alarm    History of Falls Interventions: Bed/chair exit alarm         Problem: Patient Education: Go to Patient Education Activity  Goal: Patient/Family Education  Outcome: Progressing Towards Goal     Problem: Patient Education: Go to Patient Education Activity  Goal: Patient/Family Education  Outcome: Progressing Towards Goal     Problem: Diabetes Self-Management  Goal: *Disease process and treatment process  Description: Define diabetes and identify own type of diabetes; list 3 options for treating diabetes. Outcome: Progressing Towards Goal  Goal: *Incorporating nutritional management into lifestyle  Description: Describe effect of type, amount and timing of food on blood glucose; list 3 methods for planning meals. Outcome: Progressing Towards Goal  Goal: *Incorporating physical activity into lifestyle  Description: State effect of exercise on blood glucose levels. Outcome: Progressing Towards Goal  Goal: *Developing strategies to promote health/change behavior  Description: Define the ABC's of diabetes; identify appropriate screenings, schedule and personal plan for screenings. Outcome: Progressing Towards Goal  Goal: *Using medications safely  Description: State effect of diabetes medications on diabetes; name diabetes medication taking, action and side effects. Outcome: Progressing Towards Goal  Goal: *Monitoring blood glucose, interpreting and using results  Description: Identify recommended blood glucose targets  and personal targets.   Outcome: Progressing Towards Goal  Goal: *Prevention, detection, treatment of acute complications  Description: List symptoms of hyper- and hypoglycemia; describe how to treat low blood sugar and actions for lowering  high blood glucose level. Outcome: Progressing Towards Goal  Goal: *Prevention, detection and treatment of chronic complications  Description: Define the natural course of diabetes and describe the relationship of blood glucose levels to long term complications of diabetes. Outcome: Progressing Towards Goal  Goal: *Developing strategies to address psychosocial issues  Description: Describe feelings about living with diabetes; identify support needed and support network  Outcome: Progressing Towards Goal  Goal: *Insulin pump training  Outcome: Progressing Towards Goal  Goal: *Sick day guidelines  Outcome: Progressing Towards Goal  Goal: *Patient Specific Goal (EDIT GOAL, INSERT TEXT)  Outcome: Progressing Towards Goal     Problem: Diabetes Self-Management  Goal: *Disease process and treatment process  Description: Define diabetes and identify own type of diabetes; list 3 options for treating diabetes. Outcome: Progressing Towards Goal  Goal: *Incorporating nutritional management into lifestyle  Description: Describe effect of type, amount and timing of food on blood glucose; list 3 methods for planning meals. Outcome: Progressing Towards Goal  Goal: *Incorporating physical activity into lifestyle  Description: State effect of exercise on blood glucose levels. Outcome: Progressing Towards Goal  Goal: *Developing strategies to promote health/change behavior  Description: Define the ABC's of diabetes; identify appropriate screenings, schedule and personal plan for screenings. Outcome: Progressing Towards Goal  Goal: *Using medications safely  Description: State effect of diabetes medications on diabetes; name diabetes medication taking, action and side effects.   Outcome: Progressing Towards Goal  Goal: *Monitoring blood glucose, interpreting and using results  Description: Identify recommended blood glucose targets  and personal targets. Outcome: Progressing Towards Goal  Goal: *Prevention, detection, treatment of acute complications  Description: List symptoms of hyper- and hypoglycemia; describe how to treat low blood sugar and actions for lowering  high blood glucose level. Outcome: Progressing Towards Goal  Goal: *Prevention, detection and treatment of chronic complications  Description: Define the natural course of diabetes and describe the relationship of blood glucose levels to long term complications of diabetes. Outcome: Progressing Towards Goal  Goal: *Developing strategies to address psychosocial issues  Description: Describe feelings about living with diabetes; identify support needed and support network  Outcome: Progressing Towards Goal  Goal: *Insulin pump training  Outcome: Progressing Towards Goal  Goal: *Sick day guidelines  Outcome: Progressing Towards Goal  Goal: *Patient Specific Goal (EDIT GOAL, INSERT TEXT)  Outcome: Progressing Towards Goal     Problem: Patient Education: Go to Patient Education Activity  Goal: Patient/Family Education  Outcome: Progressing Towards Goal     Problem: Patient Education: Go to Patient Education Activity  Goal: Patient/Family Education  Outcome: Progressing Towards Goal     Problem: Patient Education: Go to Patient Education Activity  Goal: Patient/Family Education  Outcome: Progressing Towards Goal     Problem: Pressure Injury - Risk of  Goal: *Prevention of pressure injury  Description: Document Scott Scale and appropriate interventions in the flowsheet.   Outcome: Progressing Towards Goal  Note: Pressure Injury Interventions:  Sensory Interventions: Assess changes in LOC         Activity Interventions: Increase time out of bed    Mobility Interventions: HOB 30 degrees or less    Nutrition Interventions: Offer support with meals,snacks and hydration    Friction and Shear Interventions: Minimize layers Problem: Patient Education: Go to Patient Education Activity  Goal: Patient/Family Education  Outcome: Progressing Towards Goal

## 2022-05-01 NOTE — PROGRESS NOTES
PULMONARY NOTE  [facility]    Name: Drew Calle MRN: [id number]   : 1940 Hospital: [address] [place] [address]   Date: 2022  Admission date: 2022 Hospital Day: 7       HPI:     Hospital Problems  Date Reviewed: 2022          Codes Class Noted POA    Acute respiratory failure with hypoxia [facility]) ICD-10-CM: J96.01  ICD-9-CM: 518.81  2022 Unknown        Sepsis ([facility] ICD-10-CM: A41.9  ICD-9-CM: 038.9, 995.91  2022 Unknown        Esophagitis ICD-10-CM: K20.90  ICD-9-CM: 530.10  2022 Unknown        CAROL (acute kidney injury) ([facility] ICD-10-CM: N17.9  ICD-9-CM: 584.9  2022 Unknown        Aspiration pneumonia ([facility] ICD-10-CM: J69.0  ICD-9-CM: 507.0  2022 Unknown                   [x] High complexity decision making was performed  [x] See my orders for details      Subjective/Initial History:     I was asked by Marsha Sears MD to see Drew Calle  a 80 y.o.  female in consultation     Excerpts from admission 2022 or consult notes as follows:   Patient is a 81yo female with a hx of COPD, sleep apnea, Type 2 diabetes, HTN, hyperlipidemia, Alzheimer's dementia, and colon caner that presented to the ED with shortness of breath. The history was given by her daughter. Patient's daughter states that she has had two episodes of non-bloody emesis with RUQ abdominal pain. Denies bowel/bladder changes. Daughter states that her SpO2 was 88% and she is not on supplemental oxygen. CXR and chest CT taken in the ED is concerning for aspiration PNA. Rocephin was started empirically, but changed later to Zosyn. Patient placed on 4L O2 via NC. Pulmonary was consulted for reasons stated above.        No Known Allergies     MAR reviewed and pertinent medications noted or modified as needed     Current Facility-Administered Medications   Medication    pantoprazole (PROTONIX) 40 mg in 0.9% sodium chloride 10 mL injection    cherry syrup 20 mL in sterile water irrigation 20 mL oral suspension    potassium chloride SR (KLOR-CON 10) tablet 40 mEq    QUEtiapine (SEROquel) tablet 25 mg    memantine (NAMENDA) tablet 10 mg    glipiZIDE (GLUCOTROL) tablet 5 mg    galantamine (RAZADYNE) tablet 8 mg    piperacillin-tazobactam (ZOSYN) 3.375 g in 0.9% sodium chloride (MBP/ADV) 100 mL MBP    sodium chloride (NS) flush 5-40 mL    sodium chloride (NS) flush 5-40 mL    acetaminophen (TYLENOL) tablet 650 mg    Or    acetaminophen (TYLENOL) suppository 650 mg    polyethylene glycol (MIRALAX) packet 17 g    ondansetron (ZOFRAN ODT) tablet 4 mg    Or    ondansetron (ZOFRAN) injection 4 mg    enoxaparin (LOVENOX) injection 30 mg    glucose chewable tablet 16 g    dextrose 10% infusion 0-250 mL    glucagon (GLUCAGEN) injection 1 mg    insulin lispro (HUMALOG) injection      Patient PCP: Nnamdi Juan  PMH:  has a past medical history of Arthritis, Cancer ([facility] (2006), COPD, Diabetes ([facility] GERD (gastroesophageal reflux disease), Other ill-defined conditions(799.89), PUD (peptic ulcer disease), and Unspecified sleep apnea. PSH:   has a past surgical history that includes hx hysterectomy; pr abdomen surgery proc unlisted (2006); hx appendectomy; hx other surgical; hx orthopaedic (2010); pr wrist arthroscop,diagnostic; and hx back surgery. FHX: family history includes Diabetes in her brother, mother, and sister; Heart Disease in her brother. SHX:  reports that she has quit smoking. She has never used smokeless tobacco. She reports that she does not drink alcohol. ROS:    Review of Systems   Constitutional: Negative. HENT: Negative. Eyes: Negative. Respiratory: Negative. Cardiovascular: Negative. Genitourinary: Negative. Skin: Negative.          Objective:     Vital Signs: Telemetry:    normal sinus rhythm Intake/Output:   Visit Vitals  BP (!) 140/64 (BP 1 Location: Right upper arm, BP Patient Position: At rest)   Pulse 71   Temp 97.6 °F (36.4 °C)   Resp 18   Ht 5' (1.524 m)   Wt 70.3 kg (155 lb)   SpO2 95%   BMI 30.27 kg/m²       Temp (24hrs), Av.2 °F (36.8 °C), Min:97.6 °F (36.4 °C), Max:98.8 °F (37.1 °C)        O2 Device: Nasal cannula O2 Flow Rate (L/min): 1 l/min       Wt Readings from Last 4 Encounters:   22 70.3 kg (155 lb)   21 74.4 kg (164 lb)   19 92.4 kg (203 lb 12.8 oz)   09/10/18 88.1 kg (194 lb 3.2 oz)        No intake or output data in the 24 hours ending 22 09    Last shift:      No intake/output data recorded. Last 3 shifts: No intake/output data recorded. Physical Exam:     Physical Exam  Constitutional:       Appearance: She is not ill-appearing. HENT:      Head: Normocephalic and atraumatic. Cardiovascular:      Rate and Rhythm: Normal rate and regular rhythm. Pulses: Normal pulses. Heart sounds: Normal heart sounds. Pulmonary:      Breath sounds: Normal breath sounds. Musculoskeletal:      Right lower leg: No edema. Left lower leg: No edema. Skin:     General: Skin is warm and dry. Labs:    Recent Labs     22  0724 22  0710 22  0651   WBC 6.0 6.9 6.9   HGB 9.8* 10.5* 9.6*    226 204     Recent Labs     22  0724 22  0710 22  0651    139 139   K 4.0 4.0 4.0    104 106   CO2 26 28 26   * 141* 127*   BUN 12 10 13   CREA 0.79 0.74 0.69   CA 9.2 9.6 8.6   ALB 2.5* 2.9* 2.6*   ALT 11* 12 13     Recent Labs     22  0535   PH 7.46*   PCO2 37   PO2 80   HCO3 26     No results for input(s): CPK, CKNDX, TROIQ in the last 72 hours.     No lab exists for component: CPKMB  No results found for: BNPP, BNP   Lab Results   Component Value Date/Time    Culture result: No Growth (<1000 cfu/mL) 2022 03:52 PM    Culture result:  2022 02:15 PM     Staphylococcus species, coagulase negative growing in 1 of 4 bottles drawn No Site Indicated    Culture result:  2022 02:15 PM     (NOTE) [facility] IN [facility] 1 OUT OF 4 BOTTLES CALLED TO AND READ BACK BY GENOVEVA LOZADA MT AT 1331 ON 4/27/2022 RE. Culture result:  04/25/2022 02:15 PM     SEE NOTE  CALLED TO AND READ BACK BY  Elaine Wu RN AT 7121 BY MO     No results found for: TSH, TSHEXT, TSHEXT    Imaging:    CXR Results  (Last 48 hours)    None          Results from [place] encounter on 04/25/22    CT ABD PELV WO CONT    Narrative  CT abdomen and pelvis without IV contrast.    Comparison CT abdomen and pelvis November 2, 2021. Axial images are reviewed along with reformatted sagittal/coronal images. No IV  contrast administered. Sensitivity for detection of neoplastic process limited  in the absence of IV contrast.  Dose reduction: All CT scans at this facility are performed using dose reduction  optimization techniques as appropriate to a performed exam including the  following-  automated exposure control, adjustments of mA and/or Kv according to patient  size, or use of iterative reconstructive technique. Nonspecific hazy reticular markings through basilar lungs. Distal esophageal wall thickening. Mild distal esophageal distention. Small  hiatal hernia. See recent CT chest report. Liver appears unremarkable on this nonenhanced study. Prior cholecystectomy. Pancreas, spleen, and bilateral adrenal glands appear unremarkable. Each kidney  appears unremarkable on this nonenhanced study. Stomach and small bowel loops are nondistended. Prior partial colectomy. Surgical anastomosis right mid abdomen. Stool and air through colon. Sigmoid  colon diverticula. No CT evidence for appendicitis or diverticulitis. No  ascites. Prior hysterectomy. Atherosclerotic change normal caliber abdominal aorta. Unchanged appearance anterior abdominal wall noted broad-based hernia containing  omental fat but no bowel. Lumbar hardware. Impression  Prior abdominal surgery. No bowel obstruction. No ascites. Prior cholecystectomy.   Abdominal aorta atherosclerosis. Findings regarding distal esophagus/GE junction region also described on recent  CT chest.        IMPRESSION:   1. Acute respiratory failure with hypoxia  2. Concern for aspiration pneumonia  3. Chronic Obstructive Pulmonary Disease  4. Rule out CHF fluid overload  5. Sleep apnea  6. GERD  7. Alzheimer's Dementia  8. Colon cancer  9. Type 2 Diabetes  10. GERD  11. PUD  12. Fibromyalgia  13. Hypertension  14. Hyperlipidemia      RECOMMENDATIONS/PLAN:     1. Decreased from 4L to 1L O2 via nasal cannula as salvage oxygen delivery device to provide high concentration of oxygen to overcome refractory hypoxia. Patient SpO2 improved after weaning. 1. Patient vomited the morning of her admission. Her condition worsened post emesis. 2. Repeat chest x-ray still shows hazy reticular markings at basilar lungs. Received Lasix. No longer taking. Good urine output yesterday and she is feeling much better. 2. WBC now at 6.9. Patient was given Rocephin in the ED and changed to Zosyn. Zosyn discontinued. Start Augmentin. 3. BNP completed 4/27 was 420.  4. 2D Echo completed 4/27 showed left ventricular systolic function EF 27-45% with normal right ventricular systolic function. 3. ABG completed 4/29. PCO2 is now 37. PO2 is 80.  5. CAT scan of the chest shows esophageal wall thickening possible reflux and GERD disease agree with Protonix. EGD showed small hiatal hernia at EG junction with reflux induced esophagitis, and mild/mod bile reflux gastritis. Biopsies taken. 6. Seen by respiratory therapist 4/30. SpO2 dropped to 88% ORA at rest. Placed back on 1L with SpO2 at 93%. No drop in oxygen ORA. 7. Patient discharging home today. Follow up at clinic in one week.               [facility]

## 2022-05-01 NOTE — DISCHARGE SUMMARY
Admit date: 4/25/2022   Admitting Provider: Yolis Fall MD    Discharge date: 5/1/2022  Discharging Provider: Arely Munoz PA-C      * Admission Diagnoses: Aspiration pneumonia [facility]) [J69.0]    * Discharge Diagnoses:    Hospital Problems as of 5/1/2022 Date Reviewed: 4/29/2022          Codes Class Noted - Resolved POA    Acute respiratory failure with hypoxia ([facility] ICD-10-CM: J96.01  ICD-9-CM: 518.81  4/30/2022 - Present Unknown        Sepsis ([facility] ICD-10-CM: A41.9  ICD-9-CM: 038.9, 995.91  4/30/2022 - Present Unknown        Esophagitis ICD-10-CM: K20.90  ICD-9-CM: 530.10  4/30/2022 - Present Unknown        CAROL (acute kidney injury) ([facility] ICD-10-CM: N17.9  ICD-9-CM: 584.9  4/30/2022 - Present Unknown        Aspiration pneumonia ([facility] ICD-10-CM: J69.0  ICD-9-CM: 507.0  4/25/2022 - Present Unknown              * Hospital Course:   Lyssa Ivey a 80 y.o. female with a hx of DMII, alzheimer's dementia, HTN, HLD who was brought in for shortness of breath and was admitted for management of aspiration PNA and acute respiratory failure with hypoxia. Pt is not on supplemental O2 at baseline but has been maintaining O2 saturation >92% on 4L NC. In the ED workup showed WBC of 13.5, CAROL with Cr of 1.4. CXR and CT of the chest concerning for developing aspiration PNA, patient treated empirically with Rocephin. Patient was started on Zosyn and Pulmonology consulted. CT of the head/neck with no acute findings. UA consistent with UTI, urine culture negative. Given CT of the chest and CT A/P demonstrating distal esophageal wall thickening and mild distal esophageal distention in the setting of new aspiration PNA and RUQ abdominal pain GI has been consulted. GI performed EGD revealing severe erosive esophagitis with a hiatal hernia. Biopsies performed and will need further follow-up with Dr. Imer Carlson. We will continue Protonix and Carafate. Pulmonary consult, Dr. Sunshine Gallego.  EF of 66 5% with septal thickening and abnormal diastolic dysfunctionrule out a CHF fluid overload. ABG complete on 04/26 revealed a PO2 of 70. Blood and urine culture have shown no growth in the preliminary results, will continue to monitor. Patient ambulated on room air with her lowest SPO2 of 96% and will be discharged with Augmentin for 10 days. Discussed discharge with patient's daughter at bedside. * Procedures:   Procedure(s):  ESOPHAGOGASTRODUODENOSCOPY (EGD) anesthesia to check  to ok procedure please  ESOPHAGOGASTRODUODENAL (EGD) BIOPSY      Consults:   GI and pulmonary    Significant Diagnostic Studies: As discussed in hospital course    Discharge Exam:  Visit Vitals  /61 (BP 1 Location: Right upper arm, BP Patient Position: At rest)   Pulse 71   Temp 98.3 °F (36.8 °C)   Resp 18   Ht 5' (1.524 m)   Wt 70.3 kg (155 lb)   SpO2 95%   BMI 30.27 kg/m²     PHYSICAL EXAM:  Constitutional: Alert in no acute distress   HEENT: Sclerae anicteric, The neck is supple. Cardiovascular: Regular rate and rhythm. No murmurs, gallops, or rubs. Donzell Pyo Respiratory: Clear breath sounds with no wheezes, rales, or rhonchi. GI: Abdomen nondistended, soft, and nontender. Normal active bowel sounds. Rectal: Deferred   Musculoskeletal: No pitting edema of the lower legs. Extremities have good range of motion. Neurological:  Patient is alert and oriented. Cranial nerves II-XII intact  Psychiatric: Mood appears appropriate with judgement intact. Lymphatic: No cervical or supraclavicular adenopathy. Skin: No rashes or breakdown of the skin      * Discharge Condition: stable  * Disposition: Home    Discharge Medications:  Current Discharge Medication List      START taking these medications    Details   sucralfate (Carafate) 1 gram tablet Take 1 Tablet by mouth four (4) times daily. Qty: 60 Tablet, Refills: 1  Start date: 4/30/2022      pantoprazole (Protonix) 40 mg tablet Take 1 Tablet by mouth two (2) times a day.   Qty: 60 Tablet, Refills: 1  Start date: 4/30/2022      amoxicillin-clavulanate (Augmentin) 500-125 mg per tablet Take 1 Tablet by mouth two (2) times a day. Qty: 20 Tablet, Refills: 0  Start date: 4/30/2022         CONTINUE these medications which have NOT CHANGED    Details   aspirin delayed-release (Aspir-81) 81 mg tablet Aspir-81   1 tablet daily      glipiZIDE SR (GLUCOTROL XL) 5 mg CR tablet Take 5 mg by mouth daily. galantamine (RAZADYNE) 16 mg SR capsule Take 16 mg by mouth Daily (before breakfast). ferrous sulfate (IRON) 325 mg (65 mg iron) EC tablet Take 325 mg by mouth two (2) times a day. memantine (NAMENDA) 10 mg tablet Take 10 mg by mouth two (2) times a day. multivitamin (ONE A DAY) tablet Take 1 Tablet by mouth daily. QUEtiapine (SEROqueL) 25 mg tablet Take 25 mg by mouth two (2) times a day. Take 2 per dose      guaiFENesin-dextromethorphan SR (Mucinex DM) 600-30 mg per tablet Take 1 Tablet by mouth two (2) times a day. Qty: 12 Tablet, Refills: 0      atorvastatin (LIPITOR) 10 mg tablet Take 1 Tab by mouth daily. Associated Diagnoses: Type 2 diabetes mellitus without complication, unspecified long term insulin use status             * Follow-up Care/Patient Instructions: Activity: Activity as tolerated  Diet: Soft diet  Wound Care: None needed    Follow-up Information     Follow up With Specialties Details Why [address] [facility], [address] In 1 week Patient must call the  to make an appointment.  [facility]  [phone]      Becki Ureña MD Pulmonary Disease In 1 week  2020 [facility] [address] [address] [phone]      Lilli Chirinos MD Gastroenterology, Internal Medicine In 1 week  [address] [place] [address]  [phone]            Discharge summary greater than 35 minutes spent with the patient performing discharge instructions, medication review and physical exam    Signed:  Lynette Carrillo YOVANY Meyer  5/1/2022  8:37 AM

## 2022-05-01 NOTE — ROUTINE PROCESS
Bedside and Verbal shift change report given to Nazario Henderson (oncoming nurse) by Flori Leiva RN (offgoing nurse). Report included the following information SBAR, Kardex, MAR, Accordion, and Recent Results.

## 2022-05-01 NOTE — PROGRESS NOTES
Problem: Falls - Risk of  Goal: *Absence of Falls  Description: Document Sydney Delacruz Fall Risk and appropriate interventions in the flowsheet. Outcome: Progressing Towards Goal  Note: Fall Risk Interventions:  Mobility Interventions: Bed/chair exit alarm,Patient to call before getting OOB    Mentation Interventions: Adequate sleep, hydration, pain control,Bed/chair exit alarm,Update white board,Reorient patient,Family/sitter at bedside    Medication Interventions: Bed/chair exit alarm,Patient to call before getting OOB,Teach patient to arise slowly    Elimination Interventions: Call light in reach,Bed/chair exit alarm,Toileting schedule/hourly rounds,Patient to call for help with toileting needs    History of Falls Interventions: Bed/chair exit alarm         Problem: Pressure Injury - Risk of  Goal: *Prevention of pressure injury  Description: Document Scott Scale and appropriate interventions in the flowsheet.   Outcome: Progressing Towards Goal  Note: Pressure Injury Interventions:  Sensory Interventions: Minimize linen layers,Keep linens dry and wrinkle-free         Activity Interventions: PT/OT evaluation    Mobility Interventions: Pressure redistribution bed/mattress (bed type)    Nutrition Interventions: Offer support with meals,snacks and hydration    Friction and Shear Interventions: Minimize layers

## 2022-05-01 NOTE — PROGRESS NOTES
Patient was discharged home with daughter. Discharge instructions were given to patient and daughter at bedside, they both verbalized understanding. Patient was VSS, and was in no distress. She was wheeled downstairs via  Wheelchair and daughter was her transportation home. Discharge plan of care/case management plan validated with provider discharge order.

## 2022-05-02 ENCOUNTER — PATIENT OUTREACH (OUTPATIENT)
Dept: CASE MANAGEMENT | Age: 82
End: 2022-05-02

## 2022-05-02 NOTE — LETTER
5/3/2022 10:28 AM    Ms. Suman White  [address]  [facility] [place] [address]    Dear Ms Debi Masters,    My name is Sheryl Amaya and I am a Care Transition Nurse who partners with your PCP office to improve patients' health. I've been trying to reach you via phone to let you know that, as a member of your care team, I will work with other providers involved in your care, offer education for your specific health conditions, and connect you with more resources as needed. This program is designed to provide you with the opportunity to have a ([id number] [facility]/[address]) care manager partner with you for the following situations:     1) if you come home from the hospital or emergency room   2) to help manage your disease   3) when you would like assistance coordinating services or appointments    This added support is provided at no additional cost to you. My primary focus is to help you achieve specific goals and improve your health. Please call me at [phone] to further discuss how I can support your health care needs.       Sincerely,      Sheryl Amaya RN

## 2022-05-03 NOTE — PROGRESS NOTES
Care Transitions Outreach Attempt    Call within 2 business days of discharge: Yes   Attempted to reach patient for transitions of care follow up. Unable to reach patient. Patient: Liana Lopez Patient : 1940 MRN: [id number]    Last Discharge [facility]       Complaint Diagnosis Description Type Department Provider    22 Fatigue; Nausea; Vomiting; Chest Pain; Abdominal Pain Community acquired pneumonia, unspecified laterality . .. ED to Hosp-Admission (Discharged) (ADMIT) Dez Hansen MD; Darci Stauffer. .. Was this an external facility discharge? No      Noted following upcoming appointments from discharge chart review:   [facility] follow up appointment(s): No future appointments. Non-[facility] follow up appointment(s):  PCP, Dr Wahl pulmonary, Dr Chloe Fisher, GI recommended at discharge    CTN unable to reach patient/emergency  on phone X 2 days, left message on VM. Letter sent asking patient to contact.  CTN will follow up next week--eloise

## 2022-05-10 ENCOUNTER — PATIENT OUTREACH (OUTPATIENT)
Dept: CASE MANAGEMENT | Age: 82
End: 2022-05-10

## 2022-05-10 NOTE — PROGRESS NOTES
Patient has graduated from the Transitions of Care Coordination  program on 5/10/2022. CTN unable to reach patient or family on phone over several attempts since discharge, no response to letter mailed. No further outreach is scheduled. Goals Addressed    None         Patient has Care Transition Nurse's contact information for any further questions, concerns, or needs. Patients upcoming visits:  No future appointments.

## 2022-06-03 ENCOUNTER — TRANSCRIBE ORDER (OUTPATIENT)
Dept: SCHEDULING | Age: 82
End: 2022-06-03

## 2022-06-03 DIAGNOSIS — R91.1 COIN LESION: ICD-10-CM

## 2022-06-03 DIAGNOSIS — R91.8 LUNG MASS: ICD-10-CM

## 2022-06-03 DIAGNOSIS — R05.9 COUGH: Primary | ICD-10-CM

## 2022-07-01 ENCOUNTER — HOSPITAL ENCOUNTER (OUTPATIENT)
Dept: CT IMAGING | Age: 82
Discharge: HOME | End: 2022-07-01
Attending: INTERNAL MEDICINE
Payer: MEDICARE

## 2022-07-01 DIAGNOSIS — R91.1 COIN LESION: ICD-10-CM

## 2022-07-01 DIAGNOSIS — R05.9 COUGH: ICD-10-CM

## 2022-07-01 DIAGNOSIS — R91.8 LUNG MASS: ICD-10-CM

## 2022-07-01 PROCEDURE — 74011000636 HC RX REV CODE- 636: Performed by: INTERNAL MEDICINE

## 2022-07-01 PROCEDURE — 71260 CT THORAX DX C+: CPT

## 2022-07-01 RX ADMIN — IOPAMIDOL 100 ML: 755 INJECTION, SOLUTION INTRAVENOUS at 14:43

## 2022-07-18 ENCOUNTER — APPOINTMENT (OUTPATIENT)
Dept: GENERAL RADIOLOGY | Age: 82
DRG: 189 | End: 2022-07-18
Attending: STUDENT IN AN ORGANIZED HEALTH CARE EDUCATION/TRAINING PROGRAM
Payer: MEDICARE

## 2022-07-18 ENCOUNTER — HOSPITAL ENCOUNTER (INPATIENT)
Age: 82
LOS: 2 days | Discharge: HOME | DRG: 189 | End: 2022-07-20
Attending: STUDENT IN AN ORGANIZED HEALTH CARE EDUCATION/TRAINING PROGRAM | Admitting: HOSPITALIST
Payer: MEDICARE

## 2022-07-18 DIAGNOSIS — R09.02 HYPOXIA: Primary | ICD-10-CM

## 2022-07-18 DIAGNOSIS — R06.02 EXERTIONAL SHORTNESS OF BREATH: ICD-10-CM

## 2022-07-18 PROBLEM — J96.90 RESPIRATORY FAILURE (HCC): Status: ACTIVE | Noted: 2022-07-18

## 2022-07-18 LAB
ALBUMIN SERPL-MCNC: 3.4 G/DL (ref 3.5–5)
ALBUMIN/GLOB SERPL: 0.9 {RATIO} (ref 1.1–2.2)
ALP SERPL-CCNC: 101 U/L (ref 45–117)
ALT SERPL-CCNC: 20 U/L (ref 12–78)
ANION GAP SERPL CALC-SCNC: 10 MMOL/L (ref 5–15)
AST SERPL W P-5'-P-CCNC: 19 U/L (ref 15–37)
ATRIAL RATE: 84 BPM
BASOPHILS # BLD: 0 K/UL (ref 0–0.1)
BASOPHILS NFR BLD: 0 % (ref 0–1)
BILIRUB SERPL-MCNC: 0.4 MG/DL (ref 0.2–1)
BNP SERPL-MCNC: 287 PG/ML
BUN SERPL-MCNC: 19 MG/DL (ref 6–20)
BUN/CREAT SERPL: 25 (ref 12–20)
CA-I BLD-MCNC: 9 MG/DL (ref 8.5–10.1)
CALCULATED P AXIS, ECG09: 36 DEGREES
CALCULATED R AXIS, ECG10: -24 DEGREES
CALCULATED T AXIS, ECG11: 40 DEGREES
CHLORIDE SERPL-SCNC: 105 MMOL/L (ref 97–108)
CO2 SERPL-SCNC: 24 MMOL/L (ref 21–32)
COVID-19 RAPID TEST, COVR: NOT DETECTED
CREAT SERPL-MCNC: 0.77 MG/DL (ref 0.55–1.02)
DIAGNOSIS, 93000: NORMAL
DIFFERENTIAL METHOD BLD: ABNORMAL
EOSINOPHIL # BLD: 0.4 K/UL (ref 0–0.4)
EOSINOPHIL NFR BLD: 3 % (ref 0–7)
ERYTHROCYTE [DISTWIDTH] IN BLOOD BY AUTOMATED COUNT: 14.1 % (ref 11.5–14.5)
GLOBULIN SER CALC-MCNC: 3.8 G/DL (ref 2–4)
GLUCOSE SERPL-MCNC: 323 MG/DL (ref 65–100)
HCT VFR BLD AUTO: 36.6 % (ref 35–47)
HGB BLD-MCNC: 12 G/DL (ref 11.5–16)
IMM GRANULOCYTES # BLD AUTO: 0.1 K/UL (ref 0–0.04)
IMM GRANULOCYTES NFR BLD AUTO: 1 % (ref 0–0.5)
LACTATE SERPL-SCNC: 3.1 MMOL/L (ref 0.4–2)
LYMPHOCYTES # BLD: 1.2 K/UL (ref 0.8–3.5)
LYMPHOCYTES NFR BLD: 12 % (ref 12–49)
MCH RBC QN AUTO: 29.6 PG (ref 26–34)
MCHC RBC AUTO-ENTMCNC: 32.8 G/DL (ref 30–36.5)
MCV RBC AUTO: 90.1 FL (ref 80–99)
MONOCYTES # BLD: 0.5 K/UL (ref 0–1)
MONOCYTES NFR BLD: 5 % (ref 5–13)
NEUTS SEG # BLD: 8.1 K/UL (ref 1.8–8)
NEUTS SEG NFR BLD: 79 % (ref 32–75)
NRBC # BLD: 0 K/UL (ref 0–0.01)
NRBC BLD-RTO: 0 PER 100 WBC
P-R INTERVAL, ECG05: 180 MS
PLATELET # BLD AUTO: 179 K/UL (ref 150–400)
PMV BLD AUTO: 12.1 FL (ref 8.9–12.9)
POTASSIUM SERPL-SCNC: 3.7 MMOL/L (ref 3.5–5.1)
PROCALCITONIN SERPL-MCNC: 0.05 NG/ML
PROT SERPL-MCNC: 7.2 G/DL (ref 6.4–8.2)
Q-T INTERVAL, ECG07: 380 MS
QRS DURATION, ECG06: 70 MS
QTC CALCULATION (BEZET), ECG08: 449 MS
RBC # BLD AUTO: 4.06 M/UL (ref 3.8–5.2)
SODIUM SERPL-SCNC: 139 MMOL/L (ref 136–145)
TROPONIN-HIGH SENSITIVITY: 4 NG/L (ref 0–51)
VENTRICULAR RATE, ECG03: 84 BPM
WBC # BLD AUTO: 10.2 K/UL (ref 3.6–11)

## 2022-07-18 PROCEDURE — 65270000029 HC RM PRIVATE

## 2022-07-18 PROCEDURE — 74011000258 HC RX REV CODE- 258: Performed by: STUDENT IN AN ORGANIZED HEALTH CARE EDUCATION/TRAINING PROGRAM

## 2022-07-18 PROCEDURE — 83880 ASSAY OF NATRIURETIC PEPTIDE: CPT

## 2022-07-18 PROCEDURE — 93005 ELECTROCARDIOGRAM TRACING: CPT

## 2022-07-18 PROCEDURE — 96375 TX/PRO/DX INJ NEW DRUG ADDON: CPT

## 2022-07-18 PROCEDURE — 74011250636 HC RX REV CODE- 250/636: Performed by: STUDENT IN AN ORGANIZED HEALTH CARE EDUCATION/TRAINING PROGRAM

## 2022-07-18 PROCEDURE — 84145 PROCALCITONIN (PCT): CPT

## 2022-07-18 PROCEDURE — 80053 COMPREHEN METABOLIC PANEL: CPT

## 2022-07-18 PROCEDURE — 74011250637 HC RX REV CODE- 250/637: Performed by: HOSPITALIST

## 2022-07-18 PROCEDURE — 74011250636 HC RX REV CODE- 250/636: Performed by: HOSPITALIST

## 2022-07-18 PROCEDURE — U0005 INFEC AGEN DETEC AMPLI PROBE: HCPCS

## 2022-07-18 PROCEDURE — 99285 EMERGENCY DEPT VISIT HI MDM: CPT

## 2022-07-18 PROCEDURE — 96374 THER/PROPH/DIAG INJ IV PUSH: CPT

## 2022-07-18 PROCEDURE — 36415 COLL VENOUS BLD VENIPUNCTURE: CPT

## 2022-07-18 PROCEDURE — 87040 BLOOD CULTURE FOR BACTERIA: CPT

## 2022-07-18 PROCEDURE — 87635 SARS-COV-2 COVID-19 AMP PRB: CPT

## 2022-07-18 PROCEDURE — 74011000250 HC RX REV CODE- 250: Performed by: HOSPITALIST

## 2022-07-18 PROCEDURE — 85025 COMPLETE CBC W/AUTO DIFF WBC: CPT

## 2022-07-18 PROCEDURE — 83605 ASSAY OF LACTIC ACID: CPT

## 2022-07-18 PROCEDURE — 71045 X-RAY EXAM CHEST 1 VIEW: CPT

## 2022-07-18 PROCEDURE — 84484 ASSAY OF TROPONIN QUANT: CPT

## 2022-07-18 RX ORDER — QUETIAPINE FUMARATE 25 MG/1
25 TABLET, FILM COATED ORAL 2 TIMES DAILY
Status: DISCONTINUED | OUTPATIENT
Start: 2022-07-18 | End: 2022-07-20 | Stop reason: HOSPADM

## 2022-07-18 RX ORDER — ENOXAPARIN SODIUM 100 MG/ML
40 INJECTION SUBCUTANEOUS DAILY
Status: DISCONTINUED | OUTPATIENT
Start: 2022-07-19 | End: 2022-07-20 | Stop reason: HOSPADM

## 2022-07-18 RX ORDER — ONDANSETRON 4 MG/1
4 TABLET, ORALLY DISINTEGRATING ORAL
Status: DISCONTINUED | OUTPATIENT
Start: 2022-07-18 | End: 2022-07-20 | Stop reason: HOSPADM

## 2022-07-18 RX ORDER — GALANTAMINE 4 MG/1
8 TABLET, FILM COATED ORAL 2 TIMES DAILY
Status: DISCONTINUED | OUTPATIENT
Start: 2022-07-18 | End: 2022-07-20 | Stop reason: HOSPADM

## 2022-07-18 RX ORDER — ACETAMINOPHEN 325 MG/1
650 TABLET ORAL
Status: DISCONTINUED | OUTPATIENT
Start: 2022-07-18 | End: 2022-07-20 | Stop reason: HOSPADM

## 2022-07-18 RX ORDER — ONDANSETRON 2 MG/ML
4 INJECTION INTRAMUSCULAR; INTRAVENOUS
Status: DISCONTINUED | OUTPATIENT
Start: 2022-07-18 | End: 2022-07-20 | Stop reason: HOSPADM

## 2022-07-18 RX ORDER — PANTOPRAZOLE SODIUM 40 MG/1
40 TABLET, DELAYED RELEASE ORAL 2 TIMES DAILY
Status: DISCONTINUED | OUTPATIENT
Start: 2022-07-18 | End: 2022-07-20 | Stop reason: HOSPADM

## 2022-07-18 RX ORDER — SODIUM CHLORIDE 9 MG/ML
75 INJECTION, SOLUTION INTRAVENOUS CONTINUOUS
Status: DISCONTINUED | OUTPATIENT
Start: 2022-07-18 | End: 2022-07-20 | Stop reason: HOSPADM

## 2022-07-18 RX ORDER — ASPIRIN 81 MG/1
81 TABLET ORAL DAILY
Status: DISCONTINUED | OUTPATIENT
Start: 2022-07-19 | End: 2022-07-20 | Stop reason: HOSPADM

## 2022-07-18 RX ORDER — POLYETHYLENE GLYCOL 3350 17 G/17G
17 POWDER, FOR SOLUTION ORAL DAILY PRN
Status: DISCONTINUED | OUTPATIENT
Start: 2022-07-18 | End: 2022-07-20 | Stop reason: HOSPADM

## 2022-07-18 RX ORDER — SODIUM CHLORIDE 0.9 % (FLUSH) 0.9 %
5-40 SYRINGE (ML) INJECTION AS NEEDED
Status: DISCONTINUED | OUTPATIENT
Start: 2022-07-18 | End: 2022-07-20 | Stop reason: HOSPADM

## 2022-07-18 RX ORDER — ATORVASTATIN CALCIUM 10 MG/1
10 TABLET, FILM COATED ORAL DAILY
Status: DISCONTINUED | OUTPATIENT
Start: 2022-07-19 | End: 2022-07-20 | Stop reason: HOSPADM

## 2022-07-18 RX ORDER — MEMANTINE HYDROCHLORIDE 10 MG/1
10 TABLET ORAL 2 TIMES DAILY
Status: DISCONTINUED | OUTPATIENT
Start: 2022-07-18 | End: 2022-07-20 | Stop reason: HOSPADM

## 2022-07-18 RX ORDER — SUCRALFATE 1 G/1
1 TABLET ORAL 4 TIMES DAILY
Status: DISCONTINUED | OUTPATIENT
Start: 2022-07-18 | End: 2022-07-20 | Stop reason: HOSPADM

## 2022-07-18 RX ORDER — SODIUM CHLORIDE 0.9 % (FLUSH) 0.9 %
5-40 SYRINGE (ML) INJECTION EVERY 8 HOURS
Status: DISCONTINUED | OUTPATIENT
Start: 2022-07-18 | End: 2022-07-20 | Stop reason: HOSPADM

## 2022-07-18 RX ORDER — ACETAMINOPHEN 650 MG/1
650 SUPPOSITORY RECTAL
Status: DISCONTINUED | OUTPATIENT
Start: 2022-07-18 | End: 2022-07-20 | Stop reason: HOSPADM

## 2022-07-18 RX ORDER — IPRATROPIUM BROMIDE AND ALBUTEROL SULFATE 2.5; .5 MG/3ML; MG/3ML
3 SOLUTION RESPIRATORY (INHALATION)
Status: ACTIVE | OUTPATIENT
Start: 2022-07-18 | End: 2022-07-19

## 2022-07-18 RX ADMIN — PIPERACILLIN AND TAZOBACTAM 3.38 G: 3; .375 INJECTION, POWDER, LYOPHILIZED, FOR SOLUTION INTRAVENOUS at 18:11

## 2022-07-18 RX ADMIN — METHYLPREDNISOLONE SODIUM SUCCINATE 125 MG: 125 INJECTION, POWDER, FOR SOLUTION INTRAMUSCULAR; INTRAVENOUS at 18:22

## 2022-07-18 RX ADMIN — SUCRALFATE 1 G: 1 TABLET ORAL at 21:10

## 2022-07-18 RX ADMIN — AZITHROMYCIN MONOHYDRATE 500 MG: 500 INJECTION, POWDER, LYOPHILIZED, FOR SOLUTION INTRAVENOUS at 21:13

## 2022-07-18 RX ADMIN — QUETIAPINE FUMARATE 25 MG: 25 TABLET ORAL at 21:11

## 2022-07-18 RX ADMIN — MEMANTINE HYDROCHLORIDE 10 MG: 10 TABLET ORAL at 23:38

## 2022-07-18 RX ADMIN — SODIUM CHLORIDE 75 ML/HR: 9 INJECTION, SOLUTION INTRAVENOUS at 21:12

## 2022-07-18 RX ADMIN — PANTOPRAZOLE SODIUM 40 MG: 40 TABLET, DELAYED RELEASE ORAL at 21:10

## 2022-07-18 RX ADMIN — CEFTRIAXONE SODIUM 1 G: 1 INJECTION, POWDER, FOR SOLUTION INTRAMUSCULAR; INTRAVENOUS at 21:13

## 2022-07-18 NOTE — H&P
History and Physical    Subjective:   Chief Complaint : shortness of breath since few days  Source of information : patient charts    History of present illness:     81F, h/o dementia, HTN and DMII with shortness of breath since few days    She saw her PCP on Friday, in addition a lot of her family members were diagnosed with covid recently. She developed cough post PCP encounter and became short of breath     ER:  88%, required 4L NC, CXR right residual airspace disease. Former smoker. Past Medical History:   Diagnosis Date    Arthritis     Cancer [facility]) 2006    colon cancer    COPD     sleep apnea    Diabetes ([facility] [address]     GERD (gastroesophageal reflux disease)     Other ill-defined conditions(799.89)     fibromylgia    PUD (peptic ulcer disease)     Unspecified sleep apnea      Past Surgical History:   Procedure Laterality Date    HX APPENDECTOMY      HX BACK SURGERY      neck and back    HX HYSTERECTOMY      HX ORTHOPAEDIC  2010    right tkr    HX OTHER SURGICAL      tonsil    ME ABDOMEN SURGERY PROC UNLISTED  2006    colectomy    ME WRIST ARTHROSCOP,DIAGNOSTIC      bilateral carpal tunell     Family History   Problem Relation Age of Onset    Diabetes Mother     Diabetes Sister     Diabetes Brother     Heart Disease Brother       Social History     Tobacco Use    Smoking status: Former Smoker    Smokeless tobacco: Never Used   Substance Use Topics    Alcohol use: No       Prior to Admission medications    Medication Sig Start Date End Date Taking? Authorizing Provider   sucralfate (Carafate) 1 gram tablet Take 1 Tablet by mouth four (4) times daily. 4/30/22   Isaura Meyer PA-C   pantoprazole (Protonix) 40 mg tablet Take 1 Tablet by mouth two (2) times a day. 4/30/22   Isaura Meyer PA-C   amoxicillin-clavulanate (Augmentin) 500-125 mg per tablet Take 1 Tablet by mouth two (2) times a day.  4/30/22   Isaura Meyer PA-C   aspirin delayed-release (Aspir-81) 81 mg tablet Aspir-81   1 tablet daily    Provider, Historical   glipiZIDE SR (GLUCOTROL XL) 5 mg CR tablet Take 5 mg by mouth daily. Alisha Dixon MD   galantamine (RAZADYNE) 16 mg SR capsule Take 16 mg by mouth Daily (before breakfast). Alisha Dixon MD   ferrous sulfate (IRON) 325 mg (65 mg iron) EC tablet Take 325 mg by mouth two (2) times a day. Alisha Dixon MD   memantine (NAMENDA) 10 mg tablet Take 10 mg by mouth two (2) times a day. Alisha Dixon MD   multivitamin (ONE A DAY) tablet Take 1 Tablet by mouth daily. Alisha Dixon MD   QUEtiapine (SEROqueL) 25 mg tablet Take 25 mg by mouth two (2) times a day. Take 2 per dose    Alisha Dixon MD   guaiFENesin-dextromethorphan SR (Mucinex DM) 600-30 mg per tablet Take 1 Tablet by mouth two (2) times a day. 9/17/21   Kaitlyn Izquierdo MD   atorvastatin (LIPITOR) 10 mg tablet Take 1 Tab by mouth daily. 2/10/17   Provider, Historical     No Known Allergies          Review of Systems:  Constitutional: short of breaht  Eye: No recent visual disturbances, no discharge, no double vision  Ear/nose/mouth/throat : No hearing disturbance, no ear pain, no nasal congestion, no sore throat, no trouble swallowing. Respiratory : ++ difficulty breathing  Cardiovascular : No chest pain, no palpitation, no racing of heart, no orthopnea, no paroxysmal nocturnal dyspnea, no peripheral edema  Gastrointestinal : No nausea, no vomiting, no diarrhea, constipation, heartburn, abdominal pain  Genitourinary : No dysuria, no hematuria, no increased frequency, incontinence,  Lymphatics : No swollen glands -Neck, axillary, inguinal  Endocrine : No excessive thirst, no polyuria no cold intolerance, no heat intolerance.   Immunologic : No hives, urticaria, no seasonal allergies,   Musculoskeletal : No joint swelling, pain, effusion,  no back pain, no neck pain,   Integumentary : No rash, no pruritus, no ecchymosis  Hematology : No petechiae, No easy bruising,  No tendency to bleed easy  Neurology : cannot be assessed  Psychiatric : cannot be assessed    Vitals:     Visit Vitals  /62   Pulse 87   Temp 98.3 °F (36.8 °C)   Resp 16   Ht 5' (1.524 m)   Wt 70.3 kg (155 lb)   SpO2 (!) 89%   BMI 30.27 kg/m²       Physical Exam:   General : comfortable on 4L NC  HEENT : PERRLA, normal oral mucosa, atraumatic normocephalic, Normal ear and nose. Neck : Supple, no JVD, no masses noted, no carotid bruit  Lungs : Breath sounds with good air entry bilaterally, no wheezes or rales, no accessory muscle use,  CVS : Rhythm rate regular, S1+, S2+, no murmur or gallop  Abdomen : Soft, nontender, no organomegaly, bowel sounds active  Extremities : No edema noted,  pedal pulses palpable  Musculoskeletal : Fair range of motion, no joint swelling or effusion, muscle tone and power appears normal,   Skin : Moist, warm, skin turgor, no pathological rash  Lymphatic : No cervical lymphadenopathy. Neurological : canot be assesed  Psychiatric : good mood        Data Review:   Recent Results (from the past 24 hour(s))   CBC WITH AUTOMATED DIFF    Collection Time: 07/18/22  4:30 PM   Result Value Ref Range    WBC 10.2 3.6 - 11.0 K/uL    RBC 4.06 3.80 - 5.20 M/uL    HGB 12.0 11.5 - 16.0 g/dL    HCT 36.6 35.0 - 47.0 %    MCV 90.1 80.0 - 99.0 FL    MCH 29.6 26.0 - 34.0 PG    MCHC 32.8 30.0 - 36.5 g/dL    RDW 14.1 11.5 - 14.5 %    PLATELET 511 833 - 053 K/uL    MPV 12.1 8.9 - 12.9 FL    NRBC 0.0 0.0  WBC    ABSOLUTE NRBC 0.00 0.00 - 0.01 K/uL    NEUTROPHILS 79 (H) 32 - 75 %    LYMPHOCYTES 12 12 - 49 %    MONOCYTES 5 5 - 13 %    EOSINOPHILS 3 0 - 7 %    BASOPHILS 0 0 - 1 %    IMMATURE GRANULOCYTES 1 (H) 0 - 0.5 %    ABS. NEUTROPHILS 8.1 (H) 1.8 - 8.0 K/UL    ABS. LYMPHOCYTES 1.2 0.8 - 3.5 K/UL    ABS. MONOCYTES 0.5 0.0 - 1.0 K/UL    ABS. EOSINOPHILS 0.4 0.0 - 0.4 K/UL    ABS. BASOPHILS 0.0 0.0 - 0.1 K/UL    ABS. IMM.  GRANS. 0.1 (H) 0.00 - 0.04 K/UL    DF AUTOMATED     METABOLIC PANEL, COMPREHENSIVE    Collection Time: 07/18/22 4:30 PM   Result Value Ref Range    Sodium 139 136 - 145 mmol/L    Potassium 3.7 3.5 - 5.1 mmol/L    Chloride 105 97 - 108 mmol/L    CO2 24 21 - 32 mmol/L    Anion gap 10 5 - 15 mmol/L    Glucose 323 (H) 65 - 100 mg/dL    BUN 19 6 - 20 mg/dL    Creatinine 0.77 0.55 - 1.02 mg/dL    BUN/Creatinine ratio 25 (H) 12 - 20      GFR est AA >60 >60 ml/min/1.73m2    GFR est non-AA >60 >60 ml/min/1.73m2    Calcium 9.0 8.5 - 10.1 mg/dL    Bilirubin, total 0.4 0.2 - 1.0 mg/dL    AST (SGOT) 19 15 - 37 U/L    ALT (SGPT) 20 12 - 78 U/L    Alk.  phosphatase 101 45 - 117 U/L    Protein, total 7.2 6.4 - 8.2 g/dL    Albumin 3.4 (L) 3.5 - 5.0 g/dL    Globulin 3.8 2.0 - 4.0 g/dL    A-G Ratio 0.9 (L) 1.1 - 2.2     TROPONIN-HIGH SENSITIVITY    Collection Time: 07/18/22  4:30 PM   Result Value Ref Range    Troponin-High Sensitivity 4 0 - 51 ng/L   NT-PRO BNP    Collection Time: 07/18/22  4:30 PM   Result Value Ref Range    NT pro- <450 pg/mL   COVID-19 RAPID TEST    Collection Time: 07/18/22  4:45 PM   Result Value Ref Range    COVID-19 rapid test Not Detected Not Detected               Assessment and Plan :     (1) Acute hypoxic respiratory failure: 4L NC wean to keep saturation > 92    (2) probable pulmonary HTN or fale negative COVID-19: ceftriaxone and azithromycin    (3) dementia: memantine    (4) DMII on oral meds: SSI    DVT ppx: lovenox     DISPO: home when on RA        Signed By: Carmine Shukla MD     July 18, 2022

## 2022-07-18 NOTE — ED PROVIDER NOTES
[facility]  EMERGENCY DEPARTMENT ENCOUNTER NOTE    Date: 7/18/2022  Patient Name: Arsenio Sams    History of Presenting Illness     Chief Complaint   Patient presents with    Shortness of Breath     HPI: Arsenio Sams, 80 y.o. female with a past medical history and outpatient medications as listed and reviewed below  presents for shortness of breath. The patient's caregiver noted that she was breathing more than usual and becoming more short of breath on exertion. SPO2 was recorded markedly decreased. She was noted to be in the low 80s percent at home. On arrival, she was noted to be 89% and was placed on 2 L of nasal cannula however uptitrated to 4 L after another dip to 89%. The patient has emerged on does not provide any additional history however the caregiver reports that she has been feeling more short of breath on exertion. Patient has had exposures to COVID-19 last Wednesday to multiple family members but none since then. No fevers or chills. At baseline mentation.     Medical History   I reviewed the medical, surgical, family, and social history, as well as allergies:    PCP: [facility]    Past Medical History:  Past Medical History:   Diagnosis Date    Arthritis     Cancer ([facility] [address] 2006    colon cancer    COPD     sleep apnea    Diabetes ([facility] [address]     GERD (gastroesophageal reflux disease)     Other ill-defined conditions(799.89)     fibromylgia    PUD (peptic ulcer disease)     Unspecified sleep apnea      Past Surgical History:  Past Surgical History:   Procedure Laterality Date    HX APPENDECTOMY      HX BACK SURGERY      neck and back    HX HYSTERECTOMY      HX ORTHOPAEDIC  2010    right tkr    HX OTHER SURGICAL      tonsil    [place] ABDOMEN SURGERY PROC UNLISTED  2006    colectomy    IL WRIST ARTHROSCOP,DIAGNOSTIC      bilateral carpal tunell     Current Outpatient Medications:  Current Outpatient Medications   Medication Instructions  amoxicillin-clavulanate (Augmentin) 500-125 mg per tablet 1 Tablet, Oral, 2 TIMES DAILY    aspirin delayed-release (Aspir-81) 81 mg tablet Aspir-81   1 tablet daily    atorvastatin (LIPITOR) 10 mg tablet 1 Tablet, Oral, DAILY    ferrous sulfate (IRON) 325 mg, Oral, 2 TIMES DAILY    galantamine (RAZADYNE) 16 mg, Oral, DAILY BEFORE BREAKFAST    glipiZIDE SR (GLUCOTROL XL) 5 mg, Oral, DAILY    guaiFENesin-dextromethorphan SR (Mucinex DM) 600-30 mg per tablet 1 Tablet, Oral, 2 TIMES DAILY    memantine (NAMENDA) 10 mg, Oral, 2 TIMES DAILY    multivitamin (ONE A DAY) tablet 1 Tablet, Oral, DAILY    pantoprazole (PROTONIX) 40 mg, Oral, 2 TIMES DAILY    QUEtiapine (SEROQUEL) 25 mg, Oral, 2 TIMES DAILY, Take 2 per dose     sucralfate (CARAFATE) 1 g, Oral, 4 TIMES DAILY      Family History:  Family History   Problem Relation Age of Onset    Diabetes Mother     Diabetes Sister     Diabetes Brother     Heart Disease Brother      Social History:  Social History     Tobacco Use    Smoking status: Former Smoker    Smokeless tobacco: Never Used   Substance Use Topics    Alcohol use: No    Drug use: Not on file     Allergies:  No Known Allergies    Review of Systems     Review of Systems  Negative: All other systems negative. Physical Exam and Vital Signs   Vital Signs - Reviewed the patient's vital signs.     Patient Vitals for the past 12 hrs:   Temp Pulse Resp BP SpO2   07/18/22 1624 98.3 °F (36.8 °C) 87 16 128/62 (!) 89 %     Physical Exam:    GENERAL: awake, alert, cooperative, not in distress  HEENT:  * Pupils equal, EOMI  * Head atraumatic  CV:  * audible heart sounds  * warm and perfused extremities bilaterally  PULMONARY: Good air movement, no wheezes, no crackles  ABDOMEN/: soft, no distension, no guarding, no abdominal tenderness  EXTREMITIES/BACK: warm and perfused, no tenderness, no edema  SKIN: no rashes or signs of trauma  NEURO:  * Speech clear  * Moves U&LE to command    Medical Decision Making   - I am the first and primary provider for this patient and am the primary provider of record. - I reviewed the vital signs, available nursing notes, past medical history, past surgical history, family history and social history. - Initial assessment performed. The patients presenting problems have been discussed, and the staff are in agreement with the care plan formulated and outlined with them. I have encouraged them to ask questions as they arise throughout their visit. - Available medical records, nursing notes, old EKGs, and EMS run sheets (if patient was EMS transported) were reviewed    MDM:   Patient is a 80 y.o. female presenting for hypoxia and SOB on exertion. Vitals reveal hypoxia and physical exam reveals no significant abnormalities. EKG showed no significant abnormalities. Based on the history, physical exam, risk factors, and vital signs, differential includes: COVID-19, pneumonia, pulmonary hypertension, stable angina. See ED Course and Reassessment for evaluation and discussion. Results     Labs:  Recent Results (from the past 12 hour(s))   CBC WITH AUTOMATED DIFF    Collection Time: 07/18/22  4:30 PM   Result Value Ref Range    WBC 10.2 3.6 - 11.0 K/uL    RBC 4.06 3.80 - 5.20 M/uL    HGB 12.0 11.5 - 16.0 g/dL    HCT 36.6 35.0 - 47.0 %    MCV 90.1 80.0 - 99.0 FL    MCH 29.6 26.0 - 34.0 PG    MCHC 32.8 30.0 - 36.5 g/dL    RDW 14.1 11.5 - 14.5 %    PLATELET 672 876 - 628 K/uL    MPV 12.1 8.9 - 12.9 FL    NRBC 0.0 0.0  WBC    ABSOLUTE NRBC 0.00 0.00 - 0.01 K/uL    NEUTROPHILS 79 (H) 32 - 75 %    LYMPHOCYTES 12 12 - 49 %    MONOCYTES 5 5 - 13 %    EOSINOPHILS 3 0 - 7 %    BASOPHILS 0 0 - 1 %    IMMATURE GRANULOCYTES 1 (H) 0 - 0.5 %    ABS. NEUTROPHILS 8.1 (H) 1.8 - 8.0 K/UL    ABS. LYMPHOCYTES 1.2 0.8 - 3.5 K/UL    ABS. MONOCYTES 0.5 0.0 - 1.0 K/UL    ABS. EOSINOPHILS 0.4 0.0 - 0.4 K/UL    ABS. BASOPHILS 0.0 0.0 - 0.1 K/UL    ABS. IMM.  GRANS. 0.1 (H) 0.00 - 0.04 K/UL    DF AUTOMATED     METABOLIC PANEL, COMPREHENSIVE    Collection Time: 07/18/22  4:30 PM   Result Value Ref Range    Sodium 139 136 - 145 mmol/L    Potassium 3.7 3.5 - 5.1 mmol/L    Chloride 105 97 - 108 mmol/L    CO2 24 21 - 32 mmol/L    Anion gap 10 5 - 15 mmol/L    Glucose 323 (H) 65 - 100 mg/dL    BUN 19 6 - 20 mg/dL    Creatinine 0.77 0.55 - 1.02 mg/dL    BUN/Creatinine ratio 25 (H) 12 - 20      GFR est AA >60 >60 ml/min/1.73m2    GFR est non-AA >60 >60 ml/min/1.73m2    Calcium 9.0 8.5 - 10.1 mg/dL    Bilirubin, total 0.4 0.2 - 1.0 mg/dL    AST (SGOT) 19 15 - 37 U/L    ALT (SGPT) 20 12 - 78 U/L    Alk. phosphatase 101 45 - 117 U/L    Protein, total 7.2 6.4 - 8.2 g/dL    Albumin 3.4 (L) 3.5 - 5.0 g/dL    Globulin 3.8 2.0 - 4.0 g/dL    A-G Ratio 0.9 (L) 1.1 - 2.2     TROPONIN-HIGH SENSITIVITY    Collection Time: 07/18/22  4:30 PM   Result Value Ref Range    Troponin-High Sensitivity 4 0 - 51 ng/L   NT-PRO BNP    Collection Time: 07/18/22  4:30 PM   Result Value Ref Range    NT pro- <450 pg/mL   COVID-19 RAPID TEST    Collection Time: 07/18/22  4:45 PM   Result Value Ref Range    COVID-19 rapid test Not Detected Not Detected       Radiologic Studies:  CT Results  (Last 48 hours)    None        CXR Results  (Last 48 hours)               07/18/22 1703  XR CHEST PORT Final result    Impression:  Mild residual right basilar airspace disease. Narrative:  INDICATION:  chest pain        COMPARISON: July 1       FINDINGS: Single AP portable view of the chest obtained at 1634 demonstrates a   stable cardiomediastinal silhouette. There is mild residual right basilar   airspace disease. No osseous abnormalities are seen.                Medications ordered:  Medications   piperacillin-tazobactam (ZOSYN) 3.375 g in 0.9% sodium chloride (MBP/ADV) 100 mL MBP (3.375 g IntraVENous New Bag 7/18/22 1811)   albuterol-ipratropium (DUO-NEB) 2.5 MG-0.5 MG/3 ML (has no administration in time range)   sodium chloride (NS) flush 5-40 mL (has no administration in time range)   sodium chloride (NS) flush 5-40 mL (has no administration in time range)   acetaminophen (TYLENOL) tablet 650 mg (has no administration in time range)     Or   acetaminophen (TYLENOL) suppository 650 mg (has no administration in time range)   polyethylene glycol (MIRALAX) packet 17 g (has no administration in time range)   ondansetron (ZOFRAN ODT) tablet 4 mg (has no administration in time range)     Or   ondansetron (ZOFRAN) injection 4 mg (has no administration in time range)   enoxaparin (LOVENOX) injection 40 mg (has no administration in time range)   aspirin delayed-release tablet 81 mg (has no administration in time range)   atorvastatin (LIPITOR) tablet 10 mg (has no administration in time range)   galantamine (RAZADYNE) 16 mg (has no administration in time range)   memantine (NAMENDA) tablet 10 mg (has no administration in time range)   pantoprazole (PROTONIX) tablet 40 mg (has no administration in time range)   QUEtiapine (SEROquel) tablet 25 mg (has no administration in time range)   sucralfate (CARAFATE) tablet 1 g (has no administration in time range)   azithromycin (ZITHROMAX) 500 mg in 0.9% sodium chloride 250 mL ([id number]) (has no administration in time range)   cefTRIAXone (ROCEPHIN) 1 g in sterile water (preservative free) 10 mL IV syringe (has no administration in time range)   methylPREDNISolone (PF) (Solu-MEDROL) injection 125 mg (125 mg IntraVENous Given 7/18/22 1822)     ED Course and Reassessment     ED Course:     ED Course as of 07/18/22 1909   Mon Jul 18, 2022   1726 CBC does not show any evidence of acute process. Leukocytosis not present to suggest infection. Hemoglobin not suggestive of acute anemia. [SS]   8386 [id number] testing is negative.   [SS]   1810 No significant electrolyte derangements. Creatinine is not elevated more than baseline range making CAROL unlikely. No significant transaminitis noted. Normal bilirubin.     Troponin is <6, ACS ruled out per the high-sensitivity troponin algorithm as the duration of symptoms does not warrant repeat troponin level.   [SS]   1811 The patient is hypoxic however her chest x-ray is clear and her labs are within normal limits. She has a history of COPD and was exposed to COVID-19. The patient may need evaluation for pulmonary hypertension versus false negative COVID-19 result. [SS]   5375 BNP is not significantly elevated making acute CHF less likely. [SS]      ED Course User Index  [SS] Bridgette Flores MD       Reassessment:    Will admit. Final Disposition     Admission: [place] [id number]    After completion of ED workup and discussion of results and diagnoses, patient was admitted to the hospital. Case was discussed with admitting provider. ED Procedures   Performed by: Mayra Evans MD  Procedures     EKG interpretation (Preliminary):  Rhythm: normal sinus rhythm; and regular . Rate (approx.): 78. Axis: normal;  MN interval: normal;  QRS interval: normal ;  ST/T wave: normal;  Other findings: normal.    Diagnosis     Clinical Impression:   1. Hypoxia    2. Exertional shortness of breath      Attestations:    Mayra Evans MD    Please note that this dictation was completed with [employer] O[employer], the [employer] voice recognition software. Quite often unanticipated grammatical, syntax, homophones, and other interpretive errors are inadvertently transcribed by the computer software. Please disregard these errors. Please excuse any errors that have escaped final proofreading. Thank you.

## 2022-07-18 NOTE — ED TRIAGE NOTES
Aspiration pneumonia in April, July 1st Dx with bilateral pneumonia, Saw PCP on Friday, reports since then has developed cough. Today when walking across room spo2 dropped 75-83% and patient became very SOB. Resting spo2 91%. Reports lower rt abd pain.

## 2022-07-19 ENCOUNTER — APPOINTMENT (OUTPATIENT)
Dept: CT IMAGING | Age: 82
DRG: 189 | End: 2022-07-19
Attending: INTERNAL MEDICINE
Payer: MEDICARE

## 2022-07-19 LAB
LACTATE SERPL-SCNC: 1.7 MMOL/L (ref 0.4–2)
SARS-COV-2, COV2: NORMAL
SARS-COV-2, XPLCVT: NOT DETECTED
SOURCE, COVRS: NORMAL

## 2022-07-19 PROCEDURE — 65270000029 HC RM PRIVATE

## 2022-07-19 PROCEDURE — 77010033678 HC OXYGEN DAILY

## 2022-07-19 PROCEDURE — 74011250636 HC RX REV CODE- 250/636: Performed by: HOSPITALIST

## 2022-07-19 PROCEDURE — 36415 COLL VENOUS BLD VENIPUNCTURE: CPT

## 2022-07-19 PROCEDURE — 74011250636 HC RX REV CODE- 250/636: Performed by: INTERNAL MEDICINE

## 2022-07-19 PROCEDURE — 74011250637 HC RX REV CODE- 250/637: Performed by: HOSPITALIST

## 2022-07-19 PROCEDURE — 83605 ASSAY OF LACTIC ACID: CPT

## 2022-07-19 PROCEDURE — 92610 EVALUATE SWALLOWING FUNCTION: CPT

## 2022-07-19 PROCEDURE — 94761 N-INVAS EAR/PLS OXIMETRY MLT: CPT

## 2022-07-19 PROCEDURE — 74011000250 HC RX REV CODE- 250: Performed by: HOSPITALIST

## 2022-07-19 PROCEDURE — 71250 CT THORAX DX C-: CPT

## 2022-07-19 PROCEDURE — 74011000258 HC RX REV CODE- 258: Performed by: INTERNAL MEDICINE

## 2022-07-19 RX ORDER — CHOLECALCIFEROL (VITAMIN D3) 125 MCG
5 CAPSULE ORAL
Status: DISCONTINUED | OUTPATIENT
Start: 2022-07-19 | End: 2022-07-20 | Stop reason: HOSPADM

## 2022-07-19 RX ORDER — IPRATROPIUM BROMIDE AND ALBUTEROL SULFATE 2.5; .5 MG/3ML; MG/3ML
3 SOLUTION RESPIRATORY (INHALATION)
Status: DISCONTINUED | OUTPATIENT
Start: 2022-07-19 | End: 2022-07-19

## 2022-07-19 RX ORDER — IPRATROPIUM BROMIDE AND ALBUTEROL SULFATE 2.5; .5 MG/3ML; MG/3ML
3 SOLUTION RESPIRATORY (INHALATION)
Status: DISCONTINUED | OUTPATIENT
Start: 2022-07-19 | End: 2022-07-20 | Stop reason: HOSPADM

## 2022-07-19 RX ADMIN — PIPERACILLIN AND TAZOBACTAM 3.38 G: 3; .375 INJECTION, POWDER, FOR SOLUTION INTRAVENOUS at 14:00

## 2022-07-19 RX ADMIN — ATORVASTATIN CALCIUM 10 MG: 10 TABLET, FILM COATED ORAL at 09:00

## 2022-07-19 RX ADMIN — SUCRALFATE 1 G: 1 TABLET ORAL at 13:00

## 2022-07-19 RX ADMIN — ASPIRIN 81 MG: 81 TABLET, COATED ORAL at 09:00

## 2022-07-19 RX ADMIN — SODIUM CHLORIDE, PRESERVATIVE FREE 10 ML: 5 INJECTION INTRAVENOUS at 21:48

## 2022-07-19 RX ADMIN — SUCRALFATE 1 G: 1 TABLET ORAL at 09:00

## 2022-07-19 RX ADMIN — ENOXAPARIN SODIUM 40 MG: 100 INJECTION SUBCUTANEOUS at 09:00

## 2022-07-19 RX ADMIN — GALANTAMINE 8 MG: 4 TABLET, FILM COATED ORAL at 21:37

## 2022-07-19 RX ADMIN — SUCRALFATE 1 G: 1 TABLET ORAL at 18:00

## 2022-07-19 RX ADMIN — SUCRALFATE 1 G: 1 TABLET ORAL at 21:38

## 2022-07-19 RX ADMIN — PIPERACILLIN AND TAZOBACTAM 3.38 G: 3; .375 INJECTION, POWDER, FOR SOLUTION INTRAVENOUS at 21:49

## 2022-07-19 RX ADMIN — SODIUM CHLORIDE, PRESERVATIVE FREE 10 ML: 5 INJECTION INTRAVENOUS at 05:46

## 2022-07-19 RX ADMIN — QUETIAPINE FUMARATE 25 MG: 25 TABLET ORAL at 21:38

## 2022-07-19 RX ADMIN — MEMANTINE HYDROCHLORIDE 10 MG: 10 TABLET ORAL at 21:38

## 2022-07-19 RX ADMIN — GALANTAMINE 8 MG: 4 TABLET, FILM COATED ORAL at 09:00

## 2022-07-19 RX ADMIN — AZITHROMYCIN MONOHYDRATE 500 MG: 500 INJECTION, POWDER, LYOPHILIZED, FOR SOLUTION INTRAVENOUS at 21:37

## 2022-07-19 RX ADMIN — PANTOPRAZOLE SODIUM 40 MG: 40 TABLET, DELAYED RELEASE ORAL at 21:38

## 2022-07-19 RX ADMIN — QUETIAPINE FUMARATE 25 MG: 25 TABLET ORAL at 09:00

## 2022-07-19 RX ADMIN — GALANTAMINE 8 MG: 4 TABLET, FILM COATED ORAL at 01:28

## 2022-07-19 RX ADMIN — MEMANTINE HYDROCHLORIDE 10 MG: 10 TABLET ORAL at 09:00

## 2022-07-19 RX ADMIN — SODIUM CHLORIDE, PRESERVATIVE FREE 10 ML: 5 INJECTION INTRAVENOUS at 14:00

## 2022-07-19 RX ADMIN — PANTOPRAZOLE SODIUM 40 MG: 40 TABLET, DELAYED RELEASE ORAL at 09:00

## 2022-07-19 NOTE — PROGRESS NOTES
[facility] Hospitalist Progress Note  Homero Shoemaker MD    Date:7/19/2022       Room:[facility]  Patient Name:Keith Gordillo     Date of Birth:[birth year]     Age:81 y.o.    4/25/22 admitted to hospital for acute hypoxic respiratory failure in the setting of aspiration pneumonitis. 5/1/22 discharge summary notes: CXR and CT of the chest concerning for developing aspiration PNA, patient treated empirically with Rocephin. Patient was started on Zosyn and Pulmonology consulted. CT of the head/neck with no acute findings. UA consistent with UTI, urine culture negative. Given CT of the chest and CT A/P demonstrating distal esophageal wall thickening and mild distal esophageal distention in the setting of new aspiration PNA and RUQ abdominal pain GI has been consulted. GI performed EGD revealing severe erosive esophagitis with a hiatal hernia. Biopsies performed and will need further follow-up with Dr. Per Borrego. We will continue Protonix and Carafate. Pulmonary consult, Dr. Chappell Ebbs of 66 5% with septal thickening and abnormal diastolic dysfunctionrule out a CHF fluid overload. ABG complete on 04/26 revealed a PO2 of 70. Blood and urine culture have shown no growth in the preliminary results, will continue to monitor. Patient ambulated on room air with her lowest SPO2 of 96% and will be discharged with Augmentin for 10 days. Discussed discharge with patient's daughter at bedside. 7/18/22 admission course  81F, h/o dementia, HTN and DMII with shortness of breath since few days   She saw her PCP on Friday, in addition a lot of her family members were diagnosed with covid recently. She developed cough post PCP encounter and became short of breath    ER: 88%, required 4L NC, CXR right residual airspace disease. Former smoker. 7/19/22 no new complaints, tolerating medications well  Initial Covid test negative, repeat test pending    Subjective    Subjective:  Symptoms:  Stable.        Review of Systems   All other systems reviewed and are negative. Objective         Vitals Last 24 Hours:  TEMPERATURE:  Temp  Av °F (36.7 °C)  Min: 97.6 °F (36.4 °C)  Max: 98.3 °F (36.8 °C)  RESPIRATIONS RANGE: Resp  Av  Min: 16  Max: 16  PULSE OXIMETRY RANGE: SpO2  Av.6 %  Min: 89 %  Max: 100 %  PULSE RANGE: Pulse  Av.5  Min: 84  Max: 87  BLOOD PRESSURE RANGE: Systolic (28DOE), LU , Min:109 , VFW:724   ; Diastolic (69EFQ), EFC:52, Min:53, Max:62    I/O (24Hr): No intake or output data in the 24 hours ending 22 1257  Objective:  General Appearance:  Comfortable. Vital signs: (most recent): Blood pressure (!) 109/53, pulse 84, temperature 97.6 °F (36.4 °C), resp. rate 16, height 5' (1.524 m), weight 70.3 kg (155 lb), SpO2 98 %. HEENT: Normal HEENT exam.    Lungs:  Normal effort and normal respiratory rate. Heart: Regular rhythm. Abdomen: Abdomen is soft. Bowel sounds are normal.   There is no abdominal tenderness. Extremities: Normal range of motion. Neurological: Patient is alert. Pupils:  Pupils are equal, round, and reactive to light. Skin:  Warm and dry. Labs/Imaging/Diagnostics    Labs:  CBC:Recent Labs     22  1630   WBC 10.2   RBC 4.06   HGB 12.0   HCT 36.6   MCV 90.1   RDW 14.1        CHEMISTRIES:  Recent Labs     22  1630      K 3.7      CO2 24   BUN 19   CA 9.0   PT/INR:No results for input(s): INR, INREXT in the last 72 hours. No lab exists for component: PROTIME  APTT:No results for input(s): APTT in the last 72 hours. LIVER PROFILE:  Recent Labs     22  1630   AST 19   ALT 20     Lab Results   Component Value Date/Time    ALT (SGPT) 20 2022 04:30 PM    AST (SGOT) 19 2022 04:30 PM    Alk.  phosphatase 101 2022 04:30 PM    Bilirubin, total 0.4 2022 04:30 PM       Imaging Last 24 Hours:  XR CHEST PORT    Result Date: 2022  INDICATION:  chest pain COMPARISON:  FINDINGS: Single AP portable view of the chest obtained at [id number] demonstrates a stable cardiomediastinal silhouette. There is mild residual right basilar airspace disease. No osseous abnormalities are seen. Mild residual right basilar airspace disease. Assessment//Plan   Active Problems:    Respiratory failure ([address] (7/18/2022)      Assessment & Plan     4/25/22 admitted to hospital for acute hypoxic respiratory failure in the setting of aspiration pneumonitis. 5/1/22 discharge summary notes: CXR and CT of the chest concerning for developing aspiration PNA, patient treated empirically with Rocephin. Patient was started on Zosyn and Pulmonology consulted. CT of the head/neck with no acute findings. UA consistent with UTI, urine culture negative. Given CT of the chest and CT A/P demonstrating distal esophageal wall thickening and mild distal esophageal distention in the setting of new aspiration PNA and RUQ abdominal pain GI has been consulted. GI performed EGD revealing severe erosive esophagitis with a hiatal hernia. Biopsies performed and will need further follow-up with Dr. Piedad Slaughter. We will continue Protonix and Carafate. Pulmonary consult, Dr. Jeramy Tirado of 66 5% with septal thickening and abnormal diastolic dysfunctionrule out a CHF fluid overload. ABG complete on 04/26 revealed a PO2 of 70. Blood and urine culture have shown no growth in the preliminary results, will continue to monitor. Patient ambulated on room air with her lowest SPO2 of 96% and will be discharged with Augmentin for 10 days. Discussed discharge with patient's daughter at bedside. 7/18/22 admission course  81F, h/o dementia, HTN and DMII with shortness of breath since few days   She saw her PCP on Friday, in addition a lot of her family members were diagnosed with covid recently. She developed cough post PCP encounter and became short of breath    ER: 88%, required 4L NC, CXR right residual airspace disease. Former smoker.      7/19/22 no new complaints, tolerating medications well  Initial Covid test negative, repeat test pending    MICROBIOLOGY    7/18/22 Blood  Negative so far    7/18/22 Covid 19 Negative  7/19/22 Covid 19 Pending    ASSESSMENT AND RECOMMENDATIONS    1) Acute hypoxic respiratory failure at admission in a patient with frequent aspiration episodes. Supportive care with respiratory support as needed   Bronchodilators   Zosyn and azithromycin for now    2) Advanced Alzheimer dementia. Supportive care   Continue outpatient regimen    Galantamine    Memantine    Quetiapine    3) Chronic esophagitis in the setting of GERD.      Continue sucralfate and pantoprazole    4) DVT prophylaxis with enoxaparin           Electronically signed by Leanne Martinez MD on 7/19/2022 at 12:57 PM

## 2022-07-19 NOTE — CONSULTS
Pulmonary/ CC Consult    Subjective:   Date of Consultation:  July 19, 2022  Referring Physician: Shabbir Nolasco MD    [facility] you for this consultation. This is an 80years old  female who is known to have history of dementia, hypertension diabetes mellitus type 2. She was brought to the hospital by her family as she had dropped her oxygen saturations on ambulation. Apparently her family members were recently diagnosed with COVID-19 infection. The date of her admission she was noted to be short of breath. Her oxygen saturation was checked at home which was 84%. Came to the ER and was admitted in hospital for further management. She does have history of pneumonia for which she was treated in April 2022 patient was treated at the time with IV Zosyn  Patient does not use any supplemental oxygen at home. And there was no history of any high-grade fever, persistent bouts of coughing or congestion. Was initially placed on 4 L of nasal cannula oxygen which improved saturations. Earlier today her oxygen saturation was 96% on 2 L. Patient denies any hemoptysis, purulent sputum production. Chest x-ray on admission was done which was reviewed. Patient Active Problem List   Diagnosis Code    Uncontrolled type 2 diabetes mellitus with hyperglycemia, with long-term current use of insulin ([facility]) E11.65, Z79.4    Mixed hyperlipidemia E78.2    Essential hypertension I10    Alzheimer's dementia without behavioral disturbance ([address] G30.9, F02.80    Type 2 diabetes mellitus with nephropathy ([address] E11.21    Severe obesity (BMI 35.0-39. 9) E66.01    Aspiration pneumonia ([address] J69.0    Acute respiratory failure with hypoxia ([address] J96.01    Sepsis ([address] A41.9    Esophagitis K20.90    CAROL (acute kidney injury) ([address] N17.9    Respiratory failure ([facility]) J96.90     Past Medical History:   Diagnosis Date    Arthritis     Cancer ([address] 2006    colon cancer    COPD     sleep apnea    Diabetes ([address]     GERD (gastroesophageal reflux disease)     Other ill-defined conditions(799.89)     fibromylgia    PUD (peptic ulcer disease)     Unspecified sleep apnea       Family History   Problem Relation Age of Onset    Diabetes Mother     Diabetes Sister     Diabetes Brother     Heart Disease Brother       Social History     Tobacco Use    Smoking status: Former Smoker    Smokeless tobacco: Never Used   Substance Use Topics    Alcohol use: No     Past Surgical History:   Procedure Laterality Date    HX APPENDECTOMY      HX BACK SURGERY      neck and back    HX HYSTERECTOMY      HX ORTHOPAEDIC  2010    right tkr    HX OTHER SURGICAL      tonsil    DE ABDOMEN SURGERY PROC UNLISTED  2006    colectomy    DE WRIST ARTHROSCOP,DIAGNOSTIC      bilateral carpal tunell      Prior to Admission medications    Medication Sig Start Date End Date Taking? Authorizing Provider   sucralfate (Carafate) 1 gram tablet Take 1 Tablet by mouth four (4) times daily. 4/30/22   Jeevan Meyer PA-C   pantoprazole (Protonix) 40 mg tablet Take 1 Tablet by mouth two (2) times a day. 4/30/22   Jeevan Meyer PA-C   amoxicillin-clavulanate (Augmentin) 500-125 mg per tablet Take 1 Tablet by mouth two (2) times a day. 4/30/22   Jeevan Meyer PA-C   aspirin delayed-release (Aspir-81) 81 mg tablet Aspir-81   1 tablet daily    Provider, Ambar   glipiZIDE SR (GLUCOTROL XL) 5 mg CR tablet Take 5 mg by mouth daily. Alisha Dixon MD   galantamine (RAZADYNE) 16 mg SR capsule Take 16 mg by mouth Daily (before breakfast). Alisha Dixon MD   ferrous sulfate (IRON) 325 mg (65 mg iron) EC tablet Take 325 mg by mouth two (2) times a day. Alisha Dixon MD   memantine (NAMENDA) 10 mg tablet Take 10 mg by mouth two (2) times a day. Alisha Dixon MD   multivitamin (ONE A DAY) tablet Take 1 Tablet by mouth daily. Alisha Dixon MD   QUEtiapine (SEROqueL) 25 mg tablet Take 25 mg by mouth two (2) times a day.  Take 2 per dose    Destiny MD Alisha   guaiFENesin-dextromethorphan SR (Mucinex DM) 600-30 mg per tablet Take 1 Tablet by mouth two (2) times a day. 21   Huma Joy MD   atorvastatin (LIPITOR) 10 mg tablet Take 1 Tab by mouth daily. 2/10/17   Provider, Historical     No Known Allergies     Review of Systems: Limited review of systems. Patient herself has significant dementia. Daughter was present at bedside and information was gathered from her. Objective:   Blood pressure (!) 109/53, pulse 84, temperature 97.6 °F (36.4 °C), resp. rate 16, height 5' (1.524 m), weight 70.3 kg (155 lb), SpO2 98 %. Temp (24hrs), Av °F (36.7 °C), Min:97.6 °F (36.4 °C), Max:98.3 °F (36.8 °C)    XR CHEST PORT   Final Result   Mild residual right basilar airspace disease.             Data Review:   Current Facility-Administered Medications   Medication Dose Route Frequency    piperacillin-tazobactam (ZOSYN) 3.375 g in 0.9% sodium chloride (MBP/ADV) 100 mL MBP  3.375 g IntraVENous Q8H    albuterol-ipratropium (DUO-NEB) 2.5 MG-0.5 MG/3 ML  3 mL Nebulization Q4H RT    sodium chloride (NS) flush 5-40 mL  5-40 mL IntraVENous Q8H    sodium chloride (NS) flush 5-40 mL  5-40 mL IntraVENous PRN    acetaminophen (TYLENOL) tablet 650 mg  650 mg Oral Q6H PRN    Or    acetaminophen (TYLENOL) suppository 650 mg  650 mg Rectal Q6H PRN    polyethylene glycol (MIRALAX) packet 17 g  17 g Oral DAILY PRN    ondansetron (ZOFRAN ODT) tablet 4 mg  4 mg Oral Q8H PRN    Or    ondansetron (ZOFRAN) injection 4 mg  4 mg IntraVENous Q6H PRN    enoxaparin (LOVENOX) injection 40 mg  40 mg SubCUTAneous DAILY    aspirin delayed-release tablet 81 mg  81 mg Oral DAILY    atorvastatin (LIPITOR) tablet 10 mg  10 mg Oral DAILY    galantamine (RAZADYNE) tablet 8 mg  8 mg Oral BID    memantine (NAMENDA) tablet 10 mg  10 mg Oral BID    pantoprazole (PROTONIX) tablet 40 mg  40 mg Oral BID    QUEtiapine (SEROquel) tablet 25 mg  25 mg Oral BID    sucralfate (CARAFATE) tablet 1 g  1 g Oral QID    azithromycin (ZITHROMAX) 500 mg in 0.9% sodium chloride 250 mL (Fyxu1Net)  500 mg IntraVENous Q24H    0.9% sodium chloride infusion  75 mL/hr IntraVENous CONTINUOUS        Exam:      This is an elderly  female who is currently not in any distress. She was on 2 L of oxygen. She is alert and sponsor. Head normocephalic and atraumatic, pupils are round responsive light sclera icteric and conjunctive are pink. Neck is supple. No cervical lymphadenopathy. Thyroid not large  Chest: Decreased air entry at lung bases. No wheezing was appreciated  Heart: S1-S2 normal  Abdomen: Soft, nontender, no visceromegaly  Extremities: No edema, sinus or clubbing  Neuro: No focal motor deficit. Recent Results (from the past 24 hour(s))   CBC WITH AUTOMATED DIFF    Collection Time: 07/18/22  4:30 PM   Result Value Ref Range    WBC 10.2 3.6 - 11.0 K/uL    RBC 4.06 3.80 - 5.20 M/uL    HGB 12.0 11.5 - 16.0 g/dL    HCT 36.6 35.0 - 47.0 %    MCV 90.1 80.0 - 99.0 FL    MCH 29.6 26.0 - 34.0 PG    MCHC 32.8 30.0 - 36.5 g/dL    RDW 14.1 11.5 - 14.5 %    PLATELET 656 635 - 179 K/uL    MPV 12.1 8.9 - 12.9 FL    NRBC 0.0 0.0  WBC    ABSOLUTE NRBC 0.00 0.00 - 0.01 K/uL    NEUTROPHILS 79 (H) 32 - 75 %    LYMPHOCYTES 12 12 - 49 %    MONOCYTES 5 5 - 13 %    EOSINOPHILS 3 0 - 7 %    BASOPHILS 0 0 - 1 %    IMMATURE GRANULOCYTES 1 (H) 0 - 0.5 %    ABS. NEUTROPHILS 8.1 (H) 1.8 - 8.0 K/UL    ABS. LYMPHOCYTES 1.2 0.8 - 3.5 K/UL    ABS. MONOCYTES 0.5 0.0 - 1.0 K/UL    ABS. EOSINOPHILS 0.4 0.0 - 0.4 K/UL    ABS. BASOPHILS 0.0 0.0 - 0.1 K/UL    ABS. IMM.  GRANS. 0.1 (H) 0.00 - 0.04 K/UL    DF AUTOMATED     METABOLIC PANEL, COMPREHENSIVE    Collection Time: 07/18/22  4:30 PM   Result Value Ref Range    Sodium 139 136 - 145 mmol/L    Potassium 3.7 3.5 - 5.1 mmol/L    Chloride 105 97 - 108 mmol/L    CO2 24 21 - 32 mmol/L    Anion gap 10 5 - 15 mmol/L    Glucose 323 (H) 65 - 100 mg/dL    BUN 19 6 - 20 mg/dL    Creatinine 0. 77 0.55 - 1.02 mg/dL    BUN/Creatinine ratio 25 (H) 12 - 20      GFR est AA >60 >60 ml/min/1.73m2    GFR est non-AA >60 >60 ml/min/1.73m2    Calcium 9.0 8.5 - 10.1 mg/dL    Bilirubin, total 0.4 0.2 - 1.0 mg/dL    AST (SGOT) 19 15 - 37 U/L    ALT (SGPT) 20 12 - 78 U/L    Alk.  phosphatase 101 45 - 117 U/L    Protein, total 7.2 6.4 - 8.2 g/dL    Albumin 3.4 (L) 3.5 - 5.0 g/dL    Globulin 3.8 2.0 - 4.0 g/dL    A-G Ratio 0.9 (L) 1.1 - 2.2     TROPONIN-HIGH SENSITIVITY    Collection Time: 07/18/22  4:30 PM   Result Value Ref Range    Troponin-High Sensitivity 4 0 - 51 ng/L   NT-PRO BNP    Collection Time: 07/18/22  4:30 PM   Result Value Ref Range    NT pro- <450 pg/mL   PROCALCITONIN    Collection Time: 07/18/22  4:30 PM   Result Value Ref Range    Procalcitonin 0.05 (H) 0 ng/mL   EKG, 12 LEAD, INITIAL    Collection Time: 07/18/22  4:39 PM   Result Value Ref Range    Ventricular Rate 84 BPM    Atrial Rate 84 BPM    P-R Interval 180 ms    QRS Duration 70 ms    Q-T Interval 380 ms    QTC Calculation (Bezet) 449 ms    Calculated P Axis 36 degrees    Calculated R Axis -24 degrees    Calculated T Axis 40 degrees    Diagnosis       Sinus rhythm with Fusion complexes  Anterior infarct (cited on or before 25-APR-2022)  Abnormal ECG  When compared with ECG of 25-APR-2022 11:22,  Fusion complexes are now Present  Confirmed by Pablito ISAAC, [address] [place] [address] ([id number]) on 7/18/2022 9:58:40 PM     COVID-19 RAPID TEST    Collection Time: 07/18/22  4:45 PM   Result Value Ref Range    COVID-19 rapid test Not Detected Not Detected     CULTURE, BLOOD, PAIRED    Collection Time: 07/18/22  6:23 PM    Specimen: Blood   Result Value Ref Range    Special Requests: No Special Requests      Culture result: No growth after 1 hour     LACTIC ACID    Collection Time: 07/18/22  6:23 PM   Result Value Ref Range    Lactic acid 3.1 (HH) 0.4 - 2.0 mmol/L   SARS-COV-2    Collection Time: 07/18/22  7:59 PM   Result Value Ref Range    SARS-CoV-2 by PCR Please find results under separate order     LACTIC ACID    Collection Time: 07/19/22  5:53 AM   Result Value Ref Range    Lactic acid 1.7 0.4 - 2.0 mmol/L       Impression: This is an elderly female with history of dementia, hypertension and diabetes mellitus. She was treated for pneumonia in April 2022. Repeat CT scan of chest 7 1, improvement in her infiltrates as compared to initial CT scan that was done in April. She was noted to be hypoxic at home she was recently exposed to COVID-19 infection and other family members. Plan:   1. Acute hypoxic respiratory failure:  Her initial oxygen saturation was 86% on room air at home. She was then placed on 4 L of oxygen which improved saturations. Currently she is on 2 L of oxygen. She is not in any distress. Goal is to wean her off of supplemental oxygen. 2.  COVID-19 closure:  She was exposed to family member with COVID-19 infection. Her PCR test is pending. Rapid COVID-19 test is negative. Got started on IV Zosyn and Zithromax. Once her PCR test is negative then she may be able to go home  Meanwhile we will repeat CT scan of her chest to evaluate for changes which are the earliest features noted in COVID-19 infection of the lungs. Management  was discussed with family member at bedside.       Moe Valverde MD  [facility]

## 2022-07-19 NOTE — PROGRESS NOTES
Patient daughter at bedside and is refusing to don PPE. Education provided and PPE given to the daughter. Daughter informed that she will no be allowed to stay overnight. When patient asked if she understood this she stated \"that's great\" and winked at nurse. Overnight sitter and RN reported daughter was a source of agitation for the patient and was not beneficial to care.

## 2022-07-19 NOTE — PROGRESS NOTES
Reason for Admission:   Respiratory Failure                    RUR Score:  15%                PCP: First and Last name:   Penelope Sheets     Name of Practice:    Are you a current patient: Yes/No: Yes   Approximate date of last visit: Last Friday   Can you participate in a virtual visit if needed:     Do you (patient/family) have any concerns for transition/discharge? Plan for utilizing home health:   No    Current Advanced Directive/Advance Care Plan:  Full Code      Healthcare Decision Maker:   Click here to complete [address] including selection of the Healthcare Decision Maker Relationship (ie \"Primary\")            Primary Decision MakerSteinna Maldonado - [phone]    Transition of Care Plan:          Cm met with pt and pt's daughter - Chandni Jorgensen at the bedside to complete discharge assessment. Daughter is pt's medical POA. Cm verified home address and daughter's contact#. Pt lives with daughter. Pt ambulates without DME. Pt is currently on O2, but has no home O2. Cm will continue to monitor for home O2 needs. Pt is not current with home health, but has [facility] in the past. Pt uses [facility] off of [address] to obtain medications. Cm will continue to follow.

## 2022-07-19 NOTE — PROGRESS NOTES
Spiritual Care Assessment/Progress Note  [facility]      NAME: Reuben Mccartney      MRN: [id number]  AGE: 80 y.o.  SEX: female  Yarsanism Affiliation: Jan Valadez   Language: English     7/19/2022     Total Time (in minutes): 15     Spiritual Assessment begun in [address] through conversation with:         [x]Patient        [x] Family    [] Friend(s)        Reason for Consult: Initial/Spiritual assessment, patient floor,Other (comment) (TELEMETRY RESPONSE)     Spiritual beliefs: (Please include comment if needed)     [] Identifies with a hortensia tradition:         [] Supported by a hortensia community:            [] Claims no spiritual orientation:           [] Seeking spiritual identity:                [] Adheres to an individual form of spirituality:           [x] Not able to assess:                           Identified resources for coping:      [] Prayer                               [] Music                  [] Guided Imagery     [] Family/friends                 [] Pet visits     [] Devotional reading                         [] Unknown     [] Other:                                             Interventions offered during this visit: (See comments for more details)    Patient Interventions: Initial/Spiritual assessment, patient floor,Other (comment) (TELEMETRY RESPONSE)     Family/Friend(s): Initial Assessment     Plan of Care:     [] Support spiritual and/or cultural needs    [] Support AMD and/or advance care planning process      [] Support grieving process   [] Coordinate Rites and/or Rituals    [] Coordination with community clergy   [] No spiritual needs identified at this time   [] Detailed Plan of Care below (See Comments)  [] Make referral to Music Therapy  [] Make referral to Pet Therapy     [] Make referral to Addiction services  [] Make referral to [facility]  [] Make referral to Spiritual Care Partner  [] No future visits requested        [x] Contact Spiritual Care for further referrals     Comments: The purpose of the visit was in response to a telemetry response, and to do a spiritual assessment on the patient. Nurse manager mentioned that patient needed to cared for with a one to one sitter, even though her daughter was present. The patient was being visited by her daughter and attended to by the med-team. The  provided the ministry of presence on the unit. [facility] M.Div.    can be reached by calling the  at [facility]  [phone]

## 2022-07-19 NOTE — ED NOTES
.. TRANSFER - OUT REPORT:    Verbal report given to Hima Cervantes RN on [employer]  being transferred to [facility] for routine progression of care       Report consisted of patients Situation, Background, Assessment and   Recommendations(SBAR). Information from the following report(s) SBAR was reviewed with the receiving nurse. Lines:   Peripheral IV 07/18/22 Left;Posterior Forearm (Active)        Opportunity for questions and clarification was provided.       Patient transported with:   [employer]

## 2022-07-19 NOTE — PROGRESS NOTES
SPEECH LANGUAGE PATHOLOGY BEDSIDE SWALLOW EVALUATION  Patient: Jammie Macario (87 y.o. female)  Date: 7/19/2022  Primary Diagnosis: Respiratory failure ([address] [J96.90]       Precautions:    ASSESSMENT :  Patient on 2L O2 NC. Based on the objective data described below, the patient presents with largely [facility] [place] swallow function. Patient with timely and efficient swallow of all consistencies. Pharyngeal phase appears largely [facility] [place]. HLE adequate to palpation. Patient tolerated single and consecutive straw sips thin liquid, puree, and regular with no overt s/s of pen/aspiration. Patient maintained a clear vocal quality following all trials. Nsg reports patient is tolerating current diet of reg, thin with no overt s/s of pen/aspiration     PLAN :  Recommendations and Planned Interventions:  Rec continue reg, thin liquid diet. Adhere to aspiration precautions  Frequency/Duration: ST is not warranted at this time. Please re-consult if medically indicated   Discharge Recommendations: To Be Determined     SUBJECTIVE:   Patient is agreeable to beginning eval. Patient's daughter present during eval, with patient consent     OBJECTIVE:     CXR Results  (Last 48 hours)               07/18/22 1703  XR CHEST PORT Final result    Impression:  Mild residual right basilar airspace disease. Narrative:  INDICATION:  chest pain        COMPARISON: July 1       FINDINGS: Single AP portable view of the chest obtained at 1634 demonstrates a   stable cardiomediastinal silhouette. There is mild residual right basilar   airspace disease. No osseous abnormalities are seen.                 Past Medical History:   Diagnosis Date    Arthritis     Cancer [facility]) 2006    colon cancer    COPD     sleep apnea    Diabetes ([facility]     GERD (gastroesophageal reflux disease)     Other ill-defined conditions(799.89)     fibromylgia    PUD (peptic ulcer disease)     Unspecified sleep apnea      Past Surgical History:   Procedure Laterality Date  HX APPENDECTOMY      HX BACK SURGERY      neck and back    HX HYSTERECTOMY      HX ORTHOPAEDIC  2010    right tkr    HX OTHER SURGICAL      tonsil    IN ABDOMEN SURGERY PROC UNLISTED  2006    colectomy    IN WRIST ARTHROSCOP,DIAGNOSTIC      bilateral carpal tunell     Prior Level of Function/Home Situation:   Home Situation  Home Environment: Private residence  # Steps to Enter: 0  One/Two Story Residence: One story  Living Alone: No  Support Systems: Child(germaine)  Patient Expects to be Discharged to[de-identified] Home  Current DME Used/Available at Home: Wheelchair  Diet prior to admission: Reg, thin  Current Diet: Reg, thin    Cognitive and Communication Status:  Neurologic State: Alert  Orientation Level: Oriented to person,Disoriented to time,Disoriented to situation,Disoriented to place  Cognition: Follows commands    Swallowing Evaluation:   Oral Assessment:  Oral Assessment  Labial: No impairment  Dentition: Upper & lower dentures  Oral Hygiene:  (Adequate)  Lingual: No impairment     P.O. Trials:  Patient Position:  (HOB raised)  Vocal quality prior to P.O.: No impairment  Consistency Presented: Thin liquid; Solid;Puree  How Presented: Self-fed/presented; Successive swallows;Straw;Spoon  How Much:  (Multiple presentations)  Bolus Acceptance: No impairment  Bolus Formation/Control: No impairment  Propulsion: No impairment  Oral Residue: None  Initiation of Swallow: No impairment  Laryngeal Elevation: Functional  Aspiration Signs/Symptoms: None  Pharyngeal Phase Characteristics: No impairment, issues, or problems   Oral Phase Severity: No impairment  Pharyngeal Phase Severity : No impairment     Voice:  Vocal Quality: No impairment    Pain: 0    After treatment:   Patient left in no apparent distress in bed, Call bell within reach, Nursing notified and Caregiver / family present    COMMUNICATION/EDUCATION:   Patient was educated regarding purpose of SLP assessment, POC, diet recs and sw safety precautions.  Patient demonstrated Guarded understanding as evidenced by cognitive deficits. The patient's plan of care including recommendations, planned interventions, and recommended diet changes were discussed with: Registered nurse and Physician.      Thank you for this referral.  Vineet Hummel M.S. CCC-SLP  Time Calculation: 12 mins

## 2022-07-20 VITALS
WEIGHT: 155 LBS | TEMPERATURE: 97.9 F | DIASTOLIC BLOOD PRESSURE: 74 MMHG | RESPIRATION RATE: 17 BRPM | BODY MASS INDEX: 30.43 KG/M2 | HEART RATE: 76 BPM | OXYGEN SATURATION: 94 % | HEIGHT: 60 IN | SYSTOLIC BLOOD PRESSURE: 170 MMHG

## 2022-07-20 LAB
ANION GAP SERPL CALC-SCNC: 5 MMOL/L (ref 5–15)
BUN SERPL-MCNC: 19 MG/DL (ref 6–20)
BUN/CREAT SERPL: 25 (ref 12–20)
CA-I BLD-MCNC: 9.2 MG/DL (ref 8.5–10.1)
CHLORIDE SERPL-SCNC: 104 MMOL/L (ref 97–108)
CO2 SERPL-SCNC: 27 MMOL/L (ref 21–32)
CREAT SERPL-MCNC: 0.75 MG/DL (ref 0.55–1.02)
ERYTHROCYTE [DISTWIDTH] IN BLOOD BY AUTOMATED COUNT: 14.1 % (ref 11.5–14.5)
GLUCOSE SERPL-MCNC: 340 MG/DL (ref 65–100)
HCT VFR BLD AUTO: 36.7 % (ref 35–47)
HGB BLD-MCNC: 11.8 G/DL (ref 11.5–16)
MCH RBC QN AUTO: 28.9 PG (ref 26–34)
MCHC RBC AUTO-ENTMCNC: 32.2 G/DL (ref 30–36.5)
MCV RBC AUTO: 89.7 FL (ref 80–99)
NRBC # BLD: 0 K/UL (ref 0–0.01)
NRBC BLD-RTO: 0 PER 100 WBC
PLATELET # BLD AUTO: 213 K/UL (ref 150–400)
PMV BLD AUTO: 12.3 FL (ref 8.9–12.9)
POTASSIUM SERPL-SCNC: 3.8 MMOL/L (ref 3.5–5.1)
PROCALCITONIN SERPL-MCNC: <0.05 NG/ML
RBC # BLD AUTO: 4.09 M/UL (ref 3.8–5.2)
SODIUM SERPL-SCNC: 136 MMOL/L (ref 136–145)
WBC # BLD AUTO: 7.1 K/UL (ref 3.6–11)

## 2022-07-20 PROCEDURE — 74011250636 HC RX REV CODE- 250/636: Performed by: INTERNAL MEDICINE

## 2022-07-20 PROCEDURE — 84145 PROCALCITONIN (PCT): CPT

## 2022-07-20 PROCEDURE — 85027 COMPLETE CBC AUTOMATED: CPT

## 2022-07-20 PROCEDURE — 74011250637 HC RX REV CODE- 250/637: Performed by: HOSPITALIST

## 2022-07-20 PROCEDURE — 80048 BASIC METABOLIC PNL TOTAL CA: CPT

## 2022-07-20 PROCEDURE — 74011000250 HC RX REV CODE- 250: Performed by: HOSPITALIST

## 2022-07-20 PROCEDURE — 36415 COLL VENOUS BLD VENIPUNCTURE: CPT

## 2022-07-20 PROCEDURE — 74011000258 HC RX REV CODE- 258: Performed by: INTERNAL MEDICINE

## 2022-07-20 RX ORDER — AZITHROMYCIN 500 MG/1
500 TABLET, FILM COATED ORAL DAILY
Qty: 7 TABLET | Refills: 0 | Status: SHIPPED | OUTPATIENT
Start: 2022-07-20 | End: 2022-07-27

## 2022-07-20 RX ORDER — AMOXICILLIN AND CLAVULANATE POTASSIUM 500; 125 MG/1; MG/1
1 TABLET, FILM COATED ORAL 2 TIMES DAILY
Qty: 20 TABLET | Refills: 0 | Status: SHIPPED | OUTPATIENT
Start: 2022-07-20 | End: 2022-07-27

## 2022-07-20 RX ADMIN — Medication 5 MG: at 01:30

## 2022-07-20 RX ADMIN — PIPERACILLIN AND TAZOBACTAM 3.38 G: 3; .375 INJECTION, POWDER, FOR SOLUTION INTRAVENOUS at 05:55

## 2022-07-20 RX ADMIN — SODIUM CHLORIDE, PRESERVATIVE FREE 10 ML: 5 INJECTION INTRAVENOUS at 05:55

## 2022-07-20 NOTE — PROGRESS NOTES
Patient discharging, refused morning meds, no telemetry on patient discontinued yesterday, IV removed at shift change. Refused am medications will take at home., DC instructions reviewed with patient and caregiver.

## 2022-07-20 NOTE — PROGRESS NOTES
Patient is alert but confused. Patient has poor safety awareness and keeps trying to get out of bed and leave the room. Patient has not slept overnight.

## 2022-07-20 NOTE — PROGRESS NOTES
Problem: General Infection Care Plan (Adult and Pediatric)  Goal: Improvement in signs and symptoms of infection  Outcome: Progressing Towards Goal     Problem: Falls - Risk of  Goal: *Absence of Falls  Description: Document Demetrius Fall Risk and appropriate interventions in the flowsheet.   Outcome: Progressing Towards Goal  Note: Fall Risk Interventions:  Mobility Interventions: PT Consult for mobility concerns    Mentation Interventions: Adequate sleep, hydration, pain control, Bed/chair exit alarm    Medication Interventions: Teach patient to arise slowly    Elimination Interventions: Call light in reach, Toileting schedule/hourly rounds

## 2022-07-20 NOTE — ROUTINE PROCESS
Family of Patient bedside, requesting doctor discharge mom today, messaged md on perfect serve the request

## 2022-07-20 NOTE — DISCHARGE SUMMARY
Admit date: 7/18/2022   Admitting Provider: Abel Hernandez MD    Discharge date: 7/20/2022  Discharging Provider: Lori Lovell MD      * Admission Diagnoses: Respiratory failure [facility]) [J96.90]    * Discharge Diagnoses:    Hospital Problems as of 7/20/2022 Date Reviewed: 7/20/2022            Codes Class Noted - Resolved POA    Respiratory failure ([facility] ICD-10-CM: J96.90  ICD-9-CM: 518.81  7/18/2022 - Present Yes        Esophagitis ICD-10-CM: K20.90  ICD-9-CM: 530.10  4/30/2022 - Present Yes        * (Principal) Aspiration pneumonia ([facility] ICD-10-CM: J69.0  ICD-9-CM: 507.0  4/25/2022 - Present Yes        Type 2 diabetes mellitus with nephropathy ([facility] ICD-10-CM: E11.21  ICD-9-CM: 250.40, 583.81  1/8/2018 - Present Yes        Alzheimer's dementia without behavioral disturbance ([facility] ICD-10-CM: G30.9, F02.80  ICD-9-CM: 331.0, 294.10  8/28/2017 - Present Yes           * Hospital Course:   4/25/22 admitted to hospital for acute hypoxic respiratory failure in the setting of aspiration pneumonitis. 5/1/22 discharge summary notes: CXR and CT of the chest concerning for developing aspiration PNA, patient treated empirically with Rocephin. Patient was started on Zosyn and Pulmonology consulted. CT of the head/neck with no acute findings. UA consistent with UTI, urine culture negative. Given CT of the chest and CT A/P demonstrating distal esophageal wall thickening and mild distal esophageal distention in the setting of new aspiration PNA and RUQ abdominal pain GI has been consulted. GI performed EGD revealing severe erosive esophagitis with a hiatal hernia. Biopsies performed and will need further follow-up with Dr. Isidra Cisneros. We will continue Protonix and Carafate. Pulmonary consult, Dr. Fraga Masters. EF of 66 5% with septal thickening and abnormal diastolic dysfunctionrule out a CHF fluid overload. ABG complete on 04/26 revealed a PO2 of 70.  Blood and urine culture have shown no growth in the preliminary results, will continue to monitor. Patient ambulated on room air with her lowest SPO2 of 96% and will be discharged with Augmentin for 10 days. Discussed discharge with patient's daughter at bedside. 7/18/22 admission course  81F, h/o dementia, HTN and DMII with shortness of breath since few days   She saw her PCP on Friday, in addition a lot of her family members were diagnosed with covid recently. She developed cough post PCP encounter and became short of breath    ER: 88%, required 4L NC, CXR right residual airspace disease. Former smoker. 7/19/22 no new complaints, tolerating medications well  Initial Covid test negative, repeat test negative    7/19/22 PULMONARY DR MENJIVAR  1. Acute hypoxic respiratory failure:  Her initial oxygen saturation was 86% on room air at home. She was then placed on 4 L of oxygen which improved saturations. Currently she is on 2 L of oxygen. She is not in any distress. Goal is to wean her off of supplemental oxygen. 2.  COVID-19 closure:  She was exposed to family member with COVID-19 infection. Her PCR test is pending. Rapid COVID-19 test is negative. Got started on IV Zosyn and Zithromax. Once her PCR test is negative then she may be able to go home  Meanwhile we will repeat CT scan of her chest to evaluate for changes which are the earliest features noted in COVID-19 infection of the lungs.       * Procedures:  * No surgery found *      Consults: Pulmonary/Intensive care    Significant Diagnostic Studies: labs:   Recent Results (from the past 24 hour(s))   CBC W/O DIFF    Collection Time: 07/20/22  8:37 AM   Result Value Ref Range    WBC 7.1 3.6 - 11.0 K/uL    RBC 4.09 3.80 - 5.20 M/uL    HGB 11.8 11.5 - 16.0 g/dL    HCT 36.7 35.0 - 47.0 %    MCV 89.7 80.0 - 99.0 FL    MCH 28.9 26.0 - 34.0 PG    MCHC 32.2 30.0 - 36.5 g/dL    RDW 14.1 11.5 - 14.5 %    PLATELET 527 973 - 789 K/uL    MPV 12.3 8.9 - 12.9 FL    NRBC 0.0 0.0  WBC    ABSOLUTE NRBC 0.00 0.00 - 0.01 K/uL     CT Results  (Last 48 hours)                 07/19/22 1755  CT CHEST WO CONT Final result    Impression:  1. Bibasilar groundglass opacification may represent residual   infection/inflammation, however is significantly improved compared to July 1, 2022.   2.  Distended stomach and fluid-filled esophagus, predisposing the patient to   aspiration. 3.  Dilated main pulmonary artery suggestive of pulmonary arterial hypertension. Narrative:  INDICATION: Pneumonia       COMPARISON: CT chest July 1, 2022       CONTRAST: None. TECHNIQUE:  5 mm axial images were obtained through the chest. Coronal and   sagittal reformats were generated. CT dose reduction was achieved through use   of a standardized protocol tailored for this examination and automatic exposure   control for dose modulation. The absence of intravenous contrast reduces the sensitivity for evaluation of   the mediastinum, lisha, vasculature, and upper abdominal organs. FINDINGS:       CHEST WALL: No mass or axillary lymphadenopathy. THYROID: No nodule. MEDIASTINUM: No mass or lymphadenopathy. LISHA: No mass or lymphadenopathy. THORACIC AORTA: No aneurysm. MAIN PULMONARY ARTERY: Dilated main pulmonary artery suggestive of pulmonary   arterial hypertension. TRACHEA/BRONCHI: Patent. ESOPHAGUS: Patulous fluid-filled esophagus with a small hiatal hernia. HEART: Dense mitral annular calcifications. No pericardial effusion. PLEURA: No effusion or pneumothorax. LUNGS: Mild lower lobe and scattered groundglass opacification, significantly   improved compared to July 1, 2022 where there was a more diffuse groundglass   abnormality. INCIDENTALLY IMAGED UPPER ABDOMEN: Distended stomach containing enteric   contents. BONES: No destructive bone lesion.                      Discharge Exam:  Patient Vitals for the past 24 hrs:   Temp Pulse Resp BP SpO2   07/20/22 0808 -- -- -- -- 96 %   07/19/22 2045 97.9 °F (36.6 °C) 71 16 (!) 152/74 95 %   07/19/22 1551 97.7 °F (36.5 °C) 78 16 138/65 95 %    General Appearance:  Comfortable. HEENT: Normal HEENT exam.    Lungs:  Normal effort and normal respiratory rate. Heart: Regular rhythm. Abdomen: Abdomen is soft. Bowel sounds are normal.   There is no abdominal tenderness. Extremities: Normal range of motion. Neurological: Patient is alert. Pupils:  Pupils are equal, round, and reactive to light. Skin:  Warm and dry. * Discharge Condition: stable  * Disposition: Home    Discharge Medications:  Current Discharge Medication List        START taking these medications    Details   azithromycin (ZITHROMAX) 500 mg tab Take 1 Tablet by mouth in the morning for 7 days. Qty: 7 Tablet, Refills: 0  Start date: 7/20/2022, End date: 7/27/2022           CONTINUE these medications which have CHANGED    Details   amoxicillin-clavulanate (Augmentin) 500-125 mg per tablet Take 1 Tablet by mouth two (2) times a day for 7 days. Qty: 20 Tablet, Refills: 0  Start date: 7/20/2022, End date: 7/27/2022           CONTINUE these medications which have NOT CHANGED    Details   sucralfate (Carafate) 1 gram tablet Take 1 Tablet by mouth four (4) times daily. Qty: 60 Tablet, Refills: 1      pantoprazole (Protonix) 40 mg tablet Take 1 Tablet by mouth two (2) times a day. Qty: 60 Tablet, Refills: 1      aspirin delayed-release (Aspir-81) 81 mg tablet Aspir-81   1 tablet daily      glipiZIDE SR (GLUCOTROL XL) 5 mg CR tablet Take 5 mg by mouth daily. galantamine (RAZADYNE) 16 mg SR capsule Take 16 mg by mouth Daily (before breakfast). ferrous sulfate (IRON) 325 mg (65 mg iron) EC tablet Take 325 mg by mouth two (2) times a day. memantine (NAMENDA) 10 mg tablet Take 10 mg by mouth two (2) times a day. multivitamin (ONE A DAY) tablet Take 1 Tablet by mouth daily. QUEtiapine (SEROqueL) 25 mg tablet Take 25 mg by mouth two (2) times a day.  Take 2 per dose guaiFENesin-dextromethorphan SR (Mucinex DM) 600-30 mg per tablet Take 1 Tablet by mouth two (2) times a day. Qty: 12 Tablet, Refills: 0      atorvastatin (LIPITOR) 10 mg tablet Take 1 Tab by mouth daily. Associated Diagnoses: Type 2 diabetes mellitus without complication, unspecified long term insulin use status             * Follow-up Care/Patient Instructions:   Activity: Activity as tolerated and no driving for today  Diet: Resume previous diet  Wound Care: None needed    Follow-up Information       Follow up With Specialties Details Why [address], [address] [address]  [address] [place] [address], [address] [place] [address], MD Pulmonary Disease Follow up in 2 week(s)  [address] [facility]  [phone]              Signed:  Drew Moreno MD  7/20/2022  9:22 AM

## 2022-07-21 ENCOUNTER — PATIENT OUTREACH (OUTPATIENT)
Dept: CASE MANAGEMENT | Age: 82
End: 2022-07-21

## 2022-07-21 RX ORDER — DULAGLUTIDE 1.5 MG/.5ML
1.5 INJECTION, SOLUTION SUBCUTANEOUS
COMMUNITY

## 2022-07-21 NOTE — ACP (ADVANCE CARE PLANNING)
Patient's daughter, Caroline Rick [employer] [facility]), states patient's AMD dated 4-9-2018 and patient's DDNR dated 4- are current ACP documents.

## 2022-07-21 NOTE — PROGRESS NOTES
22 at 11:42am:  Care Transitions Outreach Attempt    Call within 2 business days of discharge: Yes. Attempted to reach patient and daughter, Sade Myers [employer] [facility]), for transitions of care follow up. Unable to reach patient/daughter. Patient: Cathi Mcardle Patient : 1940 MRN: [id number]    Last Discharge 30 [facility]       Date Complaint Diagnosis Description Type Department Provider    22 Shortness of Breath Hypoxia . .. ED to Hosp-Admission (Discharged) (ADMIT) Homero Duke MD; Hilton Haq,... Was this an external facility discharge? No.    Noted following upcoming appointments from discharge chart review:   [facility] follow up appointment(s): No future appointments. Non-[facility] follow up appointment(s): None noted at this time. 22 at 3:21pm:  Care Transitions Initial Call    Call within 2 business days of discharge: Yes     Patient: Cathi Mcardle Patient : 1940 MRN: [id number]    Last Discharge 30 [facility]       Date Complaint Diagnosis Description Type Department Provider    22 Shortness of Breath Hypoxia . .. ED to Hosp-Admission (Discharged) (ADMIT) Homero Duke MD; Hilton Haq,... Was this an external facility discharge? No.    Challenges to be reviewed by the provider   Additional needs identified to be addressed with provider: no  None, patient's daughter has no red flags to report at this time. Method of communication with provider:    None, patient's daughter has no red flags to report at this time and patient's PCP is a non-[facility] provider.      Component      Latest Ref Rng & Units 2022           4:30 PM  7:24 AM   NEUTROPHILS      32 - 75 % 79 (H) 45     Component      Latest Ref Rng & Units 2022           4:30 PM  7:24 AM   IMMATURE GRANULOCYTES      0 - 0.5 % 1 (H) 1 (H)     Component      Latest Ref Rng & Units 2022           8:37 AM  4:30 PM  7:24 AM BUN/Creatinine ratio      12 - 20   25 (H) 25 (H) 15     Component      Latest Ref Rng & Units 7/18/2022 5/1/2022           4:30 PM  7:24 AM   Albumin      3.5 - 5.0 g/dL 3.4 (L) 2.5 (L)     Component      Latest Ref Rng & Units 7/20/2022 7/18/2022           8:37 AM  4:30 PM   Procalcitonin      0 ng/mL <0.05 (H) 0.05 (H)     Component      Latest Ref Rng & Units 7/20/2022 7/18/2022           8:37 AM  4:30 PM   Glucose      65 - 100 mg/dL 340 (H) 323 (H)     Component      Latest Ref Rng & Units 5/1/2022 4/30/2022 4/30/2022           7:25 AM  7:56 PM  4:38 PM   GLUCOSE,FAST - POC      65 - 117 mg/dL 139 (H) 162 (H) 352 (H)     Component      Latest Ref Rng & Units 4/25/2022           6:47 PM   Hemoglobin A1c, (calculated)      4.0 - 5.6 % 7.6 (H)   Est. average glucose      mg/dL 171     Discussed COVID-19 related testing which was available at this time. Test results were negative. Patient's daughter, [facility]), informed of results, if available? Yes. Advance Care Planning:   Does patient have an Advance Directive: yes, reviewed and current per patient's daughter, [facility]). Inpatient Readmission Risk score: Unplanned Readmit Risk Score: 13.9 ( )    Was this a readmission? No.   Patient stated reason for the admission: N/A, telephonic assessment completed with patient's daughter, [facility]). Patients top risk factors for readmission:  Medical condition -  Essential hypertension, diabetes with nephropathy, Alzheimer's dementia, history of aspiration pneumonia, history of acute respiratory failure with hypoxia and recent respiratory failure, and mixed hyperlipidemia per chart.    Interventions to address risk factors: Obtained and reviewed discharge summary and/or continuity of care documents and Education of patient/family/caregiver/guardian to support self-management-Education provided regarding signs/symptoms of shortness of breath and hypoxia, patient's daughter verbalized an understanding. Care Transition Nurse (CTN) contacted the  patient's daughter, Dionisio Kilpatrick [employer] [facility]),  by telephone to perform post hospital discharge assessment. Verified name and  with  the daughter  as identifiers. Provided introduction to self, and explanation of the CTN role. CTN reviewed discharge instructions, medical action plan and red flags with  the daughter  who verbalized understanding. Were discharge instructions available to patient/daughter? Yes. Reviewed appropriate site of care based on symptoms and resources available to patient including: PCP, Specialist, Urgent Care Clinics, and When to call 911. The daughter was  given an opportunity to ask questions and does not have any further questions or concerns at this time. The daughter  agrees to contact the PCP office for questions related to patient's healthcare. Medication reconciliation was performed with  the patient's daughter , who verbalizes understanding of administration of home medications. Advised obtaining a 90-day supply of all daily and as-needed medications. Referral to Pharm D needed: no.     Home Health/Outpatient orders at discharge: [address]: n/a  Date of initial visit: [facility] ordered at discharge: None  [address] received: n/a    Was patient discharged with a pulse oximeter? no.     Discussed follow-up appointments. If no appointment was previously scheduled, appointment scheduling offered: yes. Is follow up appointment scheduled within 7 days of discharge? No, patient's daughter states patient saw her PCP on 7-15-22 and daughter declines [facility] appointment at this time. [facility] follow up appointment(s): No future appointments. Non-[facility] follow up appointment(s): The daughter states she will call the office of Dr. Shahida Tolbert. Abraham/pulmonary on 22 to schedule patient's follow-up appointment.      Plan for follow-up call in 10-14 days based on severity of symptoms and risk factors. Plan for next call: symptom management-Review red flags of shortness of breath and hypoxia, and follow up appointment-Evaluate if patient is attending follow-up appointment(s) as scheduled, offer assistance with scheduling as needed  CTN provided contact information for future needs. Goals Addressed                   This Visit's Progress     Understands red flags post discharge. 7-21-22: Red flags of shortness of breath and hypoxia reviewed with patient's daughter, Odette Parra [facility] [facility]), and daughter verbalized an understanding. Daughter denies patient having shortness of breath, denies chest pain, denies fever/chills, and denies nausea/vomiting since discharge on 7-20-22. Daughter states patient is utilizing a regular diet at home, no added salt, appetite is good. Daughter states patient has not had any falls in the last 12 months. Daughter has no red flags to report at this time. Care Transitions Nurse will review red flags again with patient's daughter on next phone conversation.  Yazmin Kern

## 2022-07-25 LAB
BACTERIA SPEC CULT: NORMAL
SPECIAL REQUESTS,SREQ: NORMAL

## 2023-08-31 ENCOUNTER — APPOINTMENT (OUTPATIENT)
Facility: HOSPITAL | Age: 83
End: 2023-08-31
Payer: MEDICARE

## 2023-08-31 ENCOUNTER — HOSPITAL ENCOUNTER (EMERGENCY)
Facility: HOSPITAL | Age: 83
Discharge: HOME | End: 2023-08-31
Attending: STUDENT IN AN ORGANIZED HEALTH CARE EDUCATION/TRAINING PROGRAM
Payer: MEDICARE

## 2023-08-31 VITALS
RESPIRATION RATE: 16 BRPM | TEMPERATURE: 98 F | OXYGEN SATURATION: 95 % | SYSTOLIC BLOOD PRESSURE: 138 MMHG | HEART RATE: 62 BPM | DIASTOLIC BLOOD PRESSURE: 82 MMHG | WEIGHT: 170 LBS | HEIGHT: 60 IN | BODY MASS INDEX: 33.38 KG/M2

## 2023-08-31 DIAGNOSIS — D72.829 LEUKOCYTOSIS, UNSPECIFIED TYPE: ICD-10-CM

## 2023-08-31 DIAGNOSIS — J18.9 PNEUMONIA OF BOTH LOWER LOBES DUE TO INFECTIOUS ORGANISM: Primary | ICD-10-CM

## 2023-08-31 LAB
ALBUMIN SERPL-MCNC: 3.1 G/DL (ref 3.5–5)
ALBUMIN/GLOB SERPL: 0.7 (ref 1.1–2.2)
ALP SERPL-CCNC: 83 U/L (ref 45–117)
ALT SERPL-CCNC: 13 U/L (ref 12–78)
ANION GAP SERPL CALC-SCNC: 6 MMOL/L (ref 5–15)
AST SERPL W P-5'-P-CCNC: 7 U/L (ref 15–37)
BASOPHILS # BLD: 0 K/UL (ref 0–0.1)
BASOPHILS NFR BLD: 0 % (ref 0–1)
BILIRUB SERPL-MCNC: 0.4 MG/DL (ref 0.2–1)
BUN SERPL-MCNC: 20 MG/DL (ref 6–20)
BUN/CREAT SERPL: 25 (ref 12–20)
CA-I BLD-MCNC: 9.3 MG/DL (ref 8.5–10.1)
CHLORIDE SERPL-SCNC: 106 MMOL/L (ref 97–108)
CO2 SERPL-SCNC: 27 MMOL/L (ref 21–32)
CREAT SERPL-MCNC: 0.79 MG/DL (ref 0.55–1.02)
DIFFERENTIAL METHOD BLD: ABNORMAL
EOSINOPHIL # BLD: 0.3 K/UL (ref 0–0.4)
EOSINOPHIL NFR BLD: 2 % (ref 0–7)
ERYTHROCYTE [DISTWIDTH] IN BLOOD BY AUTOMATED COUNT: 14.5 % (ref 11.5–14.5)
GLOBULIN SER CALC-MCNC: 4.4 G/DL (ref 2–4)
GLUCOSE SERPL-MCNC: 214 MG/DL (ref 65–100)
HCT VFR BLD AUTO: 33.1 % (ref 35–47)
HGB BLD-MCNC: 10.5 G/DL (ref 11.5–16)
IMM GRANULOCYTES # BLD AUTO: 0.1 K/UL (ref 0–0.04)
IMM GRANULOCYTES NFR BLD AUTO: 1 % (ref 0–0.5)
LYMPHOCYTES # BLD: 1 K/UL (ref 0.8–3.5)
LYMPHOCYTES NFR BLD: 7 % (ref 12–49)
MCH RBC QN AUTO: 28.5 PG (ref 26–34)
MCHC RBC AUTO-ENTMCNC: 31.7 G/DL (ref 30–36.5)
MCV RBC AUTO: 89.7 FL (ref 80–99)
MONOCYTES # BLD: 0.3 K/UL (ref 0–1)
MONOCYTES NFR BLD: 2 % (ref 5–13)
NEUTS SEG # BLD: 12.3 K/UL (ref 1.8–8)
NEUTS SEG NFR BLD: 88 % (ref 32–75)
NRBC # BLD: 0 K/UL (ref 0–0.01)
NRBC BLD-RTO: 0 PER 100 WBC
PLATELET # BLD AUTO: 213 K/UL (ref 150–400)
PMV BLD AUTO: 11.7 FL (ref 8.9–12.9)
POTASSIUM SERPL-SCNC: 3.3 MMOL/L (ref 3.5–5.1)
PROT SERPL-MCNC: 7.5 G/DL (ref 6.4–8.2)
RBC # BLD AUTO: 3.69 M/UL (ref 3.8–5.2)
SODIUM SERPL-SCNC: 139 MMOL/L (ref 136–145)
WBC # BLD AUTO: 14 K/UL (ref 3.6–11)

## 2023-08-31 PROCEDURE — 6370000000 HC RX 637 (ALT 250 FOR IP): Performed by: STUDENT IN AN ORGANIZED HEALTH CARE EDUCATION/TRAINING PROGRAM

## 2023-08-31 PROCEDURE — 80053 COMPREHEN METABOLIC PANEL: CPT

## 2023-08-31 PROCEDURE — 36415 COLL VENOUS BLD VENIPUNCTURE: CPT

## 2023-08-31 PROCEDURE — 71045 X-RAY EXAM CHEST 1 VIEW: CPT

## 2023-08-31 PROCEDURE — 99284 EMERGENCY DEPT VISIT MOD MDM: CPT

## 2023-08-31 PROCEDURE — 85025 COMPLETE CBC W/AUTO DIFF WBC: CPT

## 2023-08-31 RX ORDER — AZITHROMYCIN 500 MG/1
500 TABLET, FILM COATED ORAL ONCE
Status: COMPLETED | OUTPATIENT
Start: 2023-08-31 | End: 2023-08-31

## 2023-08-31 RX ORDER — AZITHROMYCIN 250 MG/1
250 TABLET, FILM COATED ORAL DAILY
Qty: 5 TABLET | Refills: 0 | Status: SHIPPED | OUTPATIENT
Start: 2023-08-31 | End: 2023-09-05

## 2023-08-31 RX ORDER — POTASSIUM CHLORIDE 750 MG/1
40 TABLET, FILM COATED, EXTENDED RELEASE ORAL ONCE
Status: COMPLETED | OUTPATIENT
Start: 2023-08-31 | End: 2023-08-31

## 2023-08-31 RX ADMIN — POTASSIUM CHLORIDE 40 MEQ: 750 TABLET, EXTENDED RELEASE ORAL at 14:08

## 2023-08-31 RX ADMIN — AZITHROMYCIN 500 MG: 500 TABLET, FILM COATED ORAL at 15:20

## 2023-08-31 ASSESSMENT — PAIN - FUNCTIONAL ASSESSMENT
PAIN_FUNCTIONAL_ASSESSMENT: 0-10
PAIN_FUNCTIONAL_ASSESSMENT: 0-10
PAIN_FUNCTIONAL_ASSESSMENT: WONG-BAKER FACES

## 2023-08-31 ASSESSMENT — PAIN SCALES - WONG BAKER: WONGBAKER_NUMERICALRESPONSE: 2

## 2023-08-31 ASSESSMENT — PAIN DESCRIPTION - LOCATION
LOCATION: BACK;KNEE
LOCATION: BACK
LOCATION: BACK

## 2023-08-31 ASSESSMENT — LIFESTYLE VARIABLES
HOW MANY STANDARD DRINKS CONTAINING ALCOHOL DO YOU HAVE ON A TYPICAL DAY: PATIENT DOES NOT DRINK
HOW OFTEN DO YOU HAVE A DRINK CONTAINING ALCOHOL: NEVER

## 2023-08-31 ASSESSMENT — PAIN DESCRIPTION - DESCRIPTORS: DESCRIPTORS: ACHING

## 2023-08-31 ASSESSMENT — PAIN SCALES - GENERAL: PAINLEVEL_OUTOF10: 6

## 2023-08-31 NOTE — ED PROVIDER NOTES
following medications:  Medications - No data to display    CONSULTS: (Who and What was discussed)  None     Social Determinants affecting Dx or Tx: None    Smoking Cessation: Not Applicable    PROCEDURES   Unless otherwise noted above, none  Procedures      CRITICAL CARE TIME   Patient does not meet Critical Care Time, 0 minutes    ED FINAL IMPRESSION   No diagnosis found. DISPOSITION/PLAN   DISPOSITION      Discharge Note: The patient is stable for discharge home. The signs, symptoms, diagnosis, and discharge instructions have been discussed, understanding conveyed, and agreed upon. The patient is to follow up as recommended or return to ER should their symptoms worsen. PATIENT REFERRED TO:  No follow-up provider specified. DISCHARGE MEDICATIONS:     Medication List        ASK your doctor about these medications      aspirin 81 MG EC tablet     atorvastatin 10 MG tablet  Commonly known as: LIPITOR     dextromethorphan-guaiFENesin  MG per extended release tablet  Commonly known as: MUCINEX DM     ferrous sulfate 325 (65 Fe) MG EC tablet  Commonly known as: FE TABS 325     galantamine 16 MG extended release capsule  Commonly known as: RAZADYNE ER     glipiZIDE 5 MG extended release tablet  Commonly known as: GLUCOTROL XL     memantine 10 MG tablet  Commonly known as: NAMENDA     pantoprazole 40 MG tablet  Commonly known as: PROTONIX     QUEtiapine 25 MG tablet  Commonly known as: SEROQUEL     sucralfate 1 GM tablet  Commonly known as: CARAFATE     Trulicity 1.5 JI/2.6FB SC injection  Generic drug: dulaglutide                DISCONTINUED MEDICATIONS:  Current Discharge Medication List          I am the Primary Clinician of Record. Nick Avitia MD (electronically signed)    (Please note that parts of this dictation were completed with voice recognition software.  Quite often unanticipated grammatical, syntax, homophones, and other interpretive errors are inadvertently transcribed by the

## 2023-08-31 NOTE — ED NOTES
Family at bedside, pt pulls out IV, bleeding controlled and dressing applied     Sagar Cortes RN  08/31/23 0933

## 2023-08-31 NOTE — DISCHARGE INSTRUCTIONS
Thank you! Thank you for allowing me to care for you in the emergency department. It is my goal to provide you with excellent care. If you have not received excellent quality care, please ask to speak to the nurse manager. Please fill out the survey that will come to you by mail or email since we listen to your feedback! Below you will find a list of your tests from today's visit. Should you have any questions, please do not hesitate to call the emergency department.     Labs  Recent Results (from the past 12 hour(s))   CBC with Auto Differential    Collection Time: 08/31/23 12:09 PM   Result Value Ref Range    WBC 14.0 (H) 3.6 - 11.0 K/uL    RBC 3.69 (L) 3.80 - 5.20 M/uL    Hemoglobin 10.5 (L) 11.5 - 16.0 g/dL    Hematocrit 33.1 (L) 35.0 - 47.0 %    MCV 89.7 80.0 - 99.0 FL    MCH 28.5 26.0 - 34.0 PG    MCHC 31.7 30.0 - 36.5 g/dL    RDW 14.5 11.5 - 14.5 %    Platelets 739 032 - 528 K/uL    MPV 11.7 8.9 - 12.9 FL    Nucleated RBCs 0.0 0.0  WBC    nRBC 0.00 0.00 - 0.01 K/uL    Neutrophils % 88 (H) 32 - 75 %    Lymphocytes % 7 (L) 12 - 49 %    Monocytes % 2 (L) 5 - 13 %    Eosinophils % 2 0 - 7 %    Basophils % 0 0 - 1 %    Immature Granulocytes 1 (H) 0 - 0.5 %    Neutrophils Absolute 12.3 (H) 1.8 - 8.0 K/UL    Lymphocytes Absolute 1.0 0.8 - 3.5 K/UL    Monocytes Absolute 0.3 0.0 - 1.0 K/UL    Eosinophils Absolute 0.3 0.0 - 0.4 K/UL    Basophils Absolute 0.0 0.0 - 0.1 K/UL    Absolute Immature Granulocyte 0.1 (H) 0.00 - 0.04 K/UL    Differential Type AUTOMATED     Comprehensive Metabolic Panel    Collection Time: 08/31/23 12:09 PM   Result Value Ref Range    Sodium 139 136 - 145 mmol/L    Potassium 3.3 (L) 3.5 - 5.1 mmol/L    Chloride 106 97 - 108 mmol/L    CO2 27 21 - 32 mmol/L    Anion Gap 6 5 - 15 mmol/L    Glucose 214 (H) 65 - 100 mg/dL    BUN 20 6 - 20 mg/dL    Creatinine 0.79 0.55 - 1.02 mg/dL    Bun/Cre Ratio 25 (H) 12 - 20      Est, Glom Filt Rate >60 >60 ml/min/1.73m2    Calcium 9.3 8.5 - 10.1

## 2023-08-31 NOTE — ED TRIAGE NOTES
Patient recently went to her PCP for bronchitis and was not getting any better so she went back for a follow up, they sagrario labs and called her saying the WBC count was abnormally high

## 2024-10-13 ENCOUNTER — APPOINTMENT (OUTPATIENT)
Facility: HOSPITAL | Age: 84
DRG: 482 | End: 2024-10-13
Payer: MEDICARE

## 2024-10-13 ENCOUNTER — ANESTHESIA EVENT (OUTPATIENT)
Facility: HOSPITAL | Age: 84
DRG: 481 | End: 2024-10-13
Payer: MEDICARE

## 2024-10-13 ENCOUNTER — HOSPITAL ENCOUNTER (INPATIENT)
Facility: HOSPITAL | Age: 84
LOS: 2 days | Discharge: NURSING, REHAB OR LONG-TERM CARE | DRG: 482 | End: 2024-10-15
Attending: EMERGENCY MEDICINE | Admitting: INTERNAL MEDICINE
Payer: MEDICARE

## 2024-10-13 ENCOUNTER — ANESTHESIA (OUTPATIENT)
Facility: HOSPITAL | Age: 84
DRG: 481 | End: 2024-10-13
Payer: MEDICARE

## 2024-10-13 DIAGNOSIS — S72.002A CLOSED FRACTURE OF LEFT HIP, INITIAL ENCOUNTER (HCC): Primary | ICD-10-CM

## 2024-10-13 DIAGNOSIS — S72.002A HIP FRACTURE REQUIRING OPERATIVE REPAIR, LEFT, CLOSED, INITIAL ENCOUNTER (HCC): ICD-10-CM
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
## 2024-10-13 LAB
ALBUMIN SERPL-MCNC: 3 G/DL (ref 3.5–5)
ALBUMIN/GLOB SERPL: 0.8 (ref 1.1–2.2)
ALP SERPL-CCNC: 91 U/L (ref 45–117)
ALT SERPL-CCNC: 17 U/L (ref 12–78)
ANION GAP SERPL CALC-SCNC: 7 MMOL/L (ref 2–12)
AST SERPL W P-5'-P-CCNC: 16 U/L (ref 15–37)
BASOPHILS # BLD: 0.1 K/UL (ref 0–0.1)
BASOPHILS NFR BLD: 1 % (ref 0–1)
BILIRUB SERPL-MCNC: 0.3 MG/DL (ref 0.2–1)
BUN SERPL-MCNC: 18 MG/DL (ref 6–20)
BUN/CREAT SERPL: 24 (ref 12–20)
CA-I BLD-MCNC: 9.1 MG/DL (ref 8.5–10.1)
CHLORIDE SERPL-SCNC: 105 MMOL/L (ref 97–108)
CO2 SERPL-SCNC: 27 MMOL/L (ref 21–32)
CREAT SERPL-MCNC: 0.76 MG/DL (ref 0.55–1.02)
DIFFERENTIAL METHOD BLD: ABNORMAL
EOSINOPHIL # BLD: 0.5 K/UL (ref 0–0.4)
EOSINOPHIL NFR BLD: 4 % (ref 0–7)
ERYTHROCYTE [DISTWIDTH] IN BLOOD BY AUTOMATED COUNT: 14.9 % (ref 11.5–14.5)
GLOBULIN SER CALC-MCNC: 3.9 G/DL (ref 2–4)
GLUCOSE BLD STRIP.AUTO-MCNC: 360 MG/DL (ref 65–100)
GLUCOSE BLD STRIP.AUTO-MCNC: 386 MG/DL (ref 65–100)
GLUCOSE BLD STRIP.AUTO-MCNC: 395 MG/DL (ref 65–100)
GLUCOSE BLD STRIP.AUTO-MCNC: 430 MG/DL (ref 65–100)
GLUCOSE SERPL-MCNC: 303 MG/DL (ref 65–100)
HCT VFR BLD AUTO: 31.6 % (ref 35–47)
HGB BLD-MCNC: 10.1 G/DL (ref 11.5–16)
IMM GRANULOCYTES # BLD AUTO: 0.1 K/UL (ref 0–0.04)
IMM GRANULOCYTES NFR BLD AUTO: 1 % (ref 0–0.5)
INR PPP: 1.1 (ref 0.9–1.1)
LYMPHOCYTES # BLD: 1.5 K/UL (ref 0.8–3.5)
LYMPHOCYTES NFR BLD: 15 % (ref 12–49)
MCH RBC QN AUTO: 28 PG (ref 26–34)
MCHC RBC AUTO-ENTMCNC: 32 G/DL (ref 30–36.5)
MCV RBC AUTO: 87.5 FL (ref 80–99)
MONOCYTES # BLD: 0.4 K/UL (ref 0–1)
MONOCYTES NFR BLD: 4 % (ref 5–13)
NEUTS SEG # BLD: 7.6 K/UL (ref 1.8–8)
NEUTS SEG NFR BLD: 75 % (ref 32–75)
NRBC # BLD: 0 K/UL (ref 0–0.01)
NRBC BLD-RTO: 0 PER 100 WBC
PERFORMED BY:: ABNORMAL
PLATELET # BLD AUTO: 242 K/UL (ref 150–400)
PMV BLD AUTO: 11.5 FL (ref 8.9–12.9)
POTASSIUM SERPL-SCNC: 3.4 MMOL/L (ref 3.5–5.1)
PROT SERPL-MCNC: 6.9 G/DL (ref 6.4–8.2)
PROTHROMBIN TIME: 14 SEC (ref 11.9–14.6)
RBC # BLD AUTO: 3.61 M/UL (ref 3.8–5.2)
SODIUM SERPL-SCNC: 139 MMOL/L (ref 136–145)
WBC # BLD AUTO: 10.1 K/UL (ref 3.6–11)

## 2024-10-13 PROCEDURE — 73552 X-RAY EXAM OF FEMUR 2/>: CPT

## 2024-10-13 PROCEDURE — 96374 THER/PROPH/DIAG INJ IV PUSH: CPT

## 2024-10-13 PROCEDURE — 80053 COMPREHEN METABOLIC PANEL: CPT

## 2024-10-13 PROCEDURE — 6370000000 HC RX 637 (ALT 250 FOR IP): Performed by: NURSE PRACTITIONER

## 2024-10-13 PROCEDURE — 6360000002 HC RX W HCPCS: Performed by: ORTHOPAEDIC SURGERY

## 2024-10-13 PROCEDURE — 72125 CT NECK SPINE W/O DYE: CPT

## 2024-10-13 PROCEDURE — 2720000010 HC SURG SUPPLY STERILE: Performed by: ORTHOPAEDIC SURGERY

## 2024-10-13 PROCEDURE — 2709999900 HC NON-CHARGEABLE SUPPLY: Performed by: ORTHOPAEDIC SURGERY

## 2024-10-13 PROCEDURE — 6370000000 HC RX 637 (ALT 250 FOR IP): Performed by: ORTHOPAEDIC SURGERY

## 2024-10-13 PROCEDURE — 2580000003 HC RX 258: Performed by: NURSE ANESTHETIST, CERTIFIED REGISTERED

## 2024-10-13 PROCEDURE — 6360000002 HC RX W HCPCS

## 2024-10-13 PROCEDURE — 36415 COLL VENOUS BLD VENIPUNCTURE: CPT

## 2024-10-13 PROCEDURE — 3700000000 HC ANESTHESIA ATTENDED CARE: Performed by: ORTHOPAEDIC SURGERY

## 2024-10-13 PROCEDURE — 71045 X-RAY EXAM CHEST 1 VIEW: CPT

## 2024-10-13 PROCEDURE — 82962 GLUCOSE BLOOD TEST: CPT

## 2024-10-13 PROCEDURE — 70450 CT HEAD/BRAIN W/O DYE: CPT

## 2024-10-13 PROCEDURE — 6360000002 HC RX W HCPCS: Performed by: NURSE ANESTHETIST, CERTIFIED REGISTERED

## 2024-10-13 PROCEDURE — 7100000000 HC PACU RECOVERY - FIRST 15 MIN: Performed by: ORTHOPAEDIC SURGERY

## 2024-10-13 PROCEDURE — 6370000000 HC RX 637 (ALT 250 FOR IP): Performed by: STUDENT IN AN ORGANIZED HEALTH CARE EDUCATION/TRAINING PROGRAM

## 2024-10-13 PROCEDURE — 99285 EMERGENCY DEPT VISIT HI MDM: CPT

## 2024-10-13 PROCEDURE — 0QS606Z REPOSITION RIGHT UPPER FEMUR WITH INTRAMEDULLARY INTERNAL FIXATION DEVICE, OPEN APPROACH: ICD-10-PCS | Performed by: ORTHOPAEDIC SURGERY

## 2024-10-13 PROCEDURE — 2580000003 HC RX 258: Performed by: ORTHOPAEDIC SURGERY

## 2024-10-13 PROCEDURE — 3600000014 HC SURGERY LEVEL 4 ADDTL 15MIN: Performed by: ORTHOPAEDIC SURGERY

## 2024-10-13 PROCEDURE — 85610 PROTHROMBIN TIME: CPT

## 2024-10-13 PROCEDURE — C1713 ANCHOR/SCREW BN/BN,TIS/BN: HCPCS | Performed by: ORTHOPAEDIC SURGERY

## 2024-10-13 PROCEDURE — 2500000003 HC RX 250 WO HCPCS: Performed by: NURSE ANESTHETIST, CERTIFIED REGISTERED

## 2024-10-13 PROCEDURE — 6370000000 HC RX 637 (ALT 250 FOR IP): Performed by: INTERNAL MEDICINE

## 2024-10-13 PROCEDURE — 3700000001 HC ADD 15 MINUTES (ANESTHESIA): Performed by: ORTHOPAEDIC SURGERY

## 2024-10-13 PROCEDURE — 73502 X-RAY EXAM HIP UNI 2-3 VIEWS: CPT

## 2024-10-13 PROCEDURE — 6360000002 HC RX W HCPCS: Performed by: EMERGENCY MEDICINE

## 2024-10-13 PROCEDURE — 85025 COMPLETE CBC W/AUTO DIFF WBC: CPT

## 2024-10-13 PROCEDURE — 1100000000 HC RM PRIVATE

## 2024-10-13 PROCEDURE — C1769 GUIDE WIRE: HCPCS | Performed by: ORTHOPAEDIC SURGERY

## 2024-10-13 PROCEDURE — 7100000001 HC PACU RECOVERY - ADDTL 15 MIN: Performed by: ORTHOPAEDIC SURGERY

## 2024-10-13 PROCEDURE — 76000 FLUOROSCOPY <1 HR PHYS/QHP: CPT

## 2024-10-13 PROCEDURE — 2580000003 HC RX 258: Performed by: NURSE PRACTITIONER

## 2024-10-13 PROCEDURE — 3600000004 HC SURGERY LEVEL 4 BASE: Performed by: ORTHOPAEDIC SURGERY

## 2024-10-13 DEVICE — NAIL IM L235MM DIA10MM 125DEG SHT GRN L PROX FEM TI: Type: IMPLANTABLE DEVICE | Site: HIP | Status: FUNCTIONAL

## 2024-10-13 DEVICE — SCREW BNE LCK 5X34 MM LP XL25 RETROGRADE STRL RFNADVANCED: Type: IMPLANTABLE DEVICE | Site: HIP | Status: FUNCTIONAL

## 2024-10-13 DEVICE — NAIL IM L360MM DIA10MM 130DEG LNG L PROX FEM GRN TI CANN: Type: IMPLANTABLE DEVICE | Site: HIP | Status: FUNCTIONAL

## 2024-10-13 DEVICE — IMPLANTABLE DEVICE: Type: IMPLANTABLE DEVICE | Site: HIP | Status: FUNCTIONAL

## 2024-10-13 RX ORDER — POTASSIUM CHLORIDE 7.45 MG/ML
10 INJECTION INTRAVENOUS PRN
Status: DISCONTINUED | OUTPATIENT
Start: 2024-10-13 | End: 2024-10-15 | Stop reason: HOSPADM

## 2024-10-13 RX ORDER — GALANTAMINE 4 MG/1
16 TABLET, FILM COATED ORAL
Status: DISCONTINUED | OUTPATIENT
Start: 2024-10-14 | End: 2024-10-14

## 2024-10-13 RX ORDER — DIPHENHYDRAMINE HYDROCHLORIDE 50 MG/ML
INJECTION INTRAMUSCULAR; INTRAVENOUS
Status: COMPLETED
Start: 2024-10-13 | End: 2024-10-13

## 2024-10-13 RX ORDER — LORAZEPAM 0.5 MG/1
0.5 TABLET ORAL ONCE
Status: COMPLETED | OUTPATIENT
Start: 2024-10-13 | End: 2024-10-13

## 2024-10-13 RX ORDER — QUETIAPINE FUMARATE 100 MG/1
100 TABLET, FILM COATED ORAL 2 TIMES DAILY
COMMUNITY
Start: 2024-08-30

## 2024-10-13 RX ORDER — HYDRALAZINE HYDROCHLORIDE 20 MG/ML
10 INJECTION INTRAMUSCULAR; INTRAVENOUS
Status: DISCONTINUED | OUTPATIENT
Start: 2024-10-13 | End: 2024-10-13 | Stop reason: HOSPADM

## 2024-10-13 RX ORDER — ACETAMINOPHEN 650 MG/1
650 SUPPOSITORY RECTAL EVERY 6 HOURS PRN
Status: DISCONTINUED | OUTPATIENT
Start: 2024-10-13 | End: 2024-10-13

## 2024-10-13 RX ORDER — INSULIN GLARGINE 100 [IU]/ML
10 INJECTION, SOLUTION SUBCUTANEOUS 2 TIMES DAILY
Status: DISCONTINUED | OUTPATIENT
Start: 2024-10-13 | End: 2024-10-15 | Stop reason: HOSPADM

## 2024-10-13 RX ORDER — DEXTROSE MONOHYDRATE 100 MG/ML
INJECTION, SOLUTION INTRAVENOUS CONTINUOUS PRN
Status: DISCONTINUED | OUTPATIENT
Start: 2024-10-13 | End: 2024-10-13

## 2024-10-13 RX ORDER — ENOXAPARIN SODIUM 100 MG/ML
40 INJECTION SUBCUTANEOUS DAILY
Status: DISCONTINUED | OUTPATIENT
Start: 2024-10-13 | End: 2024-10-13

## 2024-10-13 RX ORDER — FENTANYL CITRATE 0.05 MG/ML
50 INJECTION, SOLUTION INTRAMUSCULAR; INTRAVENOUS EVERY 5 MIN PRN
Status: DISCONTINUED | OUTPATIENT
Start: 2024-10-13 | End: 2024-10-13 | Stop reason: HOSPADM

## 2024-10-13 RX ORDER — FLUTICASONE PROPIONATE 50 MCG
1 SPRAY, SUSPENSION (ML) NASAL DAILY
COMMUNITY
Start: 2024-09-25

## 2024-10-13 RX ORDER — INSULIN LISPRO 100 [IU]/ML
0-8 INJECTION, SOLUTION INTRAVENOUS; SUBCUTANEOUS
Status: DISCONTINUED | OUTPATIENT
Start: 2024-10-13 | End: 2024-10-15 | Stop reason: HOSPADM

## 2024-10-13 RX ORDER — INSULIN LISPRO 100 [IU]/ML
0-4 INJECTION, SOLUTION INTRAVENOUS; SUBCUTANEOUS
Status: DISCONTINUED | OUTPATIENT
Start: 2024-10-13 | End: 2024-10-13

## 2024-10-13 RX ORDER — SODIUM CHLORIDE 9 MG/ML
INJECTION, SOLUTION INTRAVENOUS CONTINUOUS
Status: DISPENSED | OUTPATIENT
Start: 2024-10-13 | End: 2024-10-14

## 2024-10-13 RX ORDER — OXYCODONE HYDROCHLORIDE 5 MG/1
10 TABLET ORAL PRN
Status: DISCONTINUED | OUTPATIENT
Start: 2024-10-13 | End: 2024-10-13 | Stop reason: HOSPADM

## 2024-10-13 RX ORDER — LIDOCAINE 4 G/G
1 PATCH TOPICAL AS NEEDED
Status: DISCONTINUED | OUTPATIENT
Start: 2024-10-13 | End: 2024-10-13 | Stop reason: HOSPADM

## 2024-10-13 RX ORDER — ATORVASTATIN CALCIUM 40 MG/1
40 TABLET, FILM COATED ORAL DAILY
Status: DISCONTINUED | OUTPATIENT
Start: 2024-10-13 | End: 2024-10-15 | Stop reason: HOSPADM

## 2024-10-13 RX ORDER — DEXAMETHASONE SODIUM PHOSPHATE 4 MG/ML
INJECTION, SOLUTION INTRA-ARTICULAR; INTRALESIONAL; INTRAMUSCULAR; INTRAVENOUS; SOFT TISSUE
Status: DISCONTINUED | OUTPATIENT
Start: 2024-10-13 | End: 2024-10-13 | Stop reason: SDUPTHER

## 2024-10-13 RX ORDER — FERROUS SULFATE 325(65) MG
325 TABLET ORAL EVERY OTHER DAY
Status: DISCONTINUED | OUTPATIENT
Start: 2024-10-13 | End: 2024-10-15 | Stop reason: HOSPADM

## 2024-10-13 RX ORDER — SUCRALFATE 1 G/1
1 TABLET ORAL 4 TIMES DAILY
Status: DISCONTINUED | OUTPATIENT
Start: 2024-10-13 | End: 2024-10-15 | Stop reason: HOSPADM

## 2024-10-13 RX ORDER — SOD SULF/POT CHLORIDE/MAG SULF 1.479 G
1 TABLET ORAL DAILY
COMMUNITY

## 2024-10-13 RX ORDER — SODIUM CHLORIDE 9 MG/ML
INJECTION, SOLUTION INTRAVENOUS PRN
Status: DISCONTINUED | OUTPATIENT
Start: 2024-10-13 | End: 2024-10-15 | Stop reason: HOSPADM

## 2024-10-13 RX ORDER — SODIUM CHLORIDE 0.9 % (FLUSH) 0.9 %
5-40 SYRINGE (ML) INJECTION EVERY 12 HOURS SCHEDULED
Status: DISCONTINUED | OUTPATIENT
Start: 2024-10-13 | End: 2024-10-15 | Stop reason: HOSPADM

## 2024-10-13 RX ORDER — SODIUM CHLORIDE 0.9 % (FLUSH) 0.9 %
5-40 SYRINGE (ML) INJECTION PRN
Status: DISCONTINUED | OUTPATIENT
Start: 2024-10-13 | End: 2024-10-13 | Stop reason: HOSPADM

## 2024-10-13 RX ORDER — TRANEXAMIC ACID 100 MG/ML
INJECTION, SOLUTION INTRAVENOUS
Status: DISCONTINUED | OUTPATIENT
Start: 2024-10-13 | End: 2024-10-13 | Stop reason: SDUPTHER

## 2024-10-13 RX ORDER — SODIUM CHLORIDE, SODIUM LACTATE, POTASSIUM CHLORIDE, CALCIUM CHLORIDE 600; 310; 30; 20 MG/100ML; MG/100ML; MG/100ML; MG/100ML
INJECTION, SOLUTION INTRAVENOUS ONCE
Status: DISCONTINUED | OUTPATIENT
Start: 2024-10-13 | End: 2024-10-13 | Stop reason: HOSPADM

## 2024-10-13 RX ORDER — SODIUM CHLORIDE 0.9 % (FLUSH) 0.9 %
5-40 SYRINGE (ML) INJECTION EVERY 12 HOURS SCHEDULED
Status: DISCONTINUED | OUTPATIENT
Start: 2024-10-13 | End: 2024-10-13 | Stop reason: HOSPADM

## 2024-10-13 RX ORDER — SODIUM CHLORIDE 9 MG/ML
INJECTION, SOLUTION INTRAVENOUS PRN
Status: DISCONTINUED | OUTPATIENT
Start: 2024-10-13 | End: 2024-10-13 | Stop reason: HOSPADM

## 2024-10-13 RX ORDER — DEXTROSE MONOHYDRATE 100 MG/ML
INJECTION, SOLUTION INTRAVENOUS CONTINUOUS PRN
Status: DISCONTINUED | OUTPATIENT
Start: 2024-10-13 | End: 2024-10-15 | Stop reason: HOSPADM

## 2024-10-13 RX ORDER — NEOSTIGMINE METHYLSULFATE 5 MG/5 ML
SYRINGE (ML) INTRAVENOUS
Status: DISCONTINUED | OUTPATIENT
Start: 2024-10-13 | End: 2024-10-13 | Stop reason: SDUPTHER

## 2024-10-13 RX ORDER — DEXTROSE MONOHYDRATE 100 MG/ML
INJECTION, SOLUTION INTRAVENOUS CONTINUOUS PRN
Status: DISCONTINUED | OUTPATIENT
Start: 2024-10-13 | End: 2024-10-13 | Stop reason: HOSPADM

## 2024-10-13 RX ORDER — METOCLOPRAMIDE HYDROCHLORIDE 5 MG/ML
10 INJECTION INTRAMUSCULAR; INTRAVENOUS
Status: DISCONTINUED | OUTPATIENT
Start: 2024-10-13 | End: 2024-10-13 | Stop reason: HOSPADM

## 2024-10-13 RX ORDER — HYDROMORPHONE HYDROCHLORIDE 1 MG/ML
0.5 INJECTION, SOLUTION INTRAMUSCULAR; INTRAVENOUS; SUBCUTANEOUS EVERY 5 MIN PRN
Status: DISCONTINUED | OUTPATIENT
Start: 2024-10-13 | End: 2024-10-13 | Stop reason: HOSPADM

## 2024-10-13 RX ORDER — PHENYLEPHRINE HCL IN 0.9% NACL 1 MG/10 ML
SYRINGE (ML) INTRAVENOUS
Status: DISCONTINUED | OUTPATIENT
Start: 2024-10-13 | End: 2024-10-13 | Stop reason: SDUPTHER

## 2024-10-13 RX ORDER — GLUCAGON 1 MG/ML
1 KIT INJECTION PRN
Status: DISCONTINUED | OUTPATIENT
Start: 2024-10-13 | End: 2024-10-13 | Stop reason: HOSPADM

## 2024-10-13 RX ORDER — ONDANSETRON 4 MG/1
4 TABLET, ORALLY DISINTEGRATING ORAL EVERY 8 HOURS PRN
Status: DISCONTINUED | OUTPATIENT
Start: 2024-10-13 | End: 2024-10-15 | Stop reason: HOSPADM

## 2024-10-13 RX ORDER — ONDANSETRON 2 MG/ML
INJECTION INTRAMUSCULAR; INTRAVENOUS
Status: DISCONTINUED | OUTPATIENT
Start: 2024-10-13 | End: 2024-10-13 | Stop reason: SDUPTHER

## 2024-10-13 RX ORDER — SODIUM CHLORIDE 0.9 % (FLUSH) 0.9 %
5-40 SYRINGE (ML) INJECTION PRN
Status: DISCONTINUED | OUTPATIENT
Start: 2024-10-13 | End: 2024-10-15 | Stop reason: HOSPADM

## 2024-10-13 RX ORDER — LORAZEPAM 2 MG/ML
0.5 INJECTION INTRAMUSCULAR
Status: DISCONTINUED | OUTPATIENT
Start: 2024-10-13 | End: 2024-10-13 | Stop reason: HOSPADM

## 2024-10-13 RX ORDER — ACETAMINOPHEN 160 MG
1 TABLET,DISINTEGRATING ORAL DAILY
COMMUNITY

## 2024-10-13 RX ORDER — ASPIRIN 81 MG/1
81 TABLET ORAL DAILY
COMMUNITY

## 2024-10-13 RX ORDER — ACETAMINOPHEN 325 MG/1
325 TABLET ORAL EVERY 4 HOURS PRN
COMMUNITY

## 2024-10-13 RX ORDER — OXYCODONE HYDROCHLORIDE 5 MG/1
5 TABLET ORAL PRN
Status: DISCONTINUED | OUTPATIENT
Start: 2024-10-13 | End: 2024-10-13 | Stop reason: HOSPADM

## 2024-10-13 RX ORDER — ASPIRIN 81 MG/1
81 TABLET ORAL 2 TIMES DAILY
Status: DISCONTINUED | OUTPATIENT
Start: 2024-10-13 | End: 2024-10-15 | Stop reason: HOSPADM

## 2024-10-13 RX ORDER — POLYETHYLENE GLYCOL 3350 17 G/17G
17 POWDER, FOR SOLUTION ORAL DAILY PRN
Status: DISCONTINUED | OUTPATIENT
Start: 2024-10-13 | End: 2024-10-15 | Stop reason: HOSPADM

## 2024-10-13 RX ORDER — FENTANYL CITRATE 50 UG/ML
INJECTION, SOLUTION INTRAMUSCULAR; INTRAVENOUS
Status: COMPLETED
Start: 2024-10-13 | End: 2024-10-13

## 2024-10-13 RX ORDER — MEMANTINE HYDROCHLORIDE 10 MG/1
10 TABLET ORAL 2 TIMES DAILY
Status: DISCONTINUED | OUTPATIENT
Start: 2024-10-13 | End: 2024-10-15 | Stop reason: HOSPADM

## 2024-10-13 RX ORDER — INSULIN GLARGINE 100 [IU]/ML
10 INJECTION, SOLUTION SUBCUTANEOUS 2 TIMES DAILY
COMMUNITY

## 2024-10-13 RX ORDER — LORATADINE 10 MG/1
1 TABLET ORAL DAILY PRN
COMMUNITY

## 2024-10-13 RX ORDER — INSULIN LISPRO 100 [IU]/ML
5 INJECTION, SOLUTION INTRAVENOUS; SUBCUTANEOUS ONCE
Status: COMPLETED | OUTPATIENT
Start: 2024-10-13 | End: 2024-10-13

## 2024-10-13 RX ORDER — ROCURONIUM BROMIDE 10 MG/ML
INJECTION, SOLUTION INTRAVENOUS
Status: DISCONTINUED | OUTPATIENT
Start: 2024-10-13 | End: 2024-10-13 | Stop reason: SDUPTHER

## 2024-10-13 RX ORDER — LABETALOL HYDROCHLORIDE 5 MG/ML
10 INJECTION, SOLUTION INTRAVENOUS
Status: DISCONTINUED | OUTPATIENT
Start: 2024-10-13 | End: 2024-10-13 | Stop reason: HOSPADM

## 2024-10-13 RX ORDER — ASCORBIC ACID 500 MG
500 TABLET ORAL
COMMUNITY

## 2024-10-13 RX ORDER — FENTANYL CITRATE 50 UG/ML
INJECTION, SOLUTION INTRAMUSCULAR; INTRAVENOUS
Status: DISCONTINUED | OUTPATIENT
Start: 2024-10-13 | End: 2024-10-13 | Stop reason: SDUPTHER

## 2024-10-13 RX ORDER — INSULIN LISPRO 100 [IU]/ML
7 INJECTION, SOLUTION INTRAVENOUS; SUBCUTANEOUS ONCE
Status: COMPLETED | OUTPATIENT
Start: 2024-10-13 | End: 2024-10-13

## 2024-10-13 RX ORDER — PROPOFOL 10 MG/ML
INJECTION, EMULSION INTRAVENOUS
Status: DISCONTINUED | OUTPATIENT
Start: 2024-10-13 | End: 2024-10-13 | Stop reason: SDUPTHER

## 2024-10-13 RX ORDER — DIPHENHYDRAMINE HYDROCHLORIDE 50 MG/ML
12.5 INJECTION INTRAMUSCULAR; INTRAVENOUS
Status: COMPLETED | OUTPATIENT
Start: 2024-10-13 | End: 2024-10-13

## 2024-10-13 RX ORDER — MAGNESIUM SULFATE IN WATER 40 MG/ML
2000 INJECTION, SOLUTION INTRAVENOUS PRN
Status: DISCONTINUED | OUTPATIENT
Start: 2024-10-13 | End: 2024-10-15 | Stop reason: HOSPADM

## 2024-10-13 RX ORDER — MEPERIDINE HYDROCHLORIDE 25 MG/ML
12.5 INJECTION INTRAMUSCULAR; INTRAVENOUS; SUBCUTANEOUS EVERY 5 MIN PRN
Status: DISCONTINUED | OUTPATIENT
Start: 2024-10-13 | End: 2024-10-13 | Stop reason: HOSPADM

## 2024-10-13 RX ORDER — VASOPRESSIN 20 U/ML
INJECTION PARENTERAL
Status: DISCONTINUED | OUTPATIENT
Start: 2024-10-13 | End: 2024-10-13 | Stop reason: SDUPTHER

## 2024-10-13 RX ORDER — POTASSIUM CHLORIDE 1500 MG/1
40 TABLET, EXTENDED RELEASE ORAL PRN
Status: DISCONTINUED | OUTPATIENT
Start: 2024-10-13 | End: 2024-10-15 | Stop reason: HOSPADM

## 2024-10-13 RX ORDER — IPRATROPIUM BROMIDE AND ALBUTEROL SULFATE 2.5; .5 MG/3ML; MG/3ML
1 SOLUTION RESPIRATORY (INHALATION)
Status: DISCONTINUED | OUTPATIENT
Start: 2024-10-13 | End: 2024-10-13 | Stop reason: HOSPADM

## 2024-10-13 RX ORDER — LIDOCAINE HYDROCHLORIDE 20 MG/ML
INJECTION, SOLUTION EPIDURAL; INFILTRATION; INTRACAUDAL; PERINEURAL
Status: DISCONTINUED | OUTPATIENT
Start: 2024-10-13 | End: 2024-10-13 | Stop reason: SDUPTHER

## 2024-10-13 RX ORDER — ACETAMINOPHEN 500 MG
1000 TABLET ORAL EVERY 8 HOURS SCHEDULED
Status: DISCONTINUED | OUTPATIENT
Start: 2024-10-13 | End: 2024-10-15 | Stop reason: HOSPADM

## 2024-10-13 RX ORDER — ACETAMINOPHEN 325 MG/1
650 TABLET ORAL EVERY 6 HOURS PRN
Status: DISCONTINUED | OUTPATIENT
Start: 2024-10-13 | End: 2024-10-13

## 2024-10-13 RX ORDER — MORPHINE SULFATE 4 MG/ML
4 INJECTION, SOLUTION INTRAMUSCULAR; INTRAVENOUS
Status: COMPLETED | OUTPATIENT
Start: 2024-10-13 | End: 2024-10-13

## 2024-10-13 RX ORDER — ONDANSETRON 2 MG/ML
4 INJECTION INTRAMUSCULAR; INTRAVENOUS
Status: DISCONTINUED | OUTPATIENT
Start: 2024-10-13 | End: 2024-10-13 | Stop reason: HOSPADM

## 2024-10-13 RX ORDER — TRAMADOL HYDROCHLORIDE 50 MG/1
50 TABLET ORAL EVERY 6 HOURS PRN
Status: DISCONTINUED | OUTPATIENT
Start: 2024-10-13 | End: 2024-10-15 | Stop reason: HOSPADM

## 2024-10-13 RX ORDER — MORPHINE SULFATE 2 MG/ML
1 INJECTION, SOLUTION INTRAMUSCULAR; INTRAVENOUS EVERY 4 HOURS PRN
Status: DISCONTINUED | OUTPATIENT
Start: 2024-10-13 | End: 2024-10-13

## 2024-10-13 RX ORDER — QUETIAPINE FUMARATE 100 MG/1
100 TABLET, FILM COATED ORAL 2 TIMES DAILY
Status: DISCONTINUED | OUTPATIENT
Start: 2024-10-13 | End: 2024-10-15 | Stop reason: HOSPADM

## 2024-10-13 RX ORDER — OXYCODONE HYDROCHLORIDE 5 MG/1
2.5 TABLET ORAL EVERY 4 HOURS PRN
Status: DISCONTINUED | OUTPATIENT
Start: 2024-10-13 | End: 2024-10-15 | Stop reason: HOSPADM

## 2024-10-13 RX ORDER — ONDANSETRON 2 MG/ML
4 INJECTION INTRAMUSCULAR; INTRAVENOUS EVERY 6 HOURS PRN
Status: DISCONTINUED | OUTPATIENT
Start: 2024-10-13 | End: 2024-10-15 | Stop reason: HOSPADM

## 2024-10-13 RX ORDER — GLYCOPYRROLATE 0.2 MG/ML
INJECTION INTRAMUSCULAR; INTRAVENOUS
Status: DISCONTINUED | OUTPATIENT
Start: 2024-10-13 | End: 2024-10-13 | Stop reason: SDUPTHER

## 2024-10-13 RX ORDER — GLUCAGON 1 MG/ML
1 KIT INJECTION PRN
Status: DISCONTINUED | OUTPATIENT
Start: 2024-10-13 | End: 2024-10-15 | Stop reason: HOSPADM

## 2024-10-13 RX ORDER — FLUTICASONE PROPIONATE 50 MCG
1 SPRAY, SUSPENSION (ML) NASAL DAILY
Status: DISCONTINUED | OUTPATIENT
Start: 2024-10-13 | End: 2024-10-15 | Stop reason: HOSPADM

## 2024-10-13 RX ORDER — NALOXONE HYDROCHLORIDE 0.4 MG/ML
INJECTION, SOLUTION INTRAMUSCULAR; INTRAVENOUS; SUBCUTANEOUS PRN
Status: DISCONTINUED | OUTPATIENT
Start: 2024-10-13 | End: 2024-10-13 | Stop reason: HOSPADM

## 2024-10-13 RX ADMIN — SUCRALFATE 1 G: 1 TABLET ORAL at 20:23

## 2024-10-13 RX ADMIN — MORPHINE SULFATE 4 MG: 4 INJECTION, SOLUTION INTRAMUSCULAR; INTRAVENOUS at 06:53

## 2024-10-13 RX ADMIN — INSULIN LISPRO 7 UNITS: 100 INJECTION, SOLUTION INTRAVENOUS; SUBCUTANEOUS at 21:16

## 2024-10-13 RX ADMIN — MEMANTINE HYDROCHLORIDE 10 MG: 10 TABLET ORAL at 20:30

## 2024-10-13 RX ADMIN — ONDANSETRON 4 MG: 2 INJECTION INTRAMUSCULAR; INTRAVENOUS at 12:43

## 2024-10-13 RX ADMIN — ROCURONIUM BROMIDE 30 MG: 10 INJECTION, SOLUTION INTRAVENOUS at 12:35

## 2024-10-13 RX ADMIN — SODIUM CHLORIDE: 9 INJECTION, SOLUTION INTRAVENOUS at 10:54

## 2024-10-13 RX ADMIN — DEXAMETHASONE SODIUM PHOSPHATE 4 MG: 4 INJECTION, SOLUTION INTRA-ARTICULAR; INTRALESIONAL; INTRAMUSCULAR; INTRAVENOUS; SOFT TISSUE at 12:43

## 2024-10-13 RX ADMIN — VASOPRESSIN 1 UNITS: 20 INJECTION PARENTERAL at 13:35

## 2024-10-13 RX ADMIN — LIDOCAINE HYDROCHLORIDE 60 MG: 20 INJECTION, SOLUTION EPIDURAL; INFILTRATION; INTRACAUDAL; PERINEURAL at 12:33

## 2024-10-13 RX ADMIN — Medication 3 MG: at 14:14

## 2024-10-13 RX ADMIN — GLYCOPYRROLATE 0.4 MG: 0.2 INJECTION INTRAMUSCULAR; INTRAVENOUS at 14:14

## 2024-10-13 RX ADMIN — FENTANYL CITRATE 100 MCG: 50 INJECTION, SOLUTION INTRAMUSCULAR; INTRAVENOUS at 14:14

## 2024-10-13 RX ADMIN — Medication 200 MCG: at 13:29

## 2024-10-13 RX ADMIN — Medication 200 MCG: at 13:25

## 2024-10-13 RX ADMIN — DIPHENHYDRAMINE HYDROCHLORIDE 12.5 MG: 50 INJECTION INTRAMUSCULAR; INTRAVENOUS at 15:20

## 2024-10-13 RX ADMIN — ACETAMINOPHEN 1000 MG: 500 TABLET ORAL at 15:19

## 2024-10-13 RX ADMIN — INSULIN LISPRO 8 UNITS: 100 INJECTION, SOLUTION INTRAVENOUS; SUBCUTANEOUS at 20:27

## 2024-10-13 RX ADMIN — QUETIAPINE 100 MG: 100 TABLET ORAL at 20:23

## 2024-10-13 RX ADMIN — SUCRALFATE 1 G: 1 TABLET ORAL at 17:36

## 2024-10-13 RX ADMIN — LORAZEPAM 0.5 MG: 0.5 TABLET ORAL at 10:59

## 2024-10-13 RX ADMIN — ASPIRIN 81 MG: 81 TABLET, COATED ORAL at 20:25

## 2024-10-13 RX ADMIN — SODIUM CHLORIDE, PRESERVATIVE FREE 10 ML: 5 INJECTION INTRAVENOUS at 20:27

## 2024-10-13 RX ADMIN — CEFAZOLIN 2000 MG: 1 INJECTION, POWDER, FOR SOLUTION INTRAMUSCULAR; INTRAVENOUS at 17:36

## 2024-10-13 RX ADMIN — TRANEXAMIC ACID 1000 MG: 100 INJECTION, SOLUTION INTRAVENOUS at 13:00

## 2024-10-13 RX ADMIN — Medication 5 MG: at 21:16

## 2024-10-13 RX ADMIN — TRANEXAMIC ACID 1000 MG: 100 INJECTION, SOLUTION INTRAVENOUS at 14:16

## 2024-10-13 RX ADMIN — Medication 200 MCG: at 13:32

## 2024-10-13 RX ADMIN — INSULIN LISPRO 5 UNITS: 100 INJECTION, SOLUTION INTRAVENOUS; SUBCUTANEOUS at 18:39

## 2024-10-13 RX ADMIN — ACETAMINOPHEN 1000 MG: 500 TABLET ORAL at 20:23

## 2024-10-13 RX ADMIN — PROPOFOL 100 MG: 10 INJECTION, EMULSION INTRAVENOUS at 12:33

## 2024-10-13 RX ADMIN — FENTANYL CITRATE 50 MCG: 50 INJECTION INTRAMUSCULAR; INTRAVENOUS at 15:36

## 2024-10-13 RX ADMIN — INSULIN LISPRO 8 UNITS: 100 INJECTION, SOLUTION INTRAVENOUS; SUBCUTANEOUS at 17:35

## 2024-10-13 RX ADMIN — FENTANYL CITRATE 50 MCG: 0.05 INJECTION, SOLUTION INTRAMUSCULAR; INTRAVENOUS at 15:36

## 2024-10-13 RX ADMIN — SODIUM CHLORIDE, PRESERVATIVE FREE 10 ML: 5 INJECTION INTRAVENOUS at 10:58

## 2024-10-13 RX ADMIN — INSULIN GLARGINE 10 UNITS: 100 INJECTION, SOLUTION SUBCUTANEOUS at 20:26

## 2024-10-13 ASSESSMENT — PAIN SCALES - GENERAL
PAINLEVEL_OUTOF10: 5
PAINLEVEL_OUTOF10: 8
PAINLEVEL_OUTOF10: 0
PAINLEVEL_OUTOF10: 0

## 2024-10-13 ASSESSMENT — PAIN DESCRIPTION - DESCRIPTORS: DESCRIPTORS: ACHING

## 2024-10-13 ASSESSMENT — PAIN DESCRIPTION - LOCATION: LOCATION: HIP

## 2024-10-13 ASSESSMENT — PAIN DESCRIPTION - ORIENTATION: ORIENTATION: LEFT

## 2024-10-13 ASSESSMENT — PAIN - FUNCTIONAL ASSESSMENT
PAIN_FUNCTIONAL_ASSESSMENT: NONE - DENIES PAIN
PAIN_FUNCTIONAL_ASSESSMENT: NONE - DENIES PAIN
PAIN_FUNCTIONAL_ASSESSMENT: 0-10

## 2024-10-13 ASSESSMENT — LIFESTYLE VARIABLES
HOW OFTEN DO YOU HAVE A DRINK CONTAINING ALCOHOL: NEVER
HOW MANY STANDARD DRINKS CONTAINING ALCOHOL DO YOU HAVE ON A TYPICAL DAY: PATIENT DOES NOT DRINK

## 2024-10-13 NOTE — PLAN OF CARE
Problem: Chronic Conditions and Co-morbidities  Goal: Patient's chronic conditions and co-morbidity symptoms are monitored and maintained or improved  Outcome: Progressing     Problem: Pain  Goal: Verbalizes/displays adequate comfort level or baseline comfort level  Outcome: Progressing     Problem: Safety - Adult  Goal: Free from fall injury  Outcome: Progressing     Problem: Confusion  Goal: Confusion, delirium, dementia, or psychosis is improved or at baseline  Description: INTERVENTIONS:  1. Assess for possible contributors to thought disturbance, including medications, impaired vision or hearing, underlying metabolic abnormalities, dehydration, psychiatric diagnoses, and notify attending LIP  2. Fletcher high risk fall precautions, as indicated  3. Provide frequent short contacts to provide reality reorientation, refocusing and direction  4. Decrease environmental stimuli, including noise as appropriate  5. Monitor and intervene to maintain adequate nutrition, hydration, elimination, sleep and activity  6. If unable to ensure safety without constant attention obtain sitter and review sitter guidelines with assigned personnel  7. Initiate Psychosocial CNS and Spiritual Care consult, as indicated  Outcome: Progressing     Problem: Skin/Tissue Integrity  Goal: Absence of new skin breakdown  Description: 1.  Monitor for areas of redness and/or skin breakdown  2.  Assess vascular access sites hourly  3.  Every 4-6 hours minimum:  Change oxygen saturation probe site  4.  Every 4-6 hours:  If on nasal continuous positive airway pressure, respiratory therapy assess nares and determine need for appliance change or resting period.  Outcome: Progressing     Problem: ABCDS Injury Assessment  Goal: Absence of physical injury  Outcome: Progressing     Problem: Discharge Planning  Goal: Discharge to home or other facility with appropriate resources  Outcome: Progressing

## 2024-10-13 NOTE — PROGRESS NOTES
4 Eyes Skin Assessment     NAME:  Nena Mcnulty  YOB: 1940  MEDICAL RECORD NUMBER:  [id number]    The patient is being assessed for  Admission    I agree that at least one RN has performed a thorough Head to Toe Skin Assessment on the patient. ALL assessment sites listed below have been assessed.      Areas assessed by both nurses:    Head, Face, Ears, Shoulders, Back, Chest, Arms, Elbows, Hands, Sacrum. Buttock, Coccyx, Ischium, Legs. Feet and Heels, and Under Medical Devices         Does the Patient have a Wound? No noted wound(s)       Ronald Prevention initiated by RN: Yes  Wound Care Orders initiated by RN: No    Pressure Injury (Stage 3,4, Unstageable, DTI, NWPT, and Complex wounds) if present, place Wound referral order by RN under : No    New Ostomies, if present place, Ostomy referral order under : No     Nurse 1 eSignature: Electronically signed by Julia Florence RN on 10/13/24 at 6:55 PM EDT    **SHARE this note so that the co-signing nurse can place an eSignature**    Nurse 2 eSignature: Electronically signed by Elia Shaw RN on 10/14/24 at 6:37 PM EDT

## 2024-10-13 NOTE — ED PROVIDER NOTES
[facility] EMERGENCY DEPT  EMERGENCY DEPARTMENT HISTORY AND PHYSICAL EXAM      Date: 10/13/2024  Patient Name: Nena Mcnulty  MRN: [id number]  YOB: 1940  Date of evaluation: 10/13/2024  Provider: Olamide Carroll MD   Note Started: 5:31 AM EDT 10/13/24    HISTORY OF PRESENT ILLNESS     Chief Complaint   Patient presents with    Hip Pain       History Provided By: Patient    HPI: Nena Mcnulty is a 84 y.o. female with history of advanced dementia presenting with left hip pain after a fall.  Patient got up to go to the bathroom and fell.  Family heard her.  She never lost consciousness.  Unknown if she hit her head.  Patient is not on blood thinners.    PAST MEDICAL HISTORY   Past Medical History:  Past Medical History:   Diagnosis Date    Arthritis     Cancer (HCC) 2006    colon cancer    Diabetes (HCC)     GERD (gastroesophageal reflux disease)     Other ill-defined conditions(799.89)     fibromylgia    PUD (peptic ulcer disease)     Unspecified sleep apnea        Past Surgical History:  Past Surgical History:   Procedure Laterality Date    APPENDECTOMY      BACK SURGERY      neck and back    HYSTERECTOMY (CERVIX STATUS UNKNOWN)      ORTHOPEDIC SURGERY  2010    right tkr    OTHER SURGICAL HISTORY      tonsil    NE UNLISTED PROCEDURE ABDOMEN PERITONEUM & OMENTUM  2006    colectomy    WRIST ARTHROSCOP,DIAGNOSTIC      bilateral carpal tunell       Family History:  Family History   Problem Relation Age of Onset    Heart Disease Brother     Diabetes Mother     Diabetes Sister     Diabetes Brother        Social History:  Social History     Tobacco Use    Smoking status: Former    Smokeless tobacco: Never   Substance Use Topics    Alcohol use: No       Allergies:  No Known Allergies    PCP: Karoline Hatfield MD    Current Meds:   No current facility-administered medications for this encounter.     Current Outpatient Medications   Medication Sig Dispense Refill    aspirin 81 MG EC tablet Aspir-81   1 tablet  1.5 0.8 - 3.5 K/UL    Monocytes Absolute 0.4 0.0 - 1.0 K/UL    Eosinophils Absolute 0.5 (H) 0.0 - 0.4 K/UL    Basophils Absolute 0.1 0.0 - 0.1 K/UL    Immature Granulocytes Absolute 0.1 (H) 0.00 - 0.04 K/UL    Differential Type AUTOMATED     Comprehensive Metabolic Panel    Collection Time: 10/13/24  6:26 AM   Result Value Ref Range    Sodium 139 136 - 145 mmol/L    Potassium 3.4 (L) 3.5 - 5.1 mmol/L    Chloride 105 97 - 108 mmol/L    CO2 27 21 - 32 mmol/L    Anion Gap 7 2 - 12 mmol/L    Glucose 303 (H) 65 - 100 mg/dL    BUN 18 6 - 20 mg/dL    Creatinine 0.76 0.55 - 1.02 mg/dL    BUN/Creatinine Ratio 24 (H) 12 - 20      Est, Glom Filt Rate 77 >60 ml/min/1.73m2    Calcium 9.1 8.5 - 10.1 mg/dL    Total Bilirubin 0.3 0.2 - 1.0 mg/dL    AST 16 15 - 37 U/L    ALT 17 12 - 78 U/L    Alk Phosphatase 91 45 - 117 U/L    Total Protein 6.9 6.4 - 8.2 g/dL    Albumin 3.0 (L) 3.5 - 5.0 g/dL    Globulin 3.9 2.0 - 4.0 g/dL    Albumin/Globulin Ratio 0.8 (L) 1.1 - 2.2         EKG: Not Applicable    Radiologic Studies:  Non-plain film images such as CT, Ultrasound and MRI are read by the radiologist. Plain radiographic images are visualized and preliminarily interpreted by the ED Physician with the following findings: Xray Interpreted by me.  Shows left hip fracture    Interpretation per the Radiologist below, if available at the time of this note:  CT HEAD WO CONTRAST   Final Result   No acute intracranial process. No significant interval change.            Electronically signed by Monroe Rivero      CT CERVICAL SPINE WO CONTRAST   Final Result   No acute fracture. Grade 1 anterolisthesis at C3-4 is likely secondary to facet   arthropathy.      Electronically signed by Monroe Rivero      XR HIP 2-3 VW W PELVIS LEFT   Final Result   Comminuted, mildly displaced intertrochanteric left hip fracture..      Electronically signed by Monroe Rivero      XR FEMUR LEFT (MIN 2 VIEWS)    (Results Pending)        ED COURSE and DIFFERENTIAL DIAGNOSIS/MDM

## 2024-10-13 NOTE — CARE COORDINATION
10/13/24 1014   Service Assessment   Patient Orientation Unable to Assess   Cognition Dementia / Early Alzheimer's   History Provided By Child/Family   Primary Caregiver Family   Accompanied By/Relationship Daughter/POA   Support Systems Children;Family Members   Patient's Healthcare Decision Maker is: Legal Next of Kin   PCP Verified by CM Yes   Last Visit to PCP Within last 3 months   Prior Functional Level Independent in ADLs/IADLs   Current Functional Level Independent in ADLs/IADLs   Can patient return to prior living arrangement Unknown at present   Ability to make needs known: Unable   Family able to assist with home care needs: Yes   Would you like for me to discuss the discharge plan with any other family members/significant others, and if so, who? Yes  (Daughter)     Advance Care Planning     General Advance Care Planning (ACP) Conversation    Date of Conversation: 10/13/2024  Conducted with: Legal next of kin  Other persons present: Daughter      Healthcare Decision Maker:   Primary Decision Maker: Sabine Rogers - Child - [phone]     Today we documented Decision Maker(s) consistent with ACP documents on file.    Length of Voluntary ACP Conversation in minutes:  <16 minutes (Non-Billable)    AQUILINO Pate      CM met w/ pt and family at bedside, pt's daughter, charted above, provided hx. Pt lives w/ daughter and family, daughter has POA, documents provided to registration. Pt does ambulate at home, but has the following dme; ramp, hosp bed, walker, rollator, cane, wc. Pt has a remote hx of hh, agency unknown. No rehab hx. Pt's daughter indicate that if the pt does need rehab, she wants her to go to [facility]. CM acknowledged this and explained criteria and differences in level of care in an effort to set expectations. Mrs. Rogers indicated that if it comes to needing rehab, that she would only want the pt to go to [facility] or come home. CM indicated that the preference would be  documented for unit CM to review. Charted PCP is correct, uses [facility] [address]. CM team to follow for dispo.

## 2024-10-13 NOTE — ED TRIAGE NOTES
Pt daughter brought her in for possible left hip fx, pt was getting up to go the bathroom x1 hour ago and fell, no LOC, no thinners, alzhiemers.   Staff assisted pt from vehicle onto stretcher without incident.

## 2024-10-13 NOTE — ANESTHESIA PRE PROCEDURE
Department of Anesthesiology  Preprocedure Note       Name:  Nena Mcnulty   Age:  84 y.o.  :  1940                                          MRN:  [id number]         Date:  10/13/2024      Surgeon: Surgeon(s):  Dennis Lacy MD    Procedure: Procedure(s):  LEFT TROCHANTERIC FIXATION NAIL ADVANCED    Medications prior to admission:   Prior to Admission medications    Medication Sig Start Date End Date Taking? Authorizing Provider   QUEtiapine (SEROQUEL) 100 MG tablet Take 1 tablet by mouth 2 times daily 24  Yes Provider, Historical, MD   fluticasone (FLONASE) 50 MCG/ACT nasal spray 1 spray by Nasal route daily 24  Yes Provider, Historical, MD   insulin glargine (BASAGLAR KWIKPEN) 100 UNIT/ML injection pen Inject 10 Units into the skin 2 times daily   Yes Provider, Historical, MD   insulin regular (HUMULIN R;NOVOLIN R) 100 UNIT/ML injection Inject into the skin See Admin Instructions Sliding Scale   Yes Provider, Historical, MD   atorvastatin (LIPITOR) 10 MG tablet Take 4 tablets by mouth daily 2/10/17  Yes Automatic Reconciliation, Ar   dulaglutide (TRULICITY) 1.5 MG/0.5ML SC injection Inject 0.5 mLs into the skin every 7 days   Yes Automatic Reconciliation, Ar   ferrous sulfate (FE TABS 325) 325 (65 Fe) MG EC tablet Take 1 tablet by mouth every other day   Yes Automatic Reconciliation, Ar   galantamine (RAZADYNE ER) 16 MG extended release capsule Take 1 capsule by mouth every morning (before breakfast)   Yes Automatic Reconciliation, Ar   glipiZIDE (GLUCOTROL XL) 5 MG extended release tablet Take 1 tablet by mouth daily   Yes Automatic Reconciliation, Ar   memantine (NAMENDA) 10 MG tablet Take 1 tablet by mouth 2 times daily   Yes Automatic Reconciliation, Ar   pantoprazole (PROTONIX) 40 MG tablet Take 1 tablet by mouth 2 times daily 22  Yes Automatic Reconciliation, Ar   sucralfate (CARAFATE) 1 GM tablet Take 1 tablet by mouth 4 times daily 22  Yes Automatic Reconciliation, Ar

## 2024-10-13 NOTE — H&P
Hospitalist Admission Note    Patient: Nena Mcnulty MRN: [id number]  SSN: [id number]    YOB: 1940  Age: 84 y.o.  Sex: female      Patient PCP: Karoline Hatfield MD    ______________________________________________________________________  Given the patient's current clinical presentation, I have a high level of concern for decompensation if discharged from the emergency department. Complex decision making was performed, which includes reviewing the patient's available past medical records, laboratory results, and x-ray films.       My assessment of this patient's clinical condition and my plan of care is as follows.    Assessment / Plan:    Ground-level fall resulting in left hip fracture  CT of the head showed no acute process  CT cervical spine no acute fracture  X-ray of the hip and pelvis with left shows a comminuted mildly displaced intertrochanteric left hip fracture  Surgery input noted patient for surgery today  Will keep n.p.o.    Alzheimer's dementia  Will continue home meds of Seroquel, Namenda and galantamine  Monitor for signs of increased agitation      Diabetes type 2  Continue home meds of Lantus 10 units twice daily  Will hold Trulicity  Sliding scale insulin  Hypoglycemic protocol    Hyperlipidemia  Continue home medication atorvastatin    Revised cardiac risk is 3.9        Medical Decision Making:     Labs reviewed by myself: INR, CBC, C MP    Diagnostic data reviewed by myself: CT of the head,CT cervical spine, x-ray of the left hip    Toxic drug monitoring: Blood glucose    Discussed case with: Attending Dr. Britton and ED provider. After discussion I am in agreement that acuity of patient's medical condition necessitates hospital stay.    DVT Prophylaxis: Enoxaparin  GI Prophylaxis: Protonix  Code Status: DNR   baseline: Patient is independent needs some assistance and reminders to utilize walker or wheelchair.  She has dementia at baseline and has family    Patient Education    [employer] HANDOUT- PARENT  9 MONTH VISIT  Here are some suggestions from [employer] experts that may be of value to your family.      HOW YOUR FAMILY IS DOING  If you feel unsafe in your home or have been hurt by someone, let us know. Hotlines and community agencies can also provide confidential help.  Keep in touch with friends and family.  Invite friends over or join a parent group.  Take time for yourself and with your partner.    YOUR CHANGING AND DEVELOPING BABY   Keep daily routines for your baby.  Let your baby explore inside and outside the home. Be with her to keep her safe and feeling secure.  Be realistic about her abilities at this age.  Recognize that your baby is eager to interact with other people but will also be anxious when  from you. Crying when you leave is normal. Stay calm.  Support your baby s learning by giving her baby balls, toys that roll, blocks, and containers to play with.  Help your baby when she needs it.  Talk, sing, and read daily.  Don t allow your baby to watch TV or use computers, tablets, or smartphones.  Consider making a family media plan. It helps you make rules for media use and balance screen time with other activities, including exercise.    FEEDING YOUR BABY   Be patient with your baby as he learns to eat without help.  Know that messy eating is normal.  Emphasize healthy foods for your baby. Give him 3 meals and 2 to 3 snacks each day.  Start giving more table foods. No foods need to be withheld except for raw honey and large chunks that can cause choking.  Vary the thickness and lumpiness of your baby s food.  Don t give your baby soft drinks, tea, coffee, and flavored drinks.  Avoid feeding your baby too much. Let him decide when he is full and wants to stop eating.  Keep trying new foods. Babies may say no to a food 10 to 15 times before they try it.  Help your baby learn to use a cup.  Continue to breastfeed as long as you can  and your baby wishes. Talk with us if you have concerns about weaning.  Continue to offer breast milk or iron-fortified formula until 1 year of age. Don t switch to cow s milk until then.    DISCIPLINE   Tell your baby in a nice way what to do ( Time to eat ), rather than what not to do.  Be consistent.  Use distraction at this age. Sometimes you can change what your baby is doing by offering something else such as a favorite toy.  Do things the way you want your baby to do them--you are your baby s role model.  Use  No!  only when your baby is going to get hurt or hurt others.    SAFETY   Use a rear-facing-only car safety seat in the back seat of all vehicles.  Have your baby s car safety seat rear facing until she reaches the highest weight or height allowed by the car safety seat s . In most cases, this will be well past the second birthday.  Never put your baby in the front seat of a vehicle that has a passenger airbag.  Your baby s safety depends on you. Always wear your lap and shoulder seat belt. Never drive after drinking alcohol or using drugs. Never text or use a cell phone while driving.  Never leave your baby alone in the car. Start habits that prevent you from ever forgetting your baby in the car, such as putting your cell phone in the back seat.  If it is necessary to keep a gun in your home, store it unloaded and locked with the ammunition locked separately.  Place sandhu at the top and bottom of stairs.  Don t leave heavy or hot things on tablecloths that your baby could pull over.  Put barriers around space heaters and keep electrical cords out of your baby s reach.  Never leave your baby alone in or near water, even in a bath seat or ring. Be within arm s reach at all times.  Keep poisons, medications, and cleaning supplies locked up and out of your baby s sight and reach.  Put the Poison Help line number into all phones, including cell phones. Call if you are worried your baby has  support    Subjective:      Nena Mcnulty is a 84 y.o. female with a PMH of advanced dementia , insulin-dependent diabetes , hyperlipidemia who presents with with a left femur fracture after ground-level fall..     In ED vitals blood pressure initially of 198/71 afebrile. Initial labs significant for blood glucose of 300, potassium 3.4 hemoglobin 10.1 x-ray of hip and pelvis noted mildly displaced -left hip fracture. Patient admitted for further management. Patient started on IV fluids she has remained n.p.o.    Past Medical History:   Diagnosis Date    Arthritis     Cancer (HCC) 2006    colon cancer    Diabetes (HCC)     GERD (gastroesophageal reflux disease)     Other ill-defined conditions(799.89)     fibromylgia    PUD (peptic ulcer disease)     Unspecified sleep apnea      Past Surgical History:   Procedure Laterality Date    APPENDECTOMY      BACK SURGERY      neck and back    HYSTERECTOMY (CERVIX STATUS UNKNOWN)      ORTHOPEDIC SURGERY  2010    right tkr    OTHER SURGICAL HISTORY      tonsil    [place] UNLISTED PROCEDURE ABDOMEN PERITONEUM & OMENTUM  2006    colectomy    WRIST ARTHROSCOP,DIAGNOSTIC      bilateral carpal tunell      Family History   Problem Relation Age of Onset    Heart Disease Brother     Diabetes Mother     Diabetes Sister     Diabetes Brother      Social History     Tobacco Use    Smoking status: Former    Smokeless tobacco: Never   Substance Use Topics    Alcohol use: No      Prior to Admission medications    Medication Sig Start Date End Date Taking? Authorizing Provider   aspirin 81 MG EC tablet Aspir-81   1 tablet daily   Yes Automatic Reconciliation, Ar   atorvastatin (LIPITOR) 10 MG tablet Take 1 tablet by mouth daily 2/10/17  Yes Automatic Reconciliation, Ar   dulaglutide (TRULICITY) 1.5 MG/0.5ML SC injection Inject 0.5 mLs into the skin every 7 days   Yes Automatic Reconciliation, Ar   ferrous sulfate (FE TABS 325) 325 (65 Fe) MG EC tablet Take 1 tablet by mouth 2 times daily   Yes  swallowed something harmful.  Install operable window guards on windows at the second story and higher. Operable means that, in an emergency, an adult can open the window.  Keep furniture away from windows.  Keep your baby in a high chair or playpen when in the kitchen.      WHAT TO EXPECT AT YOUR BABY S 12 MONTH VISIT  We will talk about    Caring for your child, your family, and yourself    Creating daily routines    Feeding your child    Caring for your child s teeth    Keeping your child safe at home, outside, and in the car        Helpful Resources:  National Domestic Violence Hotline: 688.606.7769  Family Media Use Plan: www.[employer].org/MediaUsePlan  Poison Help Line: 748.391.2278  Information About Car Safety Seats: www.safercar.gov/parents  Toll-free Auto Safety Hotline: 212.627.8917  Consistent with Bright Futures: Guidelines for Health Supervision of Infants, Children, and Adolescents, 4th Edition  For more information, go to https://brightfutures.aap.org.

## 2024-10-13 NOTE — CONSULTS
ORTHOPEDIC CONSULT    Patient: Nena Mcnulty MRN: [id number]  SSN: [id number]    YOB: 1940  Age: 84 y.o.  Sex: female      Subjective:      Nena Mcnulty is a 84 y.o. female who is being seen for left comminuted proximal femur fracture.  Patient sustained a ground-level fall around 0400 hrs. this morning.  Patient has a history of dementia and diabetes.  Daughter is present as well as the daughter's .  The daughter is the medical power of .    Past Medical History:   Diagnosis Date    Arthritis     Cancer (HCC) 2006    colon cancer    Diabetes (HCC)     GERD (gastroesophageal reflux disease)     Other ill-defined conditions(799.89)     fibromylgia    PUD (peptic ulcer disease)     Unspecified sleep apnea      Past Surgical History:   Procedure Laterality Date    APPENDECTOMY      BACK SURGERY      neck and back    HYSTERECTOMY (CERVIX STATUS UNKNOWN)      ORTHOPEDIC SURGERY  2010    right tkr    OTHER SURGICAL HISTORY      tonsil    [place] UNLISTED PROCEDURE ABDOMEN PERITONEUM & OMENTUM  2006    colectomy    WRIST ARTHROSCOP,DIAGNOSTIC      bilateral carpal tunell      Family History   Problem Relation Age of Onset    Heart Disease Brother     Diabetes Mother     Diabetes Sister     Diabetes Brother      Social History     Tobacco Use    Smoking status: Former    Smokeless tobacco: Never   Substance Use Topics    Alcohol use: No      Prior to Admission medications    Medication Sig Start Date End Date Taking? Authorizing Provider   aspirin 81 MG EC tablet Aspir-81   1 tablet daily   Yes Automatic Reconciliation, Ar   atorvastatin (LIPITOR) 10 MG tablet Take 1 tablet by mouth daily 2/10/17  Yes Automatic Reconciliation, Ar   dulaglutide (TRULICITY) 1.5 MG/0.5ML SC injection Inject 0.5 mLs into the skin every 7 days   Yes Automatic Reconciliation, Ar   ferrous sulfate (FE TABS 325) 325 (65 Fe) MG EC tablet Take 1 tablet by mouth 2 times daily   Yes Automatic Reconciliation, Ar

## 2024-10-14 LAB
ALBUMIN SERPL-MCNC: 2.4 G/DL (ref 3.5–5)
ALBUMIN/GLOB SERPL: 0.7 (ref 1.1–2.2)
ALP SERPL-CCNC: 65 U/L (ref 45–117)
ALT SERPL-CCNC: 15 U/L (ref 12–78)
ANION GAP SERPL CALC-SCNC: 7 MMOL/L (ref 2–12)
APPEARANCE UR: CLEAR
AST SERPL W P-5'-P-CCNC: 16 U/L (ref 15–37)
BACTERIA URNS QL MICRO: NEGATIVE /HPF
BASOPHILS # BLD: 0 K/UL (ref 0–0.1)
BASOPHILS NFR BLD: 0 % (ref 0–1)
BILIRUB SERPL-MCNC: 0.2 MG/DL (ref 0.2–1)
BILIRUB UR QL: NEGATIVE
BUN SERPL-MCNC: 24 MG/DL (ref 6–20)
BUN/CREAT SERPL: 22 (ref 12–20)
CA-I BLD-MCNC: 8.5 MG/DL (ref 8.5–10.1)
CHLORIDE SERPL-SCNC: 106 MMOL/L (ref 97–108)
CO2 SERPL-SCNC: 24 MMOL/L (ref 21–32)
COLOR UR: ABNORMAL
CREAT SERPL-MCNC: 1.08 MG/DL (ref 0.55–1.02)
DIFFERENTIAL METHOD BLD: ABNORMAL
EOSINOPHIL # BLD: 0 K/UL (ref 0–0.4)
EOSINOPHIL NFR BLD: 0 % (ref 0–7)
EPITH CASTS URNS QL MICRO: ABNORMAL /LPF
ERYTHROCYTE [DISTWIDTH] IN BLOOD BY AUTOMATED COUNT: 15.1 % (ref 11.5–14.5)
EST. AVERAGE GLUCOSE BLD GHB EST-MCNC: 180 MG/DL
GLOBULIN SER CALC-MCNC: 3.6 G/DL (ref 2–4)
GLUCOSE BLD STRIP.AUTO-MCNC: 212 MG/DL (ref 65–100)
GLUCOSE BLD STRIP.AUTO-MCNC: 234 MG/DL (ref 65–100)
GLUCOSE BLD STRIP.AUTO-MCNC: 252 MG/DL (ref 65–100)
GLUCOSE BLD STRIP.AUTO-MCNC: 258 MG/DL (ref 65–100)
GLUCOSE SERPL-MCNC: 259 MG/DL (ref 65–100)
GLUCOSE UR STRIP.AUTO-MCNC: >500 MG/DL
HBA1C MFR BLD: 7.9 % (ref 4–5.6)
HCT VFR BLD AUTO: 21.5 % (ref 35–47)
HGB BLD-MCNC: 6.9 G/DL (ref 11.5–16)
HGB UR QL STRIP: NEGATIVE
IMM GRANULOCYTES # BLD AUTO: 0 K/UL (ref 0–0.04)
IMM GRANULOCYTES NFR BLD AUTO: 0 % (ref 0–0.5)
KETONES UR QL STRIP.AUTO: 5 MG/DL
LEUKOCYTE ESTERASE UR QL STRIP.AUTO: NEGATIVE
LYMPHOCYTES # BLD: 1.4 K/UL (ref 0.8–3.5)
LYMPHOCYTES NFR BLD: 13 % (ref 12–49)
MCH RBC QN AUTO: 28.4 PG (ref 26–34)
MCHC RBC AUTO-ENTMCNC: 32.1 G/DL (ref 30–36.5)
MCV RBC AUTO: 88.5 FL (ref 80–99)
MONOCYTES # BLD: 0.6 K/UL (ref 0–1)
MONOCYTES NFR BLD: 5 % (ref 5–13)
NEUTS SEG # BLD: 9.1 K/UL (ref 1.8–8)
NEUTS SEG NFR BLD: 82 % (ref 32–75)
NITRITE UR QL STRIP.AUTO: NEGATIVE
NRBC # BLD: 0 K/UL (ref 0–0.01)
NRBC BLD-RTO: 0 PER 100 WBC
PERFORMED BY:: ABNORMAL
PH UR STRIP: 5 (ref 5–8)
PLATELET # BLD AUTO: 220 K/UL (ref 150–400)
PLATELET COMMENT: ABNORMAL
PMV BLD AUTO: 11.4 FL (ref 8.9–12.9)
POTASSIUM SERPL-SCNC: 3.8 MMOL/L (ref 3.5–5.1)
PROT SERPL-MCNC: 6 G/DL (ref 6.4–8.2)
PROT UR STRIP-MCNC: NEGATIVE MG/DL
RBC # BLD AUTO: 2.43 M/UL (ref 3.8–5.2)
RBC #/AREA URNS HPF: ABNORMAL /HPF (ref 0–5)
RBC MORPH BLD: ABNORMAL
SODIUM SERPL-SCNC: 137 MMOL/L (ref 136–145)
SP GR UR REFRACTOMETRY: 1.02 (ref 1–1.03)
UROBILINOGEN UR QL STRIP.AUTO: 0.1 EU/DL (ref 0.1–1)
WBC # BLD AUTO: 11.1 K/UL (ref 3.6–11)
WBC URNS QL MICRO: ABNORMAL /HPF (ref 0–4)

## 2024-10-14 PROCEDURE — P9016 RBC LEUKOCYTES REDUCED: HCPCS

## 2024-10-14 PROCEDURE — 83036 HEMOGLOBIN GLYCOSYLATED A1C: CPT

## 2024-10-14 PROCEDURE — 6370000000 HC RX 637 (ALT 250 FOR IP): Performed by: NURSE PRACTITIONER

## 2024-10-14 PROCEDURE — 81001 URINALYSIS AUTO W/SCOPE: CPT

## 2024-10-14 PROCEDURE — 97530 THERAPEUTIC ACTIVITIES: CPT

## 2024-10-14 PROCEDURE — 80053 COMPREHEN METABOLIC PANEL: CPT

## 2024-10-14 PROCEDURE — 82962 GLUCOSE BLOOD TEST: CPT

## 2024-10-14 PROCEDURE — 2580000003 HC RX 258: Performed by: ORTHOPAEDIC SURGERY

## 2024-10-14 PROCEDURE — 86901 BLOOD TYPING SEROLOGIC RH(D): CPT

## 2024-10-14 PROCEDURE — 86850 RBC ANTIBODY SCREEN: CPT

## 2024-10-14 PROCEDURE — 1100000000 HC RM PRIVATE

## 2024-10-14 PROCEDURE — 36430 TRANSFUSION BLD/BLD COMPNT: CPT

## 2024-10-14 PROCEDURE — 36415 COLL VENOUS BLD VENIPUNCTURE: CPT

## 2024-10-14 PROCEDURE — 86900 BLOOD TYPING SEROLOGIC ABO: CPT

## 2024-10-14 PROCEDURE — 6370000000 HC RX 637 (ALT 250 FOR IP): Performed by: ORTHOPAEDIC SURGERY

## 2024-10-14 PROCEDURE — 97161 PT EVAL LOW COMPLEX 20 MIN: CPT

## 2024-10-14 PROCEDURE — 6360000002 HC RX W HCPCS: Performed by: ORTHOPAEDIC SURGERY

## 2024-10-14 PROCEDURE — 86923 COMPATIBILITY TEST ELECTRIC: CPT

## 2024-10-14 PROCEDURE — 99024 POSTOP FOLLOW-UP VISIT: CPT

## 2024-10-14 PROCEDURE — 6370000000 HC RX 637 (ALT 250 FOR IP): Performed by: INTERNAL MEDICINE

## 2024-10-14 PROCEDURE — 6370000000 HC RX 637 (ALT 250 FOR IP): Performed by: STUDENT IN AN ORGANIZED HEALTH CARE EDUCATION/TRAINING PROGRAM

## 2024-10-14 PROCEDURE — 2580000003 HC RX 258: Performed by: NURSE PRACTITIONER

## 2024-10-14 PROCEDURE — 85025 COMPLETE CBC W/AUTO DIFF WBC: CPT

## 2024-10-14 RX ORDER — PANTOPRAZOLE SODIUM 40 MG/1
40 TABLET, DELAYED RELEASE ORAL
Status: DISCONTINUED | OUTPATIENT
Start: 2024-10-15 | End: 2024-10-15 | Stop reason: HOSPADM

## 2024-10-14 RX ORDER — GALANTAMINE 4 MG/1
8 TABLET, FILM COATED ORAL 2 TIMES DAILY WITH MEALS
Status: DISCONTINUED | OUTPATIENT
Start: 2024-10-14 | End: 2024-10-15 | Stop reason: HOSPADM

## 2024-10-14 RX ORDER — SODIUM CHLORIDE 9 MG/ML
INJECTION, SOLUTION INTRAVENOUS PRN
Status: DISCONTINUED | OUTPATIENT
Start: 2024-10-14 | End: 2024-10-15 | Stop reason: HOSPADM

## 2024-10-14 RX ADMIN — Medication 5 MG: at 20:59

## 2024-10-14 RX ADMIN — ACETAMINOPHEN 1000 MG: 500 TABLET ORAL at 14:24

## 2024-10-14 RX ADMIN — INSULIN GLARGINE 10 UNITS: 100 INJECTION, SOLUTION SUBCUTANEOUS at 21:01

## 2024-10-14 RX ADMIN — CEFAZOLIN 2000 MG: 1 INJECTION, POWDER, FOR SOLUTION INTRAMUSCULAR; INTRAVENOUS at 10:03

## 2024-10-14 RX ADMIN — ASPIRIN 81 MG: 81 TABLET, COATED ORAL at 09:49

## 2024-10-14 RX ADMIN — OXYCODONE 2.5 MG: 5 TABLET ORAL at 00:55

## 2024-10-14 RX ADMIN — MEMANTINE HYDROCHLORIDE 10 MG: 10 TABLET ORAL at 20:59

## 2024-10-14 RX ADMIN — INSULIN LISPRO 2 UNITS: 100 INJECTION, SOLUTION INTRAVENOUS; SUBCUTANEOUS at 17:11

## 2024-10-14 RX ADMIN — QUETIAPINE 100 MG: 100 TABLET ORAL at 09:49

## 2024-10-14 RX ADMIN — SUCRALFATE 1 G: 1 TABLET ORAL at 17:11

## 2024-10-14 RX ADMIN — GALANTAMINE 8 MG: 4 TABLET, FILM COATED ORAL at 17:11

## 2024-10-14 RX ADMIN — OXYCODONE 2.5 MG: 5 TABLET ORAL at 20:58

## 2024-10-14 RX ADMIN — ACETAMINOPHEN 1000 MG: 500 TABLET ORAL at 05:13

## 2024-10-14 RX ADMIN — INSULIN LISPRO 4 UNITS: 100 INJECTION, SOLUTION INTRAVENOUS; SUBCUTANEOUS at 21:01

## 2024-10-14 RX ADMIN — MEMANTINE HYDROCHLORIDE 10 MG: 10 TABLET ORAL at 09:49

## 2024-10-14 RX ADMIN — GALANTAMINE 8 MG: 4 TABLET, FILM COATED ORAL at 09:48

## 2024-10-14 RX ADMIN — INSULIN LISPRO 4 UNITS: 100 INJECTION, SOLUTION INTRAVENOUS; SUBCUTANEOUS at 09:59

## 2024-10-14 RX ADMIN — ACETAMINOPHEN 1000 MG: 500 TABLET ORAL at 20:58

## 2024-10-14 RX ADMIN — CEFAZOLIN 2000 MG: 1 INJECTION, POWDER, FOR SOLUTION INTRAMUSCULAR; INTRAVENOUS at 00:58

## 2024-10-14 RX ADMIN — SUCRALFATE 1 G: 1 TABLET ORAL at 14:24

## 2024-10-14 RX ADMIN — ATORVASTATIN CALCIUM 40 MG: 40 TABLET, FILM COATED ORAL at 09:49

## 2024-10-14 RX ADMIN — SUCRALFATE 1 G: 1 TABLET ORAL at 20:59

## 2024-10-14 RX ADMIN — INSULIN LISPRO 2 UNITS: 100 INJECTION, SOLUTION INTRAVENOUS; SUBCUTANEOUS at 11:49

## 2024-10-14 RX ADMIN — OXYCODONE 2.5 MG: 5 TABLET ORAL at 09:47

## 2024-10-14 RX ADMIN — FLUTICASONE PROPIONATE 1 SPRAY: 50 SPRAY, METERED NASAL at 11:50

## 2024-10-14 RX ADMIN — QUETIAPINE 100 MG: 100 TABLET ORAL at 20:59

## 2024-10-14 RX ADMIN — OXYCODONE 2.5 MG: 5 TABLET ORAL at 05:12

## 2024-10-14 RX ADMIN — SUCRALFATE 1 G: 1 TABLET ORAL at 09:49

## 2024-10-14 RX ADMIN — SODIUM CHLORIDE, PRESERVATIVE FREE 10 ML: 5 INJECTION INTRAVENOUS at 10:09

## 2024-10-14 RX ADMIN — INSULIN GLARGINE 10 UNITS: 100 INJECTION, SOLUTION SUBCUTANEOUS at 09:59

## 2024-10-14 RX ADMIN — ASPIRIN 81 MG: 81 TABLET, COATED ORAL at 20:59

## 2024-10-14 ASSESSMENT — PAIN SCALES - GENERAL
PAINLEVEL_OUTOF10: 5
PAINLEVEL_OUTOF10: 1
PAINLEVEL_OUTOF10: 5
PAINLEVEL_OUTOF10: 5
PAINLEVEL_OUTOF10: 3
PAINLEVEL_OUTOF10: 2
PAINLEVEL_OUTOF10: 7

## 2024-10-14 ASSESSMENT — PAIN DESCRIPTION - LOCATION
LOCATION: HIP

## 2024-10-14 ASSESSMENT — PAIN DESCRIPTION - ORIENTATION
ORIENTATION: LEFT
ORIENTATION: LEFT

## 2024-10-14 ASSESSMENT — PAIN DESCRIPTION - DESCRIPTORS
DESCRIPTORS: ACHING
DESCRIPTORS: ACHING;CRUSHING

## 2024-10-14 ASSESSMENT — PAIN SCALES - WONG BAKER
WONGBAKER_NUMERICALRESPONSE: NO HURT
WONGBAKER_NUMERICALRESPONSE: NO HURT

## 2024-10-14 NOTE — CONSENT
Informed Consent for Blood Component Transfusion Note    I have discussed with the daughter the rationale for blood component transfusion; its benefits in treating or preventing fatigue, organ damage, or death; and its risk which includes mild transfusion reactions, rare risk of blood borne infection, or more serious but rare reactions. I have discussed the alternatives to transfusion, including the risk and consequences of not receiving transfusion. The daughter had an opportunity to ask questions and had agreed to proceed with transfusion of blood components.    Electronically signed by MARYBETH Cuadra NP on 10/14/24 at 11:21 AM EDT

## 2024-10-14 NOTE — PROGRESS NOTES
OT eval order received and acknowledged. OT eval attempted at 1106 however Ms. Mcnulty was resting and asked to be seen later today; will return after 1300. Will continue to follow patient and attempt OT eval at a later time. Thank you.

## 2024-10-14 NOTE — PROGRESS NOTES
Hospitalist Progress Note    NAME:   Nena Mcnulty   : 1940   MRN: [id number]     Date/Time: 10/14/2024 1:37 PM  Patient PCP: Karoline Hatfield MD    Estimated discharge date:48 hours  Barriers: placement      HOSPITAL COURSE:    Nena Mcnulty is a 84 y.o. female with arthritis, advanced dementia , insulin-dependent diabetes, GERD, hyperlipidemia and HUA was admitted to the hospital after a ground level fall which resulted in a left femur fracture. X-ray showed a mildly displaced left hip fracture. Ortho consulted and patient underwent a left trochanteric fixation on 10/13. Continue asa 81 mg BID x 30 days and SCDs bilaterally per ortho recommendations. Family met with CM at the bedside to determine disposition.          Assessment / Plan:    Ground-level fall resulting in left hip fracture  Surgery following, appreciate assistance  Continue asa 81 mg BID for 30 days and SCDs bilaterally  Weight bearing as tolerated LLE  Follow-up with ortho in 2 weeks   CT of the head showed no acute process  CT cervical spine no acute fracture  X-ray of the hip and pelvis with left shows a comminuted mildly displaced intertrochanteric left hip fracture       Alzheimer's dementia  Will continue home meds of Seroquel, Namenda and galantamine  Monitor for signs of increased agitation        Diabetes type 2  Continue home meds of Lantus 10 units twice daily  Will hold Trulicity  Sliding scale insulin  Hypoglycemic protocol     Hyperlipidemia  Continue home medication atorvastatin    Anemia  10/14 Hgb 6.9, will transfuse 1 unit of PRBCs  Repeat labs in the am  Continue iron supplementation        Medical Decision Making:     [x] High (any 2)    A. Problems (any 1)  [] Acute/Chronic Illness/injury posing threat to life or bodily function:    [] Severe exacerbation of chronic illness:    ---------------------------------------------------------------------  B. Risk of Treatment (any 1)   [x] Drugs/treatments that

## 2024-10-14 NOTE — PLAN OF CARE
PHYSICAL THERAPY EVALUATION  Patient: Nena Mcnulty (84 y.o. female)  Date: 10/14/2024  Primary Diagnosis: Closed fracture of left hip, initial encounter ([facility]) [S72.002A]  Fall at home, initial encounter [W19.XXXA, Y92.009]  Hip fracture requiring operative repair, left, closed, initial encounter ([facility]) [S72.002A]  Procedure(s) (LRB):  LEFT TROCHANTERIC FIXATION NAIL ADVANCED (Left) 1 Day Post-Op   Precautions: Fall Risk, Weight Bearing     Left Lower Extremity Weight Bearing: Weight Bearing As Tolerated                Recommendations for nursing mobility: Encourage HEP in prep for ADLs/mobility; see handout for details, Frequent repositioning to prevent skin breakdown, Use of bed/chair alarm for safety, LE elevation for management of edema, Use of BSC for toileting , AD and gt belt for bed to chair , Heike Stedy for bed to chair transfers , and Assist x2    In place during session: PICC line, External Catheter, and EKG/telemetry     ASSESSMENT  Pt is a 84 y.o. female with PMH of advanced dementia , insulin-dependent diabetes , hyperlipidemia admitted on 10/13/2024 for left femur fracture after ground-level fall; pt currently being treated for s/o ORIF L hip on 10/13/24. Pt received semi supine on bed, soft mittens on b/l hands upon PT arrival, granddaughter in the room, agreeable to evaluation. Pt A&O x self, acknowledges her name, disoriented x place, time, and situation.      Based on the objective data described below, the patient currently presents with impaired functional mobility, decreased ROM, impaired strength, decreased activity tolerance, poor safety awareness, impaired cognition, decreased command following, poor attention/concentration, impaired balance, impaired posture, and increased pain levels. (See below for objective details and assist levels).     Overall pt tolerated session fair today with c/o pain L hip on weight bearing - 8/10 based on FACES pain scale. Pt with inconsistent command  Measure:  [facility] AM-PAC™ “6 Clicks”         Basic Mobility Inpatient Short Form  How much difficulty does the patient currently have... Unable A Lot A Little None   1.  Turning over in bed (including adjusting bedclothes, sheets and blankets)?   [] 1   [x] 2   [] 3   [] 4   2.  Sitting down on and standing up from a chair with arms ( e.g., wheelchair, bedside commode, etc.)   [] 1   [x] 2   [] 3   [] 4   3.  Moving from lying on back to sitting on the side of the bed?   [] 1   [x] 2   [] 3   [] 4          How much help from another person does the patient currently need... Total A Lot A Little None   4.  Moving to and from a bed to a chair (including a wheelchair)?   [] 1   [x] 2   [] 3   [] 4   5.  Need to walk in hospital room?   [] 1   [x] 2   [] 3   [] 4   6.  Climbing 3-5 steps with a railing?   [x] 1   [] 2   [] 3   [] 4   © , Trustees of [facility], under license to [employer]. All rights reserved     Score:  Initial:  Most Recent: X (Date: 10/14/2024 )   Interpretation of Tool:  Represents activities that are increasingly more difficult (i.e. Bed mobility, Transfers, Gait).  Score 24 23 22-20 19-15 14-10 9-7 6   Modifier CH CI CJ CK CL CM CN         Physical Therapy Evaluation Charge Determination   History Examination Presentation Decision-Making   MEDIUM  Complexity : 1-2 comorbidities / personal factors will impact the outcome/ POC  MEDIUM Complexity : 3 Standardized tests and measures addressing body structure, function, activity limitation and / or participation in recreation  LOW Complexity : Stable, uncomplicated  Other outcome measures [facility] 6  MEDIUM      Based on the above components, the patient evaluation is determined to be of the following complexity level: LOW    Pain Ratin/10 Pain L hip on weight bearing  Pain Intervention(s):   patient medicated for pain prior to session, ice, and rest    Activity Tolerance:   Fair     After treatment patient left in no

## 2024-10-14 NOTE — CARE COORDINATION
Chart reviewed. Therapy is recommending \"Moderate intensity short-term skilled physical therapy up to 5x/week.\" CM met with the patient and daughter at the bedside. Daughter is not agreeable to SNF. She would like a referral to [facility] for rehab. Choice letter obtained and referral has been sent.

## 2024-10-14 NOTE — PROGRESS NOTES
Pt has not had any urine output all day, bladder scan shows 211ml of urine,will assess later again.

## 2024-10-14 NOTE — PROGRESS NOTES
OT eval order received and acknowledged. OT eval attempted in the AM and this PM (1321) however Ms. Mcnulty was asleep. Will continue to follow patient and attempt OT eval at a later time. Thank you.

## 2024-10-14 NOTE — PLAN OF CARE
Problem: Chronic Conditions and Co-morbidities  Goal: Patient's chronic conditions and co-morbidity symptoms are monitored and maintained or improved  10/13/2024 1858 by Julia Florence RN  Outcome: Progressing     Problem: Pain  Goal: Verbalizes/displays adequate comfort level or baseline comfort level  10/14/2024 0122 by Jasmina Agrawal RN  Outcome: Progressing  10/13/2024 1858 by Julia Florence RN  Outcome: Progressing     Problem: Safety - Adult  Goal: Free from fall injury  10/14/2024 0122 by Jasmina Agrawal RN  Outcome: Progressing  10/13/2024 1858 by Julia Florence RN  Outcome: Progressing     Problem: Confusion  Goal: Confusion, delirium, dementia, or psychosis is improved or at baseline  Description: INTERVENTIONS:  1. Assess for possible contributors to thought disturbance, including medications, impaired vision or hearing, underlying metabolic abnormalities, dehydration, psychiatric diagnoses, and notify attending LIP  2. [place] high risk fall precautions, as indicated  3. Provide frequent short contacts to provide reality reorientation, refocusing and direction  4. Decrease environmental stimuli, including noise as appropriate  5. Monitor and intervene to maintain adequate nutrition, hydration, elimination, sleep and activity  6. If unable to ensure safety without constant attention obtain sitter and review sitter guidelines with assigned personnel  7. Initiate Psychosocial CNS and Spiritual Care consult, as indicated  10/14/2024 0122 by Jasmina Agrawal RN  Outcome: Progressing  10/13/2024 1858 by Julia Florence RN  Outcome: Progressing     Problem: Skin/Tissue Integrity  Goal: Absence of new skin breakdown  Description: 1.  Monitor for areas of redness and/or skin breakdown  2.  Assess vascular access sites hourly  3.  Every 4-6 hours minimum:  Change oxygen saturation probe site  4.  Every 4-6 hours:  If on nasal continuous positive airway pressure,

## 2024-10-14 NOTE — PLAN OF CARE
Problem: Chronic Conditions and Co-morbidities  Goal: Patient's chronic conditions and co-morbidity symptoms are monitored and maintained or improved  10/14/2024 0709 by Julia Florence RN  Outcome: Progressing  10/13/2024 1858 by Julia Florence RN  Outcome: Progressing     Problem: Pain  Goal: Verbalizes/displays adequate comfort level or baseline comfort level  10/14/2024 0709 by Julia Florence RN  Outcome: Progressing  10/14/2024 0122 by Jasmina Agrawal RN  Outcome: Progressing  10/13/2024 1858 by Julia Florence RN  Outcome: Progressing     Problem: Safety - Adult  Goal: Free from fall injury  10/14/2024 0709 by Julia Florence RN  Outcome: Progressing  10/14/2024 0122 by Jasmina Agrawal RN  Outcome: Progressing  10/13/2024 1858 by Julia Florence RN  Outcome: Progressing     Problem: Confusion  Goal: Confusion, delirium, dementia, or psychosis is improved or at baseline  Description: INTERVENTIONS:  1. Assess for possible contributors to thought disturbance, including medications, impaired vision or hearing, underlying metabolic abnormalities, dehydration, psychiatric diagnoses, and notify attending LIP  2. [place] high risk fall precautions, as indicated  3. Provide frequent short contacts to provide reality reorientation, refocusing and direction  4. Decrease environmental stimuli, including noise as appropriate  5. Monitor and intervene to maintain adequate nutrition, hydration, elimination, sleep and activity  6. If unable to ensure safety without constant attention obtain sitter and review sitter guidelines with assigned personnel  7. Initiate Psychosocial CNS and Spiritual Care consult, as indicated  10/14/2024 0709 by Julia Florence RN  Outcome: Progressing  10/14/2024 0122 by Jasmina Agrawal RN  Outcome: Progressing  10/13/2024 1858 by Julia Florence RN  Outcome: Progressing     Problem: Skin/Tissue Integrity  Goal: Absence of new skin  breakdown  Description: 1.  Monitor for areas of redness and/or skin breakdown  2.  Assess vascular access sites hourly  3.  Every 4-6 hours minimum:  Change oxygen saturation probe site  4.  Every 4-6 hours:  If on nasal continuous positive airway pressure, respiratory therapy assess nares and determine need for appliance change or resting period.  10/14/2024 0709 by Julia Florence RN  Outcome: Progressing  10/13/2024 1858 by Julia Florence RN  Outcome: Progressing     Problem: ABCDS Injury Assessment  Goal: Absence of physical injury  10/14/2024 0709 by Julia Florence RN  Outcome: Progressing  10/13/2024 1858 by Julia Florence RN  Outcome: Progressing     Problem: Discharge Planning  Goal: Discharge to home or other facility with appropriate resources  10/14/2024 0709 by Julia Florence RN  Outcome: Progressing  10/13/2024 1858 by Julia Florence RN  Outcome: Progressing

## 2024-10-14 NOTE — PROGRESS NOTES
Orthopedic Progress Note    Date:10/14/2024       Room:[facility]  Patient Name:Nena Mcnulty     YOB: 1940     Age:84 y.o.  female     SUBJECTIVE    10/14/2024: Patient resting in bed with family at bedside.  Prior to arrival patient got up with physical therapy.  Per her granddaughter she was able to stand and walk about 3 steps.  PT is recommending SNF however her daughter is requesting [facility] or home health if she is not approved.  Otherwise no other complaints.  VITALS         Temp  Av.7 °F (36.5 °C)  Min: 96.3 °F (35.7 °C)  Max: 98.4 °F (36.9 °C)  Pulse  Av.4  Min: 69  Max: 92  Systolic (24hrs), Av , Min:99 , Max:169    Diastolic (24hrs), Av, Min:28, Max:104    Resp  Av  Min: 14  Max: 25  SpO2  Av.8 %  Min: 93 %  Max: 100 %  LABS      Recent Labs     10/13/24  0626 10/14/24  0459   WBC 10.1 11.1*   RBC 3.61* 2.43*   HGB 10.1* 6.9*   HCT 31.6* 21.5*   MCV 87.5 88.5   RDW 14.9* 15.1*    220     Recent Labs     10/13/24  0626 10/14/24  0459    137   K 3.4* 3.8    106   CO2 27 24   BUN 18 24*   GLUCOSE 303* 259*   PT/INR:  Recent Labs     10/13/24  0959   PROTIME 14.0   INR 1.1     ESR: --   No results found for: \"CRP\", \"CRPM\"   Results       ** No results found for the last 336 hours. **           Physical Exam   Alert to self  Non-labored Respirations  Abdomen Non-tender  Musculoskeletal examination of the left lower extremity: Dressing along the left  hip is clean, dry and intact. No breakthrough noted, Mild tenderness to palpation along left thigh. Mild swelling, No redness or erythema noted to the thigh. Calves soft, supple, non-tender upon palpation. Dorsiflexion/plantarflexion intact, able to range toes without difficulty, neurovascularly intact      IMAGING       ASSESSMENT/PLAN         84-year-old female POD #1 s/p left trochanteric fixation nail.  Continue PT/OT, they are recommending SNF. Hgb 6.9, patient pending transfusion.

## 2024-10-15 VITALS
BODY MASS INDEX: 36.32 KG/M2 | HEART RATE: 82 BPM | HEIGHT: 60 IN | SYSTOLIC BLOOD PRESSURE: 140 MMHG | WEIGHT: 185 LBS | TEMPERATURE: 97.5 F | DIASTOLIC BLOOD PRESSURE: 47 MMHG | OXYGEN SATURATION: 94 % | RESPIRATION RATE: 17 BRPM

## 2024-10-15 LAB
ABO + RH BLD: NORMAL
ALBUMIN SERPL-MCNC: 2.5 G/DL (ref 3.5–5)
ALBUMIN/GLOB SERPL: 0.6 (ref 1.1–2.2)
ALP SERPL-CCNC: 81 U/L (ref 45–117)
ALT SERPL-CCNC: 10 U/L (ref 12–78)
ANION GAP SERPL CALC-SCNC: 5 MMOL/L (ref 2–12)
AST SERPL W P-5'-P-CCNC: 17 U/L (ref 15–37)
BASOPHILS # BLD: 0 K/UL (ref 0–0.1)
BASOPHILS NFR BLD: 0 % (ref 0–1)
BILIRUB SERPL-MCNC: 0.3 MG/DL (ref 0.2–1)
BLD PROD TYP BPU: NORMAL
BLOOD BANK BLOOD PRODUCT EXPIRATION DATE: NORMAL
BLOOD BANK DISPENSE STATUS: NORMAL
BLOOD BANK ISBT PRODUCT BLOOD TYPE: 5100
BLOOD BANK PRODUCT CODE: NORMAL
BLOOD BANK UNIT TYPE AND RH: NORMAL
BLOOD GROUP ANTIBODIES SERPL: NEGATIVE
BPU ID: NORMAL
BUN SERPL-MCNC: 19 MG/DL (ref 6–20)
BUN/CREAT SERPL: 23 (ref 12–20)
CA-I BLD-MCNC: 8.5 MG/DL (ref 8.5–10.1)
CHLORIDE SERPL-SCNC: 108 MMOL/L (ref 97–108)
CO2 SERPL-SCNC: 28 MMOL/L (ref 21–32)
CREAT SERPL-MCNC: 0.83 MG/DL (ref 0.55–1.02)
CROSSMATCH RESULT: NORMAL
DIFFERENTIAL METHOD BLD: ABNORMAL
EOSINOPHIL # BLD: 0.4 K/UL (ref 0–0.4)
EOSINOPHIL NFR BLD: 4 % (ref 0–7)
ERYTHROCYTE [DISTWIDTH] IN BLOOD BY AUTOMATED COUNT: 14.9 % (ref 11.5–14.5)
GLOBULIN SER CALC-MCNC: 3.9 G/DL (ref 2–4)
GLUCOSE BLD STRIP.AUTO-MCNC: 257 MG/DL (ref 65–100)
GLUCOSE BLD STRIP.AUTO-MCNC: 269 MG/DL (ref 65–100)
GLUCOSE BLD STRIP.AUTO-MCNC: 286 MG/DL (ref 65–100)
GLUCOSE SERPL-MCNC: 279 MG/DL (ref 65–100)
HCT VFR BLD AUTO: 28 % (ref 35–47)
HGB BLD-MCNC: 9 G/DL (ref 11.5–16)
IMM GRANULOCYTES # BLD AUTO: 0.1 K/UL (ref 0–0.04)
IMM GRANULOCYTES NFR BLD AUTO: 1 % (ref 0–0.5)
LYMPHOCYTES # BLD: 1.3 K/UL (ref 0.8–3.5)
LYMPHOCYTES NFR BLD: 14 % (ref 12–49)
MCH RBC QN AUTO: 28.7 PG (ref 26–34)
MCHC RBC AUTO-ENTMCNC: 32.1 G/DL (ref 30–36.5)
MCV RBC AUTO: 89.2 FL (ref 80–99)
MONOCYTES # BLD: 0.4 K/UL (ref 0–1)
MONOCYTES NFR BLD: 4 % (ref 5–13)
NEUTS SEG # BLD: 7.2 K/UL (ref 1.8–8)
NEUTS SEG NFR BLD: 77 % (ref 32–75)
NRBC # BLD: 0 K/UL (ref 0–0.01)
NRBC BLD-RTO: 0 PER 100 WBC
PERFORMED BY:: ABNORMAL
PLATELET # BLD AUTO: 216 K/UL (ref 150–400)
PMV BLD AUTO: 11.3 FL (ref 8.9–12.9)
POTASSIUM SERPL-SCNC: 3.4 MMOL/L (ref 3.5–5.1)
PROT SERPL-MCNC: 6.4 G/DL (ref 6.4–8.2)
RBC # BLD AUTO: 3.14 M/UL (ref 3.8–5.2)
SODIUM SERPL-SCNC: 141 MMOL/L (ref 136–145)
SPECIMEN EXP DATE BLD: NORMAL
TRANSFUSION STATUS PATIENT QL: NORMAL
UNIT DIVISION: 0
UNIT ISSUE DATE/TIME: NORMAL
WBC # BLD AUTO: 9.4 K/UL (ref 3.6–11)

## 2024-10-15 PROCEDURE — 97166 OT EVAL MOD COMPLEX 45 MIN: CPT

## 2024-10-15 PROCEDURE — 85025 COMPLETE CBC W/AUTO DIFF WBC: CPT

## 2024-10-15 PROCEDURE — 99024 POSTOP FOLLOW-UP VISIT: CPT

## 2024-10-15 PROCEDURE — 97530 THERAPEUTIC ACTIVITIES: CPT

## 2024-10-15 PROCEDURE — 6370000000 HC RX 637 (ALT 250 FOR IP): Performed by: NURSE PRACTITIONER

## 2024-10-15 PROCEDURE — 6370000000 HC RX 637 (ALT 250 FOR IP): Performed by: INTERNAL MEDICINE

## 2024-10-15 PROCEDURE — 36415 COLL VENOUS BLD VENIPUNCTURE: CPT

## 2024-10-15 PROCEDURE — 82962 GLUCOSE BLOOD TEST: CPT

## 2024-10-15 PROCEDURE — 97535 SELF CARE MNGMENT TRAINING: CPT

## 2024-10-15 PROCEDURE — 6370000000 HC RX 637 (ALT 250 FOR IP): Performed by: ORTHOPAEDIC SURGERY

## 2024-10-15 PROCEDURE — 80053 COMPREHEN METABOLIC PANEL: CPT

## 2024-10-15 PROCEDURE — 2580000003 HC RX 258: Performed by: NURSE PRACTITIONER

## 2024-10-15 RX ORDER — OXYCODONE HYDROCHLORIDE 5 MG/1
2.5 TABLET ORAL EVERY 4 HOURS PRN
Qty: 9 TABLET | Refills: 0 | Status: SHIPPED | OUTPATIENT
Start: 2024-10-15 | End: 2024-10-18

## 2024-10-15 RX ORDER — GALANTAMINE HYDROBROMIDE 8 MG/1
8 TABLET, FILM COATED ORAL 2 TIMES DAILY WITH MEALS
Qty: 60 TABLET | Refills: 3 | Status: SHIPPED | OUTPATIENT
Start: 2024-10-15

## 2024-10-15 RX ADMIN — TRAMADOL HYDROCHLORIDE 50 MG: 50 TABLET ORAL at 08:44

## 2024-10-15 RX ADMIN — FERROUS SULFATE TAB 325 MG (65 MG ELEMENTAL FE) 325 MG: 325 (65 FE) TAB at 08:43

## 2024-10-15 RX ADMIN — SUCRALFATE 1 G: 1 TABLET ORAL at 13:07

## 2024-10-15 RX ADMIN — INSULIN LISPRO 4 UNITS: 100 INJECTION, SOLUTION INTRAVENOUS; SUBCUTANEOUS at 11:35

## 2024-10-15 RX ADMIN — INSULIN LISPRO 4 UNITS: 100 INJECTION, SOLUTION INTRAVENOUS; SUBCUTANEOUS at 08:44

## 2024-10-15 RX ADMIN — ATORVASTATIN CALCIUM 40 MG: 40 TABLET, FILM COATED ORAL at 08:44

## 2024-10-15 RX ADMIN — ACETAMINOPHEN 1000 MG: 500 TABLET ORAL at 06:39

## 2024-10-15 RX ADMIN — SODIUM CHLORIDE, PRESERVATIVE FREE 10 ML: 5 INJECTION INTRAVENOUS at 08:46

## 2024-10-15 RX ADMIN — QUETIAPINE 100 MG: 100 TABLET ORAL at 08:44

## 2024-10-15 RX ADMIN — OXYCODONE 2.5 MG: 5 TABLET ORAL at 06:41

## 2024-10-15 RX ADMIN — ACETAMINOPHEN 1000 MG: 500 TABLET ORAL at 13:07

## 2024-10-15 RX ADMIN — GALANTAMINE 8 MG: 4 TABLET, FILM COATED ORAL at 08:47

## 2024-10-15 RX ADMIN — ASPIRIN 81 MG: 81 TABLET, COATED ORAL at 08:44

## 2024-10-15 RX ADMIN — INSULIN GLARGINE 10 UNITS: 100 INJECTION, SOLUTION SUBCUTANEOUS at 08:46

## 2024-10-15 RX ADMIN — MEMANTINE HYDROCHLORIDE 10 MG: 10 TABLET ORAL at 08:44

## 2024-10-15 RX ADMIN — FLUTICASONE PROPIONATE 1 SPRAY: 50 SPRAY, METERED NASAL at 08:59

## 2024-10-15 RX ADMIN — POTASSIUM CHLORIDE 40 MEQ: 1500 TABLET, EXTENDED RELEASE ORAL at 13:12

## 2024-10-15 RX ADMIN — PANTOPRAZOLE SODIUM 40 MG: 40 TABLET, DELAYED RELEASE ORAL at 06:39

## 2024-10-15 RX ADMIN — SUCRALFATE 1 G: 1 TABLET ORAL at 08:44

## 2024-10-15 ASSESSMENT — PAIN DESCRIPTION - LOCATION
LOCATION: HIP
LOCATION: HIP

## 2024-10-15 ASSESSMENT — PAIN SCALES - GENERAL
PAINLEVEL_OUTOF10: 0
PAINLEVEL_OUTOF10: 2
PAINLEVEL_OUTOF10: 0
PAINLEVEL_OUTOF10: 4
PAINLEVEL_OUTOF10: 3
PAINLEVEL_OUTOF10: 5

## 2024-10-15 ASSESSMENT — PAIN DESCRIPTION - ORIENTATION
ORIENTATION: LEFT
ORIENTATION: LEFT

## 2024-10-15 ASSESSMENT — PAIN DESCRIPTION - DESCRIPTORS
DESCRIPTORS: ACHING
DESCRIPTORS: ACHING

## 2024-10-15 ASSESSMENT — PAIN SCALES - WONG BAKER
WONGBAKER_NUMERICALRESPONSE: NO HURT
WONGBAKER_NUMERICALRESPONSE: NO HURT

## 2024-10-15 ASSESSMENT — PAIN - FUNCTIONAL ASSESSMENT
PAIN_FUNCTIONAL_ASSESSMENT: ACTIVITIES ARE NOT PREVENTED
PAIN_FUNCTIONAL_ASSESSMENT: ACTIVITIES ARE NOT PREVENTED

## 2024-10-15 NOTE — CARE COORDINATION
CM noted discharge order. Patient going to [address], [place] [address], nurse can jewel report at [phone].    Transition of Care Plan:    RUR: 16%  Prior Level of Functioning: Needs Assistance  Disposition: IRF  If SNF or IPR: Date FOC offered:10/14/2024   Date FOC received: 10/14/2024  Accepting facility: [facility]  Date authorization started with reference number: n/a  Date authorization received and expires: n/a  Follow up appointments: Per MD  DME needed: n/a  Transportation at discharge: [facility] 4:30pm  IM/IMM Medicare/ letter given: yes  Is patient a [facility] and connected with [facility]? N/a   If yes, was [facility] transfer form completed and [facility] notified?   Caregiver Contact: [place]  Discharge Caregiver contacted prior to discharge? yes  Care Conference needed? no  Barriers to discharge: none

## 2024-10-15 NOTE — PLAN OF CARE
Problem: Chronic Conditions and Co-morbidities  Goal: Patient's chronic conditions and co-morbidity symptoms are monitored and maintained or improved  Outcome: Progressing     Problem: Pain  Goal: Verbalizes/displays adequate comfort level or baseline comfort level  10/15/2024 1158 by Chris Majano RN  Outcome: Progressing  10/15/2024 0007 by Anne Keenan RN  Outcome: Progressing  10/15/2024 0007 by Anne Keenan RN  Outcome: Progressing  Flowsheets (Taken 10/15/2024 0007)  Verbalizes/displays adequate comfort level or baseline comfort level:   Encourage patient to monitor pain and request assistance   Assess pain using appropriate pain scale   Administer analgesics based on type and severity of pain and evaluate response   Notify Licensed Independent Practitioner if interventions unsuccessful or patient reports new pain

## 2024-10-15 NOTE — PLAN OF CARE
OCCUPATIONAL THERAPY EVALUATION  Patient: Nena Mcnulty (84 y.o. female)  Date: 10/15/2024  Primary Diagnosis: Closed fracture of left hip, initial encounter ([facility]) [S72.002A]  Fall at home, initial encounter [W19.XXXA, Y92.009]  Hip fracture requiring operative repair, left, closed, initial encounter ([facility]) [S72.002A]  Procedure(s) (LRB):  LEFT TROCHANTERIC FIXATION NAIL ADVANCED, intermediate (Left) 2 Days Post-Op   Precautions: Fall Risk, Weight Bearing   Left Lower Extremity Weight Bearing: Weight Bearing As Tolerated            Recommendations for nursing mobility: Out of bed to chair for meals, Use of BSC for toileting , and Assist x2    In place during session:EKG/telemetry   ASSESSMENT  Ms. Mcnulty is a 84 y.o. female was admitted 10/13/24 due to a fall and currently being treated for closed Left Hip Fracturue and now S/P Left ORIF. Pt received semi-supine in bed upon arrival, A & O x 1 (person), and agreeable to OT evaluation.     Based on current observations, pt presents with decreased  functional mobility, independence in ADLs, safety awareness, balance (see below for objective details and assist levels).     Overall, she tolerated session with extra time and verbal/tactile cues to stay on task. She  was cooperative and pleasant from start to completion and requires assistance for the following:  Bed Mobility - Max A x 1-2; Transfers - Max A x 1-2; Lower Body Dressing  (socks)  - Dependent x 1; Toilting (BSC at bedside) - Dependent x 1-2.  She has a supportive daughter who was present and Ms Mcnulty will benefit from continued skilled OT services to address current impairments and improve IND and safety with self cares and functional transfers/mobility. Current OT d/c recommendation Moderate intensity short-term skilled occupational therapy up to 5x/week once medically appropriate.     Start of Session End of Session   SPO2 (%) 92 95   Heart Rate (BPM) 83 88     GOALS:    Problem: Occupational  Other ill-defined conditions(799.89)     fibromylgia    PUD (peptic ulcer disease)     Unspecified sleep apnea      Past Surgical History:   Procedure Laterality Date    APPENDECTOMY      BACK SURGERY      neck and back    HYSTERECTOMY (CERVIX STATUS UNKNOWN)      ORTHOPEDIC SURGERY  2010    right tkr    OTHER SURGICAL HISTORY      tonsil    [place] UNLISTED PROCEDURE ABDOMEN PERITONEUM & OMENTUM  2006    colectomy    WRIST ARTHROSCOP,DIAGNOSTIC      bilateral carpal tunell        Expanded or extensive additional review of patient history:   Lives With: Daughter  Type of Home: House  Home Layout: One level  Home Access: Ramped entrance        Bathroom Shower/Tub: Tub/Shower unit     Bathroom Equipment: Grab bars in shower, Grab bars around toilet  Bathroom Accessibility: Accessible  Home Equipment: Walker - Rolling, Rollator, Cane, Wheelchair - Manual, Hospital bed     Social/Functional History  Lives With: Daughter  Type of Home: House  Home Layout: One level  Home Access: Ramped entrance  Bathroom Shower/Tub: Tub/Shower unit  Bathroom Equipment: Grab bars in shower, Grab bars around toilet  Bathroom Accessibility: Accessible  Home Equipment: Walker - Rolling, Rollator, Cane, Wheelchair - Manual, Hospital bed  Receives Help From: Family  ADL Assistance: Independent  Ambulation Assistance: Needs assistance  Transfer Assistance: Needs assistance    Hand Dominance: unknown     EXAMINATION OF PERFORMANCE DEFICITS:    Cognitive/Behavioral Status:  Orientation  Orientation Level: Oriented to person  Cognition  Overall Cognitive Status: Exceptions  Arousal/Alertness: Delayed responses to stimuli  Following Commands: Inconsistently follows commands  Attention Span: Attends with cues to redirect  Memory: Impaired  Safety Judgement: Impaired  Problem Solving: Impaired  Insights: Impaired  Initiation: Impaired  Sequencing: Impaired    Skin: Intact in visible areas    Edema: None in UE and LE's    Hearing:   Hearing  Hearing:

## 2024-10-15 NOTE — PLAN OF CARE
Problem: Pain  Goal: Verbalizes/displays adequate comfort level or baseline comfort level  10/15/2024 0007 by Anne Keenan RN  Outcome: Progressing  10/15/2024 0007 by Anne Keenan RN  Outcome: Progressing  Flowsheets (Taken 10/15/2024 0007)  Verbalizes/displays adequate comfort level or baseline comfort level:   Encourage patient to monitor pain and request assistance   Assess pain using appropriate pain scale   Administer analgesics based on type and severity of pain and evaluate response   Notify Licensed Independent Practitioner if interventions unsuccessful or patient reports new pain     Problem: Safety - Adult  Goal: Free from fall injury  10/15/2024 0007 by Anne Keenan RN  Outcome: Progressing  10/15/2024 0007 by Anne Keenan RN  Outcome: Progressing  Flowsheets (Taken 10/15/2024 0007)  Free From Fall Injury: Instruct family/caregiver on patient safety     Problem: Skin/Tissue Integrity  Goal: Absence of new skin breakdown  Description: 1.  Monitor for areas of redness and/or skin breakdown  2.  Assess vascular access sites hourly  3.  Every 4-6 hours minimum:  Change oxygen saturation probe site  4.  Every 4-6 hours:  If on nasal continuous positive airway pressure, respiratory therapy assess nares and determine need for appliance change or resting period.  Outcome: Progressing

## 2024-10-15 NOTE — DISCHARGE INSTRUCTIONS
Weight bearing: Weightbearing as tolerated left lower extremity  DVT Prophylaxis: Aspirin 81 mg twice daily x 30 days, SCDs bilaterally  Ortho f/u: 2 weeks in our outpatient orthopedic office

## 2024-10-15 NOTE — PLAN OF CARE
(unsupported)  Sitting - Dynamic: Fair (occasional)  Standing: Impaired  Standing - Static: Constant support;Fair;Poor  Standing - Dynamic: Constant support;Poor  Ambulation/Gait Training:                Gait  Gait Training: Yes  Overall Level of Assistance: Moderate assistance;Maximum assistance;Assist X2;Additional time;Adaptive equipment  Distance (ft): 5 Feet (3' with RW then a seated rest break on BSC 2')  Assistive Device: Gait belt;Walker, rolling  Interventions: Verbal cues;Tactile cues;Safety awareness training;Visual cues;Manual cues;Demonstration;Weight shifting training/pressure relief  Base of Support: Widened  Speed/Caridad: Shuffled;Slow  Step Length: Left shortened;Right shortened  Stance: Left decreased  Gait Abnormalities: Decreased step clearance;Shuffling gait           Therapeutic Exercises:       EXERCISE   Sets   Reps   Active Active Assist   Passive Self ROM   Comments   Ankle Pumps  20 [x] [] [] []    Quad Sets/Glut Sets  10 [x] [] [] []    Hamstring Sets   [] [] [] []    Short Arc Quads   [] [] [] []    Heel Slides 2 5 [] [x] [] []    Straight Leg Raises   [] [] [] []    Hip abd/add 2 5 [] [x] [] []    Long Arc Quads  10 [x] [] [] [] R LE   Marching  10 [x] [] [] [] R LE   Seated HR/TR   [] [] [] []       [] [] [] []     Addressed questions and concerns from family (granddaughter)  Pain Ratin/10 faces scale with mobility reported  Pain Intervention(s):   nursing notified, elevation, and repositioning    Activity Tolerance:   Fair  and requires frequent rest breaks    After treatment patient left in no apparent distress:   Bed locked and returned to lowest position, Patient left in no apparent distress in bed, Call bell within reach, Bed/ chair alarm activated, Caregiver / family present, Side rails x3, Heels elevated for pressure relief, and Updated patient's board on functional status and mobility recommendations, and nsg updated  left pt. With student

## 2024-10-15 NOTE — PROGRESS NOTES
Orthopedic Progress Note    Date:10/15/2024       Room:[facility]  Patient Name:Nena Mcnulty     YOB: 1940     Age:84 y.o.  female     SUBJECTIVE    10/14/2024: Patient resting in bed with family at bedside.  Prior to arrival patient got up with physical therapy.  Per her granddaughter she was able to stand and walk about 3 steps.  PT is recommending SNF however her daughter is requesting [facility] or home health if she is not approved.  Otherwise no other complaints.    10/15/2024: Patient resting in bed with daughter at bedside.  Patient's hemoglobin was 6.9 yesterday, she received a blood transfusion.  Current labs are still pending.  Daughter states she has not been complaining much of pain, except after standing up with physical therapy yesterday.  Her other daughter is wanting her mother to go to [facility] for rehab.  Otherwise no other complaints.  VITALS         Temp  Av.2 °F (36.8 °C)  Min: 97.7 °F (36.5 °C)  Max: 99.5 °F (37.5 °C)  Pulse  Av.3  Min: 76  Max: 91  Systolic (24hrs), Av , Min:126 , Max:148    Diastolic (24hrs), Av, Min:48, Max:104    Resp  Av.4  Min: 16  Max: 20  SpO2  Av.6 %  Min: 91 %  Max: 98 %  LABS      Recent Labs     10/13/24  0626 10/14/24  0459   WBC 10.1 11.1*   RBC 3.61* 2.43*   HGB 10.1* 6.9*   HCT 31.6* 21.5*   MCV 87.5 88.5   RDW 14.9* 15.1*    220     Recent Labs     10/13/24  0626 10/14/24  0459    137   K 3.4* 3.8    106   CO2 27 24   BUN 18 24*   GLUCOSE 303* 259*   PT/INR:  Recent Labs     10/13/24  0959   PROTIME 14.0   INR 1.1     ESR: --   No results found for: \"CRP\", \"CRPM\"   Results       ** No results found for the last 336 hours. **           Physical Exam   Alert to self  Non-labored Respirations  Abdomen Non-tender  Musculoskeletal examination of the left lower extremity: Dressing along the left  hip is clean, dry and intact. No breakthrough noted, Mild tenderness to palpation along left thigh. Mild

## 2024-10-15 NOTE — PROGRESS NOTES
Daughter complained of patient not able to urinate overnight. Bladder scanned patient only showing 50 mls. Will inform attending and will continue to monitor.

## 2024-10-15 NOTE — DISCHARGE SUMMARY
Discharge Summary    Name: Nena Mcnulty  [id number]  YOB: 1940 (Age: 84 y.o.)   Date of Admission: 10/13/2024  Date of Discharge: 10/15/2024  Attending Physician: Dipika Mercedes*    Discharge Diagnosis:   Principal Problem:    Hip fracture requiring operative repair, left, closed, initial encounter ([facility])  Active Problems:    Fall at home, initial encounter  Resolved Problems:    * No resolved hospital problems. *       Consultations:  IP CONSULT TO ORTHOPEDIC SURGERY  IP CONSULT TO HOSPITALIST      Brief Admission History/Reason for Admission Per Chadwick Nunez MD:     Nena Mcnulty is a 84 y.o. female with arthritis, advanced dementia , insulin-dependent diabetes, GERD, hyperlipidemia and HUA was admitted to the hospital after a ground level fall which resulted in a left femur fracture. X-ray showed a mildly displaced left hip fracture. Ortho consulted and patient underwent a left trochanteric fixation on 10/13. Continue asa 81 mg BID x 30 days and SCDs bilaterally per ortho recommendations. Patient is medically stable for discharge. Patient to continue weightbearing as tolerated to the LLE. Please continue aspirin 81 mg twice daily for 30 days, and sequential compression devices bilaterally at rehab.  Patient will need to follow-up with primary care provider and orthopedic surgeon in 2 weeks.           Discharge Exam:  Patient seen and examined by me on discharge day.  Pertinent Findings:  Patient Vitals for the past 24 hrs:   BP Temp Temp src Pulse Resp SpO2   10/15/24 0914 -- -- -- -- 16 --   10/15/24 0844 -- -- -- -- 16 --   10/15/24 0730 (!) 145/48 97.9 °F (36.6 °C) Oral 78 20 94 %   10/15/24 0711 -- -- -- -- 16 --   10/15/24 0214 (!) 139/57 99.5 °F (37.5 °C) Oral 80 18 91 %   10/14/24 2128 -- -- -- -- 18 --   10/14/24 2026 (!) 129/104 99.1 °F (37.3 °C) Oral 91 18 --   10/14/24 1715 (!) 148/58 97.7 °F (36.5 °C) -- 81 17 93 %   10/14/24 6604

## 2024-10-15 NOTE — PROGRESS NOTES
Discharge plan of care/case management plan validated with provider discharge order.  Removed patient IV without complications, tele discontinued, no tucker.   Discharge to [facility] and rehab, report called to Radha.

## 2024-10-18 NOTE — PROGRESS NOTES
Physician Progress Note      PATIENT:               DIONNE DUPONT  CSN #:                  [id number]  :                       1940  ADMIT DATE:       10/13/2024 4:59 AM  DISCH DATE:        10/15/2024 5:22 PM  RESPONDING  PROVIDER #:        Esperanza Rosen NP          QUERY TEXT:    Pt admitted with left femur fracture and has anemia documented. If possible,   please document in progress notes and discharge summary further specificity   regarding the acuity and type of anemia:    The medical record reflects the following:  Risk Factors: Post Operative, age 85    Clinical Indicators: Internal Medicine 10/14 Anemia-10/14 Hgb 6.9, will   transfuse 1 unit of PRBCs, Repeat labs in the am.  HGB-10.1,6.9,9.0  HCT-31.6,21.5,28.0    Treatment: PRBCs transfusion, Iron Supplement    Thank You  Fermin Lau [facility] CDS  Options provided:  -- Anemia due to postoperative blood loss  -- Other - I will add my own diagnosis  -- Disagree - Not applicable / Not valid  -- Disagree - Clinically unable to determine / Unknown  -- Refer to Clinical Documentation Reviewer    PROVIDER RESPONSE TEXT:    This patient has anemia due to postoperative blood loss.    Query created by: Fermin Lau on 10/18/2024 7:43 AM      Electronically signed by:  Esperanza Rosen NP 10/18/2024 10:33 AM

## 2024-10-22 ENCOUNTER — HOSPITAL ENCOUNTER (OUTPATIENT)
Facility: HOSPITAL | Age: 84
Setting detail: SPECIMEN
Discharge: HOME | End: 2024-10-25

## 2024-10-22 LAB
ANION GAP SERPL CALC-SCNC: 6 MMOL/L (ref 2–12)
BASOPHILS # BLD: 0.1 K/UL (ref 0–0.1)
BASOPHILS NFR BLD: 1 % (ref 0–1)
BUN SERPL-MCNC: 27 MG/DL (ref 6–20)
BUN/CREAT SERPL: 33 (ref 12–20)
CA-I BLD-MCNC: 8.6 MG/DL (ref 8.5–10.1)
CHLORIDE SERPL-SCNC: 106 MMOL/L (ref 97–108)
CO2 SERPL-SCNC: 28 MMOL/L (ref 21–32)
CREAT SERPL-MCNC: 0.82 MG/DL (ref 0.55–1.02)
DIFFERENTIAL METHOD BLD: ABNORMAL
EOSINOPHIL # BLD: 0.4 K/UL (ref 0–0.4)
EOSINOPHIL NFR BLD: 5 % (ref 0–7)
ERYTHROCYTE [DISTWIDTH] IN BLOOD BY AUTOMATED COUNT: 15.1 % (ref 11.5–14.5)
GLUCOSE SERPL-MCNC: 249 MG/DL (ref 65–100)
HCT VFR BLD AUTO: 28.9 % (ref 35–47)
HGB BLD-MCNC: 9 G/DL (ref 11.5–16)
IMM GRANULOCYTES # BLD AUTO: 0.1 K/UL (ref 0–0.04)
IMM GRANULOCYTES NFR BLD AUTO: 1 % (ref 0–0.5)
LYMPHOCYTES # BLD: 1.9 K/UL (ref 0.8–3.5)
LYMPHOCYTES NFR BLD: 23 % (ref 12–49)
MCH RBC QN AUTO: 28.4 PG (ref 26–34)
MCHC RBC AUTO-ENTMCNC: 31.1 G/DL (ref 30–36.5)
MCV RBC AUTO: 91.2 FL (ref 80–99)
MONOCYTES # BLD: 0.7 K/UL (ref 0–1)
MONOCYTES NFR BLD: 9 % (ref 5–13)
NEUTS SEG # BLD: 5 K/UL (ref 1.8–8)
NEUTS SEG NFR BLD: 61 % (ref 32–75)
NRBC # BLD: 0 K/UL (ref 0–0.01)
NRBC BLD-RTO: 0 PER 100 WBC
PLATELET # BLD AUTO: 346 K/UL (ref 150–400)
PMV BLD AUTO: 11.8 FL (ref 8.9–12.9)
POTASSIUM SERPL-SCNC: 4.5 MMOL/L (ref 3.5–5.1)
RBC # BLD AUTO: 3.17 M/UL (ref 3.8–5.2)
SODIUM SERPL-SCNC: 140 MMOL/L (ref 136–145)
WBC # BLD AUTO: 8.1 K/UL (ref 3.6–11)

## 2024-10-22 PROCEDURE — 80048 BASIC METABOLIC PNL TOTAL CA: CPT

## 2024-10-22 PROCEDURE — 85025 COMPLETE CBC W/AUTO DIFF WBC: CPT

## 2024-10-29 ENCOUNTER — HOSPITAL ENCOUNTER (OUTPATIENT)
Facility: HOSPITAL | Age: 84
Setting detail: SPECIMEN
Discharge: HOME | End: 2024-11-01

## 2024-10-29 LAB
ALBUMIN SERPL-MCNC: 3.2 G/DL (ref 3.5–5)
ALBUMIN/GLOB SERPL: 0.7 (ref 1.1–2.2)
ALP SERPL-CCNC: 150 U/L (ref 45–117)
ALT SERPL-CCNC: 17 U/L (ref 12–78)
ANION GAP SERPL CALC-SCNC: 7 MMOL/L (ref 2–12)
AST SERPL W P-5'-P-CCNC: 14 U/L (ref 15–37)
BASOPHILS # BLD: 0.1 K/UL (ref 0–0.1)
BASOPHILS NFR BLD: 1 % (ref 0–1)
BILIRUB SERPL-MCNC: 0.3 MG/DL (ref 0.2–1)
BUN SERPL-MCNC: 30 MG/DL (ref 6–20)
BUN/CREAT SERPL: 35 (ref 12–20)
CA-I BLD-MCNC: 9.6 MG/DL (ref 8.5–10.1)
CHLORIDE SERPL-SCNC: 106 MMOL/L (ref 97–108)
CO2 SERPL-SCNC: 24 MMOL/L (ref 21–32)
CREAT SERPL-MCNC: 0.86 MG/DL (ref 0.55–1.02)
DIFFERENTIAL METHOD BLD: ABNORMAL
EOSINOPHIL # BLD: 0.3 K/UL (ref 0–0.4)
EOSINOPHIL NFR BLD: 3 % (ref 0–7)
ERYTHROCYTE [DISTWIDTH] IN BLOOD BY AUTOMATED COUNT: 14.7 % (ref 11.5–14.5)
GLOBULIN SER CALC-MCNC: 4.6 G/DL (ref 2–4)
GLUCOSE SERPL-MCNC: 255 MG/DL (ref 65–100)
HCT VFR BLD AUTO: 32.4 % (ref 35–47)
HGB BLD-MCNC: 10.1 G/DL (ref 11.5–16)
IMM GRANULOCYTES # BLD AUTO: 0 K/UL (ref 0–0.04)
IMM GRANULOCYTES NFR BLD AUTO: 0 % (ref 0–0.5)
LYMPHOCYTES # BLD: 1.8 K/UL (ref 0.8–3.5)
LYMPHOCYTES NFR BLD: 21 % (ref 12–49)
MCH RBC QN AUTO: 27.4 PG (ref 26–34)
MCHC RBC AUTO-ENTMCNC: 31.2 G/DL (ref 30–36.5)
MCV RBC AUTO: 87.8 FL (ref 80–99)
MONOCYTES # BLD: 0.5 K/UL (ref 0–1)
MONOCYTES NFR BLD: 6 % (ref 5–13)
NEUTS SEG # BLD: 6 K/UL (ref 1.8–8)
NEUTS SEG NFR BLD: 69 % (ref 32–75)
NRBC # BLD: 0 K/UL (ref 0–0.01)
NRBC BLD-RTO: 0 PER 100 WBC
PLATELET # BLD AUTO: 355 K/UL (ref 150–400)
PMV BLD AUTO: 11.8 FL (ref 8.9–12.9)
POTASSIUM SERPL-SCNC: 4.4 MMOL/L (ref 3.5–5.1)
PROT SERPL-MCNC: 7.8 G/DL (ref 6.4–8.2)
RBC # BLD AUTO: 3.69 M/UL (ref 3.8–5.2)
SODIUM SERPL-SCNC: 137 MMOL/L (ref 136–145)
WBC # BLD AUTO: 8.7 K/UL (ref 3.6–11)

## 2024-10-29 PROCEDURE — 80053 COMPREHEN METABOLIC PANEL: CPT

## 2024-10-29 PROCEDURE — 85025 COMPLETE CBC W/AUTO DIFF WBC: CPT

## 2024-11-02 NOTE — ANESTHESIA POSTPROCEDURE EVALUATION
Department of Anesthesiology  Postprocedure Note    Patient: Nena Mcnulty  MRN: [id number]  YOB: 1940    Procedure Summary       Date: 10/13/24 Room / Location: [facility] MAIN OR 08 / [facility] MAIN OR    Anesthesia Start: 1226 Anesthesia Stop: 1440    Procedure: LEFT TROCHANTERIC FIXATION NAIL ADVANCED, intermediate (Left: Hip) Diagnosis:       Closed fracture of right hip requiring operative repair, initial encounter ([facility])      (Closed fracture of right hip requiring operative repair, initial encounter ([facility]) [S72.001A])    Surgeons: Dennis Lacy MD Responsible Provider: Gary Tierney MD    Anesthesia Type: general ASA Status: 3            Anesthesia Type: No value filed.    Malcolm Phase I: Malcolm Score: 8    Malcolm Phase II:      Anesthesia Post Evaluation    Patient location during evaluation: PACU  Patient participation: complete - patient cannot participate  Level of consciousness: awake  Pain score: 0  Airway patency: patent  Nausea & Vomiting: no nausea and no vomiting  Cardiovascular status: hemodynamically stable  Respiratory status: acceptable  Hydration status: stable  Multimodal analgesia pain management approach        No notable events documented.

## 2025-04-23 ENCOUNTER — APPOINTMENT (OUTPATIENT)
Facility: HOSPITAL | Age: 85
DRG: 378 | End: 2025-04-23
Payer: MEDICARE

## 2025-04-23 ENCOUNTER — HOSPITAL ENCOUNTER (INPATIENT)
Facility: HOSPITAL | Age: 85
LOS: 2 days | Discharge: HOME HEALTH | DRG: 378 | End: 2025-04-26
Attending: EMERGENCY MEDICINE | Admitting: INTERNAL MEDICINE
Payer: MEDICARE

## 2025-04-23 DIAGNOSIS — N30.00 ACUTE CYSTITIS WITHOUT HEMATURIA: ICD-10-CM

## 2025-04-23 DIAGNOSIS — G93.41 ACUTE METABOLIC ENCEPHALOPATHY: Primary | ICD-10-CM

## 2025-04-23 PROBLEM — N39.0 UTI (URINARY TRACT INFECTION): Status: ACTIVE | Noted: 2025-04-23

## 2025-04-23 LAB
ALBUMIN SERPL-MCNC: 3.3 G/DL (ref 3.5–5)
ALBUMIN/GLOB SERPL: 0.7 (ref 1.1–2.2)
ALP SERPL-CCNC: 117 U/L (ref 45–117)
ALT SERPL-CCNC: 19 U/L (ref 12–78)
ANION GAP SERPL CALC-SCNC: 2 MMOL/L (ref 2–12)
ANION GAP SERPL CALC-SCNC: 3 MMOL/L (ref 2–12)
APPEARANCE UR: ABNORMAL
AST SERPL W P-5'-P-CCNC: 21 U/L (ref 15–37)
BACTERIA URNS QL MICRO: ABNORMAL /HPF
BASOPHILS # BLD: 0.01 K/UL (ref 0–0.1)
BASOPHILS NFR BLD: 0.1 % (ref 0–1)
BILIRUB SERPL-MCNC: 0.3 MG/DL (ref 0.2–1)
BILIRUB UR QL: NEGATIVE
BUN SERPL-MCNC: 34 MG/DL (ref 6–20)
BUN SERPL-MCNC: 37 MG/DL (ref 6–20)
BUN/CREAT SERPL: 36 (ref 12–20)
BUN/CREAT SERPL: 44 (ref 12–20)
CA-I BLD-MCNC: 8.9 MG/DL (ref 8.5–10.1)
CA-I BLD-MCNC: 9.5 MG/DL (ref 8.5–10.1)
CHLORIDE SERPL-SCNC: 108 MMOL/L (ref 97–108)
CHLORIDE SERPL-SCNC: 109 MMOL/L (ref 97–108)
CO2 SERPL-SCNC: 30 MMOL/L (ref 21–32)
CO2 SERPL-SCNC: 30 MMOL/L (ref 21–32)
COLOR UR: ABNORMAL
CREAT SERPL-MCNC: 0.78 MG/DL (ref 0.55–1.02)
CREAT SERPL-MCNC: 1.02 MG/DL (ref 0.55–1.02)
DIFFERENTIAL METHOD BLD: ABNORMAL
EOSINOPHIL # BLD: 0.05 K/UL (ref 0–0.4)
EOSINOPHIL NFR BLD: 0.6 % (ref 0–7)
EPITH CASTS URNS QL MICRO: ABNORMAL /LPF
ERYTHROCYTE [DISTWIDTH] IN BLOOD BY AUTOMATED COUNT: 16.6 % (ref 11.5–14.5)
GLOBULIN SER CALC-MCNC: 4.5 G/DL (ref 2–4)
GLUCOSE BLD STRIP.AUTO-MCNC: 129 MG/DL (ref 65–100)
GLUCOSE SERPL-MCNC: 136 MG/DL (ref 65–100)
GLUCOSE SERPL-MCNC: 161 MG/DL (ref 65–100)
GLUCOSE UR STRIP.AUTO-MCNC: NEGATIVE MG/DL
HCT VFR BLD AUTO: 36.4 % (ref 35–47)
HGB BLD-MCNC: 11.1 G/DL (ref 11.5–16)
HGB UR QL STRIP: NEGATIVE
IMM GRANULOCYTES # BLD AUTO: 0.05 K/UL (ref 0–0.04)
IMM GRANULOCYTES NFR BLD AUTO: 0.6 % (ref 0–0.5)
KETONES UR QL STRIP.AUTO: NEGATIVE MG/DL
LACTATE BLD-SCNC: 1.48 MMOL/L (ref 0.4–2)
LEUKOCYTE ESTERASE UR QL STRIP.AUTO: ABNORMAL
LIPASE SERPL-CCNC: 24 U/L (ref 13–75)
LYMPHOCYTES # BLD: 0.66 K/UL (ref 0.8–3.5)
LYMPHOCYTES NFR BLD: 7.3 % (ref 12–49)
MCH RBC QN AUTO: 26.4 PG (ref 26–34)
MCHC RBC AUTO-ENTMCNC: 30.5 G/DL (ref 30–36.5)
MCV RBC AUTO: 86.7 FL (ref 80–99)
MONOCYTES # BLD: 0.31 K/UL (ref 0–1)
MONOCYTES NFR BLD: 3.4 % (ref 5–13)
NEUTS SEG # BLD: 7.99 K/UL (ref 1.8–8)
NEUTS SEG NFR BLD: 88 % (ref 32–75)
NITRITE UR QL STRIP.AUTO: NEGATIVE
NRBC # BLD: 0 K/UL (ref 0–0.01)
NRBC BLD-RTO: 0 PER 100 WBC
PERFORMED BY:: ABNORMAL
PERFORMED BY:: NORMAL
PH UR STRIP: 5 (ref 5–8)
PLATELET # BLD AUTO: 216 K/UL (ref 150–400)
PMV BLD AUTO: 11.9 FL (ref 8.9–12.9)
POTASSIUM SERPL-SCNC: 3.5 MMOL/L (ref 3.5–5.1)
POTASSIUM SERPL-SCNC: 3.7 MMOL/L (ref 3.5–5.1)
PROT SERPL-MCNC: 7.8 G/DL (ref 6.4–8.2)
PROT UR STRIP-MCNC: 100 MG/DL
RBC # BLD AUTO: 4.2 M/UL (ref 3.8–5.2)
RBC #/AREA URNS HPF: ABNORMAL /HPF (ref 0–5)
SODIUM SERPL-SCNC: 141 MMOL/L (ref 136–145)
SODIUM SERPL-SCNC: 141 MMOL/L (ref 136–145)
SP GR UR REFRACTOMETRY: 1.03 (ref 1–1.03)
TROPONIN I SERPL HS-MCNC: 8 NG/L (ref 0–51)
URINE CULTURE IF INDICATED: ABNORMAL
UROBILINOGEN UR QL STRIP.AUTO: 0.1 EU/DL (ref 0.1–1)
WBC # BLD AUTO: 9.1 K/UL (ref 3.6–11)
WBC CASTS URNS QL MICRO: PRESENT
WBC URNS QL MICRO: >100 /HPF (ref 0–4)
YEAST URNS QL MICRO: PRESENT

## 2025-04-23 PROCEDURE — 96375 TX/PRO/DX INJ NEW DRUG ADDON: CPT

## 2025-04-23 PROCEDURE — 80048 BASIC METABOLIC PNL TOTAL CA: CPT

## 2025-04-23 PROCEDURE — 82962 GLUCOSE BLOOD TEST: CPT

## 2025-04-23 PROCEDURE — 6370000000 HC RX 637 (ALT 250 FOR IP): Performed by: EMERGENCY MEDICINE

## 2025-04-23 PROCEDURE — 83605 ASSAY OF LACTIC ACID: CPT

## 2025-04-23 PROCEDURE — 96361 HYDRATE IV INFUSION ADD-ON: CPT

## 2025-04-23 PROCEDURE — 80053 COMPREHEN METABOLIC PANEL: CPT

## 2025-04-23 PROCEDURE — 2580000003 HC RX 258: Performed by: EMERGENCY MEDICINE

## 2025-04-23 PROCEDURE — 2500000003 HC RX 250 WO HCPCS: Performed by: EMERGENCY MEDICINE

## 2025-04-23 PROCEDURE — 87186 SC STD MICRODIL/AGAR DIL: CPT

## 2025-04-23 PROCEDURE — G0378 HOSPITAL OBSERVATION PER HR: HCPCS

## 2025-04-23 PROCEDURE — 81001 URINALYSIS AUTO W/SCOPE: CPT

## 2025-04-23 PROCEDURE — 84484 ASSAY OF TROPONIN QUANT: CPT

## 2025-04-23 PROCEDURE — 87154 CUL TYP ID BLD PTHGN 6+ TRGT: CPT

## 2025-04-23 PROCEDURE — 87040 BLOOD CULTURE FOR BACTERIA: CPT

## 2025-04-23 PROCEDURE — 6370000000 HC RX 637 (ALT 250 FOR IP): Performed by: INTERNAL MEDICINE

## 2025-04-23 PROCEDURE — 2500000003 HC RX 250 WO HCPCS: Performed by: INTERNAL MEDICINE

## 2025-04-23 PROCEDURE — 96374 THER/PROPH/DIAG INJ IV PUSH: CPT

## 2025-04-23 PROCEDURE — 36415 COLL VENOUS BLD VENIPUNCTURE: CPT

## 2025-04-23 PROCEDURE — 6360000002 HC RX W HCPCS: Performed by: EMERGENCY MEDICINE

## 2025-04-23 PROCEDURE — 87077 CULTURE AEROBIC IDENTIFY: CPT

## 2025-04-23 PROCEDURE — 94761 N-INVAS EAR/PLS OXIMETRY MLT: CPT

## 2025-04-23 PROCEDURE — 87086 URINE CULTURE/COLONY COUNT: CPT

## 2025-04-23 PROCEDURE — 85025 COMPLETE CBC W/AUTO DIFF WBC: CPT

## 2025-04-23 PROCEDURE — 71045 X-RAY EXAM CHEST 1 VIEW: CPT

## 2025-04-23 PROCEDURE — 83690 ASSAY OF LIPASE: CPT

## 2025-04-23 PROCEDURE — 99285 EMERGENCY DEPT VISIT HI MDM: CPT

## 2025-04-23 PROCEDURE — 93005 ELECTROCARDIOGRAM TRACING: CPT | Performed by: EMERGENCY MEDICINE

## 2025-04-23 RX ORDER — DEXTROSE MONOHYDRATE 100 MG/ML
INJECTION, SOLUTION INTRAVENOUS CONTINUOUS PRN
Status: DISCONTINUED | OUTPATIENT
Start: 2025-04-23 | End: 2025-04-26 | Stop reason: HOSPADM

## 2025-04-23 RX ORDER — FUROSEMIDE 20 MG/1
20 TABLET ORAL DAILY
COMMUNITY

## 2025-04-23 RX ORDER — MULTIVITAMIN WITH IRON
1 TABLET ORAL DAILY
Status: DISCONTINUED | OUTPATIENT
Start: 2025-04-24 | End: 2025-04-26 | Stop reason: HOSPADM

## 2025-04-23 RX ORDER — ATORVASTATIN CALCIUM 40 MG/1
40 TABLET, FILM COATED ORAL DAILY
Status: DISCONTINUED | OUTPATIENT
Start: 2025-04-23 | End: 2025-04-26 | Stop reason: HOSPADM

## 2025-04-23 RX ORDER — QUETIAPINE FUMARATE 100 MG/1
100 TABLET, FILM COATED ORAL 2 TIMES DAILY
Status: DISCONTINUED | OUTPATIENT
Start: 2025-04-23 | End: 2025-04-26 | Stop reason: HOSPADM

## 2025-04-23 RX ORDER — AMLODIPINE BESYLATE 5 MG/1
5 TABLET ORAL DAILY
COMMUNITY

## 2025-04-23 RX ORDER — ASCORBIC ACID 500 MG
500 TABLET ORAL DAILY
Status: DISCONTINUED | OUTPATIENT
Start: 2025-04-23 | End: 2025-04-26 | Stop reason: HOSPADM

## 2025-04-23 RX ORDER — GLIPIZIDE 5 MG/1
5 TABLET ORAL
Status: DISCONTINUED | OUTPATIENT
Start: 2025-04-24 | End: 2025-04-26 | Stop reason: HOSPADM

## 2025-04-23 RX ORDER — ACETAMINOPHEN 325 MG/1
650 TABLET ORAL EVERY 6 HOURS PRN
Status: DISCONTINUED | OUTPATIENT
Start: 2025-04-23 | End: 2025-04-26 | Stop reason: HOSPADM

## 2025-04-23 RX ORDER — AMLODIPINE BESYLATE 5 MG/1
5 TABLET ORAL DAILY
Status: DISCONTINUED | OUTPATIENT
Start: 2025-04-23 | End: 2025-04-26 | Stop reason: HOSPADM

## 2025-04-23 RX ORDER — ASPIRIN 81 MG/1
81 TABLET ORAL DAILY
Status: DISCONTINUED | OUTPATIENT
Start: 2025-04-23 | End: 2025-04-26 | Stop reason: HOSPADM

## 2025-04-23 RX ORDER — SODIUM CHLORIDE 0.9 % (FLUSH) 0.9 %
5-40 SYRINGE (ML) INJECTION PRN
Status: DISCONTINUED | OUTPATIENT
Start: 2025-04-23 | End: 2025-04-26 | Stop reason: HOSPADM

## 2025-04-23 RX ORDER — INSULIN GLARGINE 100 [IU]/ML
30 INJECTION, SOLUTION SUBCUTANEOUS 2 TIMES DAILY
Status: DISCONTINUED | OUTPATIENT
Start: 2025-04-23 | End: 2025-04-26 | Stop reason: HOSPADM

## 2025-04-23 RX ORDER — PANTOPRAZOLE SODIUM 40 MG/1
40 TABLET, DELAYED RELEASE ORAL 2 TIMES DAILY
Status: DISCONTINUED | OUTPATIENT
Start: 2025-04-23 | End: 2025-04-24

## 2025-04-23 RX ORDER — DEXTROSE MONOHYDRATE 100 MG/ML
INJECTION, SOLUTION INTRAVENOUS CONTINUOUS PRN
Status: DISCONTINUED | OUTPATIENT
Start: 2025-04-23 | End: 2025-04-23

## 2025-04-23 RX ORDER — ESCITALOPRAM OXALATE 10 MG/1
10 TABLET ORAL DAILY
COMMUNITY
Start: 2025-04-14

## 2025-04-23 RX ORDER — FERROUS SULFATE 325(65) MG
325 TABLET ORAL EVERY OTHER DAY
Status: DISCONTINUED | OUTPATIENT
Start: 2025-04-23 | End: 2025-04-26 | Stop reason: HOSPADM

## 2025-04-23 RX ORDER — FLUCONAZOLE 100 MG/1
200 TABLET ORAL ONCE
Status: COMPLETED | OUTPATIENT
Start: 2025-04-23 | End: 2025-04-23

## 2025-04-23 RX ORDER — SODIUM CHLORIDE 9 MG/ML
INJECTION, SOLUTION INTRAVENOUS PRN
Status: DISCONTINUED | OUTPATIENT
Start: 2025-04-23 | End: 2025-04-26 | Stop reason: HOSPADM

## 2025-04-23 RX ORDER — ONDANSETRON 2 MG/ML
4 INJECTION INTRAMUSCULAR; INTRAVENOUS EVERY 6 HOURS PRN
Status: DISCONTINUED | OUTPATIENT
Start: 2025-04-23 | End: 2025-04-26 | Stop reason: HOSPADM

## 2025-04-23 RX ORDER — GALANTAMINE 4 MG/1
16 TABLET, FILM COATED ORAL 2 TIMES DAILY WITH MEALS
Status: DISCONTINUED | OUTPATIENT
Start: 2025-04-23 | End: 2025-04-24

## 2025-04-23 RX ORDER — B-COMPLEX WITH VITAMIN C
1 TABLET ORAL DAILY
Status: DISCONTINUED | OUTPATIENT
Start: 2025-04-24 | End: 2025-04-23

## 2025-04-23 RX ORDER — SODIUM CHLORIDE 0.9 % (FLUSH) 0.9 %
5-40 SYRINGE (ML) INJECTION EVERY 12 HOURS SCHEDULED
Status: DISCONTINUED | OUTPATIENT
Start: 2025-04-23 | End: 2025-04-26 | Stop reason: HOSPADM

## 2025-04-23 RX ORDER — VITAMIN B COMPLEX
2000 TABLET ORAL DAILY
Status: DISCONTINUED | OUTPATIENT
Start: 2025-04-23 | End: 2025-04-26 | Stop reason: HOSPADM

## 2025-04-23 RX ORDER — 0.9 % SODIUM CHLORIDE 0.9 %
500 INTRAVENOUS SOLUTION INTRAVENOUS ONCE
Status: COMPLETED | OUTPATIENT
Start: 2025-04-23 | End: 2025-04-23

## 2025-04-23 RX ORDER — POTASSIUM CHLORIDE 1500 MG/1
40 TABLET, EXTENDED RELEASE ORAL PRN
Status: DISCONTINUED | OUTPATIENT
Start: 2025-04-23 | End: 2025-04-26 | Stop reason: HOSPADM

## 2025-04-23 RX ORDER — ACETAMINOPHEN/DIPHENHYDRAMINE 500MG-25MG
2 TABLET ORAL NIGHTLY PRN
COMMUNITY

## 2025-04-23 RX ORDER — GLUCAGON 1 MG/ML
1 KIT INJECTION PRN
Status: DISCONTINUED | OUTPATIENT
Start: 2025-04-23 | End: 2025-04-26 | Stop reason: HOSPADM

## 2025-04-23 RX ORDER — CETIRIZINE HYDROCHLORIDE 10 MG/1
5 TABLET ORAL DAILY
Status: DISCONTINUED | OUTPATIENT
Start: 2025-04-23 | End: 2025-04-26 | Stop reason: HOSPADM

## 2025-04-23 RX ORDER — ONDANSETRON 4 MG/1
4 TABLET, ORALLY DISINTEGRATING ORAL EVERY 8 HOURS PRN
Status: DISCONTINUED | OUTPATIENT
Start: 2025-04-23 | End: 2025-04-26 | Stop reason: HOSPADM

## 2025-04-23 RX ORDER — ONDANSETRON 2 MG/ML
4 INJECTION INTRAMUSCULAR; INTRAVENOUS ONCE
Status: COMPLETED | OUTPATIENT
Start: 2025-04-23 | End: 2025-04-23

## 2025-04-23 RX ORDER — PHENOL 1.4 %
1 AEROSOL, SPRAY (ML) MUCOUS MEMBRANE NIGHTLY
COMMUNITY

## 2025-04-23 RX ORDER — INSULIN LISPRO 100 [IU]/ML
0-8 INJECTION, SOLUTION INTRAVENOUS; SUBCUTANEOUS
Status: DISCONTINUED | OUTPATIENT
Start: 2025-04-23 | End: 2025-04-26 | Stop reason: HOSPADM

## 2025-04-23 RX ORDER — POTASSIUM CHLORIDE 7.45 MG/ML
10 INJECTION INTRAVENOUS PRN
Status: DISCONTINUED | OUTPATIENT
Start: 2025-04-23 | End: 2025-04-26 | Stop reason: HOSPADM

## 2025-04-23 RX ORDER — ACETAMINOPHEN 650 MG/1
650 SUPPOSITORY RECTAL EVERY 6 HOURS PRN
Status: DISCONTINUED | OUTPATIENT
Start: 2025-04-23 | End: 2025-04-26 | Stop reason: HOSPADM

## 2025-04-23 RX ORDER — MAGNESIUM SULFATE IN WATER 40 MG/ML
2000 INJECTION, SOLUTION INTRAVENOUS PRN
Status: DISCONTINUED | OUTPATIENT
Start: 2025-04-23 | End: 2025-04-26 | Stop reason: HOSPADM

## 2025-04-23 RX ORDER — FUROSEMIDE 40 MG/1
20 TABLET ORAL DAILY
Status: DISCONTINUED | OUTPATIENT
Start: 2025-04-23 | End: 2025-04-26 | Stop reason: HOSPADM

## 2025-04-23 RX ORDER — SUCRALFATE 1 G/1
1 TABLET ORAL 2 TIMES DAILY
Status: DISCONTINUED | OUTPATIENT
Start: 2025-04-23 | End: 2025-04-26 | Stop reason: HOSPADM

## 2025-04-23 RX ORDER — POLYETHYLENE GLYCOL 3350 17 G/17G
17 POWDER, FOR SOLUTION ORAL DAILY PRN
Status: DISCONTINUED | OUTPATIENT
Start: 2025-04-23 | End: 2025-04-26 | Stop reason: HOSPADM

## 2025-04-23 RX ORDER — MEMANTINE HYDROCHLORIDE 10 MG/1
10 TABLET ORAL 2 TIMES DAILY
Status: DISCONTINUED | OUTPATIENT
Start: 2025-04-23 | End: 2025-04-26 | Stop reason: HOSPADM

## 2025-04-23 RX ORDER — ESCITALOPRAM OXALATE 10 MG/1
10 TABLET ORAL DAILY
Status: DISCONTINUED | OUTPATIENT
Start: 2025-04-23 | End: 2025-04-26 | Stop reason: HOSPADM

## 2025-04-23 RX ORDER — ENOXAPARIN SODIUM 100 MG/ML
40 INJECTION SUBCUTANEOUS DAILY
Status: DISCONTINUED | OUTPATIENT
Start: 2025-04-23 | End: 2025-04-24

## 2025-04-23 RX ADMIN — SUCRALFATE 1 G: 1 TABLET ORAL at 22:13

## 2025-04-23 RX ADMIN — QUETIAPINE FUMARATE 100 MG: 100 TABLET ORAL at 22:12

## 2025-04-23 RX ADMIN — Medication 2000 UNITS: at 22:12

## 2025-04-23 RX ADMIN — ESCITALOPRAM OXALATE 10 MG: 10 TABLET ORAL at 22:13

## 2025-04-23 RX ADMIN — ATORVASTATIN CALCIUM 40 MG: 40 TABLET, FILM COATED ORAL at 21:37

## 2025-04-23 RX ADMIN — ASPIRIN 81 MG: 81 TABLET, COATED ORAL at 21:37

## 2025-04-23 RX ADMIN — FUROSEMIDE 20 MG: 40 TABLET ORAL at 22:13

## 2025-04-23 RX ADMIN — FERROUS SULFATE TAB 325 MG (65 MG ELEMENTAL FE) 325 MG: 325 (65 FE) TAB at 22:12

## 2025-04-23 RX ADMIN — MEMANTINE 10 MG: 10 TABLET ORAL at 22:12

## 2025-04-23 RX ADMIN — INSULIN GLARGINE 30 UNITS: 100 INJECTION, SOLUTION SUBCUTANEOUS at 22:13

## 2025-04-23 RX ADMIN — SODIUM CHLORIDE 500 ML: 0.9 INJECTION, SOLUTION INTRAVENOUS at 14:38

## 2025-04-23 RX ADMIN — ONDANSETRON 4 MG: 2 INJECTION, SOLUTION INTRAMUSCULAR; INTRAVENOUS at 14:38

## 2025-04-23 RX ADMIN — AMLODIPINE BESYLATE 5 MG: 5 TABLET ORAL at 22:13

## 2025-04-23 RX ADMIN — CETIRIZINE HYDROCHLORIDE 5 MG: 10 TABLET, FILM COATED ORAL at 22:12

## 2025-04-23 RX ADMIN — PANTOPRAZOLE SODIUM 40 MG: 40 TABLET, DELAYED RELEASE ORAL at 22:12

## 2025-04-23 RX ADMIN — FLUCONAZOLE 200 MG: 100 TABLET ORAL at 17:23

## 2025-04-23 RX ADMIN — Medication 10 MG: at 22:12

## 2025-04-23 RX ADMIN — OXYCODONE HYDROCHLORIDE AND ACETAMINOPHEN 500 MG: 500 TABLET ORAL at 22:13

## 2025-04-23 RX ADMIN — SODIUM CHLORIDE, PRESERVATIVE FREE 10 ML: 5 INJECTION INTRAVENOUS at 22:14

## 2025-04-23 RX ADMIN — CEFTRIAXONE SODIUM 2000 MG: 2 INJECTION, POWDER, FOR SOLUTION INTRAMUSCULAR; INTRAVENOUS at 16:19

## 2025-04-23 ASSESSMENT — PAIN SCALES - WONG BAKER: WONGBAKER_NUMERICALRESPONSE: NO HURT

## 2025-04-23 ASSESSMENT — PAIN SCALES - GENERAL: PAINLEVEL_OUTOF10: 0

## 2025-04-23 ASSESSMENT — PAIN - FUNCTIONAL ASSESSMENT: PAIN_FUNCTIONAL_ASSESSMENT: 0-10

## 2025-04-23 NOTE — ED TRIAGE NOTES
Vomiting starting yesterday and family thinks she aspirated. O2 sats dropped and were hard to bring back.

## 2025-04-23 NOTE — ED NOTES
ED TO INPATIENT SBAR HANDOFF    Patient Name: Nena Mcnulty   Preferred Name: Nena  : 1940  84 y.o.   Family/Caregiver Present: no   Code Status Order: Prior  PO Status: NPO:No  Telemetry Order: No  C-SSRS: Risk of Suicide: No Risk  Sitter no   Restraints:     Sepsis Risk Score Sepsis V2 Risk Score: 17.4    Situation  Chief Complaint   Patient presents with    Shortness of Breath     Brief Description of Patient's Condition: metabolic encephalopathy   Mental Status: alert  Arrived from:Home  Imaging:   XR CHEST PORTABLE   Final Result   No acute process.      Electronically signed by Jose Kenny        Abnormal labs:   Abnormal Labs Reviewed   CBC WITH AUTO DIFFERENTIAL - Abnormal; Notable for the following components:       Result Value    Hemoglobin 11.1 (*)     RDW 16.6 (*)     Neutrophils % 88.0 (*)     Lymphocytes % 7.3 (*)     Monocytes % 3.4 (*)     Immature Granulocytes % 0.6 (*)     Lymphocytes Absolute 0.66 (*)     Immature Granulocytes Absolute 0.05 (*)     All other components within normal limits   COMPREHENSIVE METABOLIC PANEL - Abnormal; Notable for the following components:    Glucose 161 (*)     BUN 37 (*)     BUN/Creatinine Ratio 36 (*)     Est, Glom Filt Rate 54 (*)     Albumin 3.3 (*)     Globulin 4.5 (*)     Albumin/Globulin Ratio 0.7 (*)     All other components within normal limits   URINALYSIS WITH REFLEX TO CULTURE - Abnormal; Notable for the following components:    Appearance Turbid (*)     Protein,  (*)     Leukocyte Esterase, Urine Large (*)     Urine Culture if Indicated Urine Culture Ordered (*)     WBC, UA >100 (*)     Epithelial Cells, UA Many (*)     BACTERIA, URINE 4+ (*)     Budding Yeast Present (*)     All other components within normal limits       Background  Allergies: No Known Allergies  History:   Past Medical History:   Diagnosis Date    Arthritis     Cancer (HCC) 2006    colon cancer    Diabetes (HCC)     GERD (gastroesophageal reflux disease)      Other ill-defined conditions(799.89)     fibromylgia    PUD (peptic ulcer disease)     Unspecified sleep apnea        Assessment  Vitals:    Level of Consciousness: Alert (0)   Vitals:    04/23/25 1740 04/23/25 1745 04/23/25 1750 04/23/25 1755   BP:       Pulse: 80 79 79 78   Resp: 25 23 19 21   Temp:       TempSrc:       SpO2: 97% 98% 98% 100%   Weight:       Height:         Deterioration Index (DI): Deterioration Index: 34.19  Deterioration Index (DI) Interventions Performed:    O2 Flow Rate:    O2 Device:    Cardiac Rhythm:    Critical Lab Results: [unfilled]  Cultures: Cultures:Blood and Urine  NIH Score: NIH     Active LDA's:   Peripheral IV 04/23/25 Proximal;Right;Anterior Forearm (Active)     Active Central Lines:                          Active Wounds:    Active Fleming's:    Active Feeding Tubes:      Administered Medications:   Medications   sodium chloride 0.9 % bolus 500 mL (0 mLs IntraVENous Stopped 4/23/25 1704)   ondansetron (ZOFRAN) injection 4 mg (4 mg IntraVENous Given 4/23/25 1438)   cefTRIAXone (ROCEPHIN) 2,000 mg in sterile water 20 mL IV syringe (2,000 mg IntraVENous Given 4/23/25 1619)   fluconazole (DIFLUCAN) tablet 200 mg (200 mg Oral Given 4/23/25 1723)     Last documented pain medication administration: n/a  Pertinent or High Risk Medications/Drips: no   If Yes, please provide details: n/a  Blood Product Administration: no  If Yes, please provide details: n/a  Process Protocols/Bundles: N/A    Recommendation  Incomplete STAT orders: see chart  Overdue Medications: see MAR  Patient Belongings:    Additional Comments: PT has sleep apnea but will not wear a mask. Is fine on room air when awake, but is a mouth breather when sleeper and needs supplemental O2. Also when previously admitted, pt has not received meds for dementia and family is very concerned about that happening again. Med rec has been done so everything should be updated.  If any further questions, please call Sending RN at

## 2025-04-23 NOTE — ED PROVIDER NOTES
2:37 PM   Result Value Ref Range    Sodium 141 136 - 145 mmol/L    Potassium 3.5 3.5 - 5.1 mmol/L    Chloride 108 97 - 108 mmol/L    CO2 30 21 - 32 mmol/L    Anion Gap 3 2 - 12 mmol/L    Glucose 161 (H) 65 - 100 mg/dL    BUN 37 (H) 6 - 20 mg/dL    Creatinine 1.02 0.55 - 1.02 mg/dL    BUN/Creatinine Ratio 36 (H) 12 - 20      Est, Glom Filt Rate 54 (L) >60 ml/min/1.73m2    Calcium 9.5 8.5 - 10.1 mg/dL    Total Bilirubin 0.3 0.2 - 1.0 mg/dL    AST 21 15 - 37 U/L    ALT 19 12 - 78 U/L    Alk Phosphatase 117 45 - 117 U/L    Total Protein 7.8 6.4 - 8.2 g/dL    Albumin 3.3 (L) 3.5 - 5.0 g/dL    Globulin 4.5 (H) 2.0 - 4.0 g/dL    Albumin/Globulin Ratio 0.7 (L) 1.1 - 2.2     Lipase    Collection Time: 04/23/25  2:37 PM   Result Value Ref Range    Lipase 24 13 - 75 U/L   Troponin    Collection Time: 04/23/25  2:37 PM   Result Value Ref Range    Troponin, High Sensitivity 8 0 - 51 ng/L   Urinalysis with Reflex to Culture    Collection Time: 04/23/25  2:37 PM    Specimen: Urine   Result Value Ref Range    Color, UA Yellow/Straw      Appearance Turbid (A) Clear      Specific Gravity, UA 1.028 1.003 - 1.030      pH, Urine 5.0 5.0 - 8.0      Protein,  (A) Negative mg/dL    Glucose, Ur Negative Negative mg/dL    Ketones, Urine Negative Negative mg/dL    Bilirubin, Urine Negative Negative      Blood, Urine Negative Negative      Urobilinogen, Urine 0.1 0.1 - 1.0 EU/dL    Nitrite, Urine Negative Negative      Leukocyte Esterase, Urine Large (A) Negative      Urine Culture if Indicated Urine Culture Ordered (A) Culture not indicated by UA result      WBC, UA >100 (H) 0 - 4 /hpf    RBC, UA  0 - 5 /hpf    Epithelial Cells, UA Many (A) Few /lpf    BACTERIA, URINE 4+ (A) Negative /hpf    Presumptive Yeast Present      Budding Yeast Present (A) Negative         EKG:.EKG interpreted by me. Shows Normal Sinus Rhythm with a HR of 81.  No STEMI.    Radiologic Studies:  Radiographic images are visualized and preliminarily  interpreted by the ED Provider with the following findings: Not Applicable..     Interpretation per the Radiologist below, if available at the time of this note:  XR CHEST PORTABLE   Final Result   No acute process.      Electronically signed by Jose Kenny           Records Reviewed: Prior non-ED medical records reviewed and interpreted by me. See ED Course for summary.     MEDICAL DECISION MAKING and ED COURSE   5:02 PM Differential and Considerations of tests not ordered:      Vitals:    Vitals:    04/23/25 1545 04/23/25 1600 04/23/25 1604 04/23/25 1605   BP:   (!) 174/47    Pulse: 76 82 86 82   Resp: 20 18 14   Temp:       TempSrc:       SpO2:  94% 94% 100%   Weight:       Height:           ED COURSE  ED Course as of 04/23/25 1702   Wed Apr 23, 2025   1321 84-year-old female presents for evaluation after having vomiting and diarrhea yesterday.  Multiple people at home have had a GI bug.  However today they noticed the patient was more somnolent than normal and also appears to be mildly short of breath.  She does have a history of dementia and aspiration and they are concerned she may have aspirated when she was vomiting yesterday.  Differential diagnosis includes dehydration versus electrolyte disarray versus worsening of her dementia versus aspiration pneumonitis versus aspiration pneumonia.  Getting labs including a CBC, CMP, lipase, troponin as well as a chest x-ray.  IV fluids and IV Zofran ordered for symptom control.  Disposition as per clinical course. [LW]   1603 UA w UTI.  IV Ceftriaxone ordered.  Patient weaned off oxygen.  [LW]   1659 Admitted to the hospitalist [LW]      ED Course User Index  [LW] Loretta Srinivasan MD           Clinical Management Tools:  Not Applicable    Smoking Cessation: Not Applicable    Patient was given the following medications:  Medications   fluconazole (DIFLUCAN) tablet 200 mg (has no administration in time range)   sodium chloride 0.9 % bolus 500 mL (500 mLs IntraVENous

## 2025-04-23 NOTE — CARE COORDINATION
04/23/25 1757   Service Assessment   Patient Orientation Alert and Oriented;Person   Cognition Dementia / Early Alzheimer's   History Provided By Child/Family  (Jaskaran Regalado)   Primary Caregiver Family   Accompanied By/Relationship Daughter Sabine & her .   Support Systems Family Members;Children   Patient's Healthcare Decision Maker is: Legal Next of Kin   PCP Verified by CM Yes  (Jenna Mistry - seen 2 weeks ago.)   Last Visit to PCP Within last 3 months   Prior Functional Level Assistance with the following:;Bathing;Dressing;Toileting;Cooking;Housework;Shopping;Mobility  (Walker)   Current Functional Level Assistance with the following:;Bathing;Dressing;Toileting;Cooking;Housework;Shopping;Mobility  (Walker)   Can patient return to prior living arrangement Yes   Ability to make needs known: Poor   Family able to assist with home care needs: Yes   Would you like for me to discuss the discharge plan with any other family members/significant others, and if so, who? Yes  (Jaskaran Rogers)   Financial Resources Medicaid   Community Resources None   CM/SW Referral Other (see comment)  (None)   Social/Functional History   Lives With Daughter;Family   Type of Home House   Home Layout One level   Home Access Ramped entrance   Bathroom Shower/Tub Tub/Shower unit   Bathroom Toilet Bedside commode   Bathroom Equipment None   Bathroom Accessibility Accessible   Home Equipment Walker - Standard;Lift chair;Hospital bed   Receives Help From Family   Prior Level of Assist for ADLs Needs assistance   Toileting Needs assistance   Prior Level of Assist for Homemaking Needs assistance   Homemaking Responsibilities Yes   Ambulation Assistance Needs assistance  (Walker)   Prior Level of Assist for Transfers Needs assistance   Active  No   Occupation Retired   Discharge Planning   Type of Residence House   Living Arrangements Family Members;Children  (Lives with daughter/family)   Current Services Prior To

## 2025-04-24 PROBLEM — N39.0 COMPLICATED UTI (URINARY TRACT INFECTION): Status: ACTIVE | Noted: 2025-04-24

## 2025-04-24 LAB
ANION GAP SERPL CALC-SCNC: 2 MMOL/L (ref 2–12)
BASOPHILS # BLD: 0 K/UL (ref 0–0.1)
BASOPHILS NFR BLD: 0 % (ref 0–1)
BUN SERPL-MCNC: 29 MG/DL (ref 6–20)
BUN/CREAT SERPL: 41 (ref 12–20)
CA-I BLD-MCNC: 9 MG/DL (ref 8.5–10.1)
CHLORIDE SERPL-SCNC: 108 MMOL/L (ref 97–108)
CO2 SERPL-SCNC: 30 MMOL/L (ref 21–32)
COLLECT DATE STL: ABNORMAL
CREAT SERPL-MCNC: 0.71 MG/DL (ref 0.55–1.02)
DIFFERENTIAL METHOD BLD: ABNORMAL
EKG ATRIAL RATE: 81 BPM
EKG DIAGNOSIS: NORMAL
EKG P AXIS: 30 DEGREES
EKG P-R INTERVAL: 188 MS
EKG Q-T INTERVAL: 390 MS
EKG QRS DURATION: 76 MS
EKG QTC CALCULATION (BAZETT): 453 MS
EKG R AXIS: -12 DEGREES
EKG T AXIS: 82 DEGREES
EKG VENTRICULAR RATE: 81 BPM
EOSINOPHIL # BLD: 0.25 K/UL (ref 0–0.4)
EOSINOPHIL NFR BLD: 2 % (ref 0–7)
ERYTHROCYTE [DISTWIDTH] IN BLOOD BY AUTOMATED COUNT: 16.4 % (ref 11.5–14.5)
EST. AVERAGE GLUCOSE BLD GHB EST-MCNC: 160 MG/DL
GLUCOSE BLD STRIP.AUTO-MCNC: 107 MG/DL (ref 65–100)
GLUCOSE BLD STRIP.AUTO-MCNC: 108 MG/DL (ref 65–100)
GLUCOSE BLD STRIP.AUTO-MCNC: 59 MG/DL (ref 65–100)
GLUCOSE BLD STRIP.AUTO-MCNC: 69 MG/DL (ref 65–100)
GLUCOSE BLD STRIP.AUTO-MCNC: 80 MG/DL (ref 65–100)
GLUCOSE BLD STRIP.AUTO-MCNC: 94 MG/DL (ref 65–100)
GLUCOSE SERPL-MCNC: 73 MG/DL (ref 65–100)
HBA1C MFR BLD: 7.2 % (ref 4–5.6)
HCT VFR BLD AUTO: 29.4 % (ref 35–47)
HEMOCCULT SP1 STL QL: POSITIVE
HGB BLD-MCNC: 9.2 G/DL (ref 11.5–16)
IMM GRANULOCYTES # BLD AUTO: 0 K/UL
IMM GRANULOCYTES NFR BLD AUTO: 0 %
LYMPHOCYTES # BLD: 1.11 K/UL (ref 0.8–3.5)
LYMPHOCYTES NFR BLD: 9 % (ref 12–49)
MAGNESIUM SERPL-MCNC: 2 MG/DL (ref 1.6–2.4)
MCH RBC QN AUTO: 26.9 PG (ref 26–34)
MCHC RBC AUTO-ENTMCNC: 31.3 G/DL (ref 30–36.5)
MCV RBC AUTO: 86 FL (ref 80–99)
MONOCYTES # BLD: 0.86 K/UL (ref 0–1)
MONOCYTES NFR BLD: 7 % (ref 5–13)
NEUTS SEG # BLD: 10.08 K/UL (ref 1.8–8)
NEUTS SEG NFR BLD: 82 % (ref 32–75)
NRBC # BLD: 0 K/UL (ref 0–0.01)
NRBC BLD-RTO: 0 PER 100 WBC
PERFORMED BY:: ABNORMAL
PERFORMED BY:: NORMAL
PLATELET # BLD AUTO: 196 K/UL (ref 150–400)
PMV BLD AUTO: 12.2 FL (ref 8.9–12.9)
POTASSIUM SERPL-SCNC: 3.4 MMOL/L (ref 3.5–5.1)
RBC # BLD AUTO: 3.42 M/UL (ref 3.8–5.2)
RBC MORPH BLD: ABNORMAL
SODIUM SERPL-SCNC: 140 MMOL/L (ref 136–145)
WBC # BLD AUTO: 12.3 K/UL (ref 3.6–11)

## 2025-04-24 PROCEDURE — G0378 HOSPITAL OBSERVATION PER HR: HCPCS

## 2025-04-24 PROCEDURE — 97530 THERAPEUTIC ACTIVITIES: CPT

## 2025-04-24 PROCEDURE — 80048 BASIC METABOLIC PNL TOTAL CA: CPT

## 2025-04-24 PROCEDURE — 2500000003 HC RX 250 WO HCPCS: Performed by: INTERNAL MEDICINE

## 2025-04-24 PROCEDURE — 83735 ASSAY OF MAGNESIUM: CPT

## 2025-04-24 PROCEDURE — 94761 N-INVAS EAR/PLS OXIMETRY MLT: CPT

## 2025-04-24 PROCEDURE — 6370000000 HC RX 637 (ALT 250 FOR IP): Performed by: INTERNAL MEDICINE

## 2025-04-24 PROCEDURE — 97161 PT EVAL LOW COMPLEX 20 MIN: CPT

## 2025-04-24 PROCEDURE — 1100000000 HC RM PRIVATE

## 2025-04-24 PROCEDURE — 6360000002 HC RX W HCPCS: Performed by: INTERNAL MEDICINE

## 2025-04-24 PROCEDURE — 82962 GLUCOSE BLOOD TEST: CPT

## 2025-04-24 PROCEDURE — 96372 THER/PROPH/DIAG INJ SC/IM: CPT

## 2025-04-24 PROCEDURE — 0DC38ZZ EXTIRPATION OF MATTER FROM LOWER ESOPHAGUS, VIA NATURAL OR ARTIFICIAL OPENING ENDOSCOPIC: ICD-10-PCS | Performed by: INTERNAL MEDICINE

## 2025-04-24 PROCEDURE — 36415 COLL VENOUS BLD VENIPUNCTURE: CPT

## 2025-04-24 PROCEDURE — 6370000000 HC RX 637 (ALT 250 FOR IP)

## 2025-04-24 PROCEDURE — 82272 OCCULT BLD FECES 1-3 TESTS: CPT

## 2025-04-24 PROCEDURE — 85025 COMPLETE CBC W/AUTO DIFF WBC: CPT

## 2025-04-24 PROCEDURE — 83036 HEMOGLOBIN GLYCOSYLATED A1C: CPT

## 2025-04-24 RX ORDER — FLUCONAZOLE 100 MG/1
200 TABLET ORAL DAILY
Status: DISCONTINUED | OUTPATIENT
Start: 2025-04-24 | End: 2025-04-25

## 2025-04-24 RX ORDER — PANTOPRAZOLE SODIUM 40 MG/10ML
40 INJECTION, POWDER, LYOPHILIZED, FOR SOLUTION INTRAVENOUS DAILY
Status: DISCONTINUED | OUTPATIENT
Start: 2025-04-24 | End: 2025-04-25

## 2025-04-24 RX ORDER — GALANTAMINE 4 MG/1
8 TABLET, FILM COATED ORAL 2 TIMES DAILY WITH MEALS
Status: DISCONTINUED | OUTPATIENT
Start: 2025-04-24 | End: 2025-04-26 | Stop reason: HOSPADM

## 2025-04-24 RX ADMIN — ESCITALOPRAM OXALATE 10 MG: 10 TABLET ORAL at 09:58

## 2025-04-24 RX ADMIN — QUETIAPINE FUMARATE 100 MG: 100 TABLET ORAL at 22:02

## 2025-04-24 RX ADMIN — SUCRALFATE 1 G: 1 TABLET ORAL at 09:56

## 2025-04-24 RX ADMIN — Medication 10 MG: at 22:02

## 2025-04-24 RX ADMIN — CETIRIZINE HYDROCHLORIDE 5 MG: 10 TABLET, FILM COATED ORAL at 09:57

## 2025-04-24 RX ADMIN — GALANTAMINE 8 MG: 4 TABLET, FILM COATED ORAL at 09:59

## 2025-04-24 RX ADMIN — ENOXAPARIN SODIUM 40 MG: 100 INJECTION SUBCUTANEOUS at 09:56

## 2025-04-24 RX ADMIN — AMLODIPINE BESYLATE 5 MG: 5 TABLET ORAL at 09:56

## 2025-04-24 RX ADMIN — Medication 2000 UNITS: at 10:28

## 2025-04-24 RX ADMIN — QUETIAPINE FUMARATE 100 MG: 100 TABLET ORAL at 09:59

## 2025-04-24 RX ADMIN — PANTOPRAZOLE SODIUM 40 MG: 40 TABLET, DELAYED RELEASE ORAL at 09:56

## 2025-04-24 RX ADMIN — FLUCONAZOLE 200 MG: 100 TABLET ORAL at 09:58

## 2025-04-24 RX ADMIN — SUCRALFATE 1 G: 1 TABLET ORAL at 22:02

## 2025-04-24 RX ADMIN — THERA TABS 1 TABLET: TAB at 09:57

## 2025-04-24 RX ADMIN — ATORVASTATIN CALCIUM 40 MG: 40 TABLET, FILM COATED ORAL at 22:02

## 2025-04-24 RX ADMIN — FUROSEMIDE 20 MG: 40 TABLET ORAL at 09:57

## 2025-04-24 RX ADMIN — CEFTRIAXONE SODIUM 1000 MG: 1 INJECTION, POWDER, FOR SOLUTION INTRAMUSCULAR; INTRAVENOUS at 17:09

## 2025-04-24 RX ADMIN — ASPIRIN 81 MG: 81 TABLET, COATED ORAL at 09:56

## 2025-04-24 RX ADMIN — MEMANTINE 10 MG: 10 TABLET ORAL at 22:02

## 2025-04-24 RX ADMIN — MEMANTINE 10 MG: 10 TABLET ORAL at 09:56

## 2025-04-24 RX ADMIN — GLIPIZIDE 5 MG: 5 TABLET ORAL at 06:46

## 2025-04-24 RX ADMIN — OXYCODONE HYDROCHLORIDE AND ACETAMINOPHEN 500 MG: 500 TABLET ORAL at 09:56

## 2025-04-24 RX ADMIN — GALANTAMINE 8 MG: 4 TABLET, FILM COATED ORAL at 18:01

## 2025-04-24 RX ADMIN — PANTOPRAZOLE SODIUM 40 MG: 40 INJECTION, POWDER, FOR SOLUTION INTRAVENOUS at 20:52

## 2025-04-24 NOTE — CARE COORDINATION
CM reviewed chart.     DCP: Home with daughter and [facility].     Updates sent via [employer]. Choice letter signed and placed in chart.     Per IDR, patient likely to have 48 hrs before medically ready to discharge.   Barriers: IV ABX, cultures.     CM will continue to follow.

## 2025-04-24 NOTE — PROGRESS NOTES
4 Eyes Skin Assessment     NAME:  Nena Mcnulty  YOB: 1940  MEDICAL RECORD NUMBER:  [id number]    The patient is being assessed for  Admission    I agree that at least one RN has performed a thorough Head to Toe Skin Assessment on the patient. ALL assessment sites listed below have been assessed.      Areas assessed by both nurses:    Head, Face, Ears, Shoulders, Back, Chest, Arms, Elbows, Hands, Sacrum. Buttock, Coccyx, Ischium, Legs. Feet and Heels, and Under Medical Devices         Does the Patient have a Wound? Yes wound(s) were present on assessment. LDA wound assessment was Initiated and completed by RN       Ronald Prevention initiated by RN: Yes  Wound Care Orders initiated by RN: No    Pressure Injury (Stage 3,4, Unstageable, DTI, NWPT, and Complex wounds) if present, place Wound referral order by RN under : No    New Ostomies, if present place, Ostomy referral order under : No     Nurse 1 eSignature: Electronically signed by Elia Shaw RN on 4/24/25 at 8:04 AM EDT    **SHARE this note so that the co-signing nurse can place an eSignature**    Nurse 2 eSignature: Kinsey Chow RN

## 2025-04-24 NOTE — PLAN OF CARE
Mobility:  Bed Mobility:     Bed Mobility Training  Bed Mobility Training: Yes  Overall Level of Assistance: Partial/Moderate assistance  Interventions: Safety awareness training;Verbal cues  Rolling: Partial/Moderate assistance  Supine to Sit: Partial/Moderate assistance  Sit to Supine: Partial/Moderate assistance  Scooting: Partial/Moderate assistance  Transfers:     Transfer Training  Transfer Training: Yes  Overall Level of Assistance: Partial/Moderate assistance  Sit to Stand: Partial/Moderate assistance  Stand to Sit: Partial/Moderate assistance;Substantial/Maximal assistance  Balance:               Balance  Sitting: Impaired  Sitting - Static: Fair (occasional)  Sitting - Dynamic: Fair (occasional)  Wheelchair Mobility:        Ambulation/Gait Training:                                Gait  Gait Training: No                   Therapeutic Intervention provided:   bed mobility , EOB transfers, OOB transfers, amb with AD, LE therapeutic exercises, seated dynamic balance, and standing dynamic balance    Functional Measure:  [facility]-PAC “6 Clicks”         Basic Mobility Inpatient Short Form  How much difficulty does the patient currently have... Unable A Lot A Little None   1.  Turning over in bed (including adjusting bedclothes, sheets and blankets)?   [] 1   [x] 2   [] 3   [] 4   2.  Sitting down on and standing up from a chair with arms ( e.g., wheelchair, bedside commode, etc.)   [x] 1   [] 2   [] 3   [] 4   3.  Moving from lying on back to sitting on the side of the bed?   [] 1   [x] 2   [] 3   [] 4          How much help from another person does the patient currently need... Total A Lot A Little None   4.  Moving to and from a bed to a chair (including a wheelchair)?   [x] 1   [] 2   [] 3   [] 4   5.  Need to walk in hospital room?   [x] 1   [] 2   [] 3   [] 4   6.  Climbing 3-5 steps with a railing?   [x] 1   [] 2   [] 3   [] 4   © 2007, Trustees of [facility], under license to [employer]. All rights reserved     Score:  Initial:  Most Recent: X (Date: 2025 )   Interpretation of Tool:  Represents activities that are increasingly more difficult (i.e. Bed mobility, Transfers, Gait).  Score 24 23 22-20 19-15 14-10 9-7 6   Modifier CH CI CJ CK CL CM CN         Physical Therapy Evaluation Charge Determination   History Examination Presentation Decision-Making   LOW Complexity : Zero comorbidities / personal factors that will impact the outcome / POC LOW Complexity : 1-2 Standardized tests and measures addressing body structure, function, activity limitation and / or participation in recreation  LOW Complexity : Stable, uncomplicated  Other outcome measures [facility] 6  LOW      Based on the above components, the patient evaluation is determined to be of the following complexity level: LOW    Pain Ratin/10   Pain Intervention(s):       Activity Tolerance:   Fair  and Poor    After treatment patient left in no apparent distress:   Bed locked and in lowest position Patient left in no apparent distress in bed, Call bell within reach, Side rails x3, and Heels elevated for pressure relief and nsg updated.    COMMUNICATION/EDUCATION:   The patient’s plan of care was discussed with: Occupational therapist, Registered nurse, and Case management    Patient Education  Education Given To: Patient;Family  Education Provided: Role of Therapy;Plan of Care;Home Exercise Program;Energy Conservation;Equipment;Mobility Training;Precautions;IADL Safety;Fall Prevention Strategies;ADL Adaptive Strategies  Education Method: Demonstration;Verbal;Teach Back  Education Outcome: Verbalized understanding;Continued education needed         Thank you for this referral.  Aileen Coronel, PT  Minutes: 35

## 2025-04-24 NOTE — PLAN OF CARE
Problem: Chronic Conditions and Co-morbidities  Goal: Patient's chronic conditions and co-morbidity symptoms are monitored and maintained or improved  4/24/2025 1126 by Kinsey Chow RN  Outcome: Progressing  4/23/2025 2326 by ANÍBAL Mayorga RN  Outcome: Progressing     Problem: Discharge Planning  Goal: Discharge to home or other facility with appropriate resources  4/24/2025 1126 by Kinsey Chow RN  Outcome: Progressing  4/23/2025 2326 by ANÍBAL Mayorga RN  Outcome: Progressing     Problem: Pain  Goal: Verbalizes/displays adequate comfort level or baseline comfort level  4/24/2025 1126 by Kinsey Chow RN  Outcome: Progressing  4/23/2025 2326 by ANÍBAL Mayorga RN  Outcome: Progressing     Problem: Skin/Tissue Integrity  Goal: Skin integrity remains intact  Description: 1.  Monitor for areas of redness and/or skin breakdown2.  Assess vascular access sites hourly3.  Every 4-6 hours minimum:  Change oxygen saturation probe site4.  Every 4-6 hours:  If on nasal continuous positive airway pressure, respiratory therapy assess nares and determine need for appliance change or resting period  4/24/2025 1126 by Kinsey Chow RN  Outcome: Progressing  4/23/2025 2326 by ANÍBAL Mayorga RN  Outcome: Progressing     Problem: ABCDS Injury Assessment  Goal: Absence of physical injury  Outcome: Progressing     Problem: Safety - Adult  Goal: Free from fall injury  4/24/2025 1126 by Kinsey Chow RN  Outcome: Progressing  4/23/2025 2326 by ANÍBAL Mayorga RN  Outcome: Progressing

## 2025-04-24 NOTE — PROGRESS NOTES
Hospitalist Progress Note               Daily Progress Note: 4/24/2025      Hospital Day: 2     Chief complaint:   Chief Complaint   Patient presents with    Shortness of Breath        Subjective:   Hospital course to date:    Patient is a 84 year old female with a history of advance dementia, chronic debility, essentially bed bound with full assistance to bedside commode who presents to the E.R. for evaluation of shortness of breath.  Patient cannot offer a history secondary to underlying dementia.  Her daughter is at bedside and provides information regarding her presentation.  She states that the patient has had approximately 3 loose dark bowel movements in the last 24hrs.  There was also concern for aspiration with her coughing up dark material.  She was noted to have increased work of breathing.  Workup in the E.R. included a chest x-ray that was nil acute and urinalysis positive.  Of note she was recently treated a few weeks ago for urinary tract infection       Patient was admitted, started on IV fluids and IV ceftriaxone  --------  Patient is seen today for follow-up.  She is awake and alert.           Medications reviewed  Current Facility-Administered Medications   Medication Dose Route Frequency    galantamine (RAZADYNE) tablet 8 mg  8 mg Oral BID WC    amLODIPine (NORVASC) tablet 5 mg  5 mg Oral Daily    aspirin EC tablet 81 mg  81 mg Oral Daily    atorvastatin (LIPITOR) tablet 40 mg  40 mg Oral Daily    escitalopram (LEXAPRO) tablet 10 mg  10 mg Oral Daily    ferrous sulfate (IRON 325) tablet 325 mg  325 mg Oral Every Other Day    furosemide (LASIX) tablet 20 mg  20 mg Oral Daily    glipiZIDE (GLUCOTROL) tablet 5 mg  5 mg Oral QAM AC    insulin glargine (LANTUS) injection vial 30 Units  30 Units SubCUTAneous BID    cetirizine (ZYRTEC) tablet 5 mg  5 mg Oral Daily    memantine (NAMENDA) tablet 10 mg  10 mg Oral BID    melatonin tablet 10 mg  10 mg Oral Nightly    multivitamin 1 tablet  1 tablet Oral  Negative mg/dL    Bilirubin, Urine Negative Negative      Blood, Urine Negative Negative      Urobilinogen, Urine 0.1 0.1 - 1.0 EU/dL    Nitrite, Urine Negative Negative      Leukocyte Esterase, Urine Large (A) Negative      Urine Culture if Indicated Urine Culture Ordered (A) Culture not indicated by UA result      WBC, UA >100 (H) 0 - 4 /hpf    RBC, UA  0 - 5 /hpf    Epithelial Cells, UA Many (A) Few /lpf    BACTERIA, URINE 4+ (A) Negative /hpf    Presumptive Yeast Present      Budding Yeast Present (A) Negative     POC Lactic Acid    Collection Time: 04/23/25  5:11 PM   Result Value Ref Range    POC Lactic Acid 1.48 0.40 - 2.00 mmol/L    Performed by: Danielle MEYERS    Basic Metabolic Panel    Collection Time: 04/23/25  8:32 PM   Result Value Ref Range    Sodium 141 136 - 145 mmol/L    Potassium 3.7 3.5 - 5.1 mmol/L    Chloride 109 (H) 97 - 108 mmol/L    CO2 30 21 - 32 mmol/L    Anion Gap 2 2 - 12 mmol/L    Glucose 136 (H) 65 - 100 mg/dL    BUN 34 (H) 6 - 20 mg/dL    Creatinine 0.78 0.55 - 1.02 mg/dL    BUN/Creatinine Ratio 44 (H) 12 - 20      Est, Glom Filt Rate 75 >60 ml/min/1.73m2    Calcium 8.9 8.5 - 10.1 mg/dL   POCT Glucose    Collection Time: 04/23/25  8:47 PM   Result Value Ref Range    POC Glucose 129 (H) 65 - 100 mg/dL    Performed by: Tierra Dutton    Basic Metabolic Panel w/ Reflex to MG    Collection Time: 04/24/25  5:05 AM   Result Value Ref Range    Sodium 140 136 - 145 mmol/L    Potassium 3.4 (L) 3.5 - 5.1 mmol/L    Chloride 108 97 - 108 mmol/L    CO2 30 21 - 32 mmol/L    Anion Gap 2 2 - 12 mmol/L    Glucose 73 65 - 100 mg/dL    BUN 29 (H) 6 - 20 mg/dL    Creatinine 0.71 0.55 - 1.02 mg/dL    BUN/Creatinine Ratio 41 (H) 12 - 20      Est, Glom Filt Rate 84 >60 ml/min/1.73m2    Calcium 9.0 8.5 - 10.1 mg/dL   CBC with Auto Differential    Collection Time: 04/24/25  5:05 AM   Result Value Ref Range    WBC 12.3 (H) 3.6 - 11.0 K/uL    RBC 3.42 (L) 3.80 - 5.20 M/uL    Hemoglobin 9.2 (L) 11.5 -

## 2025-04-24 NOTE — PROGRESS NOTES
Orders received for PT/OT evals  ,attempted X2 1002 AM,1145 AM for evaluation.Patient sleepy both the times.Patient's daughter defer therapy eval this time as patient was sleeping.Daughter was educated with importance of therapy eval for the DC planning.We will try later.

## 2025-04-24 NOTE — PROGRESS NOTES
Pt noted to have large, black BM. Occult stool collected and resulted positive. MD Mayra made aware and orders received for CBC in AM, GI consult, stop Lovenox, and Protonix 40mg IV daily. Completed as ordered.

## 2025-04-24 NOTE — PROGRESS NOTES
Spiritual Health History and Assessment/Progress Note  [facility]    Loneliness/Social Isolation,  , Life Adjustments, Adjustment to illness,      Name: Nena Mcnulty MRN: [id number]    Age: 84 y.o.     Sex: female   Language: English   Hinduism: Hoahaoism   UTI (urinary tract infection)     Date: 4/24/2025            Total Time Calculated: 35 min              Spiritual Assessment began in [facility] SURGICAL        Referral/Consult From: Nurse   Encounter Overview/Reason: Loneliness/Social Isolation  Service Provided For: Patient and family together    Veronica, Belief, Meaning:   Patient identifies as spiritual, is connected with a veronica tradition or spiritual practice, and has beliefs or practices that help with coping during difficult times  Family/Friends identify as spiritual, are connected with a veronica tradition or spiritual practice, and have beliefs or practices that help with coping during difficult times      Importance and Influence:  Patient has spiritual/personal beliefs that influence decisions regarding their health  Family/Friends have spiritual/personal beliefs that influence decisions regarding the patient's health    Community:  Patient is connected with a spiritual community and feels well-supported. Support system includes: Children, Veronica Community, Friends, and Extended family  Family/Friends are connected with a spiritual community: and feel well-supported. Support system includes: Spouse/Partner, Children, Veronica Community, Friends, and Extended family    Assessment and Plan of Care:   This  in to visit this Patient at this time for Initial Spiritual Health Assessment. Patient is resting in bed with daughter and son-in-law at bedside. Daughter and son-in-law providing information and shared life/review/legacy including family, work, and being of Non Denomination veronica. Family shared how Patient worked and helped to take care of the family with her spouse enjoying music,

## 2025-04-24 NOTE — H&P
Inject into the skin See Admin Instructions Sliding Scale   Yes Provider, MD Deb   atorvastatin (LIPITOR) 10 MG tablet Take 4 tablets by mouth daily 2/10/17  Yes Automatic Reconciliation, Ar   ferrous sulfate (FE TABS 325) 325 (65 Fe) MG EC tablet Take 1 tablet by mouth every other day   Yes Automatic Reconciliation, Ar   glipiZIDE (GLUCOTROL XL) 5 MG extended release tablet Take 1 tablet by mouth daily   Yes Automatic Reconciliation, Ar   memantine (NAMENDA) 10 MG tablet Take 1 tablet by mouth 2 times daily   Yes Automatic Reconciliation, Ar   pantoprazole (PROTONIX) 40 MG tablet Take 1 tablet by mouth 2 times daily 4/30/22  Yes Automatic Reconciliation, Ar   sucralfate (CARAFATE) 1 GM tablet Take 1 tablet by mouth in the morning and at bedtime 4/30/22  Yes Automatic Reconciliation, Ar   fluticasone (FLONASE) 50 MCG/ACT nasal spray 1 spray by Nasal route daily  Patient not taking: Reported on 4/23/2025 9/25/24   Provider, Deb, MD   dextromethorphan-guaiFENesin (MUCINEX DM)  MG per extended release tablet Take 1 tablet by mouth 2 times daily  Patient not taking: Reported on 4/23/2025 9/17/21   Automatic Reconciliation, Ar   dulaglutide (TRULICITY) 1.5 MG/0.5ML SC injection Inject 0.5 mLs into the skin every 7 days    Automatic Reconciliation, Ar       No Known Allergies     Family History   Problem Relation Age of Onset    Heart Disease Brother     Diabetes Mother     Diabetes Sister     Diabetes Brother         Social History     Socioeconomic History    Marital status:    Tobacco Use    Smoking status: Former    Smokeless tobacco: Never   Substance and Sexual Activity    Alcohol use: No     Social Drivers of Health     Food Insecurity: No Food Insecurity (4/23/2025)    Hunger Vital Sign     Worried About Running Out of Food in the Last Year: Never true     Ran Out of Food in the Last Year: Never true   Transportation Needs: No Transportation Needs (4/23/2025)    PRAPARE - Transportation      Lack of Transportation (Medical): No     Lack of Transportation (Non-Medical): No   Housing Stability: Low Risk  (4/23/2025)    Housing Stability Vital Sign     Unable to Pay for Housing in the Last Year: No     Number of Times Moved in the Last Year: 0     Homeless in the Last Year: No      CODE STATUS:  DNR    Full x   Other    This was discussed with the daughter at bedside who was not the P.O.A.  As far as she knew, patient is FULL CODE.  This should be readdressed with the P.O.A.      Objective:     Physical Exam:     BP (!) 135/44   Pulse 77   Temp 98.4 °F (36.9 °C)   Resp 16   Ht 1.524 m (5')   Wt 81.6 kg (180 lb)   SpO2 96%   BMI 35.15 kg/m²         Patient is awake alert.  No distress.  Morbidly obese.  Normocephalic/atraumatic.  Patient heart S1, S2, regular rhythm.  Lungs callosities bilateral.  Abdomen obese, soft nontender.  Bilateral lower extremity pitting edema.  Decreased range of motion of the lower extremities.    24 Hour Results:    Recent Results (from the past 24 hours)   CBC with Auto Differential    Collection Time: 04/23/25  2:37 PM   Result Value Ref Range    WBC 9.1 3.6 - 11.0 K/uL    RBC 4.20 3.80 - 5.20 M/uL    Hemoglobin 11.1 (L) 11.5 - 16.0 g/dL    Hematocrit 36.4 35.0 - 47.0 %    MCV 86.7 80.0 - 99.0 FL    MCH 26.4 26.0 - 34.0 PG    MCHC 30.5 30.0 - 36.5 g/dL    RDW 16.6 (H) 11.5 - 14.5 %    Platelets 216 150 - 400 K/uL    MPV 11.9 8.9 - 12.9 FL    Nucleated RBCs 0.0 0.0  WBC    nRBC 0.00 0.00 - 0.01 K/uL    Neutrophils % 88.0 (H) 32.0 - 75.0 %    Lymphocytes % 7.3 (L) 12.0 - 49.0 %    Monocytes % 3.4 (L) 5.0 - 13.0 %    Eosinophils % 0.6 0.0 - 7.0 %    Basophils % 0.1 0.0 - 1.0 %    Immature Granulocytes % 0.6 (H) 0 - 0.5 %    Neutrophils Absolute 7.99 1.80 - 8.00 K/UL    Lymphocytes Absolute 0.66 (L) 0.80 - 3.50 K/UL    Monocytes Absolute 0.31 0.00 - 1.00 K/UL    Eosinophils Absolute 0.05 0.00 - 0.40 K/UL    Basophils Absolute 0.01 0.00 - 0.10 K/UL    Immature

## 2025-04-25 ENCOUNTER — ANESTHESIA EVENT (OUTPATIENT)
Facility: HOSPITAL | Age: 85
DRG: 378 | End: 2025-04-25
Payer: MEDICARE

## 2025-04-25 ENCOUNTER — ANESTHESIA (OUTPATIENT)
Facility: HOSPITAL | Age: 85
DRG: 378 | End: 2025-04-25
Payer: MEDICARE

## 2025-04-25 LAB
ACCESSION NUMBER, LLC1M: ABNORMAL
ACINETOBACTER CALCOAC BAUMANNII COMPLEX BY PCR: NOT DETECTED
ANION GAP SERPL CALC-SCNC: 6 MMOL/L (ref 2–12)
BACTERIA SPEC CULT: NORMAL
BACTEROIDES FRAGILIS BY PCR: NOT DETECTED
BASOPHILS # BLD: 0.03 K/UL (ref 0–0.1)
BASOPHILS NFR BLD: 0.4 % (ref 0–1)
BIOFIRE TEST COMMENT: ABNORMAL
BUN SERPL-MCNC: 28 MG/DL (ref 6–20)
BUN/CREAT SERPL: 33 (ref 12–20)
CA-I BLD-MCNC: 8.7 MG/DL (ref 8.5–10.1)
CANDIDA ALBICANS BY PCR: NOT DETECTED
CANDIDA AURIS BY PCR: NOT DETECTED
CANDIDA GLABRATA: NOT DETECTED
CANDIDA KRUSEI BY PCR: NOT DETECTED
CANDIDA PARAPSILOSIS BY PCR: NOT DETECTED
CANDIDA TROPICALIS BY PCR: NOT DETECTED
CHLORIDE SERPL-SCNC: 108 MMOL/L (ref 97–108)
CO2 SERPL-SCNC: 25 MMOL/L (ref 21–32)
CREAT SERPL-MCNC: 0.84 MG/DL (ref 0.55–1.02)
CRYPTOCOCCUS NEOFORMANS/GATTII BY PCR: NOT DETECTED
DIFFERENTIAL METHOD BLD: ABNORMAL
ENTEROBACTER CLOACAE COMPLEX BY PCR: NOT DETECTED
ENTEROBACTERALES BY PCR: NOT DETECTED
ENTEROCOCCUS FAECALIS BY PCR: NOT DETECTED
ENTEROCOCCUS FAECIUM BY PCR: NOT DETECTED
EOSINOPHIL # BLD: 0.28 K/UL (ref 0–0.4)
EOSINOPHIL NFR BLD: 3.6 % (ref 0–7)
ERYTHROCYTE [DISTWIDTH] IN BLOOD BY AUTOMATED COUNT: 15.8 % (ref 11.5–14.5)
ESCHERICHIA COLI: NOT DETECTED
GLUCOSE BLD STRIP.AUTO-MCNC: 135 MG/DL (ref 65–100)
GLUCOSE BLD STRIP.AUTO-MCNC: 73 MG/DL (ref 65–100)
GLUCOSE BLD STRIP.AUTO-MCNC: 80 MG/DL (ref 65–100)
GLUCOSE BLD STRIP.AUTO-MCNC: 97 MG/DL (ref 65–100)
GLUCOSE SERPL-MCNC: 72 MG/DL (ref 65–100)
HAEM INFLU DNA BLD POS QL NAA+NON-PROBE: NOT DETECTED
HCT VFR BLD AUTO: 28.2 % (ref 35–47)
HGB BLD-MCNC: 8.8 G/DL (ref 11.5–16)
IMM GRANULOCYTES # BLD AUTO: 0.02 K/UL (ref 0–0.04)
IMM GRANULOCYTES NFR BLD AUTO: 0.3 % (ref 0–0.5)
KLEBSIELLA AEROGENES BY PCR: NOT DETECTED
KLEBSIELLA OXYTOCA BY PCR: NOT DETECTED
KLEBSIELLA PNEUMONIAE GROUP BY PCR: NOT DETECTED
LISTERIA MONOCYTOGENES BY PCR: NOT DETECTED
LYMPHOCYTES # BLD: 1.55 K/UL (ref 0.8–3.5)
LYMPHOCYTES NFR BLD: 20.2 % (ref 12–49)
Lab: NORMAL
MCH RBC QN AUTO: 27 PG (ref 26–34)
MCHC RBC AUTO-ENTMCNC: 31.2 G/DL (ref 30–36.5)
MCV RBC AUTO: 86.5 FL (ref 80–99)
MECA+MECC ISLT/SPM QL: NOT DETECTED
MONOCYTES # BLD: 0.46 K/UL (ref 0–1)
MONOCYTES NFR BLD: 6 % (ref 5–13)
NEISSERIA MENINGITIDIS BY PCR: NOT DETECTED
NEUTS SEG # BLD: 5.35 K/UL (ref 1.8–8)
NEUTS SEG NFR BLD: 69.5 % (ref 32–75)
NRBC # BLD: 0 K/UL (ref 0–0.01)
NRBC BLD-RTO: 0 PER 100 WBC
PERFORMED BY:: ABNORMAL
PERFORMED BY:: NORMAL
PLATELET # BLD AUTO: 186 K/UL (ref 150–400)
PMV BLD AUTO: 12.7 FL (ref 8.9–12.9)
POTASSIUM SERPL-SCNC: 3.4 MMOL/L (ref 3.5–5.1)
PROCALCITONIN SERPL-MCNC: 0.05 NG/ML
PROTEUS BY PCR: NOT DETECTED
PSEUDOMONAS AERUGINOSA, PSAEP: NOT DETECTED
RBC # BLD AUTO: 3.26 M/UL (ref 3.8–5.2)
RESISTANT GENE TARGETS: ABNORMAL
SALMONELLA DNA BLD POS QL NAA+NON-PROBE: NOT DETECTED
SERRATIA MARCESCENS BY PCR: NOT DETECTED
SODIUM SERPL-SCNC: 139 MMOL/L (ref 136–145)
STAPHYLOCOCCUS AUREUS: NOT DETECTED
STAPHYLOCOCCUS EPIDERMIDIS BY PCR: DETECTED
STAPHYLOCOCCUS LUGDUNENSIS BY PCR: NOT DETECTED
STAPHYLOCOCCUS: DETECTED
STENOTROPHOMONAS MALTOPHILIA BY PCR: NOT DETECTED
STREPTOCOCCUS AGALACTIAE (GROUP B): NOT DETECTED
STREPTOCOCCUS PNEUMONIAE , SPNP: NOT DETECTED
STREPTOCOCCUS PYOGENES (GROUP A), SPYOP: NOT DETECTED
STREPTOCOCCUS: NOT DETECTED
WBC # BLD AUTO: 7.7 K/UL (ref 3.6–11)

## 2025-04-25 PROCEDURE — 80048 BASIC METABOLIC PNL TOTAL CA: CPT

## 2025-04-25 PROCEDURE — 6370000000 HC RX 637 (ALT 250 FOR IP)

## 2025-04-25 PROCEDURE — 6360000002 HC RX W HCPCS: Performed by: NURSE ANESTHETIST, CERTIFIED REGISTERED

## 2025-04-25 PROCEDURE — 87040 BLOOD CULTURE FOR BACTERIA: CPT

## 2025-04-25 PROCEDURE — 6360000002 HC RX W HCPCS: Performed by: INTERNAL MEDICINE

## 2025-04-25 PROCEDURE — 36415 COLL VENOUS BLD VENIPUNCTURE: CPT

## 2025-04-25 PROCEDURE — 92610 EVALUATE SWALLOWING FUNCTION: CPT

## 2025-04-25 PROCEDURE — 2580000003 HC RX 258: Performed by: INTERNAL MEDICINE

## 2025-04-25 PROCEDURE — 2709999900 HC NON-CHARGEABLE SUPPLY: Performed by: INTERNAL MEDICINE

## 2025-04-25 PROCEDURE — 2580000003 HC RX 258: Performed by: NURSE ANESTHETIST, CERTIFIED REGISTERED

## 2025-04-25 PROCEDURE — 6370000000 HC RX 637 (ALT 250 FOR IP): Performed by: INTERNAL MEDICINE

## 2025-04-25 PROCEDURE — 94761 N-INVAS EAR/PLS OXIMETRY MLT: CPT

## 2025-04-25 PROCEDURE — 7100000011 HC PHASE II RECOVERY - ADDTL 15 MIN: Performed by: INTERNAL MEDICINE

## 2025-04-25 PROCEDURE — 84145 PROCALCITONIN (PCT): CPT

## 2025-04-25 PROCEDURE — 7100000010 HC PHASE II RECOVERY - FIRST 15 MIN: Performed by: INTERNAL MEDICINE

## 2025-04-25 PROCEDURE — 82962 GLUCOSE BLOOD TEST: CPT

## 2025-04-25 PROCEDURE — 2700000000 HC OXYGEN THERAPY PER DAY

## 2025-04-25 PROCEDURE — 3700000000 HC ANESTHESIA ATTENDED CARE: Performed by: INTERNAL MEDICINE

## 2025-04-25 PROCEDURE — 2500000003 HC RX 250 WO HCPCS: Performed by: INTERNAL MEDICINE

## 2025-04-25 PROCEDURE — 85025 COMPLETE CBC W/AUTO DIFF WBC: CPT

## 2025-04-25 PROCEDURE — 1100000000 HC RM PRIVATE

## 2025-04-25 PROCEDURE — 3700000001 HC ADD 15 MINUTES (ANESTHESIA): Performed by: INTERNAL MEDICINE

## 2025-04-25 PROCEDURE — 3600007502: Performed by: INTERNAL MEDICINE

## 2025-04-25 PROCEDURE — 3600007512: Performed by: INTERNAL MEDICINE

## 2025-04-25 RX ORDER — PROPOFOL 10 MG/ML
INJECTION, EMULSION INTRAVENOUS
Status: DISCONTINUED | OUTPATIENT
Start: 2025-04-25 | End: 2025-04-25 | Stop reason: SDUPTHER

## 2025-04-25 RX ORDER — SODIUM CHLORIDE, SODIUM LACTATE, POTASSIUM CHLORIDE, CALCIUM CHLORIDE 600; 310; 30; 20 MG/100ML; MG/100ML; MG/100ML; MG/100ML
INJECTION, SOLUTION INTRAVENOUS
Status: DISCONTINUED | OUTPATIENT
Start: 2025-04-25 | End: 2025-04-25 | Stop reason: SDUPTHER

## 2025-04-25 RX ADMIN — MEMANTINE 10 MG: 10 TABLET ORAL at 21:09

## 2025-04-25 RX ADMIN — SODIUM CHLORIDE, PRESERVATIVE FREE 10 ML: 5 INJECTION INTRAVENOUS at 08:59

## 2025-04-25 RX ADMIN — LIDOCAINE HYDROCHLORIDE 40 MG: 20 INJECTION, SOLUTION EPIDURAL; INFILTRATION; INTRACAUDAL; PERINEURAL at 10:23

## 2025-04-25 RX ADMIN — QUETIAPINE FUMARATE 100 MG: 100 TABLET ORAL at 21:09

## 2025-04-25 RX ADMIN — FLUCONAZOLE 200 MG: 100 TABLET ORAL at 13:26

## 2025-04-25 RX ADMIN — SODIUM CHLORIDE, PRESERVATIVE FREE 10 ML: 5 INJECTION INTRAVENOUS at 21:09

## 2025-04-25 RX ADMIN — SODIUM CHLORIDE, POTASSIUM CHLORIDE, SODIUM LACTATE AND CALCIUM CHLORIDE: 600; 310; 30; 20 INJECTION, SOLUTION INTRAVENOUS at 09:52

## 2025-04-25 RX ADMIN — Medication 2000 UNITS: at 13:26

## 2025-04-25 RX ADMIN — CEFTRIAXONE SODIUM 1000 MG: 1 INJECTION, POWDER, FOR SOLUTION INTRAMUSCULAR; INTRAVENOUS at 15:37

## 2025-04-25 RX ADMIN — FUROSEMIDE 20 MG: 40 TABLET ORAL at 13:26

## 2025-04-25 RX ADMIN — SUCRALFATE 1 G: 1 TABLET ORAL at 21:09

## 2025-04-25 RX ADMIN — THERA TABS 1 TABLET: TAB at 13:26

## 2025-04-25 RX ADMIN — PROPOFOL 70 MG: 10 INJECTION, EMULSION INTRAVENOUS at 10:23

## 2025-04-25 RX ADMIN — PANTOPRAZOLE SODIUM 40 MG: 40 INJECTION, POWDER, FOR SOLUTION INTRAVENOUS at 08:57

## 2025-04-25 RX ADMIN — FERROUS SULFATE TAB 325 MG (65 MG ELEMENTAL FE) 325 MG: 325 (65 FE) TAB at 13:26

## 2025-04-25 RX ADMIN — ASPIRIN 81 MG: 81 TABLET, COATED ORAL at 13:27

## 2025-04-25 RX ADMIN — QUETIAPINE FUMARATE 100 MG: 100 TABLET ORAL at 13:26

## 2025-04-25 RX ADMIN — VANCOMYCIN HYDROCHLORIDE 2000 MG: 1 INJECTION, POWDER, LYOPHILIZED, FOR SOLUTION INTRAVENOUS at 15:46

## 2025-04-25 RX ADMIN — OXYCODONE HYDROCHLORIDE AND ACETAMINOPHEN 500 MG: 500 TABLET ORAL at 13:26

## 2025-04-25 RX ADMIN — POTASSIUM BICARBONATE 40 MEQ: 782 TABLET, EFFERVESCENT ORAL at 06:21

## 2025-04-25 RX ADMIN — GALANTAMINE 8 MG: 4 TABLET, FILM COATED ORAL at 18:45

## 2025-04-25 RX ADMIN — MEMANTINE 10 MG: 10 TABLET ORAL at 13:27

## 2025-04-25 RX ADMIN — ATORVASTATIN CALCIUM 40 MG: 40 TABLET, FILM COATED ORAL at 21:09

## 2025-04-25 RX ADMIN — Medication 10 MG: at 21:09

## 2025-04-25 RX ADMIN — SUCRALFATE 1 G: 1 TABLET ORAL at 13:41

## 2025-04-25 RX ADMIN — CETIRIZINE HYDROCHLORIDE 5 MG: 10 TABLET, FILM COATED ORAL at 13:26

## 2025-04-25 RX ADMIN — ESCITALOPRAM OXALATE 10 MG: 10 TABLET ORAL at 13:25

## 2025-04-25 NOTE — PLAN OF CARE
Problem: Physical Therapy - Adult  Goal: By Discharge: Performs mobility at highest level of function for planned discharge setting.  See evaluation for individualized goals.  Description: FUNCTIONAL STATUS PRIOR TO ADMISSION: The patient  required minimal assistance for basic and instrumental ADLs.    HOME SUPPORT PRIOR TO ADMISSION: The patient lived with daughter and required minimal assistance for ADLs.    Physical Therapy Goals  Initiated 4/24/2025  Pt stated goal: Get better  Pt will be I with LE HEP in 7 days.  Pt will perform bed mobility with Minimal Assist in 7 days.  Pt will perform transfers with Minimal Assist in 7 days.   Pt will amb 5 feet with LRAD safely with Minimal Assist in 7 days.    Pt will verbalize and demonstrate compliance with fall precaution in 7 days.   Pt will demonstrate improvement in standing/gait balance from poor to fair in 7 days.    4/24/2025 1516 by Aileen Coronel, PT  Outcome: Not Progressing     Problem: Physical Therapy - Adult  Goal: By Discharge: Performs mobility at highest level of function for planned discharge setting.  See evaluation for individualized goals.  Description: FUNCTIONAL STATUS PRIOR TO ADMISSION: The patient  required minimal assistance for basic and instrumental ADLs.    HOME SUPPORT PRIOR TO ADMISSION: The patient lived with daughter and required minimal assistance for ADLs.    Physical Therapy Goals  Initiated 4/24/2025  Pt stated goal: Get better  Pt will be I with LE HEP in 7 days.  Pt will perform bed mobility with Minimal Assist in 7 days.  Pt will perform transfers with Minimal Assist in 7 days.   Pt will amb 5 feet with LRAD safely with Minimal Assist in 7 days.    Pt will verbalize and demonstrate compliance with fall precaution in 7 days.   Pt will demonstrate improvement in standing/gait balance from poor to fair in 7 days.    4/24/2025 1516 by Aileen Coronel, PT  Outcome: Not Progressing

## 2025-04-25 NOTE — PROGRESS NOTES
Hospitalist Progress Note               Daily Progress Note: 4/25/2025      Hospital Day: 3     Chief complaint:   Chief Complaint   Patient presents with    Shortness of Breath        Subjective:   Hospital course to date:    Patient is a 84 year old female with a history of advance dementia, chronic debility, essentially bed bound with full assistance to bedside commode who presents to the E.R. for evaluation of shortness of breath.  Patient cannot offer a history secondary to underlying dementia.  Her daughter is at bedside and provides information regarding her presentation.  She states that the patient has had approximately 3 loose dark bowel movements in the last 24hrs.  There was also concern for aspiration with her coughing up dark material.  She was noted to have increased work of breathing.  Workup in the E.R. included a chest x-ray that was nil acute and urinalysis positive.  Of note she was recently treated a few weeks ago for urinary tract infection       Patient was admitted, started on IV fluids and IV ceftriaxone    Patient had large black bowel movement on 4/24.  IV Protonix was started.  GI consult requested.  --------  Patient is seen today for follow-up.  She is awake and alert.   As above, noted to have large black stool yesterday which was Hemoccult positive.  Hemoglobin 8.8 this morning.        Medications reviewed  Current Facility-Administered Medications   Medication Dose Route Frequency    galantamine (RAZADYNE) tablet 8 mg  8 mg Oral BID WC    fluconazole (DIFLUCAN) tablet 200 mg  200 mg Oral Daily    pantoprazole (PROTONIX) injection 40 mg  40 mg IntraVENous Daily    amLODIPine (NORVASC) tablet 5 mg  5 mg Oral Daily    aspirin EC tablet 81 mg  81 mg Oral Daily    atorvastatin (LIPITOR) tablet 40 mg  40 mg Oral Daily    escitalopram (LEXAPRO) tablet 10 mg  10 mg Oral Daily    ferrous sulfate (IRON 325) tablet 325 mg  325 mg Oral Every Other Day    furosemide (LASIX) tablet 20 mg  20

## 2025-04-25 NOTE — ANESTHESIA PRE PROCEDURE
with long-term current use of insulin ([facility]) E11.65, Z79.4    Alzheimer's dementia without behavioral disturbance ([facility]) G30.9, F02.80    Essential hypertension I10    Aspiration pneumonia ([facility]) J69.0    Acute respiratory failure with hypoxia ([facility]) J96.01    Sepsis ([facility]) A41.9    Esophagitis K20.90    PAULIE (acute kidney injury) N17.9    Respiratory failure ([facility]) J96.90    Hip fracture requiring operative repair, left, closed, initial encounter ([facility]) S72.002A    Fall at home, initial encounter W19.XXXA, Y92.009    UTI (urinary tract infection) N39.0    Complicated UTI (urinary tract infection) N39.0       Past Medical History:        Diagnosis Date    Arthritis     Cancer ([facility]) 2006    colon cancer    Diabetes ([facility])     GERD (gastroesophageal reflux disease)     Other ill-defined conditions(799.89)     fibromylgia    PUD (peptic ulcer disease)     Unspecified sleep apnea        Past Surgical History:        Procedure Laterality Date    APPENDECTOMY      BACK SURGERY      neck and back    FEMUR SURGERY Left 10/13/2024    LEFT TROCHANTERIC FIXATION NAIL ADVANCED, intermediate performed by Dennis Lacy MD at [facility] MAIN OR    HYSTERECTOMY (CERVIX STATUS UNKNOWN)      ORTHOPEDIC SURGERY  2010    right tkr    OTHER SURGICAL HISTORY      tonsil    HI UNLISTED PROCEDURE ABDOMEN PERITONEUM & OMENTUM  2006    colectomy    WRIST ARTHROSCOP,DIAGNOSTIC      bilateral carpal tunell       Social History:    Social History     Tobacco Use    Smoking status: Former    Smokeless tobacco: Never   Substance Use Topics    Alcohol use: No                                Counseling given: Not Answered      Vital Signs (Current):   Vitals:    04/24/25 1434 04/24/25 1952 04/25/25 0222 04/25/25 0848   BP: (!) 101/40 111/65 109/62 (!) 128/45   Pulse: 68 74 76 71   Resp: 14 18 18 18   Temp: 98.4 °F (36.9 °C) 98.4 °F (36.9 °C) 97.5 °F (36.4 °C) 98.6 °F (37 °C)   TempSrc: Axillary Axillary Oral Oral   SpO2: 94% 94% 94% 93%   Weight:       Height:                                                   BP Readings from Last 3 Encounters:   04/25/25 (!) 128/45   10/15/24 (!) 140/47   08/31/23 138/82       NPO Status:                                                                                 BMI:   Wt Readings from Last 3 Encounters:   04/23/25 81.6 kg (180 lb)   10/13/24 83.9 kg (185 lb)   08/31/23 77.1 kg (170 lb)     Body mass index is 35.15 kg/m².    CBC:   Lab Results   Component Value Date/Time    WBC 7.7 04/25/2025 04:33 AM    RBC 3.26 04/25/2025 04:33 AM    HGB 8.8 04/25/2025 04:33 AM    HCT 28.2 04/25/2025 04:33 AM    MCV 86.5 04/25/2025 04:33 AM    RDW 15.8 04/25/2025 04:33 AM     04/25/2025 04:33 AM       CMP:   Lab Results   Component Value Date/Time     04/25/2025 04:33 AM    K 3.4 04/25/2025 04:33 AM     04/25/2025 04:33 AM    CO2 25 04/25/2025 04:33 AM    BUN 28 04/25/2025 04:33 AM    CREATININE 0.84 04/25/2025 04:33 AM    GFRAA >60 07/20/2022 08:37 AM    AGRATIO 0.9 07/18/2022 04:30 PM    LABGLOM 68 04/25/2025 04:33 AM    LABGLOM >60 08/31/2023 12:09 PM    GLUCOSE 72 04/25/2025 04:33 AM    CALCIUM 8.7 04/25/2025 04:33 AM    BILITOT 0.3 04/23/2025 02:37 PM    ALKPHOS 117 04/23/2025 02:37 PM    ALKPHOS 101 07/18/2022 04:30 PM    AST 21 04/23/2025 02:37 PM    ALT 19 04/23/2025 02:37 PM       POC Tests:   Recent Labs     04/25/25  0857   POCGLU 73       Coags:   Lab Results   Component Value Date/Time    PROTIME 14.0 10/13/2024 09:59 AM    INR 1.1 10/13/2024 09:59 AM       HCG (If Applicable): No results found for: \"PREGTESTUR\", \"PREGSERUM\", \"HCG\", \"HCGQUANT\"     ABGs: No results found for: \"PHART\", \"PO2ART\", \"BIX2ONZ\", \"XPS5WTS\", \"BEART\", \"C7XDJJGM\"     Type & Screen (If Applicable):  Lab Results   Component Value Date    ABORH O Positive 10/14/2024    LABANTI Negative 10/14/2024       Drug/Infectious Status (If Applicable):  No results found for: \"HIV\", \"HEPCAB\"    COVID-19 Screening (If Applicable):   Lab Results   Component Value

## 2025-04-25 NOTE — ANESTHESIA POSTPROCEDURE EVALUATION
Department of Anesthesiology  Postprocedure Note    Patient: Nena Mcnulty  MRN: [id number]  YOB: 1940  Date of evaluation: 4/25/2025    Procedure Summary       Date: 04/25/25 Room / Location: [facility] 04 / [facility] ENDOSCOPY    Anesthesia Start: 0955 Anesthesia Stop: 1028    Procedure: ESOPHAGOGASTRODUODENOSCOPY (Upper GI Region) Diagnosis:       Gastrointestinal hemorrhage, unspecified gastrointestinal hemorrhage type      (Gastrointestinal hemorrhage, unspecified gastrointestinal hemorrhage type [K92.2])    Surgeons: Waqas Farr MD Responsible Provider: Leslie Ruffin MD    Anesthesia Type: MAC ASA Status: 3            Anesthesia Type: MAC    Malcolm Phase I:      Malcolm Phase II:      Anesthesia Post Evaluation    Patient location during evaluation: bedside (Endoscopy Unit)  Patient participation: complete - patient participated  Level of consciousness: sleepy but conscious  Pain score: 0  Airway patency: patent  Nausea & Vomiting: no nausea and no vomiting  Cardiovascular status: hemodynamically stable  Respiratory status: acceptable  Hydration status: stable  Comments: This patient remained on the stretcher.  The patient was handed off to the endoscopy nursing team.  All questions regarding pre-, intra-, and postoperative care were answered.  Multimodal analgesia pain management approach    No notable events documented.

## 2025-04-25 NOTE — PROGRESS NOTES
Vancomycin Dosing Consult  Nena Mcnulty is a 84 y.o. female with c/f bacteremia. Pharmacy was consulted by Dr. Nunez to dose and monitor vancomycin. Today is day 0.    Antibiotic regimen: Vancomycin + ceftriaxone + fluconazole    Temp (24hrs), Av.2 °F (36.8 °C), Min:97.5 °F (36.4 °C), Max:98.6 °F (37 °C)    Recent Labs     25  1437 25  2032 25  0505 25  0433   WBC 9.1  --  12.3* 7.7   CREATININE 1.02 0.78 0.71 0.84   BUN 37* 34* 29* 28*     Est CrCl: 47 mL/min  Concomitant nephrotoxic drugs: None    Cultures:    Blood 1: NGTD, pending   Blood 2: NGTD, pending  PCR shows Staph Epi in 1/4 bottles so far; may indicate contaminant vs. true infection and can likely de-escalate from Vancomycin   Occult Blood Stool: Positive    MRSA Swab: Pending    Target range: Re-dose for random level <15 mcg/mL    Recent level history:  Date/Time Dose & Interval Measured Level (mcg/mL) Associated AUC/GLENN              Assessment/Plan:   Patient admitted to ED and started empirically on antibiotics, escalating today to vancomycin  Today Scr appears at baseline and stable ~ 0.8. AUC/GLENN dosing  Received LD of Vancomycin IV 2000mg x 1 dose today at 1546  Level for  @ 1300  Antimicrobial stop date 7 days

## 2025-04-25 NOTE — CONSULTS
Gastroenterology Consult Note        Patient: Nena Mcnulty MRN: [id number]  SSN: [id number]    YOB: 1940  Age: 84 y.o.  Sex: female      Subjective:      Nena Mcnulty is a 84 y.o. female who is being seen for anemia, GI bleeding .  History from medical records mostly  Generated with history of dementia who presents hospital with anemia, dark stool, hemoglobin drop reported overnight, no document nausea vomiting hematemesis no fever  Past Medical History:   Diagnosis Date    Arthritis     Cancer (HCC) 2006    colon cancer    Diabetes (HCC)     GERD (gastroesophageal reflux disease)     Other ill-defined conditions(799.89)     fibromylgia    PUD (peptic ulcer disease)     Unspecified sleep apnea      Past Surgical History:   Procedure Laterality Date    APPENDECTOMY      BACK SURGERY      neck and back    FEMUR SURGERY Left 10/13/2024    LEFT TROCHANTERIC FIXATION NAIL ADVANCED, intermediate performed by Dennis Lacy MD at [facility] MAIN OR    HYSTERECTOMY (CERVIX STATUS UNKNOWN)      ORTHOPEDIC SURGERY  2010    right tkr    OTHER SURGICAL HISTORY      tonsil    [place] UNLISTED PROCEDURE ABDOMEN PERITONEUM & OMENTUM  2006    colectomy    WRIST ARTHROSCOP,DIAGNOSTIC      bilateral carpal tunell      Family History   Problem Relation Age of Onset    Heart Disease Brother     Diabetes Mother     Diabetes Sister     Diabetes Brother      Social History     Tobacco Use    Smoking status: Former    Smokeless tobacco: Never   Substance Use Topics    Alcohol use: No      Current Facility-Administered Medications   Medication Dose Route Frequency Provider Last Rate Last Admin    galantamine (RAZADYNE) tablet 8 mg  8 mg Oral BID  Mora Santo PA-C   8 mg at 04/24/25 1801    fluconazole (DIFLUCAN) tablet 200 mg  200 mg Oral Daily Chadwick Nunez MD   200 mg at 04/24/25 0958    pantoprazole (PROTONIX) injection 40 mg  40 mg IntraVENous Daily Chadwick Nunez MD   40 mg at 04/25/25 0854

## 2025-04-25 NOTE — PROCEDURES
PROCEDURE NOTE  Date: 4/25/2025   Name: Nena Mcnulty  YOB: 1940    Procedures    Please see dictated chart review-Notes-scanned EGD report    For findings and recommendations    Large amount with food in the esophagus with food impaction, both pushed down into the   stomach   Mild erosive esophagitis,  mild gastritis,  No ulcer   no active bleeding    Recommendations,  Stopped IV PPIs  Speech therapy to follow

## 2025-04-25 NOTE — PLAN OF CARE
Speech LAnguage Pathology Dysphagia EVALUATION    Patient: Nena Mcnulty (84 y.o. female)  Date: 4/25/2025  Primary Diagnosis: UTI (urinary tract infection) [N39.0]  Acute cystitis without hematuria [N30.00]  Acute metabolic encephalopathy [G93.41]  Complicated UTI (urinary tract infection) [N39.0]  Procedure(s) (LRB):  ESOPHAGOGASTRODUODENOSCOPY (N/A) * Day of Surgery *   Precautions: Aspiration, GERD, Fall Risk                  Time In: 1530  Time Out: 1545    DIET RECOMMENDATIONS: Minced and moist and mildly thick liquids. Per family request--soft and bite-sized.     SWALLOW SAFETY PRECAUTIONS: Meds crushed in puree. Small bites/sips. Slow rate of intake. Alternate bites/sips.  6 small snacks/meals. Upright 1h after PO. Do not eat 3h before bedtime. 1:1 assist/supervision.    ASSESSMENT :    Oropharyngeal dysphagia, moderate    Consult received to evaluate oropharyngeal swallowing abilities.  85 y/o female admitted w/ c/o SOB, dark color bowel movements, concern for aspiration with pt coughing up dark material; per chart review admission findings include complicated and recurrent UTI, advanced dementia, chronic debility, bedbound, DM. PMH notable for remote colon cancer, arthritis, GERD, PUD, fibromyalgia. Lives with dtr.      4/23 CXR  report   \"IMPRESSION:  No acute process.\"     EGD today report   \"Impression: Food in the lower third of the esophagus. Removal was successful. LA Grade A reflux esophagitis with no bleeding. Gastritis, characterized by erythema. Normal examined duodenum.\" Relevant recommendations include \"Follow an antireflux regimen. Take prescribed proton pump inhibitor or H2 blocker (antacid) medications 30 - 60 minutes before meals.\"    No previous encounter w/ SLP per chart.       Awake/alert. Upright in bed. Pleasant, cooperative. Confused. Unable to state name but answered some simple personal yes/no questions and followed simple commands w/cues. Dtr present, states that patient is

## 2025-04-25 NOTE — CARE COORDINATION
CM reviewed chart.     [facility]  Updates sent via [facility].     Per IDR, patient likely has 48 hours or more before they are ready for discharge.   Barriers: IV abx, cultures     CM will continue to follow.

## 2025-04-25 NOTE — PROGRESS NOTES
Attempted PT this morning however pt off the floor at [facility], will follow up later as time permits.

## 2025-04-26 VITALS
RESPIRATION RATE: 14 BRPM | BODY MASS INDEX: 35.34 KG/M2 | TEMPERATURE: 98.1 F | HEART RATE: 65 BPM | DIASTOLIC BLOOD PRESSURE: 48 MMHG | SYSTOLIC BLOOD PRESSURE: 107 MMHG | WEIGHT: 180 LBS | HEIGHT: 60 IN | OXYGEN SATURATION: 95 %

## 2025-04-26 LAB
ANION GAP SERPL CALC-SCNC: 4 MMOL/L (ref 2–12)
BACTERIA SPEC CULT: NORMAL
BACTERIA SPEC CULT: NORMAL
BASOPHILS # BLD: 0.02 K/UL (ref 0–0.1)
BASOPHILS NFR BLD: 0.3 % (ref 0–1)
BUN SERPL-MCNC: 20 MG/DL (ref 6–20)
BUN/CREAT SERPL: 28 (ref 12–20)
CA-I BLD-MCNC: 8.6 MG/DL (ref 8.5–10.1)
CHLORIDE SERPL-SCNC: 108 MMOL/L (ref 97–108)
CO2 SERPL-SCNC: 28 MMOL/L (ref 21–32)
CREAT SERPL-MCNC: 0.72 MG/DL (ref 0.55–1.02)
DIFFERENTIAL METHOD BLD: ABNORMAL
EOSINOPHIL # BLD: 0.38 K/UL (ref 0–0.4)
EOSINOPHIL NFR BLD: 6.3 % (ref 0–7)
ERYTHROCYTE [DISTWIDTH] IN BLOOD BY AUTOMATED COUNT: 15.7 % (ref 11.5–14.5)
GLUCOSE BLD STRIP.AUTO-MCNC: 129 MG/DL (ref 65–100)
GLUCOSE SERPL-MCNC: 125 MG/DL (ref 65–100)
HCT VFR BLD AUTO: 27.8 % (ref 35–47)
HGB BLD-MCNC: 8.5 G/DL (ref 11.5–16)
IMM GRANULOCYTES # BLD AUTO: 0.02 K/UL (ref 0–0.04)
IMM GRANULOCYTES NFR BLD AUTO: 0.3 % (ref 0–0.5)
LYMPHOCYTES # BLD: 1.47 K/UL (ref 0.8–3.5)
LYMPHOCYTES NFR BLD: 24.3 % (ref 12–49)
Lab: NORMAL
MCH RBC QN AUTO: 26.5 PG (ref 26–34)
MCHC RBC AUTO-ENTMCNC: 30.6 G/DL (ref 30–36.5)
MCV RBC AUTO: 86.6 FL (ref 80–99)
MONOCYTES # BLD: 0.58 K/UL (ref 0–1)
MONOCYTES NFR BLD: 9.6 % (ref 5–13)
NEUTS SEG # BLD: 3.59 K/UL (ref 1.8–8)
NEUTS SEG NFR BLD: 59.2 % (ref 32–75)
NRBC # BLD: 0 K/UL (ref 0–0.01)
NRBC BLD-RTO: 0 PER 100 WBC
PERFORMED BY:: ABNORMAL
PLATELET # BLD AUTO: 184 K/UL (ref 150–400)
PMV BLD AUTO: 12.6 FL (ref 8.9–12.9)
POTASSIUM SERPL-SCNC: 3.4 MMOL/L (ref 3.5–5.1)
RBC # BLD AUTO: 3.21 M/UL (ref 3.8–5.2)
SODIUM SERPL-SCNC: 140 MMOL/L (ref 136–145)
WBC # BLD AUTO: 6.1 K/UL (ref 3.6–11)

## 2025-04-26 PROCEDURE — 6370000000 HC RX 637 (ALT 250 FOR IP): Performed by: INTERNAL MEDICINE

## 2025-04-26 PROCEDURE — 36415 COLL VENOUS BLD VENIPUNCTURE: CPT

## 2025-04-26 PROCEDURE — 85025 COMPLETE CBC W/AUTO DIFF WBC: CPT

## 2025-04-26 PROCEDURE — 82962 GLUCOSE BLOOD TEST: CPT

## 2025-04-26 PROCEDURE — 80048 BASIC METABOLIC PNL TOTAL CA: CPT

## 2025-04-26 RX ADMIN — ASPIRIN 81 MG: 81 TABLET, COATED ORAL at 08:43

## 2025-04-26 RX ADMIN — SUCRALFATE 1 G: 1 TABLET ORAL at 08:43

## 2025-04-26 RX ADMIN — QUETIAPINE FUMARATE 100 MG: 100 TABLET ORAL at 08:44

## 2025-04-26 RX ADMIN — ESCITALOPRAM OXALATE 10 MG: 10 TABLET ORAL at 08:44

## 2025-04-26 RX ADMIN — FUROSEMIDE 20 MG: 40 TABLET ORAL at 08:43

## 2025-04-26 RX ADMIN — MEMANTINE 10 MG: 10 TABLET ORAL at 08:43

## 2025-04-26 RX ADMIN — OXYCODONE HYDROCHLORIDE AND ACETAMINOPHEN 500 MG: 500 TABLET ORAL at 10:22

## 2025-04-26 RX ADMIN — Medication 2000 UNITS: at 10:22

## 2025-04-26 RX ADMIN — POTASSIUM BICARBONATE 20 MEQ: 782 TABLET, EFFERVESCENT ORAL at 10:32

## 2025-04-26 RX ADMIN — AMLODIPINE BESYLATE 5 MG: 5 TABLET ORAL at 08:43

## 2025-04-26 RX ADMIN — THERA TABS 1 TABLET: TAB at 08:44

## 2025-04-26 RX ADMIN — CETIRIZINE HYDROCHLORIDE 5 MG: 10 TABLET, FILM COATED ORAL at 10:22

## 2025-04-26 NOTE — DISCHARGE SUMMARY
Physician Discharge Summary     Patient ID:    Nena Mcnulty  [id number]  84 y.o.  1940    Admit date: 4/23/2025    Discharge date : 4/26/2025      Final Diagnoses:   Suspected acute cystitis, urine culture negative  Advanced dementia  Possible upper GI bleed.  EGD shows esophagitis and gastritis  Acute blood loss anemia  Essential hypertension  Chronic debility  Diabetes mellitus type 2    Reason for Hospitalization:  Patient is a 84 year old female with a history of advance dementia, chronic debility, essentially bed bound with full assistance to bedside commode who presents to the E.R. for evaluation of shortness of breath.  Patient cannot offer a history secondary to underlying dementia.  Her daughter is at bedside and provides information regarding her presentation.  She states that the patient has had approximately 3 loose dark bowel movements in the last 24hrs.  There was also concern for aspiration with her coughing up dark material.  She was noted to have increased work of breathing.  Workup in the E.R. included a chest x-ray that was nil acute and urinalysis positive.  Of note she was recently treated a few weeks ago for urinary tract infection       Hospital Course:   Patient was admitted, started on IV fluids and IV ceftriaxone     Patient had large black bowel movement on 4/24.  IV Protonix was started.  GI consult requested.     Patient underwent EGD on 4/25, showed esophagitis and gastritis but no active bleeding.  There was food noted in the lower third of the esophagus.     Urine culture came back negative, antibiotics were discontinued     Blood cultures grew Staph epidermidis, consistent with contaminant    Patient felt stable for discharge home on/26  Discharge Medications:      Medication List        CONTINUE taking these medications      amLODIPine 5 MG tablet  Commonly known as: NORVASC     aspirin 81 MG EC tablet     atorvastatin 10 MG tablet  Commonly known as:  8.5*   HCT 28.2* 27.8*    184     Recent Labs     04/24/25  0505 04/25/25  0433 04/26/25  0815    139 140   K 3.4* 3.4* 3.4*    108 108   CO2 30 25 28   BUN 29* 28* 20   CREATININE 0.71 0.84 0.72   GLUCOSE 73 72 125*   CALCIUM 9.0 8.7 8.6   MG 2.0  --   --      Recent Labs     04/23/25  1437   AST 21   ALT 19   ALKPHOS 117   BILITOT 0.3   GLOB 4.5*   LIPASE 24     No results for input(s): \"INR\", \"PROTIME\", \"APTT\" in the last 72 hours.   No results for input(s): \"IRON\", \"TIBC\" in the last 72 hours.    Invalid input(s): \"PSAT\", \"FERR\"   No results for input(s): \"PH\", \"PCO2\", \"PO2\" in the last 72 hours.  No results for input(s): \"CKTOTAL\", \"CKMB\", \"TROPONINI\" in the last 72 hours.  Lab Results   Component Value Date/Time    POCGLU 129 04/26/2025 07:52 AM    POCGLU 135 04/25/2025 09:07 PM    POCGLU 97 04/25/2025 03:59 PM    POCGLU 80 04/25/2025 11:29 AM    POCGLU 73 04/25/2025 08:57 AM         Discharge time spent 35 minutes    Signed:  Chadwick Yanez MD  4/26/2025  10:19 AM

## 2025-04-26 NOTE — PLAN OF CARE
Problem: Chronic Conditions and Co-morbidities  Goal: Patient's chronic conditions and co-morbidity symptoms are monitored and maintained or improved  4/26/2025 0828 by Kinsey Chow RN  Outcome: Progressing  4/25/2025 2358 by ANÍBAL Wright RN  Outcome: Progressing     Problem: Discharge Planning  Goal: Discharge to home or other facility with appropriate resources  Outcome: Progressing     Problem: Pain  Goal: Verbalizes/displays adequate comfort level or baseline comfort level  4/26/2025 0828 by Kinsey Chow RN  Outcome: Progressing  4/25/2025 2358 by ANÍBAL Wright RN  Outcome: Progressing     Problem: Skin/Tissue Integrity  Goal: Skin integrity remains intact  Description: 1.  Monitor for areas of redness and/or skin breakdown2.  Assess vascular access sites hourly3.  Every 4-6 hours minimum:  Change oxygen saturation probe site4.  Every 4-6 hours:  If on nasal continuous positive airway pressure, respiratory therapy assess nares and determine need for appliance change or resting period  4/26/2025 0828 by Kinsey Chow RN  Outcome: Progressing  4/25/2025 2358 by ANÍBAL Wright RN  Outcome: Progressing     Problem: ABCDS Injury Assessment  Goal: Absence of physical injury  4/26/2025 0828 by Kinsey Chow RN  Outcome: Progressing  4/25/2025 2358 by ANÍBAL Wright RN  Outcome: Progressing     Problem: Safety - Adult  Goal: Free from fall injury  Outcome: Progressing

## 2025-04-26 NOTE — PROGRESS NOTES
Hospitalist Progress Note               Daily Progress Note: 4/26/2025      Hospital Day: 4     Chief complaint:   Chief Complaint   Patient presents with    Shortness of Breath        Subjective:   Hospital course to date:    Patient is a 84 year old female with a history of advance dementia, chronic debility, essentially bed bound with full assistance to bedside commode who presents to the E.R. for evaluation of shortness of breath.  Patient cannot offer a history secondary to underlying dementia.  Her daughter is at bedside and provides information regarding her presentation.  She states that the patient has had approximately 3 loose dark bowel movements in the last 24hrs.  There was also concern for aspiration with her coughing up dark material.  She was noted to have increased work of breathing.  Workup in the E.R. included a chest x-ray that was nil acute and urinalysis positive.  Of note she was recently treated a few weeks ago for urinary tract infection       Patient was admitted, started on IV fluids and IV ceftriaxone    Patient had large black bowel movement on 4/24.  IV Protonix was started.  GI consult requested.    Patient underwent EGD on 4/25, showed esophagitis and gastritis but no active bleeding.  There was food noted in the lower third of the esophagus.    Urine culture came back negative, antibiotics were discontinued    Blood cultures grew Staph epidermidis, consistent with contaminant  --------  Patient is seen today for follow-up.  Daughter is at the bedside.  She appears to be doing well overall.  No new problems.  Hemoglobin pending this morning.        Medications reviewed  Current Facility-Administered Medications   Medication Dose Route Frequency    galantamine (RAZADYNE) tablet 8 mg  8 mg Oral BID WC    amLODIPine (NORVASC) tablet 5 mg  5 mg Oral Daily    aspirin EC tablet 81 mg  81 mg Oral Daily    atorvastatin (LIPITOR) tablet 40 mg  40 mg Oral Daily    escitalopram (LEXAPRO)

## 2025-04-26 NOTE — CARE COORDINATION
Patient discharging home today with home health. Home health through [facility] [facility]. Orders sent.    Transition of Care Plan:    RUR: 14%  Prior Level of Functioning: HH  Disposition: HH  ALEXYS: today  If SNF or IPR: Date FOC offered: na  Date FOC received: na  Accepting facility: na  Date authorization started with reference number: na  Date authorization received and expires: na  Follow up appointments:   DME needed:   Transportation at discharge: family  IM/IMM Medicare/ letter given: 04/24  Is patient a [place] and connected with [facility]? na   If yes, was [place] transfer form completed and [facility] notified? na  Caregiver Contact: daughter at bedside  Discharge Caregiver contacted prior to discharge? na  Care Conference needed? na  Barriers to discharge: na

## 2025-04-26 NOTE — PROGRESS NOTES
Discharge plan of care/case management plan validated with provider discharge order. Discharge order noted. Discharge instructions given to pt w pt verbalizing understanding. PIV x 1 removed with cath tip intact. Condition stable at time of discharge. Transported home with family.

## 2025-04-26 NOTE — PLAN OF CARE
Problem: Chronic Conditions and Co-morbidities  Goal: Patient's chronic conditions and co-morbidity symptoms are monitored and maintained or improved  4/26/2025 1106 by Elia Shaw RN  Outcome: Completed  4/26/2025 0828 by Kinsey Chow RN  Outcome: Progressing  4/25/2025 2358 by ANÍBAL Wright RN  Outcome: Progressing     Problem: Discharge Planning  Goal: Discharge to home or other facility with appropriate resources  4/26/2025 1106 by Elia Shaw RN  Outcome: Completed  4/26/2025 0828 by Kinsey Chow RN  Outcome: Progressing     Problem: Pain  Goal: Verbalizes/displays adequate comfort level or baseline comfort level  4/26/2025 1106 by Elia Shaw RN  Outcome: Completed  4/26/2025 0828 by Kinsey Chow RN  Outcome: Progressing  4/25/2025 2358 by ANÍBAL Wright RN  Outcome: Progressing     Problem: Skin/Tissue Integrity  Goal: Skin integrity remains intact  Description: 1.  Monitor for areas of redness and/or skin breakdown2.  Assess vascular access sites hourly3.  Every 4-6 hours minimum:  Change oxygen saturation probe site4.  Every 4-6 hours:  If on nasal continuous positive airway pressure, respiratory therapy assess nares and determine need for appliance change or resting period  4/26/2025 1106 by Elia Shaw RN  Outcome: Completed  4/26/2025 0828 by Kinsey Chow RN  Outcome: Progressing  4/25/2025 2358 by ANÍBAL Wright RN  Outcome: Progressing     Problem: ABCDS Injury Assessment  Goal: Absence of physical injury  4/26/2025 1106 by Elia Shaw RN  Outcome: Completed  4/26/2025 0828 by Kinsey Chow RN  Outcome: Progressing  4/25/2025 2358 by ANÍBAL Wright RN  Outcome: Progressing     Problem: Safety - Adult  Goal: Free from fall injury  4/26/2025 1106 by Elia Shaw RN  Outcome: Completed  4/26/2025 0828 by Kinsey Chow RN  Outcome:

## 2025-04-26 NOTE — PLAN OF CARE
Problem: Chronic Conditions and Co-morbidities  Goal: Patient's chronic conditions and co-morbidity symptoms are monitored and maintained or improved  4/25/2025 2358 by ANÍBAL Wright RN  Outcome: Progressing  4/25/2025 1803 by Shayna Alva RN  Outcome: Progressing     Problem: Discharge Planning  Goal: Discharge to home or other facility with appropriate resources  4/25/2025 1803 by Shayna Alva RN  Outcome: Progressing     Problem: Pain  Goal: Verbalizes/displays adequate comfort level or baseline comfort level  4/25/2025 2358 by ANÍBAL Wright RN  Outcome: Progressing  4/25/2025 1803 by Shayna Alva RN  Outcome: Progressing     Problem: Skin/Tissue Integrity  Goal: Skin integrity remains intact  Description: 1.  Monitor for areas of redness and/or skin breakdown2.  Assess vascular access sites hourly3.  Every 4-6 hours minimum:  Change oxygen saturation probe site4.  Every 4-6 hours:  If on nasal continuous positive airway pressure, respiratory therapy assess nares and determine need for appliance change or resting period  4/25/2025 2358 by ANÍBAL Wright RN  Outcome: Progressing  4/25/2025 1803 by Shayna Alva RN  Outcome: Progressing     Problem: ABCDS Injury Assessment  Goal: Absence of physical injury  4/25/2025 2358 by ANÍBAL Wright RN  Outcome: Progressing  4/25/2025 1803 by Shayna Alva RN  Outcome: Progressing     Problem: Safety - Adult  Goal: Free from fall injury  4/25/2025 1803 by Shayna Alva RN  Outcome: Progressing     Problem: SLP Adult - Impaired Swallowing  Goal: By Discharge: Advance to least restrictive diet without signs or symptoms of aspiration for planned discharge setting.  See evaluation for individualized goals.  Description: Speech Therapy Swallow Goals  Initiated 4/25/2025  -Patient will tolerate soft and bite-sized solids / thin liquids diet without clinical indicators of aspiration within 7 day(s).        -Patient will tolerate PO trials without

## 2025-04-27 LAB
BACTERIA SPEC CULT: ABNORMAL
BACTERIA SPEC CULT: NORMAL
BACTERIA SPEC CULT: NORMAL
Lab: ABNORMAL
Lab: ABNORMAL
Lab: NORMAL
Lab: NORMAL

## 2025-04-28 NOTE — PROGRESS NOTES
Physician Progress Note      PATIENT:               DIONNE DUPONT  [facility] #:                  [id number]  :                       1940  ADMIT DATE:       2025 12:34 PM  DISCH DATE:        2025 12:27 PM  RESPONDING  PROVIDER #:        Chadwick Nunez MD          QUERY TEXT:    Recurrent UTI is documented in the medical record IM PN on  by Chadwick Nunez MD. urine culture negative. Please provide additional clinical   indicators supportive of your documentation. Or please document if the   diagnosis of UTI has been ruled out after study.    The clinical indicators include:  F84yrs, HTN, DM    -IM H&P on  Of note she was recently treated a few weeks ago for urinary   tract infection.  The patient now been admitted for evaluation, treatment and   care. Urinary Tract Infection. IV Ceftriaxone.  -IM PN on  Complicated and recurrent UTI. UA showed greater than 100 WBCs   and 4+ bacteria as well as budding yeast  -GI Consult on  Patient admitted to the hospital for the UTI,  -IM PN on  Complicated and recurrent UTI. UA showed greater than 100 WBCs   and 4+ bacteria as well as budding yeast However, urine culture came back   negative.  -IM DS on  Suspected acute cystitis, urine culture negative. antibiotics   were discontinued. Blood cultures grew Staph epidermidis, consistent with   contaminant.    Treatment: IV ceftriaxone, IV vancomycin, Serial labs.      Thank You  Cj Dorado [facility] CDS  Options provided:  -- UTI present as evidenced by, Please document evidence.  -- UTI was ruled out  -- Other - I will add my own diagnosis  -- Disagree - Not applicable / Not valid  -- Disagree - Clinically unable to determine / Unknown  -- Refer to Clinical Documentation Reviewer    PROVIDER RESPONSE TEXT:    UTI was ruled out after study.    Query created by: Cj Dorado on 2025 8:08 AM      QUERY TEXT:    Gastrointestinal bleeding is documented in the medical

## 2025-05-01 LAB
BACTERIA SPEC CULT: NORMAL
BACTERIA SPEC CULT: NORMAL
Lab: NORMAL
Lab: NORMAL

## 2025-05-22 ENCOUNTER — HOSPITAL ENCOUNTER (INPATIENT)
Facility: HOSPITAL | Age: 85
LOS: 1 days | Discharge: HOME HEALTH | DRG: 368 | End: 2025-05-23
Attending: EMERGENCY MEDICINE | Admitting: INTERNAL MEDICINE
Payer: MEDICARE

## 2025-05-22 ENCOUNTER — APPOINTMENT (OUTPATIENT)
Facility: HOSPITAL | Age: 85
DRG: 368 | End: 2025-05-22
Payer: MEDICARE

## 2025-05-22 DIAGNOSIS — K20.90 ESOPHAGITIS: ICD-10-CM

## 2025-05-22 DIAGNOSIS — K92.0 HEMATEMESIS WITH NAUSEA: Primary | ICD-10-CM

## 2025-05-22 PROBLEM — K92.2 GI BLEED: Status: ACTIVE | Noted: 2025-05-22

## 2025-05-22 LAB
ALBUMIN SERPL-MCNC: 3.5 G/DL (ref 3.5–5)
ALBUMIN/GLOB SERPL: 0.9 (ref 1.1–2.2)
ALP SERPL-CCNC: 132 U/L (ref 45–117)
ALT SERPL-CCNC: 24 U/L (ref 12–78)
ANION GAP SERPL CALC-SCNC: 10 MMOL/L (ref 2–12)
AST SERPL W P-5'-P-CCNC: ABNORMAL U/L (ref 15–37)
BASOPHILS # BLD: 0.05 K/UL (ref 0–0.1)
BASOPHILS NFR BLD: 0.4 % (ref 0–1)
BILIRUB SERPL-MCNC: 0.4 MG/DL (ref 0.2–1)
BUN SERPL-MCNC: 44 MG/DL (ref 6–20)
BUN/CREAT SERPL: 41 (ref 12–20)
CA-I BLD-MCNC: 10.3 MG/DL (ref 8.5–10.1)
CHLORIDE SERPL-SCNC: 101 MMOL/L (ref 97–108)
CO2 SERPL-SCNC: 25 MMOL/L (ref 21–32)
CREAT SERPL-MCNC: 1.08 MG/DL (ref 0.55–1.02)
DIFFERENTIAL METHOD BLD: ABNORMAL
EOSINOPHIL # BLD: 0.06 K/UL (ref 0–0.4)
EOSINOPHIL NFR BLD: 0.4 % (ref 0–7)
ERYTHROCYTE [DISTWIDTH] IN BLOOD BY AUTOMATED COUNT: 15.7 % (ref 11.5–14.5)
GLOBULIN SER CALC-MCNC: 4 G/DL (ref 2–4)
GLUCOSE SERPL-MCNC: 304 MG/DL (ref 65–100)
HCT VFR BLD AUTO: 34.7 % (ref 35–47)
HGB BLD-MCNC: 11 G/DL (ref 11.5–16)
IMM GRANULOCYTES # BLD AUTO: 0.07 K/UL (ref 0–0.04)
IMM GRANULOCYTES NFR BLD AUTO: 0.5 % (ref 0–0.5)
INR PPP: 1.1 (ref 0.9–1.1)
LIPASE SERPL-CCNC: 25 U/L (ref 13–75)
LYMPHOCYTES # BLD: 1.67 K/UL (ref 0.8–3.5)
LYMPHOCYTES NFR BLD: 11.8 % (ref 12–49)
MAGNESIUM SERPL-MCNC: NORMAL MG/DL (ref 1.6–2.4)
MCH RBC QN AUTO: 26.6 PG (ref 26–34)
MCHC RBC AUTO-ENTMCNC: 31.7 G/DL (ref 30–36.5)
MCV RBC AUTO: 84 FL (ref 80–99)
MONOCYTES # BLD: 0.63 K/UL (ref 0–1)
MONOCYTES NFR BLD: 4.4 % (ref 5–13)
NEUTS SEG # BLD: 11.71 K/UL (ref 1.8–8)
NEUTS SEG NFR BLD: 82.5 % (ref 32–75)
NRBC # BLD: 0 K/UL (ref 0–0.01)
NRBC BLD-RTO: 0 PER 100 WBC
PLATELET # BLD AUTO: 279 K/UL (ref 150–400)
PMV BLD AUTO: 12.7 FL (ref 8.9–12.9)
POTASSIUM SERPL-SCNC: ABNORMAL MMOL/L (ref 3.5–5.1)
PROT SERPL-MCNC: 7.5 G/DL (ref 6.4–8.2)
PROTHROMBIN TIME: 14.2 SEC (ref 11.9–14.6)
RBC # BLD AUTO: 4.13 M/UL (ref 3.8–5.2)
SODIUM SERPL-SCNC: 136 MMOL/L (ref 136–145)
TROPONIN I SERPL HS-MCNC: 4 NG/L (ref 0–51)
WBC # BLD AUTO: 14.2 K/UL (ref 3.6–11)

## 2025-05-22 PROCEDURE — 96375 TX/PRO/DX INJ NEW DRUG ADDON: CPT

## 2025-05-22 PROCEDURE — 85025 COMPLETE CBC W/AUTO DIFF WBC: CPT

## 2025-05-22 PROCEDURE — 1100000000 HC RM PRIVATE

## 2025-05-22 PROCEDURE — 93005 ELECTROCARDIOGRAM TRACING: CPT | Performed by: EMERGENCY MEDICINE

## 2025-05-22 PROCEDURE — 83690 ASSAY OF LIPASE: CPT

## 2025-05-22 PROCEDURE — 81015 MICROSCOPIC EXAM OF URINE: CPT

## 2025-05-22 PROCEDURE — 99285 EMERGENCY DEPT VISIT HI MDM: CPT

## 2025-05-22 PROCEDURE — 71045 X-RAY EXAM CHEST 1 VIEW: CPT

## 2025-05-22 PROCEDURE — 87088 URINE BACTERIA CULTURE: CPT

## 2025-05-22 PROCEDURE — 36415 COLL VENOUS BLD VENIPUNCTURE: CPT

## 2025-05-22 PROCEDURE — 83036 HEMOGLOBIN GLYCOSYLATED A1C: CPT

## 2025-05-22 PROCEDURE — 87086 URINE CULTURE/COLONY COUNT: CPT

## 2025-05-22 PROCEDURE — 6360000004 HC RX CONTRAST MEDICATION: Performed by: EMERGENCY MEDICINE

## 2025-05-22 PROCEDURE — 83735 ASSAY OF MAGNESIUM: CPT

## 2025-05-22 PROCEDURE — 2500000003 HC RX 250 WO HCPCS: Performed by: EMERGENCY MEDICINE

## 2025-05-22 PROCEDURE — 84484 ASSAY OF TROPONIN QUANT: CPT

## 2025-05-22 PROCEDURE — 6360000002 HC RX W HCPCS: Performed by: EMERGENCY MEDICINE

## 2025-05-22 PROCEDURE — 96374 THER/PROPH/DIAG INJ IV PUSH: CPT

## 2025-05-22 PROCEDURE — 85610 PROTHROMBIN TIME: CPT

## 2025-05-22 PROCEDURE — 80053 COMPREHEN METABOLIC PANEL: CPT

## 2025-05-22 PROCEDURE — 87186 SC STD MICRODIL/AGAR DIL: CPT

## 2025-05-22 PROCEDURE — 74174 CTA ABD&PLVS W/CONTRAST: CPT

## 2025-05-22 PROCEDURE — 81003 URINALYSIS AUTO W/O SCOPE: CPT

## 2025-05-22 RX ORDER — SODIUM CHLORIDE 9 MG/ML
INJECTION, SOLUTION INTRAVENOUS CONTINUOUS
Status: DISCONTINUED | OUTPATIENT
Start: 2025-05-22 | End: 2025-05-23 | Stop reason: HOSPADM

## 2025-05-22 RX ORDER — ATORVASTATIN CALCIUM 40 MG/1
40 TABLET, FILM COATED ORAL DAILY
Status: DISCONTINUED | OUTPATIENT
Start: 2025-05-23 | End: 2025-05-23 | Stop reason: HOSPADM

## 2025-05-22 RX ORDER — POLYETHYLENE GLYCOL 3350 17 G/17G
17 POWDER, FOR SOLUTION ORAL DAILY PRN
Status: DISCONTINUED | OUTPATIENT
Start: 2025-05-22 | End: 2025-05-23 | Stop reason: HOSPADM

## 2025-05-22 RX ORDER — IOPAMIDOL 755 MG/ML
100 INJECTION, SOLUTION INTRAVASCULAR
Status: COMPLETED | OUTPATIENT
Start: 2025-05-22 | End: 2025-05-22

## 2025-05-22 RX ORDER — ACETAMINOPHEN 325 MG/1
650 TABLET ORAL EVERY 6 HOURS PRN
Status: DISCONTINUED | OUTPATIENT
Start: 2025-05-22 | End: 2025-05-23 | Stop reason: HOSPADM

## 2025-05-22 RX ORDER — DEXTROSE MONOHYDRATE 100 MG/ML
INJECTION, SOLUTION INTRAVENOUS CONTINUOUS PRN
Status: DISCONTINUED | OUTPATIENT
Start: 2025-05-22 | End: 2025-05-23 | Stop reason: HOSPADM

## 2025-05-22 RX ORDER — GLUCAGON 1 MG/ML
1 KIT INJECTION PRN
Status: DISCONTINUED | OUTPATIENT
Start: 2025-05-22 | End: 2025-05-23 | Stop reason: HOSPADM

## 2025-05-22 RX ORDER — SODIUM CHLORIDE 9 MG/ML
INJECTION, SOLUTION INTRAVENOUS PRN
Status: DISCONTINUED | OUTPATIENT
Start: 2025-05-22 | End: 2025-05-23 | Stop reason: HOSPADM

## 2025-05-22 RX ORDER — ONDANSETRON 4 MG/1
4 TABLET, ORALLY DISINTEGRATING ORAL EVERY 8 HOURS PRN
Status: DISCONTINUED | OUTPATIENT
Start: 2025-05-22 | End: 2025-05-23 | Stop reason: HOSPADM

## 2025-05-22 RX ORDER — POTASSIUM CHLORIDE 1500 MG/1
40 TABLET, EXTENDED RELEASE ORAL PRN
Status: DISCONTINUED | OUTPATIENT
Start: 2025-05-22 | End: 2025-05-23 | Stop reason: HOSPADM

## 2025-05-22 RX ORDER — POTASSIUM CHLORIDE 7.45 MG/ML
10 INJECTION INTRAVENOUS PRN
Status: DISCONTINUED | OUTPATIENT
Start: 2025-05-22 | End: 2025-05-23 | Stop reason: HOSPADM

## 2025-05-22 RX ORDER — SODIUM CHLORIDE 0.9 % (FLUSH) 0.9 %
5-40 SYRINGE (ML) INJECTION PRN
Status: DISCONTINUED | OUTPATIENT
Start: 2025-05-22 | End: 2025-05-23 | Stop reason: HOSPADM

## 2025-05-22 RX ORDER — MAGNESIUM SULFATE IN WATER 40 MG/ML
2000 INJECTION, SOLUTION INTRAVENOUS PRN
Status: DISCONTINUED | OUTPATIENT
Start: 2025-05-22 | End: 2025-05-23 | Stop reason: HOSPADM

## 2025-05-22 RX ORDER — SUCRALFATE 1 G/1
1 TABLET ORAL 2 TIMES DAILY
Status: DISCONTINUED | OUTPATIENT
Start: 2025-05-22 | End: 2025-05-23 | Stop reason: HOSPADM

## 2025-05-22 RX ORDER — QUETIAPINE FUMARATE 100 MG/1
100 TABLET, FILM COATED ORAL 2 TIMES DAILY
Status: DISCONTINUED | OUTPATIENT
Start: 2025-05-22 | End: 2025-05-23 | Stop reason: HOSPADM

## 2025-05-22 RX ORDER — ACETAMINOPHEN 650 MG/1
650 SUPPOSITORY RECTAL EVERY 6 HOURS PRN
Status: DISCONTINUED | OUTPATIENT
Start: 2025-05-22 | End: 2025-05-23 | Stop reason: HOSPADM

## 2025-05-22 RX ORDER — SODIUM CHLORIDE 0.9 % (FLUSH) 0.9 %
5-40 SYRINGE (ML) INJECTION EVERY 12 HOURS SCHEDULED
Status: DISCONTINUED | OUTPATIENT
Start: 2025-05-22 | End: 2025-05-23 | Stop reason: HOSPADM

## 2025-05-22 RX ORDER — PANTOPRAZOLE SODIUM 40 MG/10ML
40 INJECTION, POWDER, LYOPHILIZED, FOR SOLUTION INTRAVENOUS 2 TIMES DAILY
Status: DISCONTINUED | OUTPATIENT
Start: 2025-05-22 | End: 2025-05-23 | Stop reason: HOSPADM

## 2025-05-22 RX ORDER — INSULIN LISPRO 100 [IU]/ML
0-16 INJECTION, SOLUTION INTRAVENOUS; SUBCUTANEOUS
Status: DISCONTINUED | OUTPATIENT
Start: 2025-05-22 | End: 2025-05-23 | Stop reason: HOSPADM

## 2025-05-22 RX ORDER — ESCITALOPRAM OXALATE 10 MG/1
10 TABLET ORAL DAILY
Status: DISCONTINUED | OUTPATIENT
Start: 2025-05-23 | End: 2025-05-23 | Stop reason: HOSPADM

## 2025-05-22 RX ORDER — ONDANSETRON 2 MG/ML
4 INJECTION INTRAMUSCULAR; INTRAVENOUS ONCE
Status: COMPLETED | OUTPATIENT
Start: 2025-05-22 | End: 2025-05-22

## 2025-05-22 RX ORDER — VITAMIN B COMPLEX
2000 TABLET ORAL DAILY
Status: DISCONTINUED | OUTPATIENT
Start: 2025-05-23 | End: 2025-05-23 | Stop reason: HOSPADM

## 2025-05-22 RX ORDER — ONDANSETRON 2 MG/ML
4 INJECTION INTRAMUSCULAR; INTRAVENOUS EVERY 6 HOURS PRN
Status: DISCONTINUED | OUTPATIENT
Start: 2025-05-22 | End: 2025-05-23 | Stop reason: HOSPADM

## 2025-05-22 RX ORDER — GALANTAMINE 4 MG/1
16 TABLET, FILM COATED ORAL 2 TIMES DAILY WITH MEALS
Status: DISCONTINUED | OUTPATIENT
Start: 2025-05-23 | End: 2025-05-23 | Stop reason: HOSPADM

## 2025-05-22 RX ADMIN — ONDANSETRON 4 MG: 2 INJECTION, SOLUTION INTRAMUSCULAR; INTRAVENOUS at 20:00

## 2025-05-22 RX ADMIN — IOPAMIDOL 100 ML: 755 INJECTION, SOLUTION INTRAVENOUS at 21:17

## 2025-05-22 RX ADMIN — SODIUM CHLORIDE, PRESERVATIVE FREE 40 MG: 5 INJECTION INTRAVENOUS at 21:47

## 2025-05-22 NOTE — ED PROVIDER NOTES
[facility] EMERGENCY DEPT  EMERGENCY DEPARTMENT HISTORY AND PHYSICAL EXAM      Date of evaluation: 5/22/2025  Patient Name: Nena Mcnulty  Birthdate 1940  MRN: [id number]  ED Provider: Bennett Sharma MD   Note Started: 7:56 PM EDT 5/22/25    HISTORY OF PRESENT ILLNESS     Chief Complaint   Patient presents with    Vomiting       History Provided By: Patient, daughter/son     HPI: Nena Mcnulty is a 84 y.o. female presents for evaluation of nausea and vomiting that started few hours prior to arrival.  Per patient's daughter, patient has history of dementia and colon cancer, was admitted to the hospital about 1 month ago and had a endoscopy done at that time.  Daughter reports history of peptic ulcer disease.  Patient not on any blood thinners.  Daughter states patient's dementia/mental status is at her baseline.  Patient denies any pain complaint at present.    PAST MEDICAL HISTORY   Past Medical History:  Past Medical History:   Diagnosis Date    Arthritis     Cancer (HCC) 2006    colon cancer    Diabetes (HCC)     GERD (gastroesophageal reflux disease)     Other ill-defined conditions(799.89)     fibromylgia    PUD (peptic ulcer disease)     Unspecified sleep apnea        Past Surgical History:  Past Surgical History:   Procedure Laterality Date    APPENDECTOMY      BACK SURGERY      neck and back    FEMUR SURGERY Left 10/13/2024    LEFT TROCHANTERIC FIXATION NAIL ADVANCED, intermediate performed by Dennis Lacy MD at [facility] MAIN OR    HYSTERECTOMY (CERVIX STATUS UNKNOWN)      ORTHOPEDIC SURGERY  2010    right tkr    OTHER SURGICAL HISTORY      tonsil    [place] UNLISTED PROCEDURE ABDOMEN PERITONEUM & OMENTUM  2006    colectomy    UPPER GASTROINTESTINAL ENDOSCOPY N/A 4/25/2025    ESOPHAGOGASTRODUODENOSCOPY performed by Waqas Farr MD at [facility] ENDOSCOPY    WRIST ARTHROSCOP,DIAGNOSTIC      bilateral carpal tunell       Family History:  Family History   Problem Relation Age of Onset    Heart Disease Brother

## 2025-05-23 ENCOUNTER — ANESTHESIA EVENT (OUTPATIENT)
Facility: HOSPITAL | Age: 85
End: 2025-05-23
Payer: MEDICARE

## 2025-05-23 ENCOUNTER — ANESTHESIA (OUTPATIENT)
Facility: HOSPITAL | Age: 85
End: 2025-05-23
Payer: MEDICARE

## 2025-05-23 VITALS
BODY MASS INDEX: 35.66 KG/M2 | HEIGHT: 60 IN | WEIGHT: 181.66 LBS | HEART RATE: 81 BPM | RESPIRATION RATE: 16 BRPM | DIASTOLIC BLOOD PRESSURE: 58 MMHG | OXYGEN SATURATION: 95 % | SYSTOLIC BLOOD PRESSURE: 149 MMHG | TEMPERATURE: 97.5 F

## 2025-05-23 PROBLEM — K92.2 UGIB (UPPER GASTROINTESTINAL BLEED): Status: ACTIVE | Noted: 2025-05-23

## 2025-05-23 PROBLEM — N39.0 UTI (URINARY TRACT INFECTION): Status: RESOLVED | Noted: 2025-04-23 | Resolved: 2025-05-23

## 2025-05-23 LAB
ALBUMIN SERPL-MCNC: 3.3 G/DL (ref 3.5–5)
ALBUMIN/GLOB SERPL: 0.9 (ref 1.1–2.2)
ALP SERPL-CCNC: 113 U/L (ref 45–117)
ALT SERPL-CCNC: 22 U/L (ref 12–78)
ANION GAP SERPL CALC-SCNC: 8 MMOL/L (ref 2–12)
APPEARANCE UR: ABNORMAL
AST SERPL W P-5'-P-CCNC: 29 U/L (ref 15–37)
BACTERIA URNS QL MICRO: NEGATIVE /HPF
BASOPHILS # BLD: 0.04 K/UL (ref 0–0.1)
BASOPHILS NFR BLD: 0.4 % (ref 0–1)
BILIRUB SERPL-MCNC: 0.3 MG/DL (ref 0.2–1)
BILIRUB UR QL: NEGATIVE
BUN SERPL-MCNC: 43 MG/DL (ref 6–20)
BUN/CREAT SERPL: 43 (ref 12–20)
CA-I BLD-MCNC: 10.2 MG/DL (ref 8.5–10.1)
CHLORIDE SERPL-SCNC: 102 MMOL/L (ref 97–108)
CO2 SERPL-SCNC: 27 MMOL/L (ref 21–32)
COLOR UR: ABNORMAL
CREAT SERPL-MCNC: 0.99 MG/DL (ref 0.55–1.02)
DIFFERENTIAL METHOD BLD: ABNORMAL
EKG ATRIAL RATE: 90 BPM
EKG DIAGNOSIS: NORMAL
EKG P AXIS: 10 DEGREES
EKG P-R INTERVAL: 172 MS
EKG Q-T INTERVAL: 376 MS
EKG QRS DURATION: 72 MS
EKG QTC CALCULATION (BAZETT): 459 MS
EKG R AXIS: -19 DEGREES
EKG T AXIS: 77 DEGREES
EKG VENTRICULAR RATE: 90 BPM
EOSINOPHIL # BLD: 0.07 K/UL (ref 0–0.4)
EOSINOPHIL NFR BLD: 0.6 % (ref 0–7)
ERYTHROCYTE [DISTWIDTH] IN BLOOD BY AUTOMATED COUNT: 16.1 % (ref 11.5–14.5)
EST. AVERAGE GLUCOSE BLD GHB EST-MCNC: 160 MG/DL
GLOBULIN SER CALC-MCNC: 3.8 G/DL (ref 2–4)
GLUCOSE BLD STRIP.AUTO-MCNC: 144 MG/DL (ref 65–100)
GLUCOSE BLD STRIP.AUTO-MCNC: 308 MG/DL (ref 65–100)
GLUCOSE SERPL-MCNC: 292 MG/DL (ref 65–100)
GLUCOSE UR STRIP.AUTO-MCNC: >500 MG/DL
HBA1C MFR BLD: 7.2 % (ref 4–5.6)
HCT VFR BLD AUTO: 29 % (ref 35–47)
HGB BLD-MCNC: 9 G/DL (ref 11.5–16)
HGB UR QL STRIP: NEGATIVE
IMM GRANULOCYTES # BLD AUTO: 0.05 K/UL (ref 0–0.04)
IMM GRANULOCYTES NFR BLD AUTO: 0.4 % (ref 0–0.5)
KETONES UR QL STRIP.AUTO: 20 MG/DL
LEUKOCYTE ESTERASE UR QL STRIP.AUTO: ABNORMAL
LYMPHOCYTES # BLD: 2.16 K/UL (ref 0.8–3.5)
LYMPHOCYTES NFR BLD: 18.9 % (ref 12–49)
MCH RBC QN AUTO: 26.9 PG (ref 26–34)
MCHC RBC AUTO-ENTMCNC: 31 G/DL (ref 30–36.5)
MCV RBC AUTO: 86.6 FL (ref 80–99)
MONOCYTES # BLD: 0.81 K/UL (ref 0–1)
MONOCYTES NFR BLD: 7.1 % (ref 5–13)
NEUTS SEG # BLD: 8.29 K/UL (ref 1.8–8)
NEUTS SEG NFR BLD: 72.6 % (ref 32–75)
NITRITE UR QL STRIP.AUTO: NEGATIVE
NRBC # BLD: 0 K/UL (ref 0–0.01)
NRBC BLD-RTO: 0 PER 100 WBC
PERFORMED BY:: ABNORMAL
PERFORMED BY:: ABNORMAL
PH UR STRIP: 7 (ref 5–8)
PLATELET # BLD AUTO: 241 K/UL (ref 150–400)
PMV BLD AUTO: 12.5 FL (ref 8.9–12.9)
POTASSIUM SERPL-SCNC: 5.2 MMOL/L (ref 3.5–5.1)
PROT SERPL-MCNC: 7.1 G/DL (ref 6.4–8.2)
PROT UR STRIP-MCNC: NEGATIVE MG/DL
RBC # BLD AUTO: 3.35 M/UL (ref 3.8–5.2)
RBC #/AREA URNS HPF: ABNORMAL /HPF (ref 0–5)
SODIUM SERPL-SCNC: 137 MMOL/L (ref 136–145)
SP GR UR REFRACTOMETRY: >1.03 (ref 1–1.03)
UROBILINOGEN UR QL STRIP.AUTO: 0.1 EU/DL (ref 0.1–1)
WBC # BLD AUTO: 11.4 K/UL (ref 3.6–11)
WBC CASTS URNS QL MICRO: PRESENT
WBC URNS QL MICRO: >100 /HPF (ref 0–4)

## 2025-05-23 PROCEDURE — 2580000003 HC RX 258: Performed by: FAMILY MEDICINE

## 2025-05-23 PROCEDURE — 3700000000 HC ANESTHESIA ATTENDED CARE: Performed by: INTERNAL MEDICINE

## 2025-05-23 PROCEDURE — 3600007502: Performed by: INTERNAL MEDICINE

## 2025-05-23 PROCEDURE — 6370000000 HC RX 637 (ALT 250 FOR IP): Performed by: FAMILY MEDICINE

## 2025-05-23 PROCEDURE — 2500000003 HC RX 250 WO HCPCS: Performed by: INTERNAL MEDICINE

## 2025-05-23 PROCEDURE — 85025 COMPLETE CBC W/AUTO DIFF WBC: CPT

## 2025-05-23 PROCEDURE — 82962 GLUCOSE BLOOD TEST: CPT

## 2025-05-23 PROCEDURE — 6360000002 HC RX W HCPCS: Performed by: NURSE ANESTHETIST, CERTIFIED REGISTERED

## 2025-05-23 PROCEDURE — 3600007512: Performed by: INTERNAL MEDICINE

## 2025-05-23 PROCEDURE — 2709999900 HC NON-CHARGEABLE SUPPLY: Performed by: INTERNAL MEDICINE

## 2025-05-23 PROCEDURE — 3700000001 HC ADD 15 MINUTES (ANESTHESIA): Performed by: INTERNAL MEDICINE

## 2025-05-23 PROCEDURE — 7100000011 HC PHASE II RECOVERY - ADDTL 15 MIN: Performed by: INTERNAL MEDICINE

## 2025-05-23 PROCEDURE — G0378 HOSPITAL OBSERVATION PER HR: HCPCS

## 2025-05-23 PROCEDURE — 7100000010 HC PHASE II RECOVERY - FIRST 15 MIN: Performed by: INTERNAL MEDICINE

## 2025-05-23 PROCEDURE — 0DJ08ZZ INSPECTION OF UPPER INTESTINAL TRACT, VIA NATURAL OR ARTIFICIAL OPENING ENDOSCOPIC: ICD-10-PCS | Performed by: INTERNAL MEDICINE

## 2025-05-23 PROCEDURE — 6360000002 HC RX W HCPCS: Performed by: INTERNAL MEDICINE

## 2025-05-23 PROCEDURE — 36415 COLL VENOUS BLD VENIPUNCTURE: CPT

## 2025-05-23 PROCEDURE — 6360000002 HC RX W HCPCS: Performed by: FAMILY MEDICINE

## 2025-05-23 PROCEDURE — 2500000003 HC RX 250 WO HCPCS: Performed by: FAMILY MEDICINE

## 2025-05-23 RX ORDER — LIDOCAINE HYDROCHLORIDE 20 MG/ML
INJECTION, SOLUTION EPIDURAL; INFILTRATION; INTRACAUDAL; PERINEURAL
Status: DISCONTINUED | OUTPATIENT
Start: 2025-05-23 | End: 2025-05-23 | Stop reason: SDUPTHER

## 2025-05-23 RX ORDER — MEMANTINE HYDROCHLORIDE 10 MG/1
10 TABLET ORAL 2 TIMES DAILY
Status: ON HOLD | COMMUNITY

## 2025-05-23 RX ORDER — FUROSEMIDE 20 MG/1
20 TABLET ORAL DAILY
Qty: 30 TABLET | Refills: 0 | Status: ON HOLD
Start: 2025-05-23

## 2025-05-23 RX ORDER — PHENYLEPHRINE HCL IN 0.9% NACL 1 MG/10 ML
SYRINGE (ML) INTRAVENOUS
Status: DISCONTINUED | OUTPATIENT
Start: 2025-05-23 | End: 2025-05-23 | Stop reason: SDUPTHER

## 2025-05-23 RX ORDER — CEFTRIAXONE 1 G/1
INJECTION, POWDER, FOR SOLUTION INTRAMUSCULAR; INTRAVENOUS
Status: DISCONTINUED
Start: 2025-05-23 | End: 2025-05-23 | Stop reason: HOSPADM

## 2025-05-23 RX ORDER — CIPROFLOXACIN 250 MG/1
250 TABLET, FILM COATED ORAL 2 TIMES DAILY
Qty: 14 TABLET | Refills: 0 | Status: ON HOLD | OUTPATIENT
Start: 2025-05-23 | End: 2025-05-30

## 2025-05-23 RX ORDER — PROPOFOL 10 MG/ML
INJECTION, EMULSION INTRAVENOUS
Status: DISCONTINUED | OUTPATIENT
Start: 2025-05-23 | End: 2025-05-23 | Stop reason: SDUPTHER

## 2025-05-23 RX ADMIN — Medication 10 MG: at 00:52

## 2025-05-23 RX ADMIN — LIDOCAINE HYDROCHLORIDE 80 MG: 20 SOLUTION INTRAVENOUS at 09:56

## 2025-05-23 RX ADMIN — Medication 2000 UNITS: at 11:29

## 2025-05-23 RX ADMIN — QUETIAPINE FUMARATE 100 MG: 100 TABLET ORAL at 00:52

## 2025-05-23 RX ADMIN — PROPOFOL 70 MG: 10 INJECTION, EMULSION INTRAVENOUS at 09:56

## 2025-05-23 RX ADMIN — QUETIAPINE FUMARATE 100 MG: 100 TABLET ORAL at 11:29

## 2025-05-23 RX ADMIN — ATORVASTATIN CALCIUM 40 MG: 40 TABLET, FILM COATED ORAL at 11:29

## 2025-05-23 RX ADMIN — CEFTRIAXONE SODIUM 1000 MG: 1 INJECTION, POWDER, FOR SOLUTION INTRAMUSCULAR; INTRAVENOUS at 09:57

## 2025-05-23 RX ADMIN — INSULIN LISPRO 12 UNITS: 100 INJECTION, SOLUTION INTRAVENOUS; SUBCUTANEOUS at 01:31

## 2025-05-23 RX ADMIN — SODIUM CHLORIDE, PRESERVATIVE FREE 10 ML: 5 INJECTION INTRAVENOUS at 11:25

## 2025-05-23 RX ADMIN — PANTOPRAZOLE SODIUM 40 MG: 40 INJECTION, POWDER, FOR SOLUTION INTRAVENOUS at 11:25

## 2025-05-23 RX ADMIN — ESCITALOPRAM OXALATE 10 MG: 10 TABLET ORAL at 11:29

## 2025-05-23 RX ADMIN — SODIUM CHLORIDE, PRESERVATIVE FREE 10 ML: 5 INJECTION INTRAVENOUS at 00:53

## 2025-05-23 RX ADMIN — SUCRALFATE 1 G: 1 TABLET ORAL at 11:29

## 2025-05-23 RX ADMIN — SODIUM CHLORIDE: 0.9 INJECTION, SOLUTION INTRAVENOUS at 01:35

## 2025-05-23 RX ADMIN — Medication 200 MCG: at 10:03

## 2025-05-23 RX ADMIN — SUCRALFATE 1 G: 1 TABLET ORAL at 00:53

## 2025-05-23 RX ADMIN — ACETAMINOPHEN 650 MG: 325 TABLET ORAL at 01:25

## 2025-05-23 RX ADMIN — PANTOPRAZOLE SODIUM 40 MG: 40 INJECTION, POWDER, FOR SOLUTION INTRAVENOUS at 00:53

## 2025-05-23 ASSESSMENT — PAIN SCALES - WONG BAKER
WONGBAKER_NUMERICALRESPONSE: NO HURT
WONGBAKER_NUMERICALRESPONSE: NO HURT

## 2025-05-23 ASSESSMENT — PAIN SCALES - GENERAL
PAINLEVEL_OUTOF10: 0
PAINLEVEL_OUTOF10: 0

## 2025-05-23 NOTE — H&P
Making:   I personally reviewed labs: CBC with Auto Differential, CMP, Lipase, Magnesium, PT-INR, and Troponin.  I personally reviewed imaging: CT Abdomen and Pelvis Without Contrast and Chest X-Ray.  Toxic drug monitoring: None/  Discussed case with: ED provider. After discussion I am in agreement that acuity of patient's medical condition necessitates hospital stay.      Code Status: DNR  DVT Prophylaxis: SCDs  GI Prophylaxis: Protonix      Subjective:   CHIEF COMPLAINT: Two episodes of coffee-ground colored emesis.    HISTORY OF PRESENT ILLNESS:     Nena Mcnulty is a 84 y.o.  female with PMHx significant for Esophagitis, Peptic Ulcer Disease, Gastroesophageal Reflux Disease, Hypertension, Alzheimer's Dementia, Type II Diabetes Mellitus, five Transient Ischemic Attacks, and status-post Colorectal Cancer, who is presenting with two episodes of coffee-ground colored emesis. The patient's daughter is the primary historian for this encounter.  Patient not a good historian due to advanced dementia    She reports the patient had two episodes of coffee-ground colored emesis, which started this evening (Thursday, 5/22/2025) at 5:30 PM. The patient's daughter states the patient had \"barely eaten breakfast several hours prior, had a couple french fries at lunch, and had not had dinner yet,\" and expressed concerns with the brown-colored emesis she saw the patient produce, commenting \"it looked like there was blood in her vomit to me, so that's why I brought here to the Emergency Room.\" She presents a chux pad from her residence with coffee-ground colored emesis, and indicates that was from the patient's most recent episode of vomiting.    The patient's daughter additionally notes the patient's decreased appetite is a deviation from her baseline, and confirms that that started this morning. She reveals the patient has Esophagitis, and is seen by a speech therapist, as well at OT/PT in her residence, to work on her  normoactive and positive.  NEUROLOGICAL: Alert and awake. Unable to provide intelligible speech. She is otherwise presenting at her neurological baseline, per her daughter.        LAB DATA REVIEWED:    Recent Results (from the past 12 hours)   CBC with Auto Differential    Collection Time: 05/22/25  8:03 PM   Result Value Ref Range    WBC 14.2 (H) 3.6 - 11.0 K/uL    RBC 4.13 3.80 - 5.20 M/uL    Hemoglobin 11.0 (L) 11.5 - 16.0 g/dL    Hematocrit 34.7 (L) 35.0 - 47.0 %    MCV 84.0 80.0 - 99.0 FL    MCH 26.6 26.0 - 34.0 PG    MCHC 31.7 30.0 - 36.5 g/dL    RDW 15.7 (H) 11.5 - 14.5 %    Platelets 279 150 - 400 K/uL    MPV 12.7 8.9 - 12.9 FL    Nucleated RBCs 0.0 0.0  WBC    nRBC 0.00 0.00 - 0.01 K/uL    Neutrophils % 82.5 (H) 32.0 - 75.0 %    Lymphocytes % 11.8 (L) 12.0 - 49.0 %    Monocytes % 4.4 (L) 5.0 - 13.0 %    Eosinophils % 0.4 0.0 - 7.0 %    Basophils % 0.4 0.0 - 1.0 %    Immature Granulocytes % 0.5 0 - 0.5 %    Neutrophils Absolute 11.71 (H) 1.80 - 8.00 K/UL    Lymphocytes Absolute 1.67 0.80 - 3.50 K/UL    Monocytes Absolute 0.63 0.00 - 1.00 K/UL    Eosinophils Absolute 0.06 0.00 - 0.40 K/UL    Basophils Absolute 0.05 0.00 - 0.10 K/UL    Immature Granulocytes Absolute 0.07 (H) 0.00 - 0.04 K/UL    Differential Type AUTOMATED     Comprehensive Metabolic Panel    Collection Time: 05/22/25  8:03 PM   Result Value Ref Range    Sodium 136 136 - 145 mmol/L    Potassium Hemolyzed, Recollection Recommended 3.5 - 5.1 mmol/L    Chloride 101 97 - 108 mmol/L    CO2 25 21 - 32 mmol/L    Anion Gap 10 2 - 12 mmol/L    Glucose 304 (H) 65 - 100 mg/dL    BUN 44 (H) 6 - 20 mg/dL    Creatinine 1.08 (H) 0.55 - 1.02 mg/dL    BUN/Creatinine Ratio 41 (H) 12 - 20      Est, Glom Filt Rate 51 (L) >60 ml/min/1.73m2    Calcium 10.3 (H) 8.5 - 10.1 mg/dL    Total Bilirubin 0.4 0.2 - 1.0 mg/dL    AST Hemolyzed, Recollection Recommended 15 - 37 U/L    ALT 24 12 - 78 U/L    Alk Phosphatase 132 (H) 45 - 117 U/L    Total Protein 7.5 6.4 -  8.2 g/dL    Albumin 3.5 3.5 - 5.0 g/dL    Globulin 4.0 2.0 - 4.0 g/dL    Albumin/Globulin Ratio 0.9 (L) 1.1 - 2.2     Lipase    Collection Time: 05/22/25  8:03 PM   Result Value Ref Range    Lipase 25 13 - 75 U/L   Magnesium    Collection Time: 05/22/25  8:03 PM   Result Value Ref Range    Magnesium Hemolyzed, Recollection Recommended 1.6 - 2.4 mg/dL   Troponin    Collection Time: 05/22/25  8:03 PM   Result Value Ref Range    Troponin, High Sensitivity 4 0 - 51 ng/L   Protime-INR    Collection Time: 05/22/25  8:06 PM   Result Value Ref Range    Protime 14.2 11.9 - 14.6 sec    INR 1.1 0.9 - 1.1           Imaging Results (most recent):  CTA ABDOMEN PELVIS W WO CONTRAST  Result Date: 5/22/2025  INDICATION: Hematemesis COMPARISON: 4/25/2022 TECHNIQUE: Following the uneventful intravenous administration of 100 cc Isovue-370, thin axial images were obtained through the abdomen and pelvis. Coronal and sagittal reconstructions were generated. Oral contrast was not administered. CT dose reduction was achieved through use of a standardized protocol tailored for this examination and automatic exposure control for dose modulation. Unenhanced, arterial and venous phase images were obtained. MIP reconstructions were performed FINDINGS: LUNG BASES: There is bibasilar atelectasis INCIDENTALLY IMAGED HEART AND MEDIASTINUM: Extensive calcification of the mitral annulus. Edema of the distal esophagus with moderate hiatal hernia LIVER: No mass or fatty infiltration. GALLBLADDER: Surgically absent SPLEEN: Not enlarged. PANCREAS: No mass or inflammation ADRENALS: Unremarkable. KIDNEYS: No mass, calculus, or hydronephrosis. BOWEL: No dilatation or wall thickening. Left-sided diverticulosis. No acute diverticulitis APPENDIX: Likely removed PERITONEUM: No ascites or pneumoperitoneum. RETROPERITONEUM: No lymphadenopathy or aortic aneurysm. Extensive vascular calcifications BLADDER: No wall thickening or stone REPRODUCTIVE ORGANS: Absent

## 2025-05-23 NOTE — PROGRESS NOTES
4 Eyes Skin Assessment     NAME:  Nena Mcnulty  YOB: 1940  MEDICAL RECORD NUMBER:  [id number]    The patient is being assessed for  Admission    I agree that at least one RN has performed a thorough Head to Toe Skin Assessment on the patient. ALL assessment sites listed below have been assessed.      Areas assessed by both nurses:    Head, Face, Ears, Shoulders, Back, Chest, Arms, Elbows, Hands, Sacrum. Buttock, Coccyx, Ischium, Legs. Feet and Heels, and Under Medical Devices         Does the Patient have a Wound? No noted wound(s)       Ronald Prevention initiated by RN: Yes  Wound Care Orders initiated by RN: No    Pressure Injury (Stage 3,4, Unstageable, DTI, NWPT, and Complex wounds) if present, place Wound referral order by RN under : No    New Ostomies, if present place, Ostomy referral order under : No     Nurse 1 eSignature: Electronically signed by Tamia Ramirez RN on 5/23/25 at 1:47 AM EDT    **SHARE this note so that the co-signing nurse can place an eSignature**    Nurse 2 eSignature: Electronically signed by MI ATWOOD RN on 5/23/25 at 2:20 AM EDT

## 2025-05-23 NOTE — PROGRESS NOTES
PT consult received and acknowledged. PT eval attempted. However, currently, pt sleeping and per nsg, pt didn't sleep well last night and just slept. Defer PT at this time, will continue to follow. Thanks

## 2025-05-23 NOTE — PLAN OF CARE
Problem: Chronic Conditions and Co-morbidities  Goal: Patient's chronic conditions and co-morbidity symptoms are monitored and maintained or improved  Outcome: Progressing     Problem: Discharge Planning  Goal: Discharge to home or other facility with appropriate resources  Outcome: Progressing     Problem: Skin/Tissue Integrity  Goal: Skin integrity remains intact  Description: 1.  Monitor for areas of redness and/or skin breakdown2.  Assess vascular access sites hourly3.  Every 4-6 hours minimum:  Change oxygen saturation probe site4.  Every 4-6 hours:  If on nasal continuous positive airway pressure, respiratory therapy assess nares and determine need for appliance change or resting period  Outcome: Progressing  Flowsheets (Taken 5/23/2025 0106)  Skin Integrity Remains Intact: Monitor for areas of redness and/or skin breakdown     Problem: Safety - Adult  Goal: Free from fall injury  Outcome: Progressing     Problem: ABCDS Injury Assessment  Goal: Absence of physical injury  Outcome: Progressing

## 2025-05-23 NOTE — PROGRESS NOTES
Received Order for Telemetry     Nena PAUL [facility]   1940   [id number]   Esophagitis [K20.90]  GI bleed [K92.2]  Hematemesis with nausea [K92.0]   Sue Red MD     Tele Box # [phone] placed on patient at  0014 am  ER Room # 8  Admitting to Room 404  Transferring Nurse Juice  Verified with Primary Nurse Danielle at  0042 am

## 2025-05-23 NOTE — ANESTHESIA PRE PROCEDURE
Yes Deb Lucio MD   aspirin 81 MG EC tablet Take 1 tablet by mouth daily   Yes Deb Lucio MD   Multiple Vitamin (MULTIVITAMIN ADULT PO) Take 1 tablet by mouth daily   Yes Deb Lucio MD   insulin glargine (BASAGLAR KWIKPEN) 100 UNIT/ML injection pen Inject 30 Units into the skin 2 times daily   Yes Deb Lucio MD   insulin regular (HUMULIN R;NOVOLIN R) 100 UNIT/ML injection Inject into the skin See Admin Instructions Sliding Scale   Yes Deb Lucio MD   atorvastatin (LIPITOR) 10 MG tablet Take 4 tablets by mouth daily 2/10/17  Yes Automatic Reconciliation, Ar   dulaglutide (TRULICITY) 1.5 MG/0.5ML SC injection Inject 0.5 mLs into the skin every 7 days   Yes Automatic Reconciliation, Ar   ferrous sulfate (FE TABS 325) 325 (65 Fe) MG EC tablet Take 1 tablet by mouth every other day   Yes Automatic Reconciliation, Ar   pantoprazole (PROTONIX) 40 MG tablet Take 1 tablet by mouth 2 times daily 4/30/22  Yes Automatic Reconciliation, Ar   sucralfate (CARAFATE) 1 GM tablet Take 1 tablet by mouth in the morning and at bedtime 4/30/22  Yes Automatic Reconciliation, Ar   loratadine (CLARITIN) 10 MG tablet Take 1 tablet by mouth daily as needed  Patient not taking: Reported on 5/23/2025    Deb Lucio MD   glipiZIDE (GLUCOTROL XL) 5 MG extended release tablet Take 1 tablet by mouth daily  Patient not taking: Reported on 5/23/2025    Automatic Reconciliation, Ar       Current medications:    Current Facility-Administered Medications   Medication Dose Route Frequency Provider Last Rate Last Admin    cefTRIAXone (ROCEPHIN) 1,000 mg in sterile water 10 mL IV syringe  1,000 mg IntraVENous Q24H Chadwick Nunez MD        atorvastatin (LIPITOR) tablet 40 mg  40 mg Oral Daily Sue Red MD        escitalopram (LEXAPRO) tablet 10 mg  10 mg Oral Daily Sue Red MD        galantamine (RAZADYNE) tablet 16 mg  16 mg Oral BID  Sue Red MD

## 2025-05-23 NOTE — H&P
Hospitalist Admission Note    NAME: Nena Mcnulty   :  1940   MRN:  [id number]     Date/Time:  2025 10:46 PM    Patient PCP: Jenna Mistry FNP    ______________________________________________________________________  Given the patient's current clinical presentation, I have a high level of concern for decompensation if discharged from the emergency department.  Complex decision making was performed, which includes reviewing the patient's available past medical records, laboratory results, and x-ray films.       My assessment of this patient's clinical condition and my plan of care is as follows.    Assessment / Plan:  The patient is an 83 y/o F, with a PMHx of Alzheimer's Dementia, Esophagitis, Gastroesophageal Reflux Disease, Peptic Ulcer Disease, Hypertension, Type II Diabetes Mellitus, five Transient Ischemic Attacks, and status-post Colorectal Cancer, who is presenting with two episodes of coffee-ground colored emesis.    Diagnoses:   - Possible acute exacerbation of Esophagitis.  - Gastroesophageal Reflux Disease  - Peptic Ulcer Disease  - Hypertension  - Alzheimer's Dementia  - Type II Diabetes Mellitus, with Hyperglycemia  - Multiple prior Transient Ischemic Attacks  - History of Colorectal Cancer (status-post colectomy)  - Acute on Chronic Kidney Disease - Stage IIIa  - Leukocytosis  - Anemia from suspected GI hemorrhage      Plan:  - Admit the patient to inpatient.  - Consult with Gastroenterology regarding hematemesis, as well as Esophagitis and Peptic Ulcer Disease management.  - Continue on Protonix 40 mg.  - Ordered Ondansetron for nausea and vomiting.  - Ordered IV fluids.  - Place on NPO diet; can still take medications by mouth.       Continue taking:  - Sucralfate 1 gram, BID  - Protonix 40 mg, daily  - Quetiapine 100 mg, BID  - Lipitor 40 mg, daily  - Glipizide 10 mg, daily  - Galantidine 16 mg, daily  - Namenda 10 mg, daily  - Lexapro 10 mg, daily  - Ferrous Sulfate 325  325 (65 Fe) MG EC tablet Take 1 tablet by mouth every other day    Automatic Reconciliation, Ar   glipiZIDE (GLUCOTROL XL) 5 MG extended release tablet Take 1 tablet by mouth daily    Automatic Reconciliation, Ar   pantoprazole (PROTONIX) 40 MG tablet Take 1 tablet by mouth 2 times daily 22   Automatic Reconciliation, Ar   sucralfate (CARAFATE) 1 GM tablet Take 1 tablet by mouth in the morning and at bedtime 22   Automatic Reconciliation, Ar         Objective:   VITALS:    Patient Vitals for the past 24 hrs:   BP Temp Temp src Pulse Resp SpO2   25 (!) 168/71 -- -- 92 19 96 %   25 1930 (!) 145/59 97.5 °F (36.4 °C) Oral (!) 106 18 96 %       Temp (24hrs), Av.5 °F (36.4 °C), Min:97.5 °F (36.4 °C), Max:97.5 °F (36.4 °C)           Wt Readings from Last 12 Encounters:   25 81.6 kg (180 lb)   10/13/24 83.9 kg (185 lb)   23 77.1 kg (170 lb)       Review of Systems *provided by the patient's daughter  HEENT: Denies: fever nor chills.  LUNGS: Denies: shortness of breath nor cough.  HEART: Denies: diaphoresis.  ABDOMINAL: Endorses: nausea, vomiting, and blood in the vomitus. Denies: diarrhea nor constipation.  NEUROLOGICAL: Endorses: difficulty walking.    PHYSICAL EXAM:  CONSTITUTIONAL: The patient is unable to provide accurate or coherently discernible responses to questions asked of her. She is able to visually track the provider, and does provide auditory responses to questions.  HEAD AND NECK: Normocephalic. There is an erythematous lesion that's approximately two centimeters in diameter at the superior aspect of the frontal portion of her head, from where she has been repeatedly picking her skin.   CARDIAC: Heart with normal sinus rhythm. No murmurs heard on auscultation.  PULMONARY: Lungs with good air entry in all lung fields. No rales, rhonchi, nor wheezing heard on auscultation.  GASTROINTESTINAL: Abdomen is soft and nontender. Bowel sounds are normoactive and

## 2025-05-23 NOTE — CARE COORDINATION
DCP: home with daughter and [facility]  ALEXYS: today vs 24 hours    Pt is new admission, CM completed initial CM assessment along with readmission assessment.     DC order to home with HH observed.   No further needs from CM.     Transition of Care Plan:    RUR: 19%  Prior Level of Functioning: total assist  Disposition: home with family and HH  ALEXYS: 5/23/2025  Follow up appointments: on AVS  DME needed: none  Transportation at discharge: [place]  IM/IMM Medicare/[place] letter given: 1st IMM given 5/22/2025  Is patient a [place] and connected with [facility]? no  Caregiver Contact: daughter at bedside  Care Conference needed? no  Barriers to discharge: none

## 2025-05-23 NOTE — DISCHARGE SUMMARY
Physician Discharge Summary     Patient ID:    Nena Mcnulty  [id number]  84 y.o.  1940    Admit date: 5/22/2025    Discharge date : 5/23/2025      Final Diagnoses:   Esophagitis [K20.90]  GI bleed [K92.2]  Hematemesis with nausea [K92.0]  Acute cystitis  Sepsis  Chronic anemia  Alzheimer's dementia  GERD  History of PVD  Hypertension  History of TIAs    Reason for Hospitalization:  Nena Mcnulty is a 84 y.o.  female with PMHx significant for Esophagitis, Peptic Ulcer Disease, Gastroesophageal Reflux Disease, Hypertension, Alzheimer's Dementia, Type II Diabetes Mellitus, five Transient Ischemic Attacks, and status-post Colorectal Cancer, who is presenting with two episodes of coffee-ground colored emesis. The patient's daughter is the primary historian for this encounter.  Patient not a good historian due to advanced dementia     She reports the patient had two episodes of coffee-ground colored emesis, which started this evening (Thursday, 5/22/2025) at 5:30 PM. The patient's daughter states the patient had \"barely eaten breakfast several hours prior, had a couple french fries at lunch, and had not had dinner yet,\" and expressed concerns with the brown-colored emesis she saw the patient produce, commenting \"it looked like there was blood in her vomit to me, so that's why I brought here to the Emergency Room.\" She presents a chux pad from her residence with coffee-ground colored emesis, and indicates that was from the patient's most recent episode of vomiting.     The patient's daughter additionally notes the patient's decreased appetite is a deviation from her baseline, and confirms that that started this morning.  Prior EGD reportedly showed esophagitis     Additionally, patient found to have significant pyuria on her urinalysis.  White count was 14,000      Hospital Course:   Patient was admitted to the medical floor.  She was continued on her home medications.  IV Protonix was

## 2025-05-23 NOTE — CARE COORDINATION
05/23/25 1130   Service Assessment   Patient Orientation Alert and Oriented   Cognition Alert   History Provided By Patient   Primary Caregiver Self   Accompanied By/Relationship daughter   Support Systems Children;Family Members   Patient's Healthcare Decision Maker is: Legal Next of Kin   PCP Verified by CM Yes  (Jenna Mistry next appt 6.2.25)   Last Visit to PCP Within last 3 months   Prior Functional Level Assistance with the following:;Mobility;Bathing;Dressing;Toileting;Feeding;Cooking;Housework;Shopping   Current Functional Level Assistance with the following:;Mobility;Bathing;Dressing;Toileting;Feeding;Cooking;Housework;Shopping   Can patient return to prior living arrangement Yes   Ability to make needs known: Good   Family able to assist with home care needs: Yes   Would you like for me to discuss the discharge plan with any other family members/significant others, and if so, who? Yes  (daughter)   Financial Resources Medicare;Medicaid   Community Resources None   CM/SW Referral Other (see comment)  (dc planning)   Social/Functional History   Lives With Daughter;Family   Type of Home House   Home Layout One level   Home Access Ramped entrance   Discharge Planning   Patient expects to be discharged to: House  ([facility])   One/Two Story Residence One story     CM met with pt and dtr at bedside to verify demographics on facesheet are correct.     Pt lives with daughter and daughter's family in a single story house with ramped entrance.   Pt is dependent with ADLs at baseline, assisted by family.     Per daughter pt has all DME that is needed.   Pt is currently on services with [facility]. Pt dtr reports she would like to continue services with [facility]. Signed choice form placed on bedside chart, referral [facility] via [facility].     Rx: [address]     Pt daughter will transport her home at discharge.       Advance Care Planning   Healthcare Decision Maker:    Primary Decision Maker:

## 2025-05-23 NOTE — ANESTHESIA POSTPROCEDURE EVALUATION
Department of Anesthesiology  Postprocedure Note    Patient: Nena Mcnulty  MRN: [id number]  YOB: 1940  Date of evaluation: 5/23/2025    Procedure Summary       Date: 05/23/25 Room / Location: [facility] ENDO 03 / [facility] ENDOSCOPY    Anesthesia Start: 0954 Anesthesia Stop: 1005    Procedure: ESOPHAGOGASTRODUODENOSCOPY (Upper GI Region) Diagnosis:       Gastrointestinal hemorrhage, unspecified gastrointestinal hemorrhage type      (Gastrointestinal hemorrhage, unspecified gastrointestinal hemorrhage type [K92.2])    Surgeons: Cortez Nuno MD Responsible Provider: Leslie Ruffin MD    Anesthesia Type: MAC ASA Status: 3 - Emergent            Anesthesia Type: MAC    Malcolm Phase I:      Malcolm Phase II:      Anesthesia Post Evaluation    Patient location during evaluation: bedside  Patient participation: complete - patient participated  Level of consciousness: lethargic  Pain score: 0  Airway patency: patent  Nausea & Vomiting: no nausea and no vomiting  Cardiovascular status: hemodynamically stable  Respiratory status: acceptable  Hydration status: stable  Comments: VSS. Report to RN. Remains on BED.  Pain management: adequate        No notable events documented.

## 2025-05-23 NOTE — PLAN OF CARE
Problem: Chronic Conditions and Co-morbidities  Goal: Patient's chronic conditions and co-morbidity symptoms are monitored and maintained or improved  5/23/2025 1215 by Teresa Burns, RN  Outcome: Adequate for Discharge  5/23/2025 0113 by Tamia Ramirez, RN  Outcome: Progressing  Flowsheets (Taken 5/23/2025 0033)  Care Plan - Patient's Chronic Conditions and Co-Morbidity Symptoms are Monitored and Maintained or Improved: Monitor and assess patient's chronic conditions and comorbid symptoms for stability, deterioration, or improvement

## 2025-05-23 NOTE — ED NOTES
ED TO INPATIENT SBAR HANDOFF    Patient Name: Nena Mcnulty   Preferred Name: Nena  : 1940  84 y.o.   Family/Caregiver Present: no   Code Status Order: DNR  PO Status: NPO:Yes; sips of water okay  Telemetry Order: Yes  C-SSRS: Risk of Suicide: No Risk  Sitter no     Restraints:     Sepsis Risk Score Sepsis V2 Risk Score: 43.9    Situation  Chief Complaint   Patient presents with    Vomiting     Brief Description of Patient's Condition: Came in for 2 episodes of coffee-ground colored emesis and decreased appetite.  Mental Status: disoriented  Arrived from:    Imaging:   CTA ABDOMEN PELVIS W WO CONTRAST   Final Result      1. Edema of the distal esophagus with moderate size hiatal hernia. No active   extravasation. Recommend endoscopy to further evaluate         Electronically signed by Isabella Iglesias      XR CHEST PORTABLE   Final Result      No acute process on portable chest.         Electronically signed by Monroe Hicks MD        Abnormal labs:   Abnormal Labs Reviewed   CBC WITH AUTO DIFFERENTIAL - Abnormal; Notable for the following components:       Result Value    WBC 14.2 (*)     Hemoglobin 11.0 (*)     Hematocrit 34.7 (*)     RDW 15.7 (*)     Neutrophils % 82.5 (*)     Lymphocytes % 11.8 (*)     Monocytes % 4.4 (*)     Neutrophils Absolute 11.71 (*)     Immature Granulocytes Absolute 0.07 (*)     All other components within normal limits   COMPREHENSIVE METABOLIC PANEL - Abnormal; Notable for the following components:    Glucose 304 (*)     BUN 44 (*)     Creatinine 1.08 (*)     BUN/Creatinine Ratio 41 (*)     Est, Glom Filt Rate 51 (*)     Calcium 10.3 (*)     Alk Phosphatase 132 (*)     Albumin/Globulin Ratio 0.9 (*)     All other components within normal limits       Background  Allergies: No Known Allergies  History:   Past Medical History:   Diagnosis Date    Arthritis     Cancer (HCC) 2006    colon cancer    Diabetes (HCC)     GERD (gastroesophageal reflux disease)     Other ill-defined  conditions(799.89)     fibromylgia    PUD (peptic ulcer disease)     Unspecified sleep apnea        Assessment  Vitals:        Vitals:    05/22/25 1930 05/22/25 2002 05/22/25 2300 05/22/25 2315   BP: (!) 145/59 (!) 168/71 113/76    Pulse: (!) 106 92 100 95   Resp: 18 19 16 20   Temp: 97.5 °F (36.4 °C)      TempSrc: Oral      SpO2: 96% 96%  94%     Deterioration Index (DI): Deterioration Index: 33.78  Deterioration Index (DI) Interventions Performed:    O2 Flow Rate:    O2 Device:    Cardiac Rhythm:    Critical Lab Results: [unfilled]  Cultures: Cultures:Urine  NIH Score: NIH     Active LDA's:   Peripheral IV 05/22/25 Left Antecubital (Active)     Active Central Lines:                          Active Wounds:    Active Fleming's:    Active Feeding Tubes:      Administered Medications:   Medications   atorvastatin (LIPITOR) tablet 40 mg (has no administration in time range)   escitalopram (LEXAPRO) tablet 10 mg (has no administration in time range)   galantamine (RAZADYNE) tablet 16 mg (has no administration in time range)   Melatonin TABS 1 tablet (has no administration in time range)   QUEtiapine (SEROQUEL) tablet 100 mg (has no administration in time range)   sucralfate (CARAFATE) tablet 1 g (has no administration in time range)   Vitamin D (CHOLECALCIFEROL) tablet 2,000 Units (has no administration in time range)   insulin lispro (HUMALOG,ADMELOG) injection vial 0-16 Units (has no administration in time range)   glucose chewable tablet 16 g (has no administration in time range)   dextrose bolus 10% 125 mL (has no administration in time range)     Or   dextrose bolus 10% 250 mL (has no administration in time range)   glucagon injection 1 mg (has no administration in time range)   dextrose 10 % infusion (has no administration in time range)   0.9 % sodium chloride infusion (has no administration in time range)   sodium chloride flush 0.9 % injection 5-40 mL (has no administration in time range)   sodium chloride flush 0.9

## 2025-05-23 NOTE — PROGRESS NOTES
Hospitalist Progress Note               Daily Progress Note: 5/23/2025      Hospital Day: 2     Chief complaint:   Chief Complaint   Patient presents with    Vomiting        Subjective:   Hospital course to date:    Nena Mcnulty is a 84 y.o.  female with PMHx significant for Esophagitis, Peptic Ulcer Disease, Gastroesophageal Reflux Disease, Hypertension, Alzheimer's Dementia, Type II Diabetes Mellitus, five Transient Ischemic Attacks, and status-post Colorectal Cancer, who is presenting with two episodes of coffee-ground colored emesis. The patient's daughter is the primary historian for this encounter.  Patient not a good historian due to advanced dementia     She reports the patient had two episodes of coffee-ground colored emesis, which started this evening (Thursday, 5/22/2025) at 5:30 PM. The patient's daughter states the patient had \"barely eaten breakfast several hours prior, had a couple french fries at lunch, and had not had dinner yet,\" and expressed concerns with the brown-colored emesis she saw the patient produce, commenting \"it looked like there was blood in her vomit to me, so that's why I brought here to the Emergency Room.\" She presents a chux pad from her residence with coffee-ground colored emesis, and indicates that was from the patient's most recent episode of vomiting.     The patient's daughter additionally notes the patient's decreased appetite is a deviation from her baseline, and confirms that that started this morning.  Prior EGD reportedly showed esophagitis    Additionally, patient found to have significant pyuria on her urinalysis.  White count was 14,000    Patient was admitted to the medical floor  --------  Patient is seen today for follow-up.  She is sleeping soundly.  Her daughter is at the bedside reports no further hematemesis.  Hemoglobin open is 9, down from 11 on admission although her baseline is around 9 so she may have been hemoconcentrated on  65 - 100 mg/dL    Performed by: Ashley Cantrell RN    CBC with Auto Differential    Collection Time: 05/23/25  6:42 AM   Result Value Ref Range    WBC 11.4 (H) 3.6 - 11.0 K/uL    RBC 3.35 (L) 3.80 - 5.20 M/uL    Hemoglobin 9.0 (L) 11.5 - 16.0 g/dL    Hematocrit 29.0 (L) 35.0 - 47.0 %    MCV 86.6 80.0 - 99.0 FL    MCH 26.9 26.0 - 34.0 PG    MCHC 31.0 30.0 - 36.5 g/dL    RDW 16.1 (H) 11.5 - 14.5 %    Platelets 241 150 - 400 K/uL    MPV 12.5 8.9 - 12.9 FL    Nucleated RBCs 0.0 0.0  WBC    nRBC 0.00 0.00 - 0.01 K/uL    Neutrophils % 72.6 32.0 - 75.0 %    Lymphocytes % 18.9 12.0 - 49.0 %    Monocytes % 7.1 5.0 - 13.0 %    Eosinophils % 0.6 0.0 - 7.0 %    Basophils % 0.4 0.0 - 1.0 %    Immature Granulocytes % 0.4 0 - 0.5 %    Neutrophils Absolute 8.29 (H) 1.80 - 8.00 K/UL    Lymphocytes Absolute 2.16 0.80 - 3.50 K/UL    Monocytes Absolute 0.81 0.00 - 1.00 K/UL    Eosinophils Absolute 0.07 0.00 - 0.40 K/UL    Basophils Absolute 0.04 0.00 - 0.10 K/UL    Immature Granulocytes Absolute 0.05 (H) 0.00 - 0.04 K/UL    Differential Type AUTOMATED     POCT Glucose    Collection Time: 05/23/25  7:49 AM   Result Value Ref Range    POC Glucose 144 (H) 65 - 100 mg/dL    Performed by: Tommy Kellogg        CTA ABDOMEN PELVIS W WO CONTRAST   Final Result      1. Edema of the distal esophagus with moderate size hiatal hernia. No active   extravasation. Recommend endoscopy to further evaluate         Electronically signed by Isabella Iglesias      XR CHEST PORTABLE   Final Result      No acute process on portable chest.         Electronically signed by Monroe Hicks MD             Discussion/MDM:     [] High (any 2)    A. Problems (any 1)  [] Acute/Chronic Illness/injury posing threat to life or bodily function:    [] Severe exacerbation of chronic illness:    ---------------------------------------------------------------------  B. Risk of Treatment (any 1)   [] Drugs/treatments that require intensive monitoring for toxicity include:    []

## 2025-05-24 ENCOUNTER — APPOINTMENT (OUTPATIENT)
Facility: HOSPITAL | Age: 85
DRG: 871 | End: 2025-05-24
Payer: MEDICARE

## 2025-05-24 ENCOUNTER — HOSPITAL ENCOUNTER (INPATIENT)
Facility: HOSPITAL | Age: 85
LOS: 12 days | Discharge: HOSPICE | DRG: 871 | End: 2025-06-05
Attending: STUDENT IN AN ORGANIZED HEALTH CARE EDUCATION/TRAINING PROGRAM
Payer: MEDICARE

## 2025-05-24 DIAGNOSIS — R65.21 SEPSIS WITH ENCEPHALOPATHY AND SEPTIC SHOCK, DUE TO UNSPECIFIED ORGANISM (HCC): Primary | ICD-10-CM

## 2025-05-24 DIAGNOSIS — S72.002A HIP FRACTURE REQUIRING OPERATIVE REPAIR, LEFT, CLOSED, INITIAL ENCOUNTER (HCC): ICD-10-CM

## 2025-05-24 DIAGNOSIS — G93.41 SEPSIS WITH ENCEPHALOPATHY AND SEPTIC SHOCK, DUE TO UNSPECIFIED ORGANISM (HCC): Primary | ICD-10-CM

## 2025-05-24 DIAGNOSIS — J96.01 ACUTE RESPIRATORY FAILURE WITH HYPOXIA (HCC): ICD-10-CM

## 2025-05-24 DIAGNOSIS — A41.9 SEPSIS WITH ENCEPHALOPATHY AND SEPTIC SHOCK, DUE TO UNSPECIFIED ORGANISM (HCC): Primary | ICD-10-CM

## 2025-05-24 DIAGNOSIS — I50.9 ACUTE CONGESTIVE HEART FAILURE, UNSPECIFIED HEART FAILURE TYPE (HCC): ICD-10-CM

## 2025-05-24 PROBLEM — R53.1 GENERALIZED WEAKNESS: Status: ACTIVE | Noted: 2025-05-24

## 2025-05-24 LAB
ALBUMIN SERPL-MCNC: 3.4 G/DL (ref 3.5–5)
ALBUMIN/GLOB SERPL: 0.9 (ref 1.1–2.2)
ALP SERPL-CCNC: 98 U/L (ref 45–117)
ALT SERPL-CCNC: 20 U/L (ref 12–78)
ANION GAP SERPL CALC-SCNC: 12 MMOL/L (ref 2–12)
AST SERPL W P-5'-P-CCNC: 14 U/L (ref 15–37)
BASOPHILS # BLD: 0 K/UL (ref 0–0.1)
BASOPHILS NFR BLD: 0 % (ref 0–1)
BILIRUB SERPL-MCNC: 0.4 MG/DL (ref 0.2–1)
BNP SERPL-MCNC: 175 PG/ML
BUN SERPL-MCNC: 50 MG/DL (ref 6–20)
BUN/CREAT SERPL: 29 (ref 12–20)
CA-I BLD-MCNC: 9.7 MG/DL (ref 8.5–10.1)
CHLORIDE SERPL-SCNC: 107 MMOL/L (ref 97–108)
CO2 SERPL-SCNC: 22 MMOL/L (ref 21–32)
CREAT SERPL-MCNC: 1.74 MG/DL (ref 0.55–1.02)
DIFFERENTIAL METHOD BLD: ABNORMAL
EOSINOPHIL # BLD: 0.25 K/UL (ref 0–0.4)
EOSINOPHIL NFR BLD: 1 % (ref 0–7)
ERYTHROCYTE [DISTWIDTH] IN BLOOD BY AUTOMATED COUNT: 16.3 % (ref 11.5–14.5)
GLOBULIN SER CALC-MCNC: 3.6 G/DL (ref 2–4)
GLUCOSE BLD STRIP.AUTO-MCNC: 108 MG/DL (ref 65–100)
GLUCOSE BLD STRIP.AUTO-MCNC: 75 MG/DL (ref 65–100)
GLUCOSE BLD STRIP.AUTO-MCNC: 89 MG/DL (ref 65–100)
GLUCOSE BLD STRIP.AUTO-MCNC: 92 MG/DL (ref 65–100)
GLUCOSE BLD-MCNC: 75 MG/DL
GLUCOSE SERPL-MCNC: 211 MG/DL (ref 65–100)
HCT VFR BLD AUTO: 29.9 % (ref 35–47)
HGB BLD-MCNC: 9.5 G/DL (ref 11.5–16)
IMM GRANULOCYTES # BLD AUTO: 0 K/UL
IMM GRANULOCYTES NFR BLD AUTO: 0 %
LACTATE BLD-SCNC: 1.96 MMOL/L (ref 0.4–2)
LACTATE BLD-SCNC: 4.35 MMOL/L (ref 0.4–2)
LYMPHOCYTES # BLD: 0.25 K/UL (ref 0.8–3.5)
LYMPHOCYTES NFR BLD: 1 % (ref 12–49)
MCH RBC QN AUTO: 27.4 PG (ref 26–34)
MCHC RBC AUTO-ENTMCNC: 31.8 G/DL (ref 30–36.5)
MCV RBC AUTO: 86.2 FL (ref 80–99)
MONOCYTES # BLD: 1.23 K/UL (ref 0–1)
MONOCYTES NFR BLD: 5 % (ref 5–13)
NEUTS BAND NFR BLD MANUAL: 2 % (ref 0–6)
NEUTS SEG # BLD: 22.77 K/UL (ref 1.8–8)
NEUTS SEG NFR BLD: 91 % (ref 32–75)
NRBC # BLD: 0 K/UL (ref 0–0.01)
NRBC BLD-RTO: 0 PER 100 WBC
PERFORMED BY:: ABNORMAL
PERFORMED BY:: ABNORMAL
PERFORMED BY:: NORMAL
PLATELET # BLD AUTO: 273 K/UL (ref 150–400)
PMV BLD AUTO: 12.9 FL (ref 8.9–12.9)
POTASSIUM SERPL-SCNC: 3.5 MMOL/L (ref 3.5–5.1)
PROCALCITONIN SERPL-MCNC: 2.81 NG/ML
PROT SERPL-MCNC: 7 G/DL (ref 6.4–8.2)
RBC # BLD AUTO: 3.47 M/UL (ref 3.8–5.2)
RBC MORPH BLD: ABNORMAL
SERVICE CMNT-IMP: NORMAL
SODIUM SERPL-SCNC: 141 MMOL/L (ref 136–145)
SPECIMEN SITE: NORMAL
TROPONIN I SERPL HS-MCNC: 7 NG/L (ref 0–51)
WBC # BLD AUTO: 24.5 K/UL (ref 3.6–11)

## 2025-05-24 PROCEDURE — 87040 BLOOD CULTURE FOR BACTERIA: CPT

## 2025-05-24 PROCEDURE — P9047 ALBUMIN (HUMAN), 25%, 50ML: HCPCS

## 2025-05-24 PROCEDURE — 83605 ASSAY OF LACTIC ACID: CPT

## 2025-05-24 PROCEDURE — 2580000003 HC RX 258: Performed by: INTERNAL MEDICINE

## 2025-05-24 PROCEDURE — 2580000003 HC RX 258

## 2025-05-24 PROCEDURE — 85025 COMPLETE CBC W/AUTO DIFF WBC: CPT

## 2025-05-24 PROCEDURE — 2500000003 HC RX 250 WO HCPCS

## 2025-05-24 PROCEDURE — 83880 ASSAY OF NATRIURETIC PEPTIDE: CPT

## 2025-05-24 PROCEDURE — 2500000003 HC RX 250 WO HCPCS: Performed by: STUDENT IN AN ORGANIZED HEALTH CARE EDUCATION/TRAINING PROGRAM

## 2025-05-24 PROCEDURE — 2700000000 HC OXYGEN THERAPY PER DAY

## 2025-05-24 PROCEDURE — 80053 COMPREHEN METABOLIC PANEL: CPT

## 2025-05-24 PROCEDURE — 6370000000 HC RX 637 (ALT 250 FOR IP)

## 2025-05-24 PROCEDURE — 36415 COLL VENOUS BLD VENIPUNCTURE: CPT

## 2025-05-24 PROCEDURE — 94761 N-INVAS EAR/PLS OXIMETRY MLT: CPT

## 2025-05-24 PROCEDURE — 6360000002 HC RX W HCPCS

## 2025-05-24 PROCEDURE — 71045 X-RAY EXAM CHEST 1 VIEW: CPT

## 2025-05-24 PROCEDURE — 6360000002 HC RX W HCPCS: Performed by: STUDENT IN AN ORGANIZED HEALTH CARE EDUCATION/TRAINING PROGRAM

## 2025-05-24 PROCEDURE — 70450 CT HEAD/BRAIN W/O DYE: CPT

## 2025-05-24 PROCEDURE — 96374 THER/PROPH/DIAG INJ IV PUSH: CPT

## 2025-05-24 PROCEDURE — 2580000003 HC RX 258: Performed by: STUDENT IN AN ORGANIZED HEALTH CARE EDUCATION/TRAINING PROGRAM

## 2025-05-24 PROCEDURE — 1100000000 HC RM PRIVATE

## 2025-05-24 PROCEDURE — 84145 PROCALCITONIN (PCT): CPT

## 2025-05-24 PROCEDURE — 6360000002 HC RX W HCPCS: Performed by: INTERNAL MEDICINE

## 2025-05-24 PROCEDURE — 80047 BASIC METABLC PNL IONIZED CA: CPT

## 2025-05-24 PROCEDURE — 82962 GLUCOSE BLOOD TEST: CPT

## 2025-05-24 PROCEDURE — 99285 EMERGENCY DEPT VISIT HI MDM: CPT

## 2025-05-24 PROCEDURE — 84484 ASSAY OF TROPONIN QUANT: CPT

## 2025-05-24 RX ORDER — 0.9 % SODIUM CHLORIDE 0.9 %
1000 INTRAVENOUS SOLUTION INTRAVENOUS ONCE
Status: COMPLETED | OUTPATIENT
Start: 2025-05-24 | End: 2025-05-24

## 2025-05-24 RX ORDER — IPRATROPIUM BROMIDE AND ALBUTEROL SULFATE 2.5; .5 MG/3ML; MG/3ML
1 SOLUTION RESPIRATORY (INHALATION) EVERY 4 HOURS PRN
Status: DISCONTINUED | OUTPATIENT
Start: 2025-05-24 | End: 2025-06-03

## 2025-05-24 RX ORDER — QUETIAPINE FUMARATE 100 MG/1
100 TABLET, FILM COATED ORAL 2 TIMES DAILY
Status: DISCONTINUED | OUTPATIENT
Start: 2025-05-24 | End: 2025-06-03

## 2025-05-24 RX ORDER — ONDANSETRON 4 MG/1
4 TABLET, ORALLY DISINTEGRATING ORAL EVERY 8 HOURS PRN
Status: DISCONTINUED | OUTPATIENT
Start: 2025-05-24 | End: 2025-06-03

## 2025-05-24 RX ORDER — INSULIN GLARGINE 100 [IU]/ML
30 INJECTION, SOLUTION SUBCUTANEOUS 2 TIMES DAILY
Status: DISCONTINUED | OUTPATIENT
Start: 2025-05-24 | End: 2025-05-31

## 2025-05-24 RX ORDER — ALBUMIN (HUMAN) 12.5 G/50ML
25 SOLUTION INTRAVENOUS ONCE
Status: COMPLETED | OUTPATIENT
Start: 2025-05-24 | End: 2025-05-24

## 2025-05-24 RX ORDER — ESCITALOPRAM OXALATE 10 MG/1
10 TABLET ORAL DAILY
Status: DISCONTINUED | OUTPATIENT
Start: 2025-05-24 | End: 2025-06-03

## 2025-05-24 RX ORDER — DEXTROSE MONOHYDRATE 100 MG/ML
INJECTION, SOLUTION INTRAVENOUS CONTINUOUS PRN
Status: DISCONTINUED | OUTPATIENT
Start: 2025-05-24 | End: 2025-06-03

## 2025-05-24 RX ORDER — SODIUM CHLORIDE 0.9 % (FLUSH) 0.9 %
5-40 SYRINGE (ML) INJECTION PRN
Status: DISCONTINUED | OUTPATIENT
Start: 2025-05-24 | End: 2025-06-03

## 2025-05-24 RX ORDER — INSULIN LISPRO 100 [IU]/ML
0-8 INJECTION, SOLUTION INTRAVENOUS; SUBCUTANEOUS
Status: DISCONTINUED | OUTPATIENT
Start: 2025-05-24 | End: 2025-06-03

## 2025-05-24 RX ORDER — ONDANSETRON 2 MG/ML
4 INJECTION INTRAMUSCULAR; INTRAVENOUS EVERY 6 HOURS PRN
Status: DISCONTINUED | OUTPATIENT
Start: 2025-05-24 | End: 2025-06-03

## 2025-05-24 RX ORDER — MEMANTINE HYDROCHLORIDE 10 MG/1
10 TABLET ORAL 2 TIMES DAILY
Status: DISCONTINUED | OUTPATIENT
Start: 2025-05-24 | End: 2025-06-03

## 2025-05-24 RX ORDER — 0.9 % SODIUM CHLORIDE 0.9 %
1472 INTRAVENOUS SOLUTION INTRAVENOUS ONCE
Status: COMPLETED | OUTPATIENT
Start: 2025-05-24 | End: 2025-05-24

## 2025-05-24 RX ORDER — HEPARIN SODIUM 5000 [USP'U]/ML
5000 INJECTION, SOLUTION INTRAVENOUS; SUBCUTANEOUS EVERY 8 HOURS SCHEDULED
Status: DISCONTINUED | OUTPATIENT
Start: 2025-05-24 | End: 2025-06-03

## 2025-05-24 RX ORDER — GALANTAMINE 4 MG/1
8 TABLET, FILM COATED ORAL 2 TIMES DAILY WITH MEALS
Status: DISCONTINUED | OUTPATIENT
Start: 2025-05-24 | End: 2025-06-03

## 2025-05-24 RX ORDER — GLUCAGON 1 MG/ML
1 KIT INJECTION PRN
Status: DISCONTINUED | OUTPATIENT
Start: 2025-05-24 | End: 2025-06-03

## 2025-05-24 RX ORDER — SODIUM CHLORIDE, SODIUM LACTATE, POTASSIUM CHLORIDE, CALCIUM CHLORIDE 600; 310; 30; 20 MG/100ML; MG/100ML; MG/100ML; MG/100ML
INJECTION, SOLUTION INTRAVENOUS CONTINUOUS
Status: DISCONTINUED | OUTPATIENT
Start: 2025-05-24 | End: 2025-05-26

## 2025-05-24 RX ORDER — MIDODRINE HYDROCHLORIDE 5 MG/1
10 TABLET ORAL ONCE
Status: COMPLETED | OUTPATIENT
Start: 2025-05-24 | End: 2025-05-24

## 2025-05-24 RX ORDER — MORPHINE SULFATE 4 MG/ML
4 INJECTION, SOLUTION INTRAMUSCULAR; INTRAVENOUS
Status: COMPLETED | OUTPATIENT
Start: 2025-05-24 | End: 2025-05-24

## 2025-05-24 RX ORDER — ATORVASTATIN CALCIUM 40 MG/1
40 TABLET, FILM COATED ORAL DAILY
Status: DISCONTINUED | OUTPATIENT
Start: 2025-05-24 | End: 2025-06-03

## 2025-05-24 RX ORDER — MIDODRINE HYDROCHLORIDE 5 MG/1
5 TABLET ORAL ONCE
Status: DISCONTINUED | OUTPATIENT
Start: 2025-05-24 | End: 2025-05-24

## 2025-05-24 RX ORDER — FUROSEMIDE 40 MG/1
20 TABLET ORAL DAILY
Status: DISCONTINUED | OUTPATIENT
Start: 2025-05-24 | End: 2025-05-30

## 2025-05-24 RX ORDER — SODIUM CHLORIDE 0.9 % (FLUSH) 0.9 %
5-40 SYRINGE (ML) INJECTION EVERY 12 HOURS SCHEDULED
Status: DISCONTINUED | OUTPATIENT
Start: 2025-05-24 | End: 2025-06-03

## 2025-05-24 RX ORDER — ACETAMINOPHEN 325 MG/1
650 TABLET ORAL EVERY 6 HOURS PRN
Status: DISCONTINUED | OUTPATIENT
Start: 2025-05-24 | End: 2025-06-03

## 2025-05-24 RX ORDER — FERROUS SULFATE 325(65) MG
325 TABLET ORAL EVERY OTHER DAY
Status: DISCONTINUED | OUTPATIENT
Start: 2025-05-25 | End: 2025-06-03

## 2025-05-24 RX ORDER — SUCRALFATE 1 G/1
1 TABLET ORAL 2 TIMES DAILY
Status: DISCONTINUED | OUTPATIENT
Start: 2025-05-24 | End: 2025-06-03

## 2025-05-24 RX ORDER — POLYETHYLENE GLYCOL 3350 17 G/17G
17 POWDER, FOR SOLUTION ORAL DAILY PRN
Status: DISCONTINUED | OUTPATIENT
Start: 2025-05-24 | End: 2025-06-03

## 2025-05-24 RX ORDER — SODIUM CHLORIDE 9 MG/ML
INJECTION, SOLUTION INTRAVENOUS PRN
Status: DISCONTINUED | OUTPATIENT
Start: 2025-05-24 | End: 2025-06-03

## 2025-05-24 RX ORDER — PANTOPRAZOLE SODIUM 40 MG/1
40 TABLET, DELAYED RELEASE ORAL 2 TIMES DAILY
Status: DISCONTINUED | OUTPATIENT
Start: 2025-05-24 | End: 2025-05-29

## 2025-05-24 RX ORDER — AMLODIPINE BESYLATE 5 MG/1
5 TABLET ORAL DAILY
Status: DISCONTINUED | OUTPATIENT
Start: 2025-05-24 | End: 2025-06-03

## 2025-05-24 RX ORDER — ACETAMINOPHEN 650 MG/1
650 SUPPOSITORY RECTAL EVERY 6 HOURS PRN
Status: DISCONTINUED | OUTPATIENT
Start: 2025-05-24 | End: 2025-06-03

## 2025-05-24 RX ADMIN — QUETIAPINE FUMARATE 100 MG: 25 TABLET ORAL at 20:52

## 2025-05-24 RX ADMIN — GALANTAMINE 8 MG: 4 TABLET, FILM COATED ORAL at 09:05

## 2025-05-24 RX ADMIN — CEFTRIAXONE SODIUM 1000 MG: 1 INJECTION, POWDER, FOR SOLUTION INTRAMUSCULAR; INTRAVENOUS at 02:41

## 2025-05-24 RX ADMIN — GALANTAMINE 8 MG: 4 TABLET, FILM COATED ORAL at 16:47

## 2025-05-24 RX ADMIN — SODIUM CHLORIDE, PRESERVATIVE FREE 10 ML: 5 INJECTION INTRAVENOUS at 09:17

## 2025-05-24 RX ADMIN — HEPARIN SODIUM 5000 UNITS: 5000 INJECTION INTRAVENOUS; SUBCUTANEOUS at 16:46

## 2025-05-24 RX ADMIN — QUETIAPINE FUMARATE 100 MG: 25 TABLET ORAL at 09:06

## 2025-05-24 RX ADMIN — SODIUM CHLORIDE, PRESERVATIVE FREE 10 ML: 5 INJECTION INTRAVENOUS at 20:52

## 2025-05-24 RX ADMIN — SODIUM CHLORIDE, SODIUM LACTATE, POTASSIUM CHLORIDE, AND CALCIUM CHLORIDE: .6; .31; .03; .02 INJECTION, SOLUTION INTRAVENOUS at 20:08

## 2025-05-24 RX ADMIN — SODIUM CHLORIDE 1000 ML: 0.9 INJECTION, SOLUTION INTRAVENOUS at 02:41

## 2025-05-24 RX ADMIN — MIDODRINE HYDROCHLORIDE 10 MG: 5 TABLET ORAL at 22:56

## 2025-05-24 RX ADMIN — SUCRALFATE 1 G: 1 TABLET ORAL at 09:07

## 2025-05-24 RX ADMIN — ALBUMIN (HUMAN) 25 G: 0.25 INJECTION, SOLUTION INTRAVENOUS at 05:39

## 2025-05-24 RX ADMIN — Medication 10 MG: at 20:52

## 2025-05-24 RX ADMIN — SUCRALFATE 1 G: 1 TABLET ORAL at 20:52

## 2025-05-24 RX ADMIN — PIPERACILLIN AND TAZOBACTAM 4500 MG: 4; .5 INJECTION, POWDER, LYOPHILIZED, FOR SOLUTION INTRAVENOUS at 10:29

## 2025-05-24 RX ADMIN — MIDODRINE HYDROCHLORIDE 10 MG: 5 TABLET ORAL at 09:07

## 2025-05-24 RX ADMIN — MORPHINE SULFATE 4 MG: 4 INJECTION, SOLUTION INTRAMUSCULAR; INTRAVENOUS at 03:47

## 2025-05-24 RX ADMIN — PIPERACILLIN AND TAZOBACTAM 3375 MG: 3; .375 INJECTION, POWDER, LYOPHILIZED, FOR SOLUTION INTRAVENOUS at 16:46

## 2025-05-24 RX ADMIN — SODIUM CHLORIDE 1472 ML: 9 INJECTION, SOLUTION INTRAVENOUS at 03:32

## 2025-05-24 RX ADMIN — MEMANTINE 10 MG: 10 TABLET ORAL at 09:06

## 2025-05-24 RX ADMIN — HEPARIN SODIUM 5000 UNITS: 5000 INJECTION INTRAVENOUS; SUBCUTANEOUS at 20:52

## 2025-05-24 RX ADMIN — ESCITALOPRAM OXALATE 10 MG: 10 TABLET ORAL at 09:05

## 2025-05-24 RX ADMIN — MEMANTINE 10 MG: 10 TABLET ORAL at 20:52

## 2025-05-24 RX ADMIN — ATORVASTATIN CALCIUM 40 MG: 40 TABLET, FILM COATED ORAL at 09:07

## 2025-05-24 RX ADMIN — PANTOPRAZOLE SODIUM 40 MG: 40 TABLET, DELAYED RELEASE ORAL at 20:52

## 2025-05-24 RX ADMIN — PANTOPRAZOLE SODIUM 40 MG: 40 TABLET, DELAYED RELEASE ORAL at 09:07

## 2025-05-24 ASSESSMENT — PAIN SCALES - GENERAL: PAINLEVEL_OUTOF10: 0

## 2025-05-24 NOTE — ED TRIAGE NOTES
BIB EMS for generalized body weakness. Per EMS, patient was seen here last night for UTI. Sats on the 90's on RA. GBL 311mg/dl with EMS     hX: dementia

## 2025-05-24 NOTE — PROGRESS NOTES
Patient discharged home with daughter. Patient discharged instructions discussed with daughter and discharge folder given to daughter. IV removed. Assisted patient with getting dressed and transferred to wheelchair. Patient fighting staff while staff attempted to dress and transfer to chair. Finally able to put patient in wheelchair and escort downstairs to family car. Patient assisted in car. No verbal complaints voiced.

## 2025-05-24 NOTE — ED NOTES
BP noted to be after morphine was given. No change in mentation at this time. Provider informed. With orders in  
Notified Provider that Albumin has been given. Also informed regarding fluctuation on patient's BP. BP taken on both arms, MD aware. No new orders made at this time. Per MD, patient can go up.   
Pt to CT at this time.   
Tele box 32 connection confirmed @[id number]  
medication administration: morphine 4mg 05/24/25 at 0347H  Pertinent or High Risk Medications/Drips: no   If Yes, please provide details:   Blood Product Administration: no  If Yes, please provide details:   Process Protocols/Bundles:     Recommendation  Incomplete STAT orders:   Overdue Medications: unverified med - hep  Patient Belongings:    Additional Comments: The patient's daughter is the primary historian for this encounter. Patient has advanced dementia  If any further questions, please call Sending RN at [phone]      Admitting Unit Notification  Name of person notified and time: relayed sbar in to Melania AMES      Electronically signed by: Electronically signed by Amna Tompkins RN on 5/24/2025 at 5:14 AM

## 2025-05-24 NOTE — CARE COORDINATION
05/24/25 0829   Service Assessment   Patient Orientation Alert and Oriented   Cognition Alert   History Provided By Patient;Medical Record   Primary Caregiver Self   Support Systems Family Members   Patient's Healthcare Decision Maker is: Legal Next of Kin   PCP Verified by CM Yes   Last Visit to PCP Within last 3 months   Prior Functional Level Assistance with the following:;Bathing;Dressing;Toileting;Feeding;Cooking;Housework;Shopping;Mobility   Current Functional Level Assistance with the following:;Bathing;Dressing;Toileting;Feeding;Cooking;Housework;Shopping;Mobility   Can patient return to prior living arrangement Unknown at present   Ability to make needs known: Good   Family able to assist with home care needs: Yes   Would you like for me to discuss the discharge plan with any other family members/significant others, and if so, who? Yes  (Daughter)   Financial Resources Medicaid;Medicare   Social/Functional History   Lives With Family   Type of Home House   Home Layout One level   Home Access Ramped entrance   Occupation Retired   Services At/After Discharge   Transition of Care Consult (CM Consult) Home Health   Services At/After Discharge Home Health

## 2025-05-24 NOTE — ED PROVIDER NOTES
sodium chloride infusion (has no administration in time range)   ondansetron (ZOFRAN-ODT) disintegrating tablet 4 mg (has no administration in time range)     Or   ondansetron (ZOFRAN) injection 4 mg (has no administration in time range)   polyethylene glycol (GLYCOLAX) packet 17 g (has no administration in time range)   acetaminophen (TYLENOL) tablet 650 mg (has no administration in time range)     Or   acetaminophen (TYLENOL) suppository 650 mg (has no administration in time range)   heparin (porcine) injection 5,000 Units (has no administration in time range)   midodrine (PROAMATINE) tablet 5 mg (0 mg Oral Held 5/24/25 0430)   sodium chloride 0.9 % bolus 1,000 mL (0 mLs IntraVENous Stopped 5/24/25 0440)   cefTRIAXone (ROCEPHIN) 1,000 mg in sterile water 10 mL IV syringe (1,000 mg IntraVENous Given 5/24/25 0241)   morphine (PF) injection 4 mg (4 mg IntraVENous Given 5/24/25 0347)       ED Course and Reassessments:  ED Course as of 05/24/25 0441   Sat May 24, 2025   0226 WBC(!): 24.5 [PC]   0252 Lactic 4 4, 30 cc/kg added. [PC]   0309 Discussed case with hospitalist who accepts patient for admission. [PC]      ED Course User Index  [PC] Eusebio Fish MD   Labs showing sepsis, given lactic acid, possible early septic shock.  Antibiotics provided, pending UA, discussed case with hospitalist who accept patient for admission.      Sepsis Reassessment: Sepsis Reassessment:    The patient initially met SIRS criteria with a suspected source of UTI/Genitourinary. Cultures and antibiotics were initiated sequentially as per orders.   Fluid resuscitation volume with the FULL 30ml/kg crystalloid bolus was given.   I have performed a sepsis reassessment of the patient's clinical volume status and tissue perfusion at TIME: 441 AM, and DATE: 52425, and the patient is adequately resuscitated.      Smoking Cessation:Not Applicable    Results     Labs:  Recent Results (from the past 12 hours)   CBC with Auto Differential

## 2025-05-24 NOTE — CARE COORDINATION
05/24/25 0834   Readmission Assessment   Number of Days since last admission? 1-7 days   Previous Disposition Home with Home Health   Who is being Interviewed Unable to Complete   What was the patient's/caregiver's perception as to why they think they needed to return back to the hospital? Did not realize care needs would be so extensive   Did you visit your Primary Care Physician after you left the hospital, before you returned this time? No  (Discharged 24 hours ago)   Why weren't you able to visit your PCP? Did not have an appointment   Did you see a specialist, such as Cardiac, Pulmonary, Orthopedic Physician, etc. after you left the hospital? No   Who advised the patient to return to the hospital? Caregiver   Does the patient report anything that got in the way of taking their medications? No   In our efforts to provide the best possible care to you and others like you, can you think of anything that we could have done to help you after you left the hospital the first time, so that you might not have needed to return so soon? Teaching during hospitalization regarding your illness;Improved written discharge instructions;Additional Community resources available for illness support

## 2025-05-24 NOTE — H&P
MD   furosemide (LASIX) 20 MG tablet Take 1 tablet by mouth daily PRN 5/23/25   Chadwick Nunez MD   ciprofloxacin (CIPRO) 250 MG tablet Take 1 tablet by mouth 2 times daily for 7 days 5/23/25 5/30/25  Chadwick Nunez MD   Potassium 99 MG TABS Take 1 tablet by mouth daily    Deb Lucio MD   amLODIPine (NORVASC) 5 MG tablet Take 1 tablet by mouth daily    Deb Lucio MD   escitalopram (LEXAPRO) 10 MG tablet Take 1 tablet by mouth daily 4/14/25   Deb Lucio MD   diphenhydrAMINE-APAP, sleep, (TYLENOL PM EXTRA STRENGTH)  MG tablet Take 2 tablets by mouth nightly as needed for Sleep    Deb Lucio MD   Melatonin 10 MG TABS Take 1 tablet by mouth nightly    Deb Lucio MD   galantamine (RAZADYNE) 8 MG tablet Take 1 tablet by mouth 2 times daily (with meals) 10/15/24   Esperanza Rosen APRN - NP   QUEtiapine (SEROQUEL) 100 MG tablet Take 1 tablet by mouth 2 times daily 8/30/24   Deb Lucio MD   Sodium Sulfate-Mag Sulfate-KCl (SUTAB) 6635-825-314 MG TABS Take 1 tablet by mouth daily    Deb Lucio MD   vitamin D (VITAMIN D3) 50 MCG (2000 UT) CAPS capsule Take 1 capsule by mouth daily    Deb Lucio MD   vitamin C (ASCORBIC ACID) 500 MG tablet Take 1 tablet by mouth Every Day    Deb Lucio MD   B Complex Vitamins (VITAMIN B COMPLEX PO) Take 1 tablet by mouth daily    Deb Lucio MD   Multiple Vitamin (MULTIVITAMIN ADULT PO) Take 1 tablet by mouth daily    Deb Lucio MD   insulin glargine (BASAGLAR KWIKPEN) 100 UNIT/ML injection pen Inject 30 Units into the skin 2 times daily    Deb Lucio MD   insulin regular (HUMULIN R;NOVOLIN R) 100 UNIT/ML injection Inject into the skin See Admin Instructions Sliding Scale    Deb Lucio MD   atorvastatin (LIPITOR) 10 MG tablet Take 4 tablets by mouth daily 2/10/17   Automatic Reconciliation, Ar   dulaglutide (TRULICITY) 1.5 MG/0.5ML SC injection

## 2025-05-25 LAB
ALBUMIN SERPL-MCNC: 2.8 G/DL (ref 3.5–5)
ALBUMIN/GLOB SERPL: 1 (ref 1.1–2.2)
ALP SERPL-CCNC: 69 U/L (ref 45–117)
ALT SERPL-CCNC: 16 U/L (ref 12–78)
ANION GAP SERPL CALC-SCNC: 6 MMOL/L (ref 2–12)
AST SERPL W P-5'-P-CCNC: 15 U/L (ref 15–37)
BACTERIA SPEC CULT: NORMAL
BACTERIA SPEC CULT: NORMAL
BASOPHILS # BLD: 0.04 K/UL (ref 0–0.1)
BASOPHILS # BLD: 0.04 K/UL (ref 0–0.1)
BASOPHILS NFR BLD: 0.3 % (ref 0–1)
BASOPHILS NFR BLD: 0.3 % (ref 0–1)
BILIRUB SERPL-MCNC: 0.2 MG/DL (ref 0.2–1)
BUN SERPL-MCNC: 35 MG/DL (ref 6–20)
BUN/CREAT SERPL: 44 (ref 12–20)
CA-I BLD-MCNC: 8.6 MG/DL (ref 8.5–10.1)
CHLORIDE SERPL-SCNC: 115 MMOL/L (ref 97–108)
CO2 SERPL-SCNC: 25 MMOL/L (ref 21–32)
COLONY COUNT, CNT: NORMAL
CREAT SERPL-MCNC: 0.8 MG/DL (ref 0.55–1.02)
DIFFERENTIAL METHOD BLD: ABNORMAL
DIFFERENTIAL METHOD BLD: ABNORMAL
EOSINOPHIL # BLD: 0.23 K/UL (ref 0–0.4)
EOSINOPHIL # BLD: 0.25 K/UL (ref 0–0.4)
EOSINOPHIL NFR BLD: 1.8 % (ref 0–7)
EOSINOPHIL NFR BLD: 2.2 % (ref 0–7)
ERYTHROCYTE [DISTWIDTH] IN BLOOD BY AUTOMATED COUNT: 15.7 % (ref 11.5–14.5)
ERYTHROCYTE [DISTWIDTH] IN BLOOD BY AUTOMATED COUNT: 16.1 % (ref 11.5–14.5)
EST. AVERAGE GLUCOSE BLD GHB EST-MCNC: 157 MG/DL
FERRITIN SERPL-MCNC: 43 NG/ML (ref 8–252)
GLOBULIN SER CALC-MCNC: 2.7 G/DL (ref 2–4)
GLUCOSE BLD STRIP.AUTO-MCNC: 108 MG/DL (ref 65–100)
GLUCOSE BLD STRIP.AUTO-MCNC: 130 MG/DL (ref 65–100)
GLUCOSE BLD STRIP.AUTO-MCNC: 151 MG/DL (ref 65–100)
GLUCOSE BLD STRIP.AUTO-MCNC: 160 MG/DL (ref 65–100)
GLUCOSE SERPL-MCNC: 111 MG/DL (ref 65–100)
HBA1C MFR BLD: 7.1 % (ref 4–5.6)
HCT VFR BLD AUTO: 19.7 % (ref 35–47)
HCT VFR BLD AUTO: 20.6 % (ref 35–47)
HCT VFR BLD AUTO: 23.1 % (ref 35–47)
HGB BLD-MCNC: 6 G/DL (ref 11.5–16)
HGB BLD-MCNC: 6.3 G/DL (ref 11.5–16)
HGB BLD-MCNC: 7.2 G/DL (ref 11.5–16)
IMM GRANULOCYTES # BLD AUTO: 0.07 K/UL (ref 0–0.04)
IMM GRANULOCYTES # BLD AUTO: 0.07 K/UL (ref 0–0.04)
IMM GRANULOCYTES NFR BLD AUTO: 0.5 % (ref 0–0.5)
IMM GRANULOCYTES NFR BLD AUTO: 0.6 % (ref 0–0.5)
IRON SATN MFR SERPL: 5 % (ref 20–50)
IRON SERPL-MCNC: 10 UG/DL (ref 35–150)
LACTATE SERPL-SCNC: 1.2 MMOL/L (ref 0.4–2)
LYMPHOCYTES # BLD: 1.27 K/UL (ref 0.8–3.5)
LYMPHOCYTES # BLD: 1.51 K/UL (ref 0.8–3.5)
LYMPHOCYTES NFR BLD: 11.1 % (ref 12–49)
LYMPHOCYTES NFR BLD: 11.6 % (ref 12–49)
Lab: NORMAL
MCH RBC QN AUTO: 26.7 PG (ref 26–34)
MCH RBC QN AUTO: 27.3 PG (ref 26–34)
MCHC RBC AUTO-ENTMCNC: 30.6 G/DL (ref 30–36.5)
MCHC RBC AUTO-ENTMCNC: 31.2 G/DL (ref 30–36.5)
MCV RBC AUTO: 87.3 FL (ref 80–99)
MCV RBC AUTO: 87.5 FL (ref 80–99)
MONOCYTES # BLD: 0.44 K/UL (ref 0–1)
MONOCYTES # BLD: 0.47 K/UL (ref 0–1)
MONOCYTES NFR BLD: 3.4 % (ref 5–13)
MONOCYTES NFR BLD: 4.1 % (ref 5–13)
NEUTS SEG # BLD: 10.71 K/UL (ref 1.8–8)
NEUTS SEG # BLD: 9.35 K/UL (ref 1.8–8)
NEUTS SEG NFR BLD: 81.7 % (ref 32–75)
NEUTS SEG NFR BLD: 82.4 % (ref 32–75)
NRBC # BLD: 0 K/UL (ref 0–0.01)
NRBC # BLD: 0 K/UL (ref 0–0.01)
NRBC BLD-RTO: 0 PER 100 WBC
NRBC BLD-RTO: 0 PER 100 WBC
PERFORMED BY:: ABNORMAL
PHOSPHATE SERPL-MCNC: 2.1 MG/DL (ref 2.6–4.7)
PLATELET # BLD AUTO: 170 K/UL (ref 150–400)
PLATELET # BLD AUTO: 174 K/UL (ref 150–400)
PMV BLD AUTO: 12.5 FL (ref 8.9–12.9)
PMV BLD AUTO: 13 FL (ref 8.9–12.9)
POTASSIUM SERPL-SCNC: 3.4 MMOL/L (ref 3.5–5.1)
PROCALCITONIN SERPL-MCNC: 3.47 NG/ML
PROT SERPL-MCNC: 5.5 G/DL (ref 6.4–8.2)
RBC # BLD AUTO: 2.36 M/UL (ref 3.8–5.2)
RBC # BLD AUTO: 2.64 M/UL (ref 3.8–5.2)
RBC MORPH BLD: ABNORMAL
SODIUM SERPL-SCNC: 146 MMOL/L (ref 136–145)
TIBC SERPL-MCNC: 219 UG/DL (ref 250–450)
WBC # BLD AUTO: 11.5 K/UL (ref 3.6–11)
WBC # BLD AUTO: 13 K/UL (ref 3.6–11)

## 2025-05-25 PROCEDURE — 86900 BLOOD TYPING SEROLOGIC ABO: CPT

## 2025-05-25 PROCEDURE — 6370000000 HC RX 637 (ALT 250 FOR IP)

## 2025-05-25 PROCEDURE — 83036 HEMOGLOBIN GLYCOSYLATED A1C: CPT

## 2025-05-25 PROCEDURE — 36430 TRANSFUSION BLD/BLD COMPNT: CPT

## 2025-05-25 PROCEDURE — 85014 HEMATOCRIT: CPT

## 2025-05-25 PROCEDURE — 83605 ASSAY OF LACTIC ACID: CPT

## 2025-05-25 PROCEDURE — 2700000000 HC OXYGEN THERAPY PER DAY

## 2025-05-25 PROCEDURE — 86901 BLOOD TYPING SEROLOGIC RH(D): CPT

## 2025-05-25 PROCEDURE — 85018 HEMOGLOBIN: CPT

## 2025-05-25 PROCEDURE — 82728 ASSAY OF FERRITIN: CPT

## 2025-05-25 PROCEDURE — P9016 RBC LEUKOCYTES REDUCED: HCPCS

## 2025-05-25 PROCEDURE — 85025 COMPLETE CBC W/AUTO DIFF WBC: CPT

## 2025-05-25 PROCEDURE — 6360000002 HC RX W HCPCS: Performed by: INTERNAL MEDICINE

## 2025-05-25 PROCEDURE — 82962 GLUCOSE BLOOD TEST: CPT

## 2025-05-25 PROCEDURE — 2500000003 HC RX 250 WO HCPCS

## 2025-05-25 PROCEDURE — 2580000003 HC RX 258

## 2025-05-25 PROCEDURE — 84145 PROCALCITONIN (PCT): CPT

## 2025-05-25 PROCEDURE — 83540 ASSAY OF IRON: CPT

## 2025-05-25 PROCEDURE — 36415 COLL VENOUS BLD VENIPUNCTURE: CPT

## 2025-05-25 PROCEDURE — 84100 ASSAY OF PHOSPHORUS: CPT

## 2025-05-25 PROCEDURE — 1100000000 HC RM PRIVATE

## 2025-05-25 PROCEDURE — 6360000002 HC RX W HCPCS

## 2025-05-25 PROCEDURE — 94761 N-INVAS EAR/PLS OXIMETRY MLT: CPT

## 2025-05-25 PROCEDURE — 86923 COMPATIBILITY TEST ELECTRIC: CPT

## 2025-05-25 PROCEDURE — 86850 RBC ANTIBODY SCREEN: CPT

## 2025-05-25 PROCEDURE — 30233N1 TRANSFUSION OF NONAUTOLOGOUS RED BLOOD CELLS INTO PERIPHERAL VEIN, PERCUTANEOUS APPROACH: ICD-10-PCS | Performed by: FAMILY MEDICINE

## 2025-05-25 PROCEDURE — 80053 COMPREHEN METABOLIC PANEL: CPT

## 2025-05-25 PROCEDURE — 2580000003 HC RX 258: Performed by: INTERNAL MEDICINE

## 2025-05-25 RX ORDER — DOCUSATE SODIUM 100 MG/1
100 CAPSULE, LIQUID FILLED ORAL DAILY
Status: DISCONTINUED | OUTPATIENT
Start: 2025-05-26 | End: 2025-05-27

## 2025-05-25 RX ORDER — ZIPRASIDONE MESYLATE 20 MG/ML
10 INJECTION, POWDER, LYOPHILIZED, FOR SOLUTION INTRAMUSCULAR ONCE
Status: DISCONTINUED | OUTPATIENT
Start: 2025-05-25 | End: 2025-05-25

## 2025-05-25 RX ORDER — HYDROXYZINE PAMOATE 25 MG/1
25 CAPSULE ORAL 3 TIMES DAILY PRN
Status: DISCONTINUED | OUTPATIENT
Start: 2025-05-25 | End: 2025-06-03

## 2025-05-25 RX ORDER — DIAZEPAM 10 MG/2ML
2 INJECTION, SOLUTION INTRAMUSCULAR; INTRAVENOUS
Status: ACTIVE | OUTPATIENT
Start: 2025-05-25 | End: 2025-05-25

## 2025-05-25 RX ORDER — DIAZEPAM 10 MG/2ML
2 INJECTION, SOLUTION INTRAMUSCULAR; INTRAVENOUS ONCE
Status: COMPLETED | OUTPATIENT
Start: 2025-05-25 | End: 2025-05-25

## 2025-05-25 RX ORDER — MIDODRINE HYDROCHLORIDE 5 MG/1
5 TABLET ORAL
Status: DISCONTINUED | OUTPATIENT
Start: 2025-05-25 | End: 2025-06-03

## 2025-05-25 RX ORDER — SODIUM CHLORIDE 9 MG/ML
INJECTION, SOLUTION INTRAVENOUS PRN
Status: DISCONTINUED | OUTPATIENT
Start: 2025-05-25 | End: 2025-06-03

## 2025-05-25 RX ORDER — SODIUM CHLORIDE 9 MG/ML
INJECTION, SOLUTION INTRAVENOUS PRN
Status: CANCELLED | OUTPATIENT
Start: 2025-05-25

## 2025-05-25 RX ADMIN — MIDODRINE HYDROCHLORIDE 5 MG: 5 TABLET ORAL at 10:29

## 2025-05-25 RX ADMIN — MEROPENEM 1000 MG: 1 INJECTION INTRAVENOUS at 16:18

## 2025-05-25 RX ADMIN — QUETIAPINE FUMARATE 100 MG: 25 TABLET ORAL at 10:29

## 2025-05-25 RX ADMIN — QUETIAPINE FUMARATE 100 MG: 25 TABLET ORAL at 20:15

## 2025-05-25 RX ADMIN — MEMANTINE 10 MG: 10 TABLET ORAL at 10:29

## 2025-05-25 RX ADMIN — SUCRALFATE 1 G: 1 TABLET ORAL at 10:29

## 2025-05-25 RX ADMIN — PIPERACILLIN AND TAZOBACTAM 3375 MG: 3; .375 INJECTION, POWDER, LYOPHILIZED, FOR SOLUTION INTRAVENOUS at 10:30

## 2025-05-25 RX ADMIN — MIDODRINE HYDROCHLORIDE 5 MG: 5 TABLET ORAL at 17:03

## 2025-05-25 RX ADMIN — SUCRALFATE 1 G: 1 TABLET ORAL at 20:16

## 2025-05-25 RX ADMIN — PANTOPRAZOLE SODIUM 40 MG: 40 TABLET, DELAYED RELEASE ORAL at 10:29

## 2025-05-25 RX ADMIN — SODIUM CHLORIDE, SODIUM LACTATE, POTASSIUM CHLORIDE, AND CALCIUM CHLORIDE: .6; .31; .03; .02 INJECTION, SOLUTION INTRAVENOUS at 21:09

## 2025-05-25 RX ADMIN — PIPERACILLIN AND TAZOBACTAM 3375 MG: 3; .375 INJECTION, POWDER, LYOPHILIZED, FOR SOLUTION INTRAVENOUS at 00:36

## 2025-05-25 RX ADMIN — MIDODRINE HYDROCHLORIDE 5 MG: 5 TABLET ORAL at 13:46

## 2025-05-25 RX ADMIN — GALANTAMINE 8 MG: 4 TABLET, FILM COATED ORAL at 10:35

## 2025-05-25 RX ADMIN — HYDROXYZINE PAMOATE 25 MG: 25 CAPSULE ORAL at 20:19

## 2025-05-25 RX ADMIN — HEPARIN SODIUM 5000 UNITS: 5000 INJECTION INTRAVENOUS; SUBCUTANEOUS at 05:57

## 2025-05-25 RX ADMIN — SODIUM CHLORIDE, PRESERVATIVE FREE 10 ML: 5 INJECTION INTRAVENOUS at 20:16

## 2025-05-25 RX ADMIN — SODIUM CHLORIDE, SODIUM LACTATE, POTASSIUM CHLORIDE, AND CALCIUM CHLORIDE: .6; .31; .03; .02 INJECTION, SOLUTION INTRAVENOUS at 07:02

## 2025-05-25 RX ADMIN — Medication 10 MG: at 20:15

## 2025-05-25 RX ADMIN — SODIUM CHLORIDE, PRESERVATIVE FREE 10 ML: 5 INJECTION INTRAVENOUS at 10:31

## 2025-05-25 RX ADMIN — GALANTAMINE 8 MG: 4 TABLET, FILM COATED ORAL at 17:04

## 2025-05-25 RX ADMIN — PANTOPRAZOLE SODIUM 40 MG: 40 TABLET, DELAYED RELEASE ORAL at 20:16

## 2025-05-25 RX ADMIN — HEPARIN SODIUM 5000 UNITS: 5000 INJECTION INTRAVENOUS; SUBCUTANEOUS at 20:16

## 2025-05-25 RX ADMIN — DIAZEPAM 2 MG: 5 INJECTION, SOLUTION INTRAMUSCULAR; INTRAVENOUS at 09:00

## 2025-05-25 RX ADMIN — ESCITALOPRAM OXALATE 10 MG: 10 TABLET ORAL at 10:34

## 2025-05-25 RX ADMIN — MEMANTINE 10 MG: 10 TABLET ORAL at 20:16

## 2025-05-25 RX ADMIN — HYDROXYZINE PAMOATE 25 MG: 25 CAPSULE ORAL at 04:30

## 2025-05-25 RX ADMIN — ATORVASTATIN CALCIUM 40 MG: 40 TABLET, FILM COATED ORAL at 10:29

## 2025-05-25 RX ADMIN — FERROUS SULFATE TAB 325 MG (65 MG ELEMENTAL FE) 325 MG: 325 (65 FE) TAB at 10:29

## 2025-05-25 RX ADMIN — MEROPENEM 1000 MG: 1 INJECTION INTRAVENOUS at 23:00

## 2025-05-25 NOTE — CONSENT
Informed Consent for Blood Component Transfusion Note    I have discussed with the daughter the rationale for blood component transfusion; its benefits in treating or preventing fatigue, organ damage, or death; and its risk which includes mild transfusion reactions, rare risk of blood borne infection, or more serious but rare reactions. I have discussed the alternatives to transfusion, including the risk and consequences of not receiving transfusion. The daughter had an opportunity to ask questions and had agreed to proceed with transfusion of blood components.    Electronically signed by Ashleigh Irvin PA-C on 5/25/25 at 4:20 AM EDT

## 2025-05-25 NOTE — SIGNIFICANT EVENT
Evaluated patient for persistent mild hypotension. Blood pressure has been variable throughout the day with MAPs ranging from 55 to 96. Pt was admitted on 5/24 early AM for sepsis 2/2 UTI. Was given 3L fluid in ED. Has also received albumin, midodrine 10mg x 2 and is on 100mL/h IVF. Patient seen at bedside, more alert than on admission although still confused. Urine output good, 700cc noted in cannister. ICU was consulted given persistent hypotension despite above measure. Given patient's improvement in mentation, recommended monitoring and repeating lactic acid and reevaluating if BP drops further.    0400: Hgb 5/25 resulted at 6.3, down from 9.5 yesterday. Likely due to hemodilution from large volume of IV fluids patient received. Per RN, no blood noted in stool or urine. Patient's daughter denies further episodes of coffee ground emesis. Will recheck stat H&H, fecal occult, and administer 1 unit PRBC. BP improved to 110/78.

## 2025-05-26 ENCOUNTER — APPOINTMENT (OUTPATIENT)
Facility: HOSPITAL | Age: 85
DRG: 871 | End: 2025-05-26
Payer: MEDICARE

## 2025-05-26 LAB
ALBUMIN SERPL-MCNC: 2.7 G/DL (ref 3.5–5)
ALBUMIN/GLOB SERPL: 0.7 (ref 1.1–2.2)
ALP SERPL-CCNC: 88 U/L (ref 45–117)
ALT SERPL-CCNC: 16 U/L (ref 12–78)
ANION GAP SERPL CALC-SCNC: 10 MMOL/L (ref 2–12)
AST SERPL W P-5'-P-CCNC: 26 U/L (ref 15–37)
BASOPHILS # BLD: 0.03 K/UL (ref 0–0.1)
BASOPHILS # BLD: NORMAL K/UL
BASOPHILS NFR BLD: 0.3 % (ref 0–1)
BASOPHILS NFR BLD: NORMAL %
BILIRUB SERPL-MCNC: 0.4 MG/DL (ref 0.2–1)
BNP SERPL-MCNC: 2786 PG/ML
BUN SERPL-MCNC: 19 MG/DL (ref 6–20)
BUN/CREAT SERPL: 27 (ref 12–20)
CA-I BLD-MCNC: 9.2 MG/DL (ref 8.5–10.1)
CHLORIDE SERPL-SCNC: 113 MMOL/L (ref 97–108)
CO2 SERPL-SCNC: 16 MMOL/L (ref 21–32)
CREAT SERPL-MCNC: 0.71 MG/DL (ref 0.55–1.02)
DIFFERENTIAL METHOD BLD: ABNORMAL
DIFFERENTIAL METHOD BLD: NORMAL
EOSINOPHIL # BLD: 0.26 K/UL (ref 0–0.4)
EOSINOPHIL # BLD: NORMAL K/UL
EOSINOPHIL NFR BLD: 2.8 % (ref 0–7)
EOSINOPHIL NFR BLD: NORMAL %
ERYTHROCYTE [DISTWIDTH] IN BLOOD BY AUTOMATED COUNT: 15.8 % (ref 11.5–14.5)
ERYTHROCYTE [DISTWIDTH] IN BLOOD BY AUTOMATED COUNT: NORMAL %
GLOBULIN SER CALC-MCNC: 3.9 G/DL (ref 2–4)
GLUCOSE BLD STRIP.AUTO-MCNC: 145 MG/DL (ref 65–100)
GLUCOSE BLD STRIP.AUTO-MCNC: 159 MG/DL (ref 65–100)
GLUCOSE BLD STRIP.AUTO-MCNC: 167 MG/DL (ref 65–100)
GLUCOSE BLD STRIP.AUTO-MCNC: 174 MG/DL (ref 65–100)
GLUCOSE BLD STRIP.AUTO-MCNC: 184 MG/DL (ref 65–100)
GLUCOSE BLD STRIP.AUTO-MCNC: 210 MG/DL (ref 65–100)
GLUCOSE SERPL-MCNC: 150 MG/DL (ref 65–100)
HCT VFR BLD AUTO: 28.6 % (ref 35–47)
HCT VFR BLD AUTO: NORMAL %
HGB BLD-MCNC: 8.6 G/DL (ref 11.5–16)
HGB BLD-MCNC: NORMAL G/DL
IMM GRANULOCYTES # BLD AUTO: 0.05 K/UL (ref 0–0.04)
IMM GRANULOCYTES # BLD AUTO: NORMAL K/UL
IMM GRANULOCYTES NFR BLD AUTO: 0.5 % (ref 0–0.5)
IMM GRANULOCYTES NFR BLD AUTO: NORMAL %
LYMPHOCYTES # BLD: 1.2 K/UL (ref 0.8–3.5)
LYMPHOCYTES # BLD: NORMAL K/UL
LYMPHOCYTES NFR BLD: 12.9 % (ref 12–49)
LYMPHOCYTES NFR BLD: NORMAL %
MCH RBC QN AUTO: 27.3 PG (ref 26–34)
MCH RBC QN AUTO: NORMAL PG
MCHC RBC AUTO-ENTMCNC: 30.1 G/DL (ref 30–36.5)
MCHC RBC AUTO-ENTMCNC: NORMAL G/DL
MCV RBC AUTO: 90.8 FL (ref 80–99)
MCV RBC AUTO: NORMAL FL
MONOCYTES # BLD: 0.44 K/UL (ref 0–1)
MONOCYTES # BLD: NORMAL K/UL
MONOCYTES NFR BLD: 4.7 % (ref 5–13)
MONOCYTES NFR BLD: NORMAL %
NEUTS SEG # BLD: 7.3 K/UL (ref 1.8–8)
NEUTS SEG # BLD: NORMAL K/UL
NEUTS SEG NFR BLD: 78.8 % (ref 32–75)
NEUTS SEG NFR BLD: NORMAL %
NRBC # BLD: 0 K/UL (ref 0–0.01)
NRBC # BLD: NORMAL K/UL
NRBC BLD-RTO: 0 PER 100 WBC
NRBC BLD-RTO: NORMAL PER 100 WBC
PERFORMED BY:: ABNORMAL
PHOSPHATE SERPL-MCNC: 2.3 MG/DL (ref 2.6–4.7)
PLATELET # BLD AUTO: 171 K/UL (ref 150–400)
PLATELET # BLD AUTO: NORMAL K/UL
PMV BLD AUTO: NORMAL FL
POTASSIUM SERPL-SCNC: 3.4 MMOL/L (ref 3.5–5.1)
PROT SERPL-MCNC: 6.6 G/DL (ref 6.4–8.2)
RBC # BLD AUTO: 3.15 M/UL (ref 3.8–5.2)
RBC # BLD AUTO: NORMAL M/UL
SODIUM SERPL-SCNC: 139 MMOL/L (ref 136–145)
WBC # BLD AUTO: 9.3 K/UL (ref 3.6–11)
WBC # BLD AUTO: NORMAL K/UL

## 2025-05-26 PROCEDURE — 83880 ASSAY OF NATRIURETIC PEPTIDE: CPT

## 2025-05-26 PROCEDURE — 84100 ASSAY OF PHOSPHORUS: CPT

## 2025-05-26 PROCEDURE — 36415 COLL VENOUS BLD VENIPUNCTURE: CPT

## 2025-05-26 PROCEDURE — 80053 COMPREHEN METABOLIC PANEL: CPT

## 2025-05-26 PROCEDURE — 6360000002 HC RX W HCPCS

## 2025-05-26 PROCEDURE — 85025 COMPLETE CBC W/AUTO DIFF WBC: CPT

## 2025-05-26 PROCEDURE — 71045 X-RAY EXAM CHEST 1 VIEW: CPT

## 2025-05-26 PROCEDURE — 6370000000 HC RX 637 (ALT 250 FOR IP): Performed by: INTERNAL MEDICINE

## 2025-05-26 PROCEDURE — 6360000002 HC RX W HCPCS: Performed by: INTERNAL MEDICINE

## 2025-05-26 PROCEDURE — 1100000000 HC RM PRIVATE

## 2025-05-26 PROCEDURE — 2500000003 HC RX 250 WO HCPCS

## 2025-05-26 PROCEDURE — 82962 GLUCOSE BLOOD TEST: CPT

## 2025-05-26 PROCEDURE — 2580000003 HC RX 258

## 2025-05-26 PROCEDURE — 2580000003 HC RX 258: Performed by: INTERNAL MEDICINE

## 2025-05-26 PROCEDURE — 6370000000 HC RX 637 (ALT 250 FOR IP)

## 2025-05-26 RX ORDER — DIAZEPAM 10 MG/2ML
1 INJECTION, SOLUTION INTRAMUSCULAR; INTRAVENOUS ONCE
Status: COMPLETED | OUTPATIENT
Start: 2025-05-26 | End: 2025-05-26

## 2025-05-26 RX ORDER — BISACODYL 5 MG/1
10 TABLET, DELAYED RELEASE ORAL ONCE
Status: COMPLETED | OUTPATIENT
Start: 2025-05-26 | End: 2025-05-26

## 2025-05-26 RX ORDER — POLYETHYLENE GLYCOL 3350 17 G/17G
17 POWDER, FOR SOLUTION ORAL DAILY
Status: DISCONTINUED | OUTPATIENT
Start: 2025-05-26 | End: 2025-06-03

## 2025-05-26 RX ORDER — FUROSEMIDE 10 MG/ML
40 INJECTION INTRAMUSCULAR; INTRAVENOUS ONCE
Status: COMPLETED | OUTPATIENT
Start: 2025-05-26 | End: 2025-05-26

## 2025-05-26 RX ORDER — BENZONATATE 100 MG/1
100 CAPSULE ORAL 3 TIMES DAILY PRN
Status: DISCONTINUED | OUTPATIENT
Start: 2025-05-26 | End: 2025-06-03

## 2025-05-26 RX ADMIN — MEROPENEM 1000 MG: 1 INJECTION INTRAVENOUS at 06:13

## 2025-05-26 RX ADMIN — SUCRALFATE 1 G: 1 TABLET ORAL at 21:21

## 2025-05-26 RX ADMIN — SODIUM CHLORIDE, PRESERVATIVE FREE 10 ML: 5 INJECTION INTRAVENOUS at 23:27

## 2025-05-26 RX ADMIN — GALANTAMINE 8 MG: 4 TABLET, FILM COATED ORAL at 17:41

## 2025-05-26 RX ADMIN — POLYETHYLENE GLYCOL 3350 17 G: 17 POWDER, FOR SOLUTION ORAL at 09:28

## 2025-05-26 RX ADMIN — GALANTAMINE 8 MG: 4 TABLET, FILM COATED ORAL at 09:43

## 2025-05-26 RX ADMIN — INSULIN LISPRO 2 UNITS: 100 INJECTION, SOLUTION INTRAVENOUS; SUBCUTANEOUS at 21:22

## 2025-05-26 RX ADMIN — PANTOPRAZOLE SODIUM 40 MG: 40 TABLET, DELAYED RELEASE ORAL at 21:21

## 2025-05-26 RX ADMIN — MEMANTINE 10 MG: 10 TABLET ORAL at 09:29

## 2025-05-26 RX ADMIN — MIDODRINE HYDROCHLORIDE 5 MG: 5 TABLET ORAL at 09:28

## 2025-05-26 RX ADMIN — INSULIN GLARGINE 30 UNITS: 100 INJECTION, SOLUTION SUBCUTANEOUS at 09:40

## 2025-05-26 RX ADMIN — HEPARIN SODIUM 5000 UNITS: 5000 INJECTION INTRAVENOUS; SUBCUTANEOUS at 06:12

## 2025-05-26 RX ADMIN — BENZONATATE 100 MG: 100 CAPSULE ORAL at 16:05

## 2025-05-26 RX ADMIN — SODIUM CHLORIDE, PRESERVATIVE FREE 10 ML: 5 INJECTION INTRAVENOUS at 09:44

## 2025-05-26 RX ADMIN — MEROPENEM 1000 MG: 1 INJECTION INTRAVENOUS at 15:19

## 2025-05-26 RX ADMIN — QUETIAPINE FUMARATE 100 MG: 25 TABLET ORAL at 21:21

## 2025-05-26 RX ADMIN — DOCUSATE SODIUM 100 MG: 100 CAPSULE, LIQUID FILLED ORAL at 09:29

## 2025-05-26 RX ADMIN — PANTOPRAZOLE SODIUM 40 MG: 40 TABLET, DELAYED RELEASE ORAL at 09:29

## 2025-05-26 RX ADMIN — MEMANTINE 10 MG: 10 TABLET ORAL at 21:21

## 2025-05-26 RX ADMIN — BISACODYL 10 MG: 5 TABLET, COATED ORAL at 09:29

## 2025-05-26 RX ADMIN — QUETIAPINE FUMARATE 100 MG: 25 TABLET ORAL at 09:29

## 2025-05-26 RX ADMIN — MEROPENEM 1000 MG: 1 INJECTION INTRAVENOUS at 21:45

## 2025-05-26 RX ADMIN — Medication 10 MG: at 21:21

## 2025-05-26 RX ADMIN — HEPARIN SODIUM 5000 UNITS: 5000 INJECTION INTRAVENOUS; SUBCUTANEOUS at 21:21

## 2025-05-26 RX ADMIN — HEPARIN SODIUM 5000 UNITS: 5000 INJECTION INTRAVENOUS; SUBCUTANEOUS at 15:12

## 2025-05-26 RX ADMIN — ESCITALOPRAM OXALATE 10 MG: 10 TABLET ORAL at 09:29

## 2025-05-26 RX ADMIN — SODIUM CHLORIDE, PRESERVATIVE FREE 10 ML: 5 INJECTION INTRAVENOUS at 21:23

## 2025-05-26 RX ADMIN — FUROSEMIDE 40 MG: 10 INJECTION, SOLUTION INTRAMUSCULAR; INTRAVENOUS at 16:05

## 2025-05-26 RX ADMIN — INSULIN GLARGINE 30 UNITS: 100 INJECTION, SOLUTION SUBCUTANEOUS at 21:22

## 2025-05-26 RX ADMIN — HYDROXYZINE PAMOATE 25 MG: 25 CAPSULE ORAL at 13:11

## 2025-05-26 RX ADMIN — ONDANSETRON 4 MG: 2 INJECTION, SOLUTION INTRAMUSCULAR; INTRAVENOUS at 23:26

## 2025-05-26 RX ADMIN — SODIUM CHLORIDE: 0.9 INJECTION, SOLUTION INTRAVENOUS at 21:44

## 2025-05-26 RX ADMIN — DIAZEPAM 1 MG: 5 INJECTION, SOLUTION INTRAMUSCULAR; INTRAVENOUS at 01:04

## 2025-05-26 RX ADMIN — ATORVASTATIN CALCIUM 40 MG: 40 TABLET, FILM COATED ORAL at 09:29

## 2025-05-26 RX ADMIN — SUCRALFATE 1 G: 1 TABLET ORAL at 09:29

## 2025-05-26 NOTE — PLAN OF CARE
Problem: Chronic Conditions and Co-morbidities  Goal: Patient's chronic conditions and co-morbidity symptoms are monitored and maintained or improved  Outcome: Progressing  Flowsheets (Taken 5/25/2025 0800 by Ingrid Bird, RN)  Care Plan - Patient's Chronic Conditions and Co-Morbidity Symptoms are Monitored and Maintained or Improved:   Monitor and assess patient's chronic conditions and comorbid symptoms for stability, deterioration, or improvement   Collaborate with multidisciplinary team to address chronic and comorbid conditions and prevent exacerbation or deterioration   Update acute care plan with appropriate goals if chronic or comorbid symptoms are exacerbated and prevent overall improvement and discharge     Problem: Discharge Planning  Goal: Discharge to home or other facility with appropriate resources  Outcome: Progressing  Flowsheets (Taken 5/25/2025 0800 by Ingrid Bird, RN)  Discharge to home or other facility with appropriate resources:   Identify barriers to discharge with patient and caregiver   Arrange for needed discharge resources and transportation as appropriate   Identify discharge learning needs (meds, wound care, etc)   Refer to discharge planning if patient needs post-hospital services based on physician order or complex needs related to functional status, cognitive ability or social support system     Problem: Safety - Adult  Goal: Free from fall injury  5/25/2025 2039 by Monroe Koo RN  Outcome: Progressing  5/25/2025 1148 by Ingrid Bird RN  Outcome: Progressing     Problem: Pain  Goal: Verbalizes/displays adequate comfort level or baseline comfort level  5/25/2025 2039 by Monroe Koo, RN  Outcome: Progressing  5/25/2025 1148 by Ingrid Bird RN  Outcome: Progressing     Problem: Skin/Tissue Integrity  Goal: Skin integrity remains intact  Description: 1.  Monitor for areas of redness and/or skin breakdown2.  Assess vascular access sites hourly3.  Every 4-6 hours minimum:

## 2025-05-26 NOTE — PLAN OF CARE
Problem: Chronic Conditions and Co-morbidities  Goal: Patient's chronic conditions and co-morbidity symptoms are monitored and maintained or improved  Outcome: Progressing     Problem: Discharge Planning  Goal: Discharge to home or other facility with appropriate resources  Outcome: Progressing     Problem: Safety - Adult  Goal: Free from fall injury  Outcome: Progressing     Problem: Pain  Goal: Verbalizes/displays adequate comfort level or baseline comfort level  Outcome: Progressing     Problem: Skin/Tissue Integrity  Goal: Skin integrity remains intact  Description: 1.  Monitor for areas of redness and/or skin breakdown2.  Assess vascular access sites hourly3.  Every 4-6 hours minimum:  Change oxygen saturation probe site4.  Every 4-6 hours:  If on nasal continuous positive airway pressure, respiratory therapy assess nares and determine need for appliance change or resting period  Outcome: Progressing     Problem: ABCDS Injury Assessment  Goal: Absence of physical injury  Outcome: Progressing

## 2025-05-27 ENCOUNTER — APPOINTMENT (OUTPATIENT)
Facility: HOSPITAL | Age: 85
DRG: 871 | End: 2025-05-27
Attending: INTERNAL MEDICINE
Payer: MEDICARE

## 2025-05-27 LAB
ABO + RH BLD: NORMAL
ALBUMIN SERPL-MCNC: 3 G/DL (ref 3.5–5)
ALBUMIN/GLOB SERPL: 0.9 (ref 1.1–2.2)
ALP SERPL-CCNC: 87 U/L (ref 45–117)
ALT SERPL-CCNC: 14 U/L (ref 12–78)
ANION GAP SERPL CALC-SCNC: 8 MMOL/L (ref 2–12)
AST SERPL W P-5'-P-CCNC: 15 U/L (ref 15–37)
BASOPHILS # BLD: 0.03 K/UL (ref 0–0.1)
BASOPHILS NFR BLD: 0.2 % (ref 0–1)
BILIRUB SERPL-MCNC: 0.4 MG/DL (ref 0.2–1)
BLD PROD TYP BPU: NORMAL
BLOOD BANK BLOOD PRODUCT EXPIRATION DATE: NORMAL
BLOOD BANK DISPENSE STATUS: NORMAL
BLOOD BANK ISBT PRODUCT BLOOD TYPE: 5100
BLOOD BANK PRODUCT CODE: NORMAL
BLOOD BANK UNIT TYPE AND RH: NORMAL
BLOOD GROUP ANTIBODIES SERPL: NEGATIVE
BNP SERPL-MCNC: 1528 PG/ML
BPU ID: NORMAL
BUN SERPL-MCNC: 16 MG/DL (ref 6–20)
BUN/CREAT SERPL: 23 (ref 12–20)
CA-I BLD-MCNC: 9.4 MG/DL (ref 8.5–10.1)
CHLORIDE SERPL-SCNC: 106 MMOL/L (ref 97–108)
CO2 SERPL-SCNC: 26 MMOL/L (ref 21–32)
CREAT SERPL-MCNC: 0.71 MG/DL (ref 0.55–1.02)
CROSSMATCH RESULT: NORMAL
DIFFERENTIAL METHOD BLD: ABNORMAL
ECHO AV MEAN GRADIENT: 13 MMHG
ECHO AV MEAN VELOCITY: 1.7 M/S
ECHO AV PEAK GRADIENT: 23 MMHG
ECHO AV PEAK VELOCITY: 2.4 M/S
ECHO AV VELOCITY RATIO: 0.58
ECHO AV VTI: 27.4 CM
ECHO BSA: 1.87 M2
ECHO EST RA PRESSURE: 15 MMHG
ECHO LA AREA 4C: 14.2 CM2
ECHO LA MAJOR AXIS: 4.1 CM
ECHO LA VOL MOD A4C: 39 ML (ref 22–52)
ECHO LA VOLUME INDEX MOD A4C: 22 ML/M2 (ref 16–34)
ECHO LV EDV A2C: 15 ML
ECHO LV EDV A4C: 34 ML
ECHO LV EDV INDEX A4C: 19 ML/M2
ECHO LV EDV NDEX A2C: 8 ML/M2
ECHO LV EF PHYSICIAN: 70 %
ECHO LV EJECTION FRACTION A2C: 75 %
ECHO LV EJECTION FRACTION A4C: 79 %
ECHO LV EJECTION FRACTION BIPLANE: 78 % (ref 55–100)
ECHO LV ESV A2C: 4 ML
ECHO LV ESV A4C: 7 ML
ECHO LV ESV INDEX A2C: 2 ML/M2
ECHO LV ESV INDEX A4C: 4 ML/M2
ECHO LVOT AV VTI INDEX: 0.76
ECHO LVOT MEAN GRADIENT: 4 MMHG
ECHO LVOT PEAK GRADIENT: 7 MMHG
ECHO LVOT PEAK VELOCITY: 1.4 M/S
ECHO LVOT VTI: 20.8 CM
ECHO MV A VELOCITY: 0.96 M/S
ECHO MV E VELOCITY: 0.83 M/S
ECHO MV E/A RATIO: 0.86
ECHO PULMONARY ARTERY SYSTOLIC PRESSURE (PASP): 30 MMHG
ECHO PV MAX VELOCITY: 0.5 M/S
ECHO PV MEAN GRADIENT: 0 MMHG
ECHO PV MEAN VELOCITY: 0.3 M/S
ECHO PV PEAK GRADIENT: 1 MMHG
ECHO PV VTI: 5 CM
ECHO RIGHT VENTRICULAR SYSTOLIC PRESSURE (RVSP): 26 MMHG
ECHO RV BASAL DIMENSION: 3.3 CM
ECHO TV REGURGITANT MAX VELOCITY: 1.65 M/S
ECHO TV REGURGITANT PEAK GRADIENT: 11 MMHG
EOSINOPHIL # BLD: 0.1 K/UL (ref 0–0.4)
EOSINOPHIL NFR BLD: 0.8 % (ref 0–7)
ERYTHROCYTE [DISTWIDTH] IN BLOOD BY AUTOMATED COUNT: 15.4 % (ref 11.5–14.5)
GLOBULIN SER CALC-MCNC: 3.5 G/DL (ref 2–4)
GLUCOSE BLD STRIP.AUTO-MCNC: 172 MG/DL (ref 65–100)
GLUCOSE BLD STRIP.AUTO-MCNC: 182 MG/DL (ref 65–100)
GLUCOSE BLD STRIP.AUTO-MCNC: 190 MG/DL (ref 65–100)
GLUCOSE BLD STRIP.AUTO-MCNC: 212 MG/DL (ref 65–100)
GLUCOSE SERPL-MCNC: 173 MG/DL (ref 65–100)
HCT VFR BLD AUTO: 29.2 % (ref 35–47)
HGB BLD-MCNC: 9.4 G/DL (ref 11.5–16)
IMM GRANULOCYTES # BLD AUTO: 0.1 K/UL (ref 0–0.04)
IMM GRANULOCYTES NFR BLD AUTO: 0.8 % (ref 0–0.5)
LYMPHOCYTES # BLD: 0.7 K/UL (ref 0.8–3.5)
LYMPHOCYTES NFR BLD: 5.8 % (ref 12–49)
MCH RBC QN AUTO: 27.6 PG (ref 26–34)
MCHC RBC AUTO-ENTMCNC: 32.2 G/DL (ref 30–36.5)
MCV RBC AUTO: 85.9 FL (ref 80–99)
MONOCYTES # BLD: 0.5 K/UL (ref 0–1)
MONOCYTES NFR BLD: 4.1 % (ref 5–13)
NEUTS SEG # BLD: 10.62 K/UL (ref 1.8–8)
NEUTS SEG NFR BLD: 88.3 % (ref 32–75)
NRBC # BLD: 0 K/UL (ref 0–0.01)
NRBC BLD-RTO: 0 PER 100 WBC
PERFORMED BY:: ABNORMAL
PLATELET # BLD AUTO: 155 K/UL (ref 150–400)
PMV BLD AUTO: 12.3 FL (ref 8.9–12.9)
POTASSIUM SERPL-SCNC: 3 MMOL/L (ref 3.5–5.1)
PROCALCITONIN SERPL-MCNC: 0.72 NG/ML
PROT SERPL-MCNC: 6.5 G/DL (ref 6.4–8.2)
RBC # BLD AUTO: 3.4 M/UL (ref 3.8–5.2)
SODIUM SERPL-SCNC: 140 MMOL/L (ref 136–145)
SPECIMEN EXP DATE BLD: NORMAL
TRANSFUSION STATUS PATIENT QL: NORMAL
UNIT DIVISION: 0
UNIT ISSUE DATE/TIME: NORMAL
WBC # BLD AUTO: 12.1 K/UL (ref 3.6–11)

## 2025-05-27 PROCEDURE — 83880 ASSAY OF NATRIURETIC PEPTIDE: CPT

## 2025-05-27 PROCEDURE — 94761 N-INVAS EAR/PLS OXIMETRY MLT: CPT

## 2025-05-27 PROCEDURE — 2500000003 HC RX 250 WO HCPCS

## 2025-05-27 PROCEDURE — 2700000000 HC OXYGEN THERAPY PER DAY

## 2025-05-27 PROCEDURE — 84145 PROCALCITONIN (PCT): CPT

## 2025-05-27 PROCEDURE — 82962 GLUCOSE BLOOD TEST: CPT

## 2025-05-27 PROCEDURE — 6370000000 HC RX 637 (ALT 250 FOR IP)

## 2025-05-27 PROCEDURE — 85025 COMPLETE CBC W/AUTO DIFF WBC: CPT

## 2025-05-27 PROCEDURE — 1100000000 HC RM PRIVATE

## 2025-05-27 PROCEDURE — 2580000003 HC RX 258: Performed by: INTERNAL MEDICINE

## 2025-05-27 PROCEDURE — 80053 COMPREHEN METABOLIC PANEL: CPT

## 2025-05-27 PROCEDURE — 93308 TTE F-UP OR LMTD: CPT

## 2025-05-27 PROCEDURE — 36415 COLL VENOUS BLD VENIPUNCTURE: CPT

## 2025-05-27 PROCEDURE — 6360000002 HC RX W HCPCS

## 2025-05-27 PROCEDURE — 6360000002 HC RX W HCPCS: Performed by: INTERNAL MEDICINE

## 2025-05-27 RX ORDER — FUROSEMIDE 10 MG/ML
40 INJECTION INTRAMUSCULAR; INTRAVENOUS 2 TIMES DAILY
Status: DISCONTINUED | OUTPATIENT
Start: 2025-05-27 | End: 2025-05-27

## 2025-05-27 RX ORDER — FUROSEMIDE 10 MG/ML
40 INJECTION INTRAMUSCULAR; INTRAVENOUS ONCE
Status: DISCONTINUED | OUTPATIENT
Start: 2025-05-27 | End: 2025-06-01

## 2025-05-27 RX ORDER — MORPHINE SULFATE 2 MG/ML
2 INJECTION, SOLUTION INTRAMUSCULAR; INTRAVENOUS EVERY 4 HOURS PRN
Status: DISCONTINUED | OUTPATIENT
Start: 2025-05-27 | End: 2025-06-03

## 2025-05-27 RX ADMIN — ACETAMINOPHEN 650 MG: 650 SUPPOSITORY RECTAL at 15:40

## 2025-05-27 RX ADMIN — SODIUM CHLORIDE, PRESERVATIVE FREE 10 ML: 5 INJECTION INTRAVENOUS at 07:04

## 2025-05-27 RX ADMIN — SODIUM CHLORIDE, PRESERVATIVE FREE 10 ML: 5 INJECTION INTRAVENOUS at 22:16

## 2025-05-27 RX ADMIN — MEROPENEM 1000 MG: 1 INJECTION INTRAVENOUS at 14:46

## 2025-05-27 RX ADMIN — ONDANSETRON 4 MG: 2 INJECTION, SOLUTION INTRAMUSCULAR; INTRAVENOUS at 07:03

## 2025-05-27 RX ADMIN — ACETAMINOPHEN 650 MG: 650 SUPPOSITORY RECTAL at 09:58

## 2025-05-27 RX ADMIN — MEROPENEM 1000 MG: 1 INJECTION INTRAVENOUS at 07:01

## 2025-05-27 RX ADMIN — HEPARIN SODIUM 5000 UNITS: 5000 INJECTION INTRAVENOUS; SUBCUTANEOUS at 06:53

## 2025-05-27 RX ADMIN — AZITHROMYCIN MONOHYDRATE 500 MG: 500 INJECTION, POWDER, LYOPHILIZED, FOR SOLUTION INTRAVENOUS at 16:48

## 2025-05-27 RX ADMIN — MIDODRINE HYDROCHLORIDE 5 MG: 5 TABLET ORAL at 12:20

## 2025-05-27 NOTE — CARE COORDINATION
Patient is from home.  Unable to work with therapy OT and SLP today due to lethargy and not following commands. Per physician note, If patient not improving in the next several days, daughter will be amenable to hospice. CM team will continue to follow patient.

## 2025-05-28 LAB
ALBUMIN SERPL-MCNC: 2.6 G/DL (ref 3.5–5)
ANION GAP SERPL CALC-SCNC: 7 MMOL/L (ref 2–12)
BASOPHILS # BLD: 0 K/UL (ref 0–0.1)
BASOPHILS NFR BLD: 0 % (ref 0–1)
BUN SERPL-MCNC: 20 MG/DL (ref 6–20)
BUN/CREAT SERPL: 24 (ref 12–20)
CA-I BLD-MCNC: 9.6 MG/DL (ref 8.5–10.1)
CHLORIDE SERPL-SCNC: 107 MMOL/L (ref 97–108)
CO2 SERPL-SCNC: 27 MMOL/L (ref 21–32)
CREAT SERPL-MCNC: 0.83 MG/DL (ref 0.55–1.02)
DIFFERENTIAL METHOD BLD: ABNORMAL
EOSINOPHIL # BLD: 0 K/UL (ref 0–0.4)
EOSINOPHIL NFR BLD: 0 % (ref 0–7)
ERYTHROCYTE [DISTWIDTH] IN BLOOD BY AUTOMATED COUNT: 15.8 % (ref 11.5–14.5)
GLUCOSE BLD STRIP.AUTO-MCNC: 155 MG/DL (ref 65–100)
GLUCOSE BLD STRIP.AUTO-MCNC: 193 MG/DL (ref 65–100)
GLUCOSE BLD STRIP.AUTO-MCNC: 214 MG/DL (ref 65–100)
GLUCOSE SERPL-MCNC: 166 MG/DL (ref 65–100)
HCT VFR BLD AUTO: 27.3 % (ref 35–47)
HGB BLD-MCNC: 8.5 G/DL (ref 11.5–16)
IMM GRANULOCYTES # BLD AUTO: 0 K/UL
IMM GRANULOCYTES NFR BLD AUTO: 0 %
LYMPHOCYTES # BLD: 0.96 K/UL (ref 0.8–3.5)
LYMPHOCYTES NFR BLD: 4 % (ref 12–49)
MCH RBC QN AUTO: 26.9 PG (ref 26–34)
MCHC RBC AUTO-ENTMCNC: 31.1 G/DL (ref 30–36.5)
MCV RBC AUTO: 86.4 FL (ref 80–99)
MONOCYTES # BLD: 1.44 K/UL (ref 0–1)
MONOCYTES NFR BLD: 6 % (ref 5–13)
NEUTS BAND NFR BLD MANUAL: 3 % (ref 0–6)
NEUTS SEG # BLD: 21.6 K/UL (ref 1.8–8)
NEUTS SEG NFR BLD: 87 % (ref 32–75)
NRBC # BLD: 0 K/UL (ref 0–0.01)
NRBC BLD-RTO: 0 PER 100 WBC
PERFORMED BY:: ABNORMAL
PHOSPHATE SERPL-MCNC: 2 MG/DL (ref 2.6–4.7)
PLATELET # BLD AUTO: 256 K/UL (ref 150–400)
PMV BLD AUTO: 12.6 FL (ref 8.9–12.9)
POTASSIUM SERPL-SCNC: 2.8 MMOL/L (ref 3.5–5.1)
RBC # BLD AUTO: 3.16 M/UL (ref 3.8–5.2)
RBC MORPH BLD: ABNORMAL
SODIUM SERPL-SCNC: 141 MMOL/L (ref 136–145)
WBC # BLD AUTO: 24 K/UL (ref 3.6–11)

## 2025-05-28 PROCEDURE — 6360000002 HC RX W HCPCS

## 2025-05-28 PROCEDURE — 97530 THERAPEUTIC ACTIVITIES: CPT

## 2025-05-28 PROCEDURE — 97161 PT EVAL LOW COMPLEX 20 MIN: CPT

## 2025-05-28 PROCEDURE — 2580000003 HC RX 258: Performed by: INTERNAL MEDICINE

## 2025-05-28 PROCEDURE — 6370000000 HC RX 637 (ALT 250 FOR IP)

## 2025-05-28 PROCEDURE — 82962 GLUCOSE BLOOD TEST: CPT

## 2025-05-28 PROCEDURE — 97165 OT EVAL LOW COMPLEX 30 MIN: CPT

## 2025-05-28 PROCEDURE — 85025 COMPLETE CBC W/AUTO DIFF WBC: CPT

## 2025-05-28 PROCEDURE — 80069 RENAL FUNCTION PANEL: CPT

## 2025-05-28 PROCEDURE — 92610 EVALUATE SWALLOWING FUNCTION: CPT

## 2025-05-28 PROCEDURE — 2500000003 HC RX 250 WO HCPCS

## 2025-05-28 PROCEDURE — 1100000000 HC RM PRIVATE

## 2025-05-28 PROCEDURE — 6360000002 HC RX W HCPCS: Performed by: INTERNAL MEDICINE

## 2025-05-28 PROCEDURE — 36415 COLL VENOUS BLD VENIPUNCTURE: CPT

## 2025-05-28 RX ORDER — POTASSIUM CHLORIDE 7.45 MG/ML
10 INJECTION INTRAVENOUS
Status: COMPLETED | OUTPATIENT
Start: 2025-05-28 | End: 2025-05-28

## 2025-05-28 RX ORDER — SODIUM CHLORIDE 9 MG/ML
INJECTION, SOLUTION INTRAVENOUS CONTINUOUS
Status: DISCONTINUED | OUTPATIENT
Start: 2025-05-28 | End: 2025-05-30

## 2025-05-28 RX ADMIN — INSULIN LISPRO 2 UNITS: 100 INJECTION, SOLUTION INTRAVENOUS; SUBCUTANEOUS at 17:40

## 2025-05-28 RX ADMIN — MIDODRINE HYDROCHLORIDE 5 MG: 5 TABLET ORAL at 11:54

## 2025-05-28 RX ADMIN — MIDODRINE HYDROCHLORIDE 5 MG: 5 TABLET ORAL at 16:22

## 2025-05-28 RX ADMIN — Medication 10 MG: at 21:48

## 2025-05-28 RX ADMIN — POTASSIUM CHLORIDE 10 MEQ: 7.45 INJECTION INTRAVENOUS at 11:53

## 2025-05-28 RX ADMIN — POTASSIUM CHLORIDE 10 MEQ: 7.45 INJECTION INTRAVENOUS at 09:38

## 2025-05-28 RX ADMIN — POTASSIUM CHLORIDE 10 MEQ: 7.45 INJECTION INTRAVENOUS at 08:32

## 2025-05-28 RX ADMIN — AZITHROMYCIN MONOHYDRATE 500 MG: 500 INJECTION, POWDER, LYOPHILIZED, FOR SOLUTION INTRAVENOUS at 15:09

## 2025-05-28 RX ADMIN — MORPHINE SULFATE 2 MG: 2 INJECTION, SOLUTION INTRAMUSCULAR; INTRAVENOUS at 23:39

## 2025-05-28 RX ADMIN — POTASSIUM CHLORIDE 10 MEQ: 7.45 INJECTION INTRAVENOUS at 10:59

## 2025-05-28 RX ADMIN — GALANTAMINE 8 MG: 4 TABLET, FILM COATED ORAL at 16:22

## 2025-05-28 RX ADMIN — HEPARIN SODIUM 5000 UNITS: 5000 INJECTION INTRAVENOUS; SUBCUTANEOUS at 15:09

## 2025-05-28 RX ADMIN — PANTOPRAZOLE SODIUM 40 MG: 40 TABLET, DELAYED RELEASE ORAL at 21:48

## 2025-05-28 RX ADMIN — SODIUM CHLORIDE: 0.9 INJECTION, SOLUTION INTRAVENOUS at 08:30

## 2025-05-28 RX ADMIN — MEROPENEM 1000 MG: 1 INJECTION INTRAVENOUS at 01:15

## 2025-05-28 RX ADMIN — SODIUM CHLORIDE: 0.9 INJECTION, SOLUTION INTRAVENOUS at 23:47

## 2025-05-28 RX ADMIN — MEROPENEM 1000 MG: 1 INJECTION INTRAVENOUS at 16:22

## 2025-05-28 RX ADMIN — QUETIAPINE FUMARATE 100 MG: 25 TABLET ORAL at 21:47

## 2025-05-28 RX ADMIN — SODIUM CHLORIDE, PRESERVATIVE FREE 10 ML: 5 INJECTION INTRAVENOUS at 21:59

## 2025-05-28 RX ADMIN — MORPHINE SULFATE 2 MG: 2 INJECTION, SOLUTION INTRAMUSCULAR; INTRAVENOUS at 01:27

## 2025-05-28 RX ADMIN — MEROPENEM 1000 MG: 1 INJECTION INTRAVENOUS at 23:46

## 2025-05-28 RX ADMIN — SUCRALFATE 1 G: 1 TABLET ORAL at 21:48

## 2025-05-28 RX ADMIN — MEROPENEM 1000 MG: 1 INJECTION INTRAVENOUS at 08:28

## 2025-05-28 RX ADMIN — MEMANTINE 10 MG: 10 TABLET ORAL at 21:48

## 2025-05-28 RX ADMIN — HEPARIN SODIUM 5000 UNITS: 5000 INJECTION INTRAVENOUS; SUBCUTANEOUS at 21:58

## 2025-05-28 ASSESSMENT — PAIN - FUNCTIONAL ASSESSMENT: PAIN_FUNCTIONAL_ASSESSMENT: ACTIVITIES ARE NOT PREVENTED

## 2025-05-28 ASSESSMENT — PAIN SCALES - GENERAL
PAINLEVEL_OUTOF10: 0
PAINLEVEL_OUTOF10: 8

## 2025-05-28 ASSESSMENT — PAIN DESCRIPTION - DESCRIPTORS: DESCRIPTORS: ACHING;DISCOMFORT;HEAVINESS

## 2025-05-28 ASSESSMENT — PAIN DESCRIPTION - LOCATION: LOCATION: ABDOMEN;BACK

## 2025-05-28 ASSESSMENT — PAIN DESCRIPTION - ORIENTATION: ORIENTATION: LOWER

## 2025-05-28 ASSESSMENT — PAIN SCALES - WONG BAKER: WONGBAKER_NUMERICALRESPONSE: NO HURT

## 2025-05-28 NOTE — PLAN OF CARE
Problem: Chronic Conditions and Co-morbidities  Goal: Patient's chronic conditions and co-morbidity symptoms are monitored and maintained or improved  Outcome: Not Progressing     Problem: Discharge Planning  Goal: Discharge to home or other facility with appropriate resources  Outcome: Not Progressing     Problem: ABCDS Injury Assessment  Goal: Absence of physical injury  Outcome: Not Progressing     Problem: Safety - Adult  Goal: Free from fall injury  Outcome: Progressing     Problem: Pain  Goal: Verbalizes/displays adequate comfort level or baseline comfort level  Outcome: Progressing     Problem: Skin/Tissue Integrity  Goal: Skin integrity remains intact  Description: 1.  Monitor for areas of redness and/or skin breakdown2.  Assess vascular access sites hourly3.  Every 4-6 hours minimum:  Change oxygen saturation probe site4.  Every 4-6 hours:  If on nasal continuous positive airway pressure, respiratory therapy assess nares and determine need for appliance change or resting period  Outcome: Progressing

## 2025-05-28 NOTE — THERAPY EVALUATION
OCCUPATIONAL THERAPY EVALUATION  Patient: Nena Mcnulty (84 y.o. female)  Date: 5/28/2025  Primary Diagnosis: Generalized weakness [R53.1]  Sepsis with encephalopathy and septic shock, due to unspecified organism (HCC) [A41.9, R65.21, G93.41]       Precautions: Ax2, Fall Risk, Bed Alarm                Recommendations for nursing mobility: Encourage HEP in prep for ADLs/mobility; see handout for details, Frequent repositioning to prevent skin breakdown, Use of bed/chair alarm for safety, LE elevation for management of edema, and Assist x2    In place during session:Peripheral IV, External Catheter, and EKG/telemetry   ASSESSMENT  Pt is a 84 y.o. female presenting to [facility] with c/o fatigue, admitted 5/24/2025 and currently being treated for severe sepsis with leukocytosis, acute cystitis, metabolic encephalopathy, acute hypoxic respiratory failure, acute diastolic heart failure, probably aspiration PNA RLL, PAULIE, recent upper GIB, advanced dementia, DM 2, HTN. Pt received seated EOB with PT at bedside upon arrival, AXO x person, and agreeable to OT evaluation. Pt's daughter present with pt's approval.    Based on current observations, pt presents with decreased  functional mobility, independence in ADLs, high-level IADLs, ROM, strength, body mechanics, activity tolerance, safety awareness, cognition, command following, attention/concentration, coordination, balance, posture, fine-motor control (see below for objective details and assist levels).     Overall, pt tolerates session poor with no pain. Pt req'd max multimodal cuing to initiate, sequence, and attend to tasks. Pt req'd max Ax2 for sit<>stand transfers at EOB using RW in prep for toileting. SPT EOB>BSC attempted however pt not following commands to take steps. Total A req'd for salty care while pt standing with RW. Further activity deferred due to pt having difficulty following commands. Pt will benefit from continued skilled OT services to address current

## 2025-05-28 NOTE — PLAN OF CARE
PHYSICAL THERAPY EVALUATION  Patient: Nena Mcnulty (84 y.o. female)  Date: 5/28/2025  Primary Diagnosis: Generalized weakness [R53.1]  Sepsis with encephalopathy and septic shock, due to unspecified organism (HCC) [A41.9, R65.21, G93.41]       Precautions: Fall Risk, Bed Alarm                      Recommendations for nursing mobility: Encourage HEP in prep for ADLs/mobility; see handout for details, Frequent repositioning to prevent skin breakdown, and LE elevation for management of edema    In place during session: Peripheral IV, External Catheter, and EKG/telemetry     ASSESSMENT  Pt is a 84 y.o. female admitted on 5/24/2025 for gen weakness; pt currently being treated for for severe sepsis with leukocytosis, acute cystitis, metabolic encephalopathy, acute hypoxic respiratory failure, acute diastolic heart failure, probably aspiration PNA RLL, PAULIE, recent upper GIB, advanced dementia, DM 2, HTN . Pt semi supine upon PT arrival, agreeable to evaluation. Pt A&O x 1.  Daughter reported she lives with her mother and she is able to amb with RW to bathroom with help.Needs assist for bathing and dressing.Patient unable to verbalize her birthday.Daughter said it .    Based on the objective data described below, the patient currently presents with impaired ability to perform high-level IADLs, impaired strength, decreased activity tolerance, poor safety awareness, impaired cognition, impaired balance, and impaired posture. (See below for objective details and assist levels).     Overall pt tolerated session fair/poor today with therapy(OT joined for OOB activities after patient up at eob with PT). Pt  required max A for bed mobility and supine to sit  transfers. Pt req'd multimodal cuing to initiate, sequence, and attend to tasks. Pt req'd max Ax2 for sit<>stand transfers at EOB using RW in prep for toileting. SPT EOB>BSC attempted however pt not following commands to take steps. Total A req'd for salty care while pt

## 2025-05-28 NOTE — PLAN OF CARE
Speech LAnguage Pathology Dysphagia EVALUATION    Patient: Nena Mcnulty (84 y.o. female)  Date: 5/28/2025  Primary Diagnosis: Generalized weakness [R53.1]  Sepsis with encephalopathy and septic shock, due to unspecified organism (HCC) [A41.9, R65.21, G93.41]       Precautions: Aspiration, GERD Fall Risk, Bed Alarm                DIET RECOMMENDATIONS: Full liquid and thin liquids, meds crushed if able in applesauce or pudding, upgrade to soft and bite sized as tolerated    SWALLOW SAFETY PRECAUTIONS: Rec slow rate of intake, small bites/sips, 6 small meals, double swallow, liquid wash, remain upright 1 hour after PO and do not eat 3 hours before bedtime.       ASSESSMENT :  Based on the objective data described below, the patient presents with mild oropharyngeal dysphagia. Patient is more alert, pleasantly confused w/ nonsensical speech at times. Daughter reports mentation not back to baseline but has improved.   Reviewed recent EGD and hx of esophageal dysphagia. Patient w/ recent episodes of n/v however no vomiting x2 days. Patient on 6L nc w/ intermittent audible wheeze. .  Oral phase c/b slow masticaton of solids s/t dentition and delayed A-P. Pharyngeal phase c/b mild swallow delay and HLE is wfl upon palpation. Audible swallow w/ frequent belching.   Reviewed GERD diet and strategies  Compensatory Strategies for esophageal dysphagia including:  Select softer foods  Alternate solids and liquids  Elevate head of bed with brick/blocks/mattress lift 6-8\"  Avoid eating 3-4 hours before bedtime  Sit fully upright during all p.o. and for 30-60 min after  Take small bites and sips  Eat smaller, more frequent meals                 Compensatory strategies for reflux management, including:   Reduce intake of reflux trigger foods (i.e. chocolate, peppermint, fried and fatty foods, citrus, onions, tomatoes, caffeine and soda, red wine and alcohol)  Don't over eat during meals.  Stay upright for at least 30-60

## 2025-05-29 ENCOUNTER — APPOINTMENT (OUTPATIENT)
Facility: HOSPITAL | Age: 85
DRG: 871 | End: 2025-05-29
Payer: MEDICARE

## 2025-05-29 LAB
ABO + RH BLD: NORMAL
ALBUMIN SERPL-MCNC: 2.1 G/DL (ref 3.5–5)
ANION GAP SERPL CALC-SCNC: 8 MMOL/L (ref 2–12)
BASOPHILS # BLD: 0 K/UL (ref 0–0.1)
BASOPHILS NFR BLD: 0 % (ref 0–1)
BLOOD GROUP ANTIBODIES SERPL: NEGATIVE
BUN SERPL-MCNC: 22 MG/DL (ref 6–20)
BUN/CREAT SERPL: 39 (ref 12–20)
CA-I BLD-MCNC: 8.8 MG/DL (ref 8.5–10.1)
CHLORIDE SERPL-SCNC: 106 MMOL/L (ref 97–108)
CO2 SERPL-SCNC: 24 MMOL/L (ref 21–32)
CREAT SERPL-MCNC: 0.57 MG/DL (ref 0.55–1.02)
DIFFERENTIAL METHOD BLD: ABNORMAL
EOSINOPHIL # BLD: 0.19 K/UL (ref 0–0.4)
EOSINOPHIL NFR BLD: 1 % (ref 0–7)
ERYTHROCYTE [DISTWIDTH] IN BLOOD BY AUTOMATED COUNT: 15.6 % (ref 11.5–14.5)
GLUCOSE BLD STRIP.AUTO-MCNC: 164 MG/DL (ref 65–100)
GLUCOSE BLD STRIP.AUTO-MCNC: 174 MG/DL (ref 65–100)
GLUCOSE BLD STRIP.AUTO-MCNC: 203 MG/DL (ref 65–100)
GLUCOSE BLD STRIP.AUTO-MCNC: 230 MG/DL (ref 65–100)
GLUCOSE BLD STRIP.AUTO-MCNC: 239 MG/DL (ref 65–100)
GLUCOSE SERPL-MCNC: 227 MG/DL (ref 65–100)
HCT VFR BLD AUTO: 25.1 % (ref 35–47)
HGB BLD-MCNC: 8 G/DL (ref 11.5–16)
IMM GRANULOCYTES # BLD AUTO: 0 K/UL
IMM GRANULOCYTES NFR BLD AUTO: 0 %
LYMPHOCYTES # BLD: 0.19 K/UL (ref 0.8–3.5)
LYMPHOCYTES NFR BLD: 1 % (ref 12–49)
MCH RBC QN AUTO: 26.9 PG (ref 26–34)
MCHC RBC AUTO-ENTMCNC: 31.9 G/DL (ref 30–36.5)
MCV RBC AUTO: 84.5 FL (ref 80–99)
MONOCYTES # BLD: 0.56 K/UL (ref 0–1)
MONOCYTES NFR BLD: 3 % (ref 5–13)
NEUTS BAND NFR BLD MANUAL: 3 % (ref 0–6)
NEUTS SEG # BLD: 17.56 K/UL (ref 1.8–8)
NEUTS SEG NFR BLD: 92 % (ref 32–75)
NRBC # BLD: 0 K/UL (ref 0–0.01)
NRBC BLD-RTO: 0 PER 100 WBC
PERFORMED BY:: ABNORMAL
PHOSPHATE SERPL-MCNC: 1 MG/DL (ref 2.6–4.7)
PLATELET # BLD AUTO: 257 K/UL (ref 150–400)
PMV BLD AUTO: 12 FL (ref 8.9–12.9)
POTASSIUM SERPL-SCNC: 3.4 MMOL/L (ref 3.5–5.1)
PROCALCITONIN SERPL-MCNC: 0.84 NG/ML
RBC # BLD AUTO: 2.97 M/UL (ref 3.8–5.2)
RBC MORPH BLD: ABNORMAL
RBC MORPH BLD: ABNORMAL
SODIUM SERPL-SCNC: 138 MMOL/L (ref 136–145)
SPECIMEN EXP DATE BLD: NORMAL
WBC # BLD AUTO: 18.5 K/UL (ref 3.6–11)

## 2025-05-29 PROCEDURE — 86900 BLOOD TYPING SEROLOGIC ABO: CPT

## 2025-05-29 PROCEDURE — 2580000003 HC RX 258: Performed by: INTERNAL MEDICINE

## 2025-05-29 PROCEDURE — 86901 BLOOD TYPING SEROLOGIC RH(D): CPT

## 2025-05-29 PROCEDURE — 6360000002 HC RX W HCPCS: Performed by: INTERNAL MEDICINE

## 2025-05-29 PROCEDURE — 2500000003 HC RX 250 WO HCPCS

## 2025-05-29 PROCEDURE — 6370000000 HC RX 637 (ALT 250 FOR IP): Performed by: INTERNAL MEDICINE

## 2025-05-29 PROCEDURE — 2500000003 HC RX 250 WO HCPCS: Performed by: INTERNAL MEDICINE

## 2025-05-29 PROCEDURE — 80069 RENAL FUNCTION PANEL: CPT

## 2025-05-29 PROCEDURE — 2700000000 HC OXYGEN THERAPY PER DAY

## 2025-05-29 PROCEDURE — 85025 COMPLETE CBC W/AUTO DIFF WBC: CPT

## 2025-05-29 PROCEDURE — 71045 X-RAY EXAM CHEST 1 VIEW: CPT

## 2025-05-29 PROCEDURE — 86850 RBC ANTIBODY SCREEN: CPT

## 2025-05-29 PROCEDURE — 94761 N-INVAS EAR/PLS OXIMETRY MLT: CPT

## 2025-05-29 PROCEDURE — 36415 COLL VENOUS BLD VENIPUNCTURE: CPT

## 2025-05-29 PROCEDURE — 6370000000 HC RX 637 (ALT 250 FOR IP)

## 2025-05-29 PROCEDURE — 84145 PROCALCITONIN (PCT): CPT

## 2025-05-29 PROCEDURE — 1100000000 HC RM PRIVATE

## 2025-05-29 PROCEDURE — 82962 GLUCOSE BLOOD TEST: CPT

## 2025-05-29 RX ADMIN — SODIUM CHLORIDE, PRESERVATIVE FREE 10 ML: 5 INJECTION INTRAVENOUS at 10:28

## 2025-05-29 RX ADMIN — Medication 10 MG: at 21:01

## 2025-05-29 RX ADMIN — SODIUM CHLORIDE, PRESERVATIVE FREE 10 ML: 5 INJECTION INTRAVENOUS at 21:05

## 2025-05-29 RX ADMIN — INSULIN GLARGINE 30 UNITS: 100 INJECTION, SOLUTION SUBCUTANEOUS at 21:04

## 2025-05-29 RX ADMIN — FERROUS SULFATE TAB 325 MG (65 MG ELEMENTAL FE) 325 MG: 325 (65 FE) TAB at 08:55

## 2025-05-29 RX ADMIN — ATORVASTATIN CALCIUM 40 MG: 40 TABLET, FILM COATED ORAL at 08:56

## 2025-05-29 RX ADMIN — MORPHINE SULFATE 2 MG: 2 INJECTION, SOLUTION INTRAMUSCULAR; INTRAVENOUS at 08:46

## 2025-05-29 RX ADMIN — AZITHROMYCIN MONOHYDRATE 500 MG: 500 INJECTION, POWDER, LYOPHILIZED, FOR SOLUTION INTRAVENOUS at 16:06

## 2025-05-29 RX ADMIN — MEMANTINE 10 MG: 10 TABLET ORAL at 21:01

## 2025-05-29 RX ADMIN — POLYETHYLENE GLYCOL 3350 17 G: 17 POWDER, FOR SOLUTION ORAL at 08:56

## 2025-05-29 RX ADMIN — QUETIAPINE FUMARATE 100 MG: 25 TABLET ORAL at 21:03

## 2025-05-29 RX ADMIN — SODIUM CHLORIDE, PRESERVATIVE FREE 40 MG: 5 INJECTION INTRAVENOUS at 21:04

## 2025-05-29 RX ADMIN — SODIUM CHLORIDE: 0.9 INJECTION, SOLUTION INTRAVENOUS at 14:45

## 2025-05-29 RX ADMIN — INSULIN LISPRO 2 UNITS: 100 INJECTION, SOLUTION INTRAVENOUS; SUBCUTANEOUS at 08:53

## 2025-05-29 RX ADMIN — BENZONATATE 100 MG: 100 CAPSULE ORAL at 17:47

## 2025-05-29 RX ADMIN — MIDODRINE HYDROCHLORIDE 5 MG: 5 TABLET ORAL at 17:47

## 2025-05-29 RX ADMIN — MEMANTINE 10 MG: 10 TABLET ORAL at 08:56

## 2025-05-29 RX ADMIN — MEROPENEM 1000 MG: 1 INJECTION INTRAVENOUS at 09:10

## 2025-05-29 RX ADMIN — ACETAMINOPHEN 650 MG: 650 SUPPOSITORY RECTAL at 05:20

## 2025-05-29 RX ADMIN — GALANTAMINE 8 MG: 4 TABLET, FILM COATED ORAL at 09:01

## 2025-05-29 RX ADMIN — ESCITALOPRAM OXALATE 10 MG: 10 TABLET ORAL at 08:56

## 2025-05-29 RX ADMIN — GALANTAMINE 8 MG: 4 TABLET, FILM COATED ORAL at 17:48

## 2025-05-29 RX ADMIN — INSULIN LISPRO 2 UNITS: 100 INJECTION, SOLUTION INTRAVENOUS; SUBCUTANEOUS at 13:11

## 2025-05-29 RX ADMIN — SUCRALFATE 1 G: 1 TABLET ORAL at 08:56

## 2025-05-29 RX ADMIN — MORPHINE SULFATE 2 MG: 2 INJECTION, SOLUTION INTRAMUSCULAR; INTRAVENOUS at 21:08

## 2025-05-29 RX ADMIN — QUETIAPINE FUMARATE 100 MG: 25 TABLET ORAL at 08:56

## 2025-05-29 RX ADMIN — ACETAMINOPHEN 650 MG: 325 TABLET ORAL at 17:51

## 2025-05-29 RX ADMIN — INSULIN GLARGINE 30 UNITS: 100 INJECTION, SOLUTION SUBCUTANEOUS at 08:52

## 2025-05-29 RX ADMIN — MIDODRINE HYDROCHLORIDE 5 MG: 5 TABLET ORAL at 08:56

## 2025-05-29 RX ADMIN — MEROPENEM 1000 MG: 1 INJECTION INTRAVENOUS at 23:57

## 2025-05-29 RX ADMIN — MEROPENEM 1000 MG: 1 INJECTION INTRAVENOUS at 17:49

## 2025-05-29 RX ADMIN — SUCRALFATE 1 G: 1 TABLET ORAL at 21:02

## 2025-05-29 RX ADMIN — SODIUM CHLORIDE, PRESERVATIVE FREE 40 MG: 5 INJECTION INTRAVENOUS at 09:02

## 2025-05-29 ASSESSMENT — PAIN DESCRIPTION - ORIENTATION: ORIENTATION: MID;LOWER

## 2025-05-29 ASSESSMENT — PAIN SCALES - GENERAL
PAINLEVEL_OUTOF10: 0
PAINLEVEL_OUTOF10: 2
PAINLEVEL_OUTOF10: 8

## 2025-05-29 ASSESSMENT — PAIN SCALES - WONG BAKER: WONGBAKER_NUMERICALRESPONSE: NO HURT

## 2025-05-29 ASSESSMENT — PAIN DESCRIPTION - DESCRIPTORS: DESCRIPTORS: ACHING

## 2025-05-29 ASSESSMENT — PAIN DESCRIPTION - LOCATION: LOCATION: BACK

## 2025-05-29 ASSESSMENT — PAIN - FUNCTIONAL ASSESSMENT: PAIN_FUNCTIONAL_ASSESSMENT: PREVENTS OR INTERFERES SOME ACTIVE ACTIVITIES AND ADLS

## 2025-05-29 NOTE — SIGNIFICANT EVENT
Overnight coverage:    Patient vomiting dark-colored substance most likely coffee-ground emesis.  Type and screen ordered and labs are pending.  Patient febrile found to have right lower lobe pneumonia as well as ESBL Klebsiella currently on azithromycin and meropenem.  Will perform gastric occult for any further bleeding.  GI consultation.  Discussed case with daughter at the bedside.  Holding DVT prophylaxis heparin

## 2025-05-29 NOTE — PLAN OF CARE
Problem: Chronic Conditions and Co-morbidities  Goal: Patient's chronic conditions and co-morbidity symptoms are monitored and maintained or improved  5/29/2025 0018 by Lis Lyman RN  Outcome: Not Progressing  Flowsheets (Taken 5/28/2025 2125)  Care Plan - Patient's Chronic Conditions and Co-Morbidity Symptoms are Monitored and Maintained or Improved:   Monitor and assess patient's chronic conditions and comorbid symptoms for stability, deterioration, or improvement   Collaborate with multidisciplinary team to address chronic and comorbid conditions and prevent exacerbation or deterioration   Update acute care plan with appropriate goals if chronic or comorbid symptoms are exacerbated and prevent overall improvement and discharge     Problem: Safety - Adult  Goal: Free from fall injury  5/29/2025 0715 by Sanjana Gurrola RN  Outcome: Progressing  5/29/2025 0018 by Lis Lyman RN  Outcome: Progressing     Problem: Chronic Conditions and Co-morbidities  Goal: Patient's chronic conditions and co-morbidity symptoms are monitored and maintained or improved  5/29/2025 0018 by Lis Lyman RN  Outcome: Not Progressing  Flowsheets (Taken 5/28/2025 2125)  Care Plan - Patient's Chronic Conditions and Co-Morbidity Symptoms are Monitored and Maintained or Improved:   Monitor and assess patient's chronic conditions and comorbid symptoms for stability, deterioration, or improvement   Collaborate with multidisciplinary team to address chronic and comorbid conditions and prevent exacerbation or deterioration   Update acute care plan with appropriate goals if chronic or comorbid symptoms are exacerbated and prevent overall improvement and discharge     Problem: Discharge Planning  Goal: Discharge to home or other facility with appropriate resources  5/29/2025 0018 by Lis Lyman RN  Outcome: Not Progressing  Flowsheets (Taken 5/28/2025 2125)  Discharge to home or other facility with appropriate

## 2025-05-29 NOTE — PLAN OF CARE
Problem: Chronic Conditions and Co-morbidities  Goal: Patient's chronic conditions and co-morbidity symptoms are monitored and maintained or improved  5/29/2025 0018 by Lis Lyman RN  Outcome: Not Progressing  Flowsheets (Taken 5/28/2025 2125)  Care Plan - Patient's Chronic Conditions and Co-Morbidity Symptoms are Monitored and Maintained or Improved:   Monitor and assess patient's chronic conditions and comorbid symptoms for stability, deterioration, or improvement   Collaborate with multidisciplinary team to address chronic and comorbid conditions and prevent exacerbation or deterioration   Update acute care plan with appropriate goals if chronic or comorbid symptoms are exacerbated and prevent overall improvement and discharge  5/28/2025 1302 by Renate Alva RN  Outcome: Not Progressing  Flowsheets (Taken 5/28/2025 0900)  Care Plan - Patient's Chronic Conditions and Co-Morbidity Symptoms are Monitored and Maintained or Improved: Monitor and assess patient's chronic conditions and comorbid symptoms for stability, deterioration, or improvement     Problem: Discharge Planning  Goal: Discharge to home or other facility with appropriate resources  5/29/2025 0018 by Lis Lyman RN  Outcome: Not Progressing  Flowsheets (Taken 5/28/2025 2125)  Discharge to home or other facility with appropriate resources:   Identify barriers to discharge with patient and caregiver   Arrange for needed discharge resources and transportation as appropriate   Identify discharge learning needs (meds, wound care, etc)  5/28/2025 1302 by Renate Alva, RN  Outcome: Not Progressing  Flowsheets (Taken 5/28/2025 0900)  Discharge to home or other facility with appropriate resources: Identify barriers to discharge with patient and caregiver     Problem: Pain  Goal: Verbalizes/displays adequate comfort level or baseline comfort level  5/29/2025 0018 by Lis Lyman, RN  Outcome: Not Progressing  5/28/2025 1302 by Renate Alva,

## 2025-05-30 ENCOUNTER — APPOINTMENT (OUTPATIENT)
Facility: HOSPITAL | Age: 85
DRG: 871 | End: 2025-05-30
Payer: MEDICARE

## 2025-05-30 LAB
ANION GAP SERPL CALC-SCNC: 5 MMOL/L (ref 2–12)
ARTERIAL PATENCY WRIST A: ABNORMAL
BACTERIA SPEC CULT: NORMAL
BACTERIA SPEC CULT: NORMAL
BASE EXCESS BLDA CALC-SCNC: 1.4 MMOL/L (ref 0–3)
BDY SITE: ABNORMAL
BODY TEMPERATURE: 99
BUN SERPL-MCNC: 22 MG/DL (ref 6–20)
BUN/CREAT SERPL: 35 (ref 12–20)
CA-I BLD-MCNC: 9.1 MG/DL (ref 8.5–10.1)
CHLORIDE SERPL-SCNC: 109 MMOL/L (ref 97–108)
CO2 SERPL-SCNC: 27 MMOL/L (ref 21–32)
CREAT SERPL-MCNC: 0.62 MG/DL (ref 0.55–1.02)
ERYTHROCYTE [DISTWIDTH] IN BLOOD BY AUTOMATED COUNT: 15.8 % (ref 11.5–14.5)
FIO2 ON VENT: 100 %
GAS FLOW.O2 O2 DELIVERY SYS: 15 L/MIN
GLUCOSE BLD STRIP.AUTO-MCNC: 117 MG/DL (ref 65–100)
GLUCOSE BLD STRIP.AUTO-MCNC: 222 MG/DL (ref 65–100)
GLUCOSE BLD STRIP.AUTO-MCNC: 92 MG/DL (ref 65–100)
GLUCOSE BLD STRIP.AUTO-MCNC: 95 MG/DL (ref 65–100)
GLUCOSE BLD STRIP.AUTO-MCNC: 97 MG/DL (ref 65–100)
GLUCOSE BLD STRIP.AUTO-MCNC: 99 MG/DL (ref 65–100)
GLUCOSE SERPL-MCNC: 200 MG/DL (ref 65–100)
HCO3 BLDA-SCNC: 26 MMOL/L (ref 22–26)
HCT VFR BLD AUTO: 23 % (ref 35–47)
HGB BLD-MCNC: 7.3 G/DL (ref 11.5–16)
Lab: NORMAL
Lab: NORMAL
MCH RBC QN AUTO: 27.1 PG (ref 26–34)
MCHC RBC AUTO-ENTMCNC: 31.7 G/DL (ref 30–36.5)
MCV RBC AUTO: 85.5 FL (ref 80–99)
MRSA DNA SPEC QL NAA+PROBE: NOT DETECTED
NRBC # BLD: 0 K/UL (ref 0–0.01)
NRBC BLD-RTO: 0 PER 100 WBC
PCO2 BLDA: 40 MMHG (ref 35–45)
PERFORMED BY:: ABNORMAL
PERFORMED BY:: NORMAL
PH BLDA: 7.43 (ref 7.35–7.45)
PLATELET # BLD AUTO: 280 K/UL (ref 150–400)
PMV BLD AUTO: 12.4 FL (ref 8.9–12.9)
PO2 BLDA: 69 MMHG (ref 80–100)
POTASSIUM SERPL-SCNC: 3.3 MMOL/L (ref 3.5–5.1)
POTASSIUM SERPL-SCNC: 3.7 MMOL/L (ref 3.5–5.1)
RBC # BLD AUTO: 2.69 M/UL (ref 3.8–5.2)
SAO2% DEVICE SAO2% SENSOR NAME: ABNORMAL
SODIUM SERPL-SCNC: 141 MMOL/L (ref 136–145)
SPECIMEN SITE: ABNORMAL
WBC # BLD AUTO: 17.5 K/UL (ref 3.6–11)

## 2025-05-30 PROCEDURE — 6360000002 HC RX W HCPCS: Performed by: INTERNAL MEDICINE

## 2025-05-30 PROCEDURE — 74018 RADEX ABDOMEN 1 VIEW: CPT

## 2025-05-30 PROCEDURE — 87641 MR-STAPH DNA AMP PROBE: CPT

## 2025-05-30 PROCEDURE — 71045 X-RAY EXAM CHEST 1 VIEW: CPT

## 2025-05-30 PROCEDURE — 36415 COLL VENOUS BLD VENIPUNCTURE: CPT

## 2025-05-30 PROCEDURE — 97530 THERAPEUTIC ACTIVITIES: CPT

## 2025-05-30 PROCEDURE — 2500000003 HC RX 250 WO HCPCS: Performed by: INTERNAL MEDICINE

## 2025-05-30 PROCEDURE — 6360000002 HC RX W HCPCS: Performed by: SURGERY

## 2025-05-30 PROCEDURE — 85027 COMPLETE CBC AUTOMATED: CPT

## 2025-05-30 PROCEDURE — 2580000003 HC RX 258

## 2025-05-30 PROCEDURE — 5A0945A ASSISTANCE WITH RESPIRATORY VENTILATION, 24-96 CONSECUTIVE HOURS, HIGH NASAL FLOW/VELOCITY: ICD-10-PCS | Performed by: FAMILY MEDICINE

## 2025-05-30 PROCEDURE — 2700000000 HC OXYGEN THERAPY PER DAY

## 2025-05-30 PROCEDURE — 6360000002 HC RX W HCPCS: Performed by: PHYSICIAN ASSISTANT

## 2025-05-30 PROCEDURE — 2580000003 HC RX 258: Performed by: INTERNAL MEDICINE

## 2025-05-30 PROCEDURE — 84132 ASSAY OF SERUM POTASSIUM: CPT

## 2025-05-30 PROCEDURE — 80048 BASIC METABOLIC PNL TOTAL CA: CPT

## 2025-05-30 PROCEDURE — 99221 1ST HOSP IP/OBS SF/LOW 40: CPT | Performed by: SURGERY

## 2025-05-30 PROCEDURE — 82803 BLOOD GASES ANY COMBINATION: CPT

## 2025-05-30 PROCEDURE — 2000000000 HC ICU R&B

## 2025-05-30 PROCEDURE — 82962 GLUCOSE BLOOD TEST: CPT

## 2025-05-30 PROCEDURE — 36600 WITHDRAWAL OF ARTERIAL BLOOD: CPT

## 2025-05-30 PROCEDURE — 74176 CT ABD & PELVIS W/O CONTRAST: CPT

## 2025-05-30 PROCEDURE — 2500000003 HC RX 250 WO HCPCS

## 2025-05-30 RX ORDER — FUROSEMIDE 10 MG/ML
40 INJECTION INTRAMUSCULAR; INTRAVENOUS EVERY 8 HOURS
Status: DISCONTINUED | OUTPATIENT
Start: 2025-05-30 | End: 2025-06-03

## 2025-05-30 RX ORDER — FUROSEMIDE 10 MG/ML
40 INJECTION INTRAMUSCULAR; INTRAVENOUS ONCE
Status: CANCELLED | OUTPATIENT
Start: 2025-05-30

## 2025-05-30 RX ORDER — FUROSEMIDE 10 MG/ML
40 INJECTION INTRAMUSCULAR; INTRAVENOUS DAILY
Status: DISCONTINUED | OUTPATIENT
Start: 2025-05-31 | End: 2025-05-30

## 2025-05-30 RX ORDER — POTASSIUM CHLORIDE 7.45 MG/ML
10 INJECTION INTRAVENOUS
Status: COMPLETED | OUTPATIENT
Start: 2025-05-30 | End: 2025-05-30

## 2025-05-30 RX ORDER — FUROSEMIDE 10 MG/ML
40 INJECTION INTRAMUSCULAR; INTRAVENOUS EVERY 8 HOURS
Status: DISCONTINUED | OUTPATIENT
Start: 2025-05-31 | End: 2025-05-30

## 2025-05-30 RX ORDER — LORAZEPAM 2 MG/ML
1 INJECTION INTRAMUSCULAR ONCE
Status: COMPLETED | OUTPATIENT
Start: 2025-05-30 | End: 2025-05-30

## 2025-05-30 RX ADMIN — POTASSIUM CHLORIDE 10 MEQ: 7.45 INJECTION INTRAVENOUS at 09:24

## 2025-05-30 RX ADMIN — POTASSIUM CHLORIDE 10 MEQ: 7.45 INJECTION INTRAVENOUS at 05:55

## 2025-05-30 RX ADMIN — IRON SUCROSE 100 MG: 20 INJECTION, SOLUTION INTRAVENOUS at 13:44

## 2025-05-30 RX ADMIN — FUROSEMIDE 40 MG: 10 INJECTION, SOLUTION INTRAMUSCULAR; INTRAVENOUS at 16:59

## 2025-05-30 RX ADMIN — SODIUM CHLORIDE, PRESERVATIVE FREE 10 ML: 5 INJECTION INTRAVENOUS at 20:56

## 2025-05-30 RX ADMIN — POTASSIUM CHLORIDE 10 MEQ: 7.45 INJECTION INTRAVENOUS at 06:57

## 2025-05-30 RX ADMIN — SODIUM CHLORIDE, PRESERVATIVE FREE 40 MG: 5 INJECTION INTRAVENOUS at 18:42

## 2025-05-30 RX ADMIN — MEROPENEM 1000 MG: 1 INJECTION INTRAVENOUS at 17:14

## 2025-05-30 RX ADMIN — POTASSIUM CHLORIDE 10 MEQ: 7.45 INJECTION INTRAVENOUS at 11:37

## 2025-05-30 RX ADMIN — FUROSEMIDE 40 MG: 10 INJECTION, SOLUTION INTRAMUSCULAR; INTRAVENOUS at 22:07

## 2025-05-30 RX ADMIN — MEROPENEM 1000 MG: 1 INJECTION INTRAVENOUS at 08:04

## 2025-05-30 RX ADMIN — AZITHROMYCIN MONOHYDRATE 500 MG: 500 INJECTION, POWDER, LYOPHILIZED, FOR SOLUTION INTRAVENOUS at 16:37

## 2025-05-30 RX ADMIN — POTASSIUM CHLORIDE 10 MEQ: 7.45 INJECTION INTRAVENOUS at 10:31

## 2025-05-30 RX ADMIN — SODIUM CHLORIDE: 0.9 INJECTION, SOLUTION INTRAVENOUS at 23:13

## 2025-05-30 RX ADMIN — POTASSIUM CHLORIDE 10 MEQ: 7.45 INJECTION INTRAVENOUS at 08:06

## 2025-05-30 RX ADMIN — MEROPENEM 1000 MG: 1 INJECTION INTRAVENOUS at 23:14

## 2025-05-30 RX ADMIN — SODIUM CHLORIDE: 0.9 INJECTION, SOLUTION INTRAVENOUS at 03:23

## 2025-05-30 RX ADMIN — LORAZEPAM 1 MG: 2 INJECTION INTRAMUSCULAR; INTRAVENOUS at 18:39

## 2025-05-30 RX ADMIN — MORPHINE SULFATE 2 MG: 2 INJECTION, SOLUTION INTRAMUSCULAR; INTRAVENOUS at 22:08

## 2025-05-30 RX ADMIN — MORPHINE SULFATE 2 MG: 2 INJECTION, SOLUTION INTRAMUSCULAR; INTRAVENOUS at 16:47

## 2025-05-30 RX ADMIN — SODIUM CHLORIDE, PRESERVATIVE FREE 40 MG: 5 INJECTION INTRAVENOUS at 07:42

## 2025-05-30 ASSESSMENT — PAIN SCALES - PAIN ASSESSMENT IN ADVANCED DEMENTIA (PAINAD)
TOTALSCORE: 1
CONSOLABILITY: DISTRACTED OR REASSURED BY VOICE/TOUCH
NEGVOCALIZATION: OCCASIONAL MOAN/GROAN, LOW SPEECH, NEGATIVE/DISAPPROVING QUALITY
FACIALEXPRESSION: FACIAL GRIMACING
NEGVOCALIZATION: OCCASIONAL MOAN/GROAN, LOW SPEECH, NEGATIVE/DISAPPROVING QUALITY
FACIALEXPRESSION: SMILING OR INEXPRESSIVE
CONSOLABILITY: NO NEED TO CONSOLE
BODYLANGUAGE: TENSE, DISTRESSED PACING, FIDGETING
TOTALSCORE: 6
BREATHING: OCCASIONAL LABORED BREATHING, SHORT PERIOD OF HYPERVENTILATION
BREATHING: NORMAL
BODYLANGUAGE: RELAXED

## 2025-05-30 ASSESSMENT — PAIN SCALES - GENERAL
PAINLEVEL_OUTOF10: 1
PAINLEVEL_OUTOF10: 6

## 2025-05-30 ASSESSMENT — PAIN SCALES - WONG BAKER: WONGBAKER_NUMERICALRESPONSE: NO HURT

## 2025-05-30 NOTE — SIGNIFICANT EVENT
Called to the bedside by nursing and found that the patient had a distended abdomen with vomiting.  NG tube was placed with 700 mL of aspiration into the canister.  CT of the abdomen pelvis revealed bilateral lower lobe pneumonia pretty extensive consistent with aspiration pneumonia.  Small bowel obstruction with a transition point in the right lower quadrant.  Surgical consultation and pulmonary consultation pending.  Diagnosis and treatment plan discussed with daughter at the bedside.  Continue meropenem and azithromycin

## 2025-05-30 NOTE — CARE COORDINATION
GI, surgical, and pulmonary consults pending.  IV ABX.  Currently on 5L NC.  Discharge disposition TBD.  Clinical improvement.       AQUILINO Reid

## 2025-05-30 NOTE — PLAN OF CARE
PHYSICAL THERAPY TREATMENT     Patient: Nena Mcnulty (84 y.o. female)  Date: 5/30/2025  Diagnosis: Generalized weakness [R53.1]  Sepsis with encephalopathy and septic shock, due to unspecified organism (HCC) [A41.9, R65.21, G93.41] Generalized weakness      Precautions: Fall Risk, Bed Alarm                      Recommendations for nursing mobility: Encourage HEP in prep for ADLs/mobility; see handout for details, Frequent repositioning to prevent skin breakdown, Use of bed/chair alarm for safety, LE elevation for management of edema, and Assist x2    In place during session: Peripheral IV, Nasal Cannula 5L, EKG/telemetry , and Nasogastric Tube, wrist restraints b/l   Chart, physical therapy assessment, plan of care and goals were reviewed.  ASSESSMENT  Patient continues with skilled PT services and is not progressing towards goals. Pt received semi supine upon PT arrival, agreeable to session. Pt A&O x self, disoriented to time, situation, and place. (See below for objective details and assist levels).     Overall pt tolerated session fair/poor today with limited activity tolerance, SPO2 desat to 85-86% with mobility. Requires maxAx 2 for sup<>sit transfers with max cues of initiation and sequencing. Pt desat to 85-86% on sitting at EOB, titrated O2 to 6 L, recovered to 89-90%, nsg notified. Pt able to tolerate sitting at the EOB ~10-12 min with intact sitting balance, high guard.  Will continue to benefit from skilled PT services, and will continue to progress as tolerated. Current PT DC recommendation Moderate intensity short-term skilled physical therapy up to 5x/week once medically appropriate.     Start of Session With mobility End of Session   SPO2 (%) 94 85-86% 90%     GOALS:  FUNCTIONAL STATUS PRIOR TO ADMISSION: The patient  required moderate assistance for basic and instrumental ADLs.    HOME SUPPORT PRIOR TO ADMISSION: The patient lived with daughter and required moderate assistance for

## 2025-05-30 NOTE — PLAN OF CARE
Problem: Chronic Conditions and Co-morbidities  Goal: Patient's chronic conditions and co-morbidity symptoms are monitored and maintained or improved  Outcome: Progressing     Problem: Discharge Planning  Goal: Discharge to home or other facility with appropriate resources  Outcome: Progressing     Problem: Safety - Adult  Goal: Free from fall injury  Outcome: Progressing     Problem: Pain  Goal: Verbalizes/displays adequate comfort level or baseline comfort level  Outcome: Progressing     Problem: Skin/Tissue Integrity  Goal: Skin integrity remains intact  Description: 1.  Monitor for areas of redness and/or skin breakdown2.  Assess vascular access sites hourly3.  Every 4-6 hours minimum:  Change oxygen saturation probe site4.  Every 4-6 hours:  If on nasal continuous positive airway pressure, respiratory therapy assess nares and determine need for appliance change or resting period  Outcome: Progressing     Problem: ABCDS Injury Assessment  Goal: Absence of physical injury  Outcome: Progressing     Problem: Neurosensory - Adult  Goal: Achieves stable or improved neurological status  Outcome: Progressing     Problem: Neurosensory - Adult  Goal: Achieves maximal functionality and self care  Outcome: Progressing     Problem: Respiratory - Adult  Goal: Achieves optimal ventilation and oxygenation  Outcome: Progressing     Problem: Cardiovascular - Adult  Goal: Maintains optimal cardiac output and hemodynamic stability  Outcome: Progressing     Problem: Cardiovascular - Adult  Goal: Absence of cardiac dysrhythmias or at baseline  Outcome: Progressing     Problem: Skin/Tissue Integrity - Adult  Goal: Skin integrity remains intact  Description: 1.  Monitor for areas of redness and/or skin breakdown2.  Assess vascular access sites hourly3.  Every 4-6 hours minimum:  Change oxygen saturation probe site4.  Every 4-6 hours:  If on nasal continuous positive airway pressure, respiratory therapy assess nares and determine

## 2025-05-30 NOTE — PLAN OF CARE
OCCUPATIONAL THERAPY TREATMENT  Patient: Nena Mcnulty (84 y.o. female)  Date: 5/30/2025  Primary Diagnosis: Generalized weakness [R53.1]  Sepsis with encephalopathy and septic shock, due to unspecified organism (HCC) [A41.9, R65.21, G93.41]       Precautions: Fall Risk, Bed Alarm                Recommendations for nursing mobility: Encourage HEP in prep for ADLs/mobility; see handout for details, Frequent repositioning to prevent skin breakdown, Use of bed/chair alarm for safety, and Assist x2    In place during session: Peripheral IV, Mid line, Nasal Cannula 5L, EKG/telemetry , and Nasogastric Tube  Chart, occupational therapy assessment, plan of care, and goals were reviewed.  ASSESSMENT  Patient continues with skilled OT services and is slowly progressing towards goals. Pt semi supine in bed w/ B wrist restraints upon WRIGHT/PT arrival, agreeable to session. Pt A&O x person only. Orientation provided w/ poor carry over noted. Wrist restraints removed during session and pt required several verbal and tactile cues not to touch NG tube. O2 monitored through out session. Pt initially on 5L however was stating 84-85 sitting eob.  Pt instructed on PLB however pt unable to follow directions.  O2 increased to 6L and O2 increased to 89-90. Pt completed bed mobility w/ overall Max A x 2 and extra time. Pt tolerated eob for ~10-12 minutes before becoming sob and returned to semi supine. Attempted light grooming while seated eob however pt unable to follow to complete. O2 still stating 89-90 in semi supine, pt left on 6L of O2 and RN notified. Pt completed UE therex w/ hand over hand technique semi supine in bed. Pt completed UE therex, see grid below for details, to maintain/ increase strength and endurance to aid in adl performance. Wrist restraints replaced. Pt left semi supine in bed with all known needs met.   (See below for objective details and assist levels).     Overall pt tolerated session poor/fair today with

## 2025-05-31 ENCOUNTER — APPOINTMENT (OUTPATIENT)
Facility: HOSPITAL | Age: 85
DRG: 871 | End: 2025-05-31
Payer: MEDICARE

## 2025-05-31 LAB
ALBUMIN SERPL-MCNC: 1.9 G/DL (ref 3.5–5)
ALBUMIN/GLOB SERPL: 0.5 (ref 1.1–2.2)
ALP SERPL-CCNC: 77 U/L (ref 45–117)
ALT SERPL-CCNC: 11 U/L (ref 12–78)
ANION GAP SERPL CALC-SCNC: 6 MMOL/L (ref 2–12)
AST SERPL W P-5'-P-CCNC: 33 U/L (ref 15–37)
BILIRUB SERPL-MCNC: 0.4 MG/DL (ref 0.2–1)
BUN SERPL-MCNC: 15 MG/DL (ref 6–20)
BUN/CREAT SERPL: 29 (ref 12–20)
CA-I BLD-MCNC: 8.6 MG/DL (ref 8.5–10.1)
CHLORIDE SERPL-SCNC: 104 MMOL/L (ref 97–108)
CO2 SERPL-SCNC: 30 MMOL/L (ref 21–32)
CREAT SERPL-MCNC: 0.52 MG/DL (ref 0.55–1.02)
ERYTHROCYTE [DISTWIDTH] IN BLOOD BY AUTOMATED COUNT: 15.9 % (ref 11.5–14.5)
GLOBULIN SER CALC-MCNC: 3.9 G/DL (ref 2–4)
GLUCOSE BLD STRIP.AUTO-MCNC: 104 MG/DL (ref 65–100)
GLUCOSE BLD STRIP.AUTO-MCNC: 96 MG/DL (ref 65–100)
GLUCOSE BLD STRIP.AUTO-MCNC: 97 MG/DL (ref 65–100)
GLUCOSE BLD STRIP.AUTO-MCNC: 98 MG/DL (ref 65–100)
GLUCOSE SERPL-MCNC: 86 MG/DL (ref 65–100)
HCT VFR BLD AUTO: 23.5 % (ref 35–47)
HGB BLD-MCNC: 7.5 G/DL (ref 11.5–16)
MCH RBC QN AUTO: 27 PG (ref 26–34)
MCHC RBC AUTO-ENTMCNC: 31.9 G/DL (ref 30–36.5)
MCV RBC AUTO: 84.5 FL (ref 80–99)
NRBC # BLD: 0 K/UL (ref 0–0.01)
NRBC BLD-RTO: 0 PER 100 WBC
PERFORMED BY:: ABNORMAL
PERFORMED BY:: NORMAL
PLATELET # BLD AUTO: 326 K/UL (ref 150–400)
PMV BLD AUTO: 11.8 FL (ref 8.9–12.9)
POTASSIUM SERPL-SCNC: 3.7 MMOL/L (ref 3.5–5.1)
PROT SERPL-MCNC: 5.8 G/DL (ref 6.4–8.2)
RBC # BLD AUTO: 2.78 M/UL (ref 3.8–5.2)
SODIUM SERPL-SCNC: 140 MMOL/L (ref 136–145)
WBC # BLD AUTO: 17.6 K/UL (ref 3.6–11)

## 2025-05-31 PROCEDURE — 85027 COMPLETE CBC AUTOMATED: CPT

## 2025-05-31 PROCEDURE — 2580000003 HC RX 258: Performed by: INTERNAL MEDICINE

## 2025-05-31 PROCEDURE — 6360000002 HC RX W HCPCS: Performed by: INTERNAL MEDICINE

## 2025-05-31 PROCEDURE — 36415 COLL VENOUS BLD VENIPUNCTURE: CPT

## 2025-05-31 PROCEDURE — 6370000000 HC RX 637 (ALT 250 FOR IP): Performed by: INTERNAL MEDICINE

## 2025-05-31 PROCEDURE — 2500000003 HC RX 250 WO HCPCS

## 2025-05-31 PROCEDURE — 2700000000 HC OXYGEN THERAPY PER DAY

## 2025-05-31 PROCEDURE — 94761 N-INVAS EAR/PLS OXIMETRY MLT: CPT

## 2025-05-31 PROCEDURE — 80053 COMPREHEN METABOLIC PANEL: CPT

## 2025-05-31 PROCEDURE — 2000000000 HC ICU R&B

## 2025-05-31 PROCEDURE — 82962 GLUCOSE BLOOD TEST: CPT

## 2025-05-31 PROCEDURE — 2500000003 HC RX 250 WO HCPCS: Performed by: INTERNAL MEDICINE

## 2025-05-31 PROCEDURE — 71045 X-RAY EXAM CHEST 1 VIEW: CPT

## 2025-05-31 RX ORDER — INSULIN GLARGINE 100 [IU]/ML
30 INJECTION, SOLUTION SUBCUTANEOUS DAILY
Status: DISCONTINUED | OUTPATIENT
Start: 2025-06-01 | End: 2025-06-01

## 2025-05-31 RX ADMIN — FUROSEMIDE 40 MG: 10 INJECTION, SOLUTION INTRAMUSCULAR; INTRAVENOUS at 20:15

## 2025-05-31 RX ADMIN — FUROSEMIDE 40 MG: 10 INJECTION, SOLUTION INTRAMUSCULAR; INTRAVENOUS at 13:45

## 2025-05-31 RX ADMIN — MEROPENEM 1000 MG: 1 INJECTION INTRAVENOUS at 23:01

## 2025-05-31 RX ADMIN — HEPARIN SODIUM 5000 UNITS: 5000 INJECTION INTRAVENOUS; SUBCUTANEOUS at 20:15

## 2025-05-31 RX ADMIN — HEPARIN SODIUM 5000 UNITS: 5000 INJECTION INTRAVENOUS; SUBCUTANEOUS at 13:45

## 2025-05-31 RX ADMIN — AZITHROMYCIN MONOHYDRATE 500 MG: 500 INJECTION, POWDER, LYOPHILIZED, FOR SOLUTION INTRAVENOUS at 16:02

## 2025-05-31 RX ADMIN — MORPHINE SULFATE 2 MG: 2 INJECTION, SOLUTION INTRAMUSCULAR; INTRAVENOUS at 14:15

## 2025-05-31 RX ADMIN — SODIUM CHLORIDE, PRESERVATIVE FREE 40 MG: 5 INJECTION INTRAVENOUS at 18:07

## 2025-05-31 RX ADMIN — FUROSEMIDE 40 MG: 10 INJECTION, SOLUTION INTRAMUSCULAR; INTRAVENOUS at 05:42

## 2025-05-31 RX ADMIN — MEROPENEM 1000 MG: 1 INJECTION INTRAVENOUS at 08:37

## 2025-05-31 RX ADMIN — MORPHINE SULFATE 2 MG: 2 INJECTION, SOLUTION INTRAMUSCULAR; INTRAVENOUS at 09:39

## 2025-05-31 RX ADMIN — POLYETHYLENE GLYCOL 3350 17 G: 17 POWDER, FOR SOLUTION ORAL at 08:36

## 2025-05-31 RX ADMIN — SODIUM CHLORIDE, PRESERVATIVE FREE 10 ML: 5 INJECTION INTRAVENOUS at 08:36

## 2025-05-31 RX ADMIN — SODIUM CHLORIDE, PRESERVATIVE FREE 40 MG: 5 INJECTION INTRAVENOUS at 08:36

## 2025-05-31 RX ADMIN — MORPHINE SULFATE 2 MG: 2 INJECTION, SOLUTION INTRAMUSCULAR; INTRAVENOUS at 01:40

## 2025-05-31 RX ADMIN — MORPHINE SULFATE 2 MG: 2 INJECTION, SOLUTION INTRAMUSCULAR; INTRAVENOUS at 23:58

## 2025-05-31 RX ADMIN — MEROPENEM 1000 MG: 1 INJECTION INTRAVENOUS at 14:59

## 2025-05-31 RX ADMIN — SODIUM CHLORIDE, PRESERVATIVE FREE 10 ML: 5 INJECTION INTRAVENOUS at 19:46

## 2025-05-31 ASSESSMENT — PAIN SCALES - GENERAL
PAINLEVEL_OUTOF10: 3
PAINLEVEL_OUTOF10: 3
PAINLEVEL_OUTOF10: 0
PAINLEVEL_OUTOF10: 0
PAINLEVEL_OUTOF10: 3
PAINLEVEL_OUTOF10: 3
PAINLEVEL_OUTOF10: 2
PAINLEVEL_OUTOF10: 0
PAINLEVEL_OUTOF10: 0
PAINLEVEL_OUTOF10: 9
PAINLEVEL_OUTOF10: 0

## 2025-05-31 ASSESSMENT — PAIN SCALES - PAIN ASSESSMENT IN ADVANCED DEMENTIA (PAINAD)
CONSOLABILITY: NO NEED TO CONSOLE
TOTALSCORE: 0
BREATHING: NORMAL
BREATHING: NORMAL
TOTALSCORE: 0
FACIALEXPRESSION: SMILING OR INEXPRESSIVE
BODYLANGUAGE: RELAXED
CONSOLABILITY: NO NEED TO CONSOLE
BODYLANGUAGE: RELAXED
FACIALEXPRESSION: SMILING OR INEXPRESSIVE

## 2025-05-31 ASSESSMENT — PAIN DESCRIPTION - ORIENTATION
ORIENTATION: MID
ORIENTATION: MID

## 2025-05-31 ASSESSMENT — PAIN DESCRIPTION - LOCATION
LOCATION: ABDOMEN

## 2025-05-31 ASSESSMENT — PAIN SCALES - WONG BAKER: WONGBAKER_NUMERICALRESPONSE: NO HURT

## 2025-05-31 ASSESSMENT — PAIN - FUNCTIONAL ASSESSMENT
PAIN_FUNCTIONAL_ASSESSMENT: PREVENTS OR INTERFERES SOME ACTIVE ACTIVITIES AND ADLS
PAIN_FUNCTIONAL_ASSESSMENT: PREVENTS OR INTERFERES SOME ACTIVE ACTIVITIES AND ADLS

## 2025-05-31 ASSESSMENT — PAIN DESCRIPTION - DESCRIPTORS
DESCRIPTORS: DISCOMFORT
DESCRIPTORS: DISCOMFORT

## 2025-05-31 NOTE — PLAN OF CARE
Problem: Safety - Adult  Goal: Free from fall injury  5/31/2025 0744 by Bhumi Jules RN  Outcome: Progressing  5/30/2025 2240 by Uriel Tan RN  Outcome: Progressing     Problem: Pain  Goal: Verbalizes/displays adequate comfort level or baseline comfort level  5/31/2025 0744 by Bhumi Jules RN  Outcome: Progressing  5/30/2025 2240 by Uriel Tan RN  Outcome: Progressing     Problem: Skin/Tissue Integrity  Goal: Skin integrity remains intact  Description: 1.  Monitor for areas of redness and/or skin breakdown2.  Assess vascular access sites hourly3.  Every 4-6 hours minimum:  Change oxygen saturation probe site4.  Every 4-6 hours:  If on nasal continuous positive airway pressure, respiratory therapy assess nares and determine need for appliance change or resting period  Outcome: Progressing  Flowsheets (Taken 5/30/2025 1800 by Deana Mercer RN)  Skin Integrity Remains Intact: Monitor for areas of redness and/or skin breakdown     Problem: Respiratory - Adult  Goal: Achieves optimal ventilation and oxygenation  5/30/2025 2240 by Uriel Tan RN  Outcome: Not Progressing

## 2025-06-01 ENCOUNTER — APPOINTMENT (OUTPATIENT)
Facility: HOSPITAL | Age: 85
DRG: 871 | End: 2025-06-01
Payer: MEDICARE

## 2025-06-01 LAB
ALBUMIN SERPL-MCNC: 2 G/DL (ref 3.5–5)
ALBUMIN/GLOB SERPL: 0.5 (ref 1.1–2.2)
ALP SERPL-CCNC: 83 U/L (ref 45–117)
ALT SERPL-CCNC: 12 U/L (ref 12–78)
ANION GAP SERPL CALC-SCNC: 10 MMOL/L (ref 2–12)
ANION GAP SERPL CALC-SCNC: 12 MMOL/L (ref 2–12)
ANION GAP SERPL CALC-SCNC: 13 MMOL/L (ref 2–12)
AST SERPL W P-5'-P-CCNC: 18 U/L (ref 15–37)
BILIRUB SERPL-MCNC: 0.4 MG/DL (ref 0.2–1)
BNP SERPL-MCNC: 1113 PG/ML
BUN SERPL-MCNC: 12 MG/DL (ref 6–20)
BUN SERPL-MCNC: 13 MG/DL (ref 6–20)
BUN SERPL-MCNC: 14 MG/DL (ref 6–20)
BUN/CREAT SERPL: 22 (ref 12–20)
BUN/CREAT SERPL: 27 (ref 12–20)
BUN/CREAT SERPL: 28 (ref 12–20)
CA-I BLD-MCNC: 8.5 MG/DL (ref 8.5–10.1)
CA-I BLD-MCNC: 8.6 MG/DL (ref 8.5–10.1)
CA-I BLD-MCNC: 8.9 MG/DL (ref 8.5–10.1)
CHLORIDE SERPL-SCNC: 89 MMOL/L (ref 97–108)
CHLORIDE SERPL-SCNC: 92 MMOL/L (ref 97–108)
CHLORIDE SERPL-SCNC: 93 MMOL/L (ref 97–108)
CO2 SERPL-SCNC: 31 MMOL/L (ref 21–32)
CO2 SERPL-SCNC: 32 MMOL/L (ref 21–32)
CO2 SERPL-SCNC: 33 MMOL/L (ref 21–32)
CREAT SERPL-MCNC: 0.46 MG/DL (ref 0.55–1.02)
CREAT SERPL-MCNC: 0.51 MG/DL (ref 0.55–1.02)
CREAT SERPL-MCNC: 0.54 MG/DL (ref 0.55–1.02)
ERYTHROCYTE [DISTWIDTH] IN BLOOD BY AUTOMATED COUNT: 15.8 % (ref 11.5–14.5)
GLOBULIN SER CALC-MCNC: 3.9 G/DL (ref 2–4)
GLUCOSE BLD STRIP.AUTO-MCNC: 126 MG/DL (ref 65–100)
GLUCOSE BLD STRIP.AUTO-MCNC: 137 MG/DL (ref 65–100)
GLUCOSE BLD STRIP.AUTO-MCNC: 145 MG/DL (ref 65–100)
GLUCOSE BLD STRIP.AUTO-MCNC: 162 MG/DL (ref 65–100)
GLUCOSE SERPL-MCNC: 104 MG/DL (ref 65–100)
GLUCOSE SERPL-MCNC: 130 MG/DL (ref 65–100)
GLUCOSE SERPL-MCNC: 146 MG/DL (ref 65–100)
HCT VFR BLD AUTO: 23.6 % (ref 35–47)
HGB BLD-MCNC: 7.7 G/DL (ref 11.5–16)
MAGNESIUM SERPL-MCNC: 1.5 MG/DL (ref 1.6–2.4)
MAGNESIUM SERPL-MCNC: 2.1 MG/DL (ref 1.6–2.4)
MAGNESIUM SERPL-MCNC: 2.3 MG/DL (ref 1.6–2.4)
MCH RBC QN AUTO: 27.5 PG (ref 26–34)
MCHC RBC AUTO-ENTMCNC: 32.6 G/DL (ref 30–36.5)
MCV RBC AUTO: 84.3 FL (ref 80–99)
NRBC # BLD: 0.02 K/UL (ref 0–0.01)
NRBC BLD-RTO: 0.1 PER 100 WBC
PERFORMED BY:: ABNORMAL
PHOSPHATE SERPL-MCNC: 2 MG/DL (ref 2.6–4.7)
PHOSPHATE SERPL-MCNC: 2.8 MG/DL (ref 2.6–4.7)
PHOSPHATE SERPL-MCNC: 3.4 MG/DL (ref 2.6–4.7)
PLATELET # BLD AUTO: 338 K/UL (ref 150–400)
PMV BLD AUTO: 11.3 FL (ref 8.9–12.9)
POTASSIUM SERPL-SCNC: 2.8 MMOL/L (ref 3.5–5.1)
POTASSIUM SERPL-SCNC: 3.2 MMOL/L (ref 3.5–5.1)
POTASSIUM SERPL-SCNC: 3.9 MMOL/L (ref 3.5–5.1)
PROT SERPL-MCNC: 5.9 G/DL (ref 6.4–8.2)
RBC # BLD AUTO: 2.8 M/UL (ref 3.8–5.2)
SODIUM SERPL-SCNC: 135 MMOL/L (ref 136–145)
WBC # BLD AUTO: 14.8 K/UL (ref 3.6–11)

## 2025-06-01 PROCEDURE — 83735 ASSAY OF MAGNESIUM: CPT

## 2025-06-01 PROCEDURE — 6360000002 HC RX W HCPCS

## 2025-06-01 PROCEDURE — 99221 1ST HOSP IP/OBS SF/LOW 40: CPT | Performed by: SURGERY

## 2025-06-01 PROCEDURE — 80053 COMPREHEN METABOLIC PANEL: CPT

## 2025-06-01 PROCEDURE — 6360000002 HC RX W HCPCS: Performed by: INTERNAL MEDICINE

## 2025-06-01 PROCEDURE — 71045 X-RAY EXAM CHEST 1 VIEW: CPT

## 2025-06-01 PROCEDURE — 82962 GLUCOSE BLOOD TEST: CPT

## 2025-06-01 PROCEDURE — 84100 ASSAY OF PHOSPHORUS: CPT

## 2025-06-01 PROCEDURE — 2580000003 HC RX 258: Performed by: NURSE PRACTITIONER

## 2025-06-01 PROCEDURE — 2580000003 HC RX 258: Performed by: INTERNAL MEDICINE

## 2025-06-01 PROCEDURE — 2500000003 HC RX 250 WO HCPCS: Performed by: NURSE PRACTITIONER

## 2025-06-01 PROCEDURE — 83880 ASSAY OF NATRIURETIC PEPTIDE: CPT

## 2025-06-01 PROCEDURE — 2500000003 HC RX 250 WO HCPCS

## 2025-06-01 PROCEDURE — 6360000002 HC RX W HCPCS: Performed by: NURSE PRACTITIONER

## 2025-06-01 PROCEDURE — 94761 N-INVAS EAR/PLS OXIMETRY MLT: CPT

## 2025-06-01 PROCEDURE — 2000000000 HC ICU R&B

## 2025-06-01 PROCEDURE — 2700000000 HC OXYGEN THERAPY PER DAY

## 2025-06-01 PROCEDURE — 80048 BASIC METABOLIC PNL TOTAL CA: CPT

## 2025-06-01 PROCEDURE — 74018 RADEX ABDOMEN 1 VIEW: CPT

## 2025-06-01 PROCEDURE — 85027 COMPLETE CBC AUTOMATED: CPT

## 2025-06-01 PROCEDURE — 6370000000 HC RX 637 (ALT 250 FOR IP): Performed by: INTERNAL MEDICINE

## 2025-06-01 RX ORDER — POTASSIUM CHLORIDE 7.45 MG/ML
10 INJECTION INTRAVENOUS PRN
Status: DISCONTINUED | OUTPATIENT
Start: 2025-06-01 | End: 2025-06-03

## 2025-06-01 RX ORDER — MAGNESIUM SULFATE IN WATER 40 MG/ML
2000 INJECTION, SOLUTION INTRAVENOUS PRN
Status: DISCONTINUED | OUTPATIENT
Start: 2025-06-01 | End: 2025-06-03

## 2025-06-01 RX ORDER — INSULIN GLARGINE 100 [IU]/ML
10 INJECTION, SOLUTION SUBCUTANEOUS DAILY
Status: DISCONTINUED | OUTPATIENT
Start: 2025-06-01 | End: 2025-06-03

## 2025-06-01 RX ORDER — MAGNESIUM SULFATE IN WATER 40 MG/ML
4000 INJECTION, SOLUTION INTRAVENOUS ONCE
Status: COMPLETED | OUTPATIENT
Start: 2025-06-01 | End: 2025-06-01

## 2025-06-01 RX ORDER — POTASSIUM CHLORIDE 1500 MG/1
40 TABLET, EXTENDED RELEASE ORAL PRN
Status: DISCONTINUED | OUTPATIENT
Start: 2025-06-01 | End: 2025-06-01

## 2025-06-01 RX ORDER — POTASSIUM CHLORIDE 29.8 MG/ML
20 INJECTION INTRAVENOUS PRN
Status: DISCONTINUED | OUTPATIENT
Start: 2025-06-01 | End: 2025-06-03

## 2025-06-01 RX ORDER — POTASSIUM CHLORIDE 7.45 MG/ML
10 INJECTION INTRAVENOUS
Status: COMPLETED | OUTPATIENT
Start: 2025-06-01 | End: 2025-06-01

## 2025-06-01 RX ORDER — POTASSIUM CHLORIDE 7.45 MG/ML
10 INJECTION INTRAVENOUS PRN
Status: DISCONTINUED | OUTPATIENT
Start: 2025-06-01 | End: 2025-06-01

## 2025-06-01 RX ORDER — DIPHENHYDRAMINE HYDROCHLORIDE 50 MG/ML
12.5 INJECTION, SOLUTION INTRAMUSCULAR; INTRAVENOUS ONCE
Status: COMPLETED | OUTPATIENT
Start: 2025-06-01 | End: 2025-06-01

## 2025-06-01 RX ORDER — DIPHENHYDRAMINE HYDROCHLORIDE 50 MG/ML
INJECTION, SOLUTION INTRAMUSCULAR; INTRAVENOUS
Status: COMPLETED
Start: 2025-06-01 | End: 2025-06-01

## 2025-06-01 RX ADMIN — DIPHENHYDRAMINE HYDROCHLORIDE 12.5 MG: 50 INJECTION, SOLUTION INTRAMUSCULAR; INTRAVENOUS at 03:34

## 2025-06-01 RX ADMIN — POTASSIUM CHLORIDE 10 MEQ: 7.46 INJECTION, SOLUTION INTRAVENOUS at 02:44

## 2025-06-01 RX ADMIN — POTASSIUM CHLORIDE 10 MEQ: 7.46 INJECTION, SOLUTION INTRAVENOUS at 05:57

## 2025-06-01 RX ADMIN — POLYETHYLENE GLYCOL 3350 17 G: 17 POWDER, FOR SOLUTION ORAL at 08:16

## 2025-06-01 RX ADMIN — POTASSIUM CHLORIDE 10 MEQ: 7.46 INJECTION, SOLUTION INTRAVENOUS at 03:47

## 2025-06-01 RX ADMIN — SODIUM CHLORIDE, PRESERVATIVE FREE 10 ML: 5 INJECTION INTRAVENOUS at 08:16

## 2025-06-01 RX ADMIN — POTASSIUM PHOSPHATE, MONOBASIC POTASSIUM PHOSPHATE, DIBASIC 30 MMOL: 224; 236 INJECTION, SOLUTION, CONCENTRATE INTRAVENOUS at 04:07

## 2025-06-01 RX ADMIN — HEPARIN SODIUM 5000 UNITS: 5000 INJECTION INTRAVENOUS; SUBCUTANEOUS at 21:03

## 2025-06-01 RX ADMIN — FUROSEMIDE 40 MG: 10 INJECTION, SOLUTION INTRAMUSCULAR; INTRAVENOUS at 21:03

## 2025-06-01 RX ADMIN — MORPHINE SULFATE 2 MG: 2 INJECTION, SOLUTION INTRAMUSCULAR; INTRAVENOUS at 21:04

## 2025-06-01 RX ADMIN — POTASSIUM CHLORIDE 10 MEQ: 7.46 INJECTION, SOLUTION INTRAVENOUS at 23:34

## 2025-06-01 RX ADMIN — MEROPENEM 1000 MG: 1 INJECTION INTRAVENOUS at 07:26

## 2025-06-01 RX ADMIN — POTASSIUM CHLORIDE 10 MEQ: 7.46 INJECTION, SOLUTION INTRAVENOUS at 04:48

## 2025-06-01 RX ADMIN — MEROPENEM 1000 MG: 1 INJECTION INTRAVENOUS at 23:06

## 2025-06-01 RX ADMIN — SODIUM CHLORIDE, PRESERVATIVE FREE 10 ML: 5 INJECTION INTRAVENOUS at 21:07

## 2025-06-01 RX ADMIN — HEPARIN SODIUM 5000 UNITS: 5000 INJECTION INTRAVENOUS; SUBCUTANEOUS at 05:49

## 2025-06-01 RX ADMIN — DIPHENHYDRAMINE HYDROCHLORIDE 12.5 MG: 50 INJECTION INTRAMUSCULAR; INTRAVENOUS at 23:33

## 2025-06-01 RX ADMIN — DIPHENHYDRAMINE HYDROCHLORIDE 12.5 MG: 50 INJECTION INTRAMUSCULAR; INTRAVENOUS at 03:34

## 2025-06-01 RX ADMIN — POTASSIUM CHLORIDE 10 MEQ: 7.46 INJECTION, SOLUTION INTRAVENOUS at 08:16

## 2025-06-01 RX ADMIN — POTASSIUM CHLORIDE 10 MEQ: 7.46 INJECTION, SOLUTION INTRAVENOUS at 06:58

## 2025-06-01 RX ADMIN — MORPHINE SULFATE 2 MG: 2 INJECTION, SOLUTION INTRAMUSCULAR; INTRAVENOUS at 16:42

## 2025-06-01 RX ADMIN — MEROPENEM 1000 MG: 1 INJECTION INTRAVENOUS at 15:07

## 2025-06-01 RX ADMIN — MAGNESIUM SULFATE HEPTAHYDRATE 4000 MG: 40 INJECTION, SOLUTION INTRAVENOUS at 03:09

## 2025-06-01 RX ADMIN — HEPARIN SODIUM 5000 UNITS: 5000 INJECTION INTRAVENOUS; SUBCUTANEOUS at 13:19

## 2025-06-01 RX ADMIN — FUROSEMIDE 40 MG: 10 INJECTION, SOLUTION INTRAMUSCULAR; INTRAVENOUS at 05:49

## 2025-06-01 RX ADMIN — FUROSEMIDE 40 MG: 10 INJECTION, SOLUTION INTRAMUSCULAR; INTRAVENOUS at 13:20

## 2025-06-01 ASSESSMENT — PAIN DESCRIPTION - PAIN TYPE: TYPE: ACUTE PAIN

## 2025-06-01 ASSESSMENT — PAIN - FUNCTIONAL ASSESSMENT
PAIN_FUNCTIONAL_ASSESSMENT: ACTIVITIES ARE NOT PREVENTED
PAIN_FUNCTIONAL_ASSESSMENT: PREVENTS OR INTERFERES SOME ACTIVE ACTIVITIES AND ADLS

## 2025-06-01 ASSESSMENT — PAIN SCALES - GENERAL
PAINLEVEL_OUTOF10: 0
PAINLEVEL_OUTOF10: 3
PAINLEVEL_OUTOF10: 0
PAINLEVEL_OUTOF10: 9
PAINLEVEL_OUTOF10: 0
PAINLEVEL_OUTOF10: 0
PAINLEVEL_OUTOF10: 8

## 2025-06-01 ASSESSMENT — PAIN DESCRIPTION - DESCRIPTORS
DESCRIPTORS: DISCOMFORT
DESCRIPTORS: DISCOMFORT

## 2025-06-01 ASSESSMENT — PAIN DESCRIPTION - LOCATION
LOCATION: GENERALIZED
LOCATION: PERINEUM

## 2025-06-01 ASSESSMENT — PAIN DESCRIPTION - FREQUENCY: FREQUENCY: INTERMITTENT

## 2025-06-01 ASSESSMENT — PAIN DESCRIPTION - ONSET: ONSET: GRADUAL

## 2025-06-01 NOTE — PLAN OF CARE
Problem: Skin/Tissue Integrity  Goal: Skin integrity remains intact  Description: 1.  Monitor for areas of redness and/or skin breakdown2.  Assess vascular access sites hourly3.  Every 4-6 hours minimum:  Change oxygen saturation probe site4.  Every 4-6 hours:  If on nasal continuous positive airway pressure, respiratory therapy assess nares and determine need for appliance change or resting period  Outcome: Not Progressing  Flowsheets (Taken 5/31/2025 2000)  Skin Integrity Remains Intact:   Monitor for areas of redness and/or skin breakdown   Turn and reposition as indicated   Positioning devices   Check visual cues for pain   Pressure redistribution bed/mattress (bed type)   Monitor skin under medical devices     Problem: Chronic Conditions and Co-morbidities  Goal: Patient's chronic conditions and co-morbidity symptoms are monitored and maintained or improved  Outcome: Progressing  Flowsheets (Taken 5/31/2025 2000)  Care Plan - Patient's Chronic Conditions and Co-Morbidity Symptoms are Monitored and Maintained or Improved:   Monitor and assess patient's chronic conditions and comorbid symptoms for stability, deterioration, or improvement   Collaborate with multidisciplinary team to address chronic and comorbid conditions and prevent exacerbation or deterioration   Update acute care plan with appropriate goals if chronic or comorbid symptoms are exacerbated and prevent overall improvement and discharge     Problem: Discharge Planning  Goal: Discharge to home or other facility with appropriate resources  Outcome: Progressing  Flowsheets (Taken 5/31/2025 2000)  Discharge to home or other facility with appropriate resources:   Identify barriers to discharge with patient and caregiver   Identify discharge learning needs (meds, wound care, etc)     Problem: Safety - Adult  Goal: Free from fall injury  Outcome: Progressing  Flowsheets (Taken 5/31/2025 2000)  Free From Fall Injury:   Instruct family/caregiver on

## 2025-06-01 NOTE — PLAN OF CARE
Problem: Safety - Adult  Goal: Free from fall injury  6/1/2025 0740 by Bhumi Jules RN  Outcome: Progressing  5/31/2025 2245 by Romina Vaughn RN  Outcome: Progressing  Flowsheets (Taken 5/31/2025 2000)  Free From Fall Injury:   Instruct family/caregiver on patient safety   Based on caregiver fall risk screen, instruct family/caregiver to ask for assistance with transferring infant if caregiver noted to have fall risk factors     Problem: Pain  Goal: Verbalizes/displays adequate comfort level or baseline comfort level  6/1/2025 0740 by Bhumi Jules RN  Outcome: Progressing  5/31/2025 2245 by Romina Vaughn RN  Outcome: Progressing  Flowsheets (Taken 5/31/2025 2000)  Verbalizes/displays adequate comfort level or baseline comfort level:   Assess pain using appropriate pain scale   Administer analgesics based on type and severity of pain and evaluate response   Implement non-pharmacological measures as appropriate and evaluate response     Problem: Skin/Tissue Integrity  Goal: Skin integrity remains intact  Description: 1.  Monitor for areas of redness and/or skin breakdown2.  Assess vascular access sites hourly3.  Every 4-6 hours minimum:  Change oxygen saturation probe site4.  Every 4-6 hours:  If on nasal continuous positive airway pressure, respiratory therapy assess nares and determine need for appliance change or resting period  6/1/2025 0740 by Bhumi Jules RN  Outcome: Progressing  5/31/2025 2245 by Romina Vaughn RN  Outcome: Not Progressing  Flowsheets (Taken 5/31/2025 2000)  Skin Integrity Remains Intact:   Monitor for areas of redness and/or skin breakdown   Turn and reposition as indicated   Positioning devices   Check visual cues for pain   Pressure redistribution bed/mattress (bed type)   Monitor skin under medical devices     Problem: Skin/Tissue Integrity  Goal: Skin integrity remains intact  Description: 1.  Monitor for areas of redness and/or skin breakdown2.  Assess vascular access

## 2025-06-02 LAB
ALBUMIN SERPL-MCNC: 2.1 G/DL (ref 3.5–5)
ALBUMIN/GLOB SERPL: 0.5 (ref 1.1–2.2)
ALP SERPL-CCNC: 81 U/L (ref 45–117)
ALT SERPL-CCNC: 10 U/L (ref 12–78)
ANION GAP SERPL CALC-SCNC: 10 MMOL/L (ref 2–12)
AST SERPL W P-5'-P-CCNC: 20 U/L (ref 15–37)
BILIRUB SERPL-MCNC: 0.4 MG/DL (ref 0.2–1)
BUN SERPL-MCNC: 14 MG/DL (ref 6–20)
BUN/CREAT SERPL: 25 (ref 12–20)
CA-I BLD-MCNC: 8.8 MG/DL (ref 8.5–10.1)
CHLORIDE SERPL-SCNC: 91 MMOL/L (ref 97–108)
CO2 SERPL-SCNC: 33 MMOL/L (ref 21–32)
CREAT SERPL-MCNC: 0.56 MG/DL (ref 0.55–1.02)
ERYTHROCYTE [DISTWIDTH] IN BLOOD BY AUTOMATED COUNT: 15.6 % (ref 11.5–14.5)
GLOBULIN SER CALC-MCNC: 4.1 G/DL (ref 2–4)
GLUCOSE BLD STRIP.AUTO-MCNC: 142 MG/DL (ref 65–100)
GLUCOSE BLD STRIP.AUTO-MCNC: 165 MG/DL (ref 65–100)
GLUCOSE BLD STRIP.AUTO-MCNC: 175 MG/DL (ref 65–100)
GLUCOSE BLD STRIP.AUTO-MCNC: 178 MG/DL (ref 65–100)
GLUCOSE SERPL-MCNC: 130 MG/DL (ref 65–100)
HCT VFR BLD AUTO: 23.8 % (ref 35–47)
HGB BLD-MCNC: 7.7 G/DL (ref 11.5–16)
MAGNESIUM SERPL-MCNC: 2 MG/DL (ref 1.6–2.4)
MCH RBC QN AUTO: 27.4 PG (ref 26–34)
MCHC RBC AUTO-ENTMCNC: 32.4 G/DL (ref 30–36.5)
MCV RBC AUTO: 84.7 FL (ref 80–99)
NRBC # BLD: 0 K/UL (ref 0–0.01)
NRBC BLD-RTO: 0 PER 100 WBC
PERFORMED BY:: ABNORMAL
PHOSPHATE SERPL-MCNC: 2.2 MG/DL (ref 2.6–4.7)
PLATELET # BLD AUTO: 367 K/UL (ref 150–400)
PMV BLD AUTO: 11.2 FL (ref 8.9–12.9)
POTASSIUM SERPL-SCNC: 3.8 MMOL/L (ref 3.5–5.1)
PROT SERPL-MCNC: 6.2 G/DL (ref 6.4–8.2)
RBC # BLD AUTO: 2.81 M/UL (ref 3.8–5.2)
SODIUM SERPL-SCNC: 134 MMOL/L (ref 136–145)
WBC # BLD AUTO: 10.5 K/UL (ref 3.6–11)

## 2025-06-02 PROCEDURE — 2580000003 HC RX 258: Performed by: NURSE PRACTITIONER

## 2025-06-02 PROCEDURE — 2700000000 HC OXYGEN THERAPY PER DAY

## 2025-06-02 PROCEDURE — 2500000003 HC RX 250 WO HCPCS

## 2025-06-02 PROCEDURE — 6370000000 HC RX 637 (ALT 250 FOR IP): Performed by: INTERNAL MEDICINE

## 2025-06-02 PROCEDURE — 6360000002 HC RX W HCPCS: Performed by: INTERNAL MEDICINE

## 2025-06-02 PROCEDURE — 80053 COMPREHEN METABOLIC PANEL: CPT

## 2025-06-02 PROCEDURE — 36415 COLL VENOUS BLD VENIPUNCTURE: CPT

## 2025-06-02 PROCEDURE — 2500000003 HC RX 250 WO HCPCS: Performed by: NURSE PRACTITIONER

## 2025-06-02 PROCEDURE — 6360000002 HC RX W HCPCS: Performed by: NURSE PRACTITIONER

## 2025-06-02 PROCEDURE — 84100 ASSAY OF PHOSPHORUS: CPT

## 2025-06-02 PROCEDURE — 85027 COMPLETE CBC AUTOMATED: CPT

## 2025-06-02 PROCEDURE — 2580000003 HC RX 258: Performed by: INTERNAL MEDICINE

## 2025-06-02 PROCEDURE — 94761 N-INVAS EAR/PLS OXIMETRY MLT: CPT

## 2025-06-02 PROCEDURE — 82962 GLUCOSE BLOOD TEST: CPT

## 2025-06-02 PROCEDURE — 83735 ASSAY OF MAGNESIUM: CPT

## 2025-06-02 PROCEDURE — 2000000000 HC ICU R&B

## 2025-06-02 RX ORDER — TRAZODONE HYDROCHLORIDE 50 MG/1
50 TABLET ORAL NIGHTLY
Status: DISCONTINUED | OUTPATIENT
Start: 2025-06-02 | End: 2025-06-03

## 2025-06-02 RX ADMIN — POTASSIUM CHLORIDE 10 MEQ: 7.46 INJECTION, SOLUTION INTRAVENOUS at 02:48

## 2025-06-02 RX ADMIN — MEROPENEM 1000 MG: 1 INJECTION INTRAVENOUS at 15:04

## 2025-06-02 RX ADMIN — TRAZODONE HYDROCHLORIDE 50 MG: 50 TABLET ORAL at 21:11

## 2025-06-02 RX ADMIN — POTASSIUM CHLORIDE 10 MEQ: 7.46 INJECTION, SOLUTION INTRAVENOUS at 01:41

## 2025-06-02 RX ADMIN — MEROPENEM 1000 MG: 1 INJECTION INTRAVENOUS at 06:50

## 2025-06-02 RX ADMIN — SODIUM CHLORIDE, PRESERVATIVE FREE 10 ML: 5 INJECTION INTRAVENOUS at 08:45

## 2025-06-02 RX ADMIN — FUROSEMIDE 40 MG: 10 INJECTION, SOLUTION INTRAMUSCULAR; INTRAVENOUS at 04:34

## 2025-06-02 RX ADMIN — POTASSIUM CHLORIDE 10 MEQ: 7.46 INJECTION, SOLUTION INTRAVENOUS at 00:40

## 2025-06-02 RX ADMIN — MORPHINE SULFATE 2 MG: 2 INJECTION, SOLUTION INTRAMUSCULAR; INTRAVENOUS at 22:13

## 2025-06-02 RX ADMIN — SODIUM CHLORIDE, PRESERVATIVE FREE 10 ML: 5 INJECTION INTRAVENOUS at 21:13

## 2025-06-02 RX ADMIN — SODIUM PHOSPHATE, MONOBASIC, MONOHYDRATE AND SODIUM PHOSPHATE, DIBASIC, ANHYDROUS 15 MMOL: 142; 276 INJECTION, SOLUTION INTRAVENOUS at 07:54

## 2025-06-02 RX ADMIN — MORPHINE SULFATE 2 MG: 2 INJECTION, SOLUTION INTRAMUSCULAR; INTRAVENOUS at 04:34

## 2025-06-02 RX ADMIN — INSULIN GLARGINE 10 UNITS: 100 INJECTION, SOLUTION SUBCUTANEOUS at 08:44

## 2025-06-02 RX ADMIN — FUROSEMIDE 40 MG: 10 INJECTION, SOLUTION INTRAMUSCULAR; INTRAVENOUS at 21:11

## 2025-06-02 RX ADMIN — HEPARIN SODIUM 5000 UNITS: 5000 INJECTION INTRAVENOUS; SUBCUTANEOUS at 15:26

## 2025-06-02 RX ADMIN — MEROPENEM 1000 MG: 1 INJECTION INTRAVENOUS at 23:31

## 2025-06-02 RX ADMIN — MORPHINE SULFATE 2 MG: 2 INJECTION, SOLUTION INTRAMUSCULAR; INTRAVENOUS at 00:39

## 2025-06-02 RX ADMIN — HEPARIN SODIUM 5000 UNITS: 5000 INJECTION INTRAVENOUS; SUBCUTANEOUS at 04:35

## 2025-06-02 ASSESSMENT — PAIN SCALES - GENERAL
PAINLEVEL_OUTOF10: 0

## 2025-06-02 NOTE — PLAN OF CARE
Problem: Chronic Conditions and Co-morbidities  Goal: Patient's chronic conditions and co-morbidity symptoms are monitored and maintained or improved  Outcome: Progressing  Flowsheets (Taken 6/1/2025 2000)  Care Plan - Patient's Chronic Conditions and Co-Morbidity Symptoms are Monitored and Maintained or Improved:   Monitor and assess patient's chronic conditions and comorbid symptoms for stability, deterioration, or improvement   Collaborate with multidisciplinary team to address chronic and comorbid conditions and prevent exacerbation or deterioration   Update acute care plan with appropriate goals if chronic or comorbid symptoms are exacerbated and prevent overall improvement and discharge     Problem: Discharge Planning  Goal: Discharge to home or other facility with appropriate resources  Outcome: Progressing  Flowsheets (Taken 6/1/2025 2000)  Discharge to home or other facility with appropriate resources:   Identify barriers to discharge with patient and caregiver   Identify discharge learning needs (meds, wound care, etc)     Problem: Safety - Adult  Goal: Free from fall injury  Outcome: Progressing  Flowsheets (Taken 6/1/2025 2000)  Free From Fall Injury:   Instruct family/caregiver on patient safety   Based on caregiver fall risk screen, instruct family/caregiver to ask for assistance with transferring infant if caregiver noted to have fall risk factors     Problem: Pain  Goal: Verbalizes/displays adequate comfort level or baseline comfort level  Outcome: Progressing  Flowsheets (Taken 6/1/2025 2000)  Verbalizes/displays adequate comfort level or baseline comfort level:   Encourage patient to monitor pain and request assistance   Assess pain using appropriate pain scale   Administer analgesics based on type and severity of pain and evaluate response   Implement non-pharmacological measures as appropriate and evaluate response     Problem: Skin/Tissue Integrity  Goal: Skin integrity remains

## 2025-06-02 NOTE — PLAN OF CARE
Problem: Chronic Conditions and Co-morbidities  Goal: Patient's chronic conditions and co-morbidity symptoms are monitored and maintained or improved  Recent Flowsheet Documentation  Taken 6/2/2025 0800 by Katy Calvert RN  Care Plan - Patient's Chronic Conditions and Co-Morbidity Symptoms are Monitored and Maintained or Improved:   Monitor and assess patient's chronic conditions and comorbid symptoms for stability, deterioration, or improvement   Collaborate with multidisciplinary team to address chronic and comorbid conditions and prevent exacerbation or deterioration   Update acute care plan with appropriate goals if chronic or comorbid symptoms are exacerbated and prevent overall improvement and discharge  6/1/2025 2253 by Romina Vaughn RN  Outcome: Progressing  Flowsheets (Taken 6/1/2025 2000)  Care Plan - Patient's Chronic Conditions and Co-Morbidity Symptoms are Monitored and Maintained or Improved:   Monitor and assess patient's chronic conditions and comorbid symptoms for stability, deterioration, or improvement   Collaborate with multidisciplinary team to address chronic and comorbid conditions and prevent exacerbation or deterioration   Update acute care plan with appropriate goals if chronic or comorbid symptoms are exacerbated and prevent overall improvement and discharge     Problem: Discharge Planning  Goal: Discharge to home or other facility with appropriate resources  Recent Flowsheet Documentation  Taken 6/2/2025 0800 by Katy Calvert RN  Discharge to home or other facility with appropriate resources:   Identify barriers to discharge with patient and caregiver   Arrange for needed discharge resources and transportation as appropriate   Identify discharge learning needs (meds, wound care, etc)   Arrange for interpreters to assist at discharge as needed   Refer to discharge planning if patient needs post-hospital services based on physician order or complex needs related to functional

## 2025-06-02 NOTE — CARE COORDINATION
Pt lives with his daughter and needs assistance with ADL/IADL.     Per previous CM, if Pt does not improve, Pt daughter may be amenable to hospice.     Per [facility]  Hospice    CM met f/f with Pt daughter, she stated that she felt that it might be better to keep Pt here and do comfort care.    Pt daughter asked CM to get a hold of [facility].     Pt daughter will talk with her brother and sister to see about taking Pt home.     Pt daughter singed the choice letter, CM sent a referral to [facility].   CM called Emeka Aguirre for [facility]  Carla@ [phone]    Carla stated that she will be here this afternoon.     11:50    [facility] accepted Pt.     2:30  CM met f/f with Emeka Aguirre for [facility]. Carla stated that Pt daughter has decided to keep Pt here because she is afraid that Pt would not make it home.

## 2025-06-03 LAB
BACTERIA SPEC CULT: NORMAL
GLUCOSE BLD STRIP.AUTO-MCNC: 139 MG/DL (ref 65–100)
Lab: NORMAL
PERFORMED BY:: ABNORMAL

## 2025-06-03 PROCEDURE — 6360000002 HC RX W HCPCS: Performed by: INTERNAL MEDICINE

## 2025-06-03 PROCEDURE — 82962 GLUCOSE BLOOD TEST: CPT

## 2025-06-03 PROCEDURE — 6360000002 HC RX W HCPCS: Performed by: NURSE PRACTITIONER

## 2025-06-03 PROCEDURE — 1100000000 HC RM PRIVATE

## 2025-06-03 RX ORDER — MORPHINE SULFATE 4 MG/ML
4 INJECTION, SOLUTION INTRAMUSCULAR; INTRAVENOUS
Status: DISCONTINUED | OUTPATIENT
Start: 2025-06-03 | End: 2025-06-05 | Stop reason: HOSPADM

## 2025-06-03 RX ORDER — MIDAZOLAM HYDROCHLORIDE 2 MG/2ML
4 INJECTION, SOLUTION INTRAMUSCULAR; INTRAVENOUS
Status: DISCONTINUED | OUTPATIENT
Start: 2025-06-03 | End: 2025-06-05 | Stop reason: HOSPADM

## 2025-06-03 RX ORDER — LORAZEPAM 2 MG/ML
0.5 INJECTION INTRAMUSCULAR ONCE
Status: COMPLETED | OUTPATIENT
Start: 2025-06-03 | End: 2025-06-03

## 2025-06-03 RX ADMIN — MORPHINE SULFATE 4 MG: 4 INJECTION INTRAVENOUS at 20:32

## 2025-06-03 RX ADMIN — MIDAZOLAM HYDROCHLORIDE 4 MG: 1 INJECTION, SOLUTION INTRAMUSCULAR; INTRAVENOUS at 08:59

## 2025-06-03 RX ADMIN — MORPHINE SULFATE 2 MG: 2 INJECTION, SOLUTION INTRAMUSCULAR; INTRAVENOUS at 02:29

## 2025-06-03 RX ADMIN — MORPHINE SULFATE 4 MG: 4 INJECTION INTRAVENOUS at 09:01

## 2025-06-03 RX ADMIN — MORPHINE SULFATE 4 MG: 4 INJECTION INTRAVENOUS at 13:52

## 2025-06-03 RX ADMIN — MORPHINE SULFATE 4 MG: 4 INJECTION INTRAVENOUS at 11:32

## 2025-06-03 RX ADMIN — MIDAZOLAM HYDROCHLORIDE 4 MG: 1 INJECTION, SOLUTION INTRAMUSCULAR; INTRAVENOUS at 22:38

## 2025-06-03 RX ADMIN — MIDAZOLAM HYDROCHLORIDE 4 MG: 1 INJECTION, SOLUTION INTRAMUSCULAR; INTRAVENOUS at 13:49

## 2025-06-03 RX ADMIN — LORAZEPAM 0.5 MG: 2 INJECTION INTRAMUSCULAR; INTRAVENOUS at 05:53

## 2025-06-03 ASSESSMENT — PAIN SCALES - GENERAL
PAINLEVEL_OUTOF10: 0

## 2025-06-03 ASSESSMENT — PAIN SCALES - WONG BAKER
WONGBAKER_NUMERICALRESPONSE: NO HURT

## 2025-06-03 NOTE — PLAN OF CARE
Problem: Chronic Conditions and Co-morbidities  Goal: Patient's chronic conditions and co-morbidity symptoms are monitored and maintained or improved  Outcome: Progressing  Flowsheets (Taken 6/2/2025 2000)  Care Plan - Patient's Chronic Conditions and Co-Morbidity Symptoms are Monitored and Maintained or Improved: Monitor and assess patient's chronic conditions and comorbid symptoms for stability, deterioration, or improvement     Problem: Safety - Adult  Goal: Free from fall injury  Outcome: Progressing     Problem: Pain  Goal: Verbalizes/displays adequate comfort level or baseline comfort level  Outcome: Progressing     Problem: Cardiovascular - Adult  Goal: Maintains optimal cardiac output and hemodynamic stability  Outcome: Progressing  Flowsheets (Taken 6/2/2025 2000)  Maintains optimal cardiac output and hemodynamic stability: Monitor urine output and notify Licensed Independent Practitioner for values outside of normal range     Problem: Respiratory - Adult  Goal: Achieves optimal ventilation and oxygenation  Outcome: Not Progressing  Flowsheets (Taken 6/2/2025 2000)  Achieves optimal ventilation and oxygenation:   Assess for changes in respiratory status   Assess for changes in mentation and behavior   Position to facilitate oxygenation and minimize respiratory effort   Oxygen supplementation based on oxygen saturation or arterial blood gases   Assess the need for suctioning and aspirate as needed

## 2025-06-03 NOTE — CARE COORDINATION
CM will confirm with Pt daughter that she wants to keep Pt here with Comfort Care.     If Pt daughter wants to keep Pt here CM will discuss sending to [facility].     9:50    Pt was put on Comfort Measures at 9:00 am. Pt Nurse stated that the doctor does not feel that Pt will last long.

## 2025-06-03 NOTE — PLAN OF CARE
Problem: Chronic Conditions and Co-morbidities  Goal: Patient's chronic conditions and co-morbidity symptoms are monitored and maintained or improved  Outcome: Progressing  Flowsheets (Taken 6/3/2025 0730)  Care Plan - Patient's Chronic Conditions and Co-Morbidity Symptoms are Monitored and Maintained or Improved:   Monitor and assess patient's chronic conditions and comorbid symptoms for stability, deterioration, or improvement   Collaborate with multidisciplinary team to address chronic and comorbid conditions and prevent exacerbation or deterioration     Problem: Discharge Planning  Goal: Discharge to home or other facility with appropriate resources  Outcome: Progressing  Flowsheets (Taken 6/3/2025 0730)  Discharge to home or other facility with appropriate resources:   Identify barriers to discharge with patient and caregiver   Arrange for needed discharge resources and transportation as appropriate     Problem: Safety - Adult  Goal: Free from fall injury  Outcome: Progressing     Problem: Pain  Goal: Verbalizes/displays adequate comfort level or baseline comfort level  Outcome: Progressing     Problem: Skin/Tissue Integrity  Goal: Skin integrity remains intact  Description: 1.  Monitor for areas of redness and/or skin breakdown2.  Assess vascular access sites hourly3.  Every 4-6 hours minimum:  Change oxygen saturation probe site4.  Every 4-6 hours:  If on nasal continuous positive airway pressure, respiratory therapy assess nares and determine need for appliance change or resting period  Outcome: Progressing     Problem: ABCDS Injury Assessment  Goal: Absence of physical injury  Outcome: Progressing     Problem: Neurosensory - Adult  Goal: Achieves stable or improved neurological status  Outcome: Progressing  Goal: Achieves maximal functionality and self care  Outcome: Progressing     Problem: Respiratory - Adult  Goal: Achieves optimal ventilation and oxygenation  Outcome: Progressing     Problem:

## 2025-06-03 NOTE — CONSULTS
Hospitalist Progress Note               Daily Progress Note: 6/3/2025      Hospital Day: 11     Chief complaint:   Chief Complaint   Patient presents with    Fatigue        Subjective:   This is an 84-year-old female with history of dementia, GERD, esophagitis, PUD, HTN, T2DM, TIA x 5, colorectal cancer s/p hemicolectomy in  who was initially admitted on  with nausea, vomiting and generalized weakness. She was also recently diagnosed with UTI prior to admission. CT during this admission showed concern for partial small bowel obstruction. NG tube was placed and is being managed conservatively by general surgery. While on the floor, she was noted to become more hypoxic over the last few days and chest x-ray showed increasing edema. She was started on Lasix today. Rapid response was called due to severe hypoxia requiring heated high flow. Patient upgraded to ICU for further management.     ICU discussed with the family the decision was made to switch patient to comfort care.  Continue current orders    Problems:  Acute hypoxemic respiratory failure  Acute respiratory distress syndrome  Pulmonary edema  Right-sided pneumonia  Partial SBO due to adhesions  Nausea/vomiting  History of dementia, GERD, esophagitis, PUD, HTN, T2DM, TIA x 5, colorectal cancer s/p hemicolectomy in     Consult  regarding hospice       CODE STATUS comfort care    Barriers to discharge    Medications reviewed  Current Facility-Administered Medications   Medication Dose Route Frequency    morphine (PF) injection 4 mg  4 mg IntraVENous Q15 Min PRN    midazolam PF (VERSED) injection 4 mg  4 mg IntraVENous Q15 Min PRN         Objective:   Physical Exam:     BP (!) 99/42   Pulse 77   Temp 97.6 °F (36.4 °C) (Axillary)   Resp 18   Ht 1.524 m (5')   Wt 85.3 kg (188 lb 0.8 oz)   SpO2 (!) 87%   BMI 36.73 kg/m²  O2 Flow Rate (L/min): 60 L/min O2 Device: Heated high flow cannula    Temp (24hrs), Av.5 °F (36.9 °C), 
General Surgery Consultation      History of Present Illness      Nena Mcnulty is an 84 y.o.  with a known history of a hemicolectomy in 2009 who reportedly never had an episode of bowel obstruction since the surgery, now presents with malaise and nausea/vomiting as noticed by her daughter and care taker.     Past Medical History:   Diagnosis Date    Arthritis     Cancer (HCC) 2006    colon cancer    Diabetes (HCC)     GERD (gastroesophageal reflux disease)     Other ill-defined conditions(289.89)     fibromylgia    PUD (peptic ulcer disease)     Unspecified sleep apnea      Family History   Problem Relation Age of Onset    Heart Disease Brother     Diabetes Mother     Diabetes Sister     Diabetes Brother      Prior to Admission Medications   Prescriptions Last Dose Informant Patient Reported? Taking?   B Complex Vitamins (VITAMIN B COMPLEX PO)   Yes No   Sig: Take 1 tablet by mouth daily   Melatonin 10 MG TABS 5/23/2025  Yes Yes   Sig: Take 1 tablet by mouth nightly   Multiple Vitamin (MULTIVITAMIN ADULT PO) 5/23/2025  Yes Yes   Sig: Take 1 tablet by mouth daily   Potassium 99 MG TABS 5/23/2025  Yes Yes   Sig: Take 1 tablet by mouth daily   QUEtiapine (SEROQUEL) 100 MG tablet 5/23/2025  Yes Yes   Sig: Take 1 tablet by mouth 2 times daily   Sodium Sulfate-Mag Sulfate-KCl (SUTAB) 0269-280-006 MG TABS 5/23/2025  Yes Yes   Sig: Take 1 tablet by mouth daily   amLODIPine (NORVASC) 5 MG tablet 5/23/2025  Yes Yes   Sig: Take 1 tablet by mouth daily   atorvastatin (LIPITOR) 10 MG tablet 5/23/2025  Yes Yes   Sig: Take 4 tablets by mouth daily   ciprofloxacin (CIPRO) 250 MG tablet   No No   Sig: Take 1 tablet by mouth 2 times daily for 7 days   diphenhydrAMINE-APAP, sleep, (TYLENOL PM EXTRA STRENGTH)  MG tablet   Yes No   Sig: Take 2 tablets by mouth nightly as needed for Sleep   dulaglutide (TRULICITY) 1.5 MG/0.5ML SC injection 5/23/2025  Yes Yes   Sig: Inject 0.5 mLs into the skin every 7 days   escitalopram 
Patient was seen previously by me will take over the case.  
86, temperature 99 °F (37.2 °C), resp. rate 18, height 1.524 m (5'), weight 82.4 kg (181 lb 10.5 oz), SpO2 98%.  Physical Exam  Gen: Not in distress  HEENT: NC/AT, supple  Cardiac: Regular rate and rhythm  Pulm: Diminished breath sounds in bases   ABD: Soft, non distended, non tender, hypoactive bowel sounds   Extremities: no significant edema  Neuro: Disoriented, following simple commands    Labs and Data: Reviewed 05/30/25  Medications: Reviewed 05/30/25  Imaging: Reviewed 05/30/25    Intake/Output:     Intake/Output Summary (Last 24 hours) at 5/30/2025 1841  Last data filed at 5/30/2025 0344  Gross per 24 hour   Intake 1749.95 ml   Output 750 ml   Net 999.95 ml       CRITICAL CARE DOCUMENTATION  I had a face to face encounter with the patient, reviewed and interpreted patient data including clinical events, labs, images, vital signs, I/O's, and examined patient.  I have discussed the case and the plan and management of the patient's care with the consulting services, the bedside nurses and the respiratory therapist.      NOTE OF PERSONAL INVOLVEMENT IN CARE   This patient has a high probability of imminent, clinically significant deterioration, which requires the highest level of preparedness to intervene urgently. I participated in the decision-making and personally managed or directed the management of the following life and organ supporting interventions that required my frequent assessment to treat or prevent imminent deterioration.    I personally spent 60 minutes of critical care time.  This is time spent at this critically ill patient's bedside actively involved in patient care as well as the coordination of care.  This does not include any procedural time which has been billed separately.    Dena Laird MD  Critical Care Medicine  [facility]    
    General: Sleepy on oxygen 5 L nasal cannula advanced dementia  HEENT: NCAT, poor dentition, lips and mucosa dry  Eyes: anicteric; conjunctiva clear  Neck: no nodes, trach midline; no accessory MM use.  Chest: no deformity,   Cardiac: R regular; no murmur;   Lungs: distant breath sounds; coarse breath sound bilaterally rhonchi scattered mostly on the right side  Abd: soft, NT, hypoactive BS  Ext: no edema; no joint swelling; No clubbing  : NO tucker, clear urine  Neuro: Advanced dementia no focal deficit  Psych- unable to assess  Skin: warm, dry, no cyanosis;   Pulses: 1-2+ Bilateral pedal, radial  Capillary: brisk; pale      DATA:  No results found for this or any previous visit.    05/24/25    ECHO (TTE) LIMITED (PRN CONTRAST/BUBBLE/STRAIN/3D) 05/27/2025  5:15 PM (Final)    Interpretation Summary    Left Ventricle: Normal left ventricular systolic function with a visually estimated EF of 65 - 70%. Not assessed. Left ventricle size is normal. Normal wall thickness. Normal wall motion. Diastolic dysfunction present with increased LAP with normal LV EF.    Right Ventricle: Right ventricle size is normal. Normal systolic function.    Mitral Valve: Severely thickened, at the posterior leaflet.    Left Atrium: Left atrium is moderately dilated. Left atrial volume index is normal (16-34 mL/m2) mL/m2.    Image quality is fair.    Signed by: Jake Rosario MD on 5/27/2025  5:15 PM       MAR reviewed and pertinent medications noted or modified as needed    MEDS:   Current Facility-Administered Medications   Medication Dose Route Frequency Provider Last Rate Last Admin    potassium chloride 10 mEq/100 mL IVPB (Peripheral Line)  10 mEq IntraVENous Q1H Amadeo Taylor PA-C 100 mL/hr at 05/30/25 0806 10 mEq at 05/30/25 0806    pantoprazole (PROTONIX) 40 mg in sodium chloride (PF) 0.9 % 10 mL injection  40 mg IntraVENous Q12H Chadwick Nunez MD   40 mg at 05/30/25 0742    potassium bicarb-citric acid (EFFER-K)

## 2025-06-03 NOTE — TRANSITION OF CARE
End of life care orders placed by attending. Versed and Morphine given at this time per family requests. Dietary called for bereavement cart.   Patient switched from [place] to nasal cannula at 2LPM.   A host of family at bedside

## 2025-06-04 PROCEDURE — 1100000000 HC RM PRIVATE

## 2025-06-04 PROCEDURE — 94761 N-INVAS EAR/PLS OXIMETRY MLT: CPT

## 2025-06-04 PROCEDURE — 6360000002 HC RX W HCPCS: Performed by: INTERNAL MEDICINE

## 2025-06-04 PROCEDURE — 2700000000 HC OXYGEN THERAPY PER DAY

## 2025-06-04 RX ADMIN — MORPHINE SULFATE 4 MG: 4 INJECTION INTRAVENOUS at 14:39

## 2025-06-04 RX ADMIN — MORPHINE SULFATE 4 MG: 4 INJECTION INTRAVENOUS at 02:46

## 2025-06-04 RX ADMIN — MIDAZOLAM HYDROCHLORIDE 4 MG: 1 INJECTION, SOLUTION INTRAMUSCULAR; INTRAVENOUS at 04:45

## 2025-06-04 RX ADMIN — MIDAZOLAM HYDROCHLORIDE 4 MG: 1 INJECTION, SOLUTION INTRAMUSCULAR; INTRAVENOUS at 11:50

## 2025-06-04 RX ADMIN — MIDAZOLAM HYDROCHLORIDE 4 MG: 1 INJECTION, SOLUTION INTRAMUSCULAR; INTRAVENOUS at 14:39

## 2025-06-04 RX ADMIN — MIDAZOLAM HYDROCHLORIDE 4 MG: 1 INJECTION, SOLUTION INTRAMUSCULAR; INTRAVENOUS at 07:49

## 2025-06-04 RX ADMIN — MORPHINE SULFATE 4 MG: 4 INJECTION INTRAVENOUS at 07:54

## 2025-06-04 RX ADMIN — MORPHINE SULFATE 4 MG: 4 INJECTION INTRAVENOUS at 11:50

## 2025-06-04 ASSESSMENT — PAIN SCALES - GENERAL
PAINLEVEL_OUTOF10: 0
PAINLEVEL_OUTOF10: 4
PAINLEVEL_OUTOF10: 0
PAINLEVEL_OUTOF10: 0

## 2025-06-04 ASSESSMENT — PAIN SCALES - WONG BAKER
WONGBAKER_NUMERICALRESPONSE: NO HURT

## 2025-06-04 NOTE — CARE COORDINATION
DCP: pending GIP eval   ALEXYS: today vs 24 hours    1003: CM met with pt and dtr at bedside. Pt resting with eyes closed and did not participate in meeting. CM discussed dispo with daughter. Pt is agreeable to dc home with hospice if pt does not meet GIP, but voiced concerns about pt tolerating PO.     1119: RANDY called [facility] Junie @ [phone] to discuss pt being evaluated for GIP. She reports she will be sending nurse to evaluate pt shortly.     1412: CM sent message via [employer] requesting ETA on nurse to come evaluate pt. CM will await reply.     1430: RANDY received call from Carla at [phone] (nurse from [facility]). She reports pt does not meet GIP criteria, but they are ok with pt discharging to home today or tomorrow, but would need discharging MD to send scripts for pain meds until hospice agency is able to receive meds.     CM team following.

## 2025-06-04 NOTE — PLAN OF CARE
Problem: Discharge Planning  Goal: Discharge to home or other facility with appropriate resources  Recent Flowsheet Documentation  Taken 6/4/2025 0800 by Sanjana Gurrola RN  Discharge to home or other facility with appropriate resources: Refer to discharge planning if patient needs post-hospital services based on physician order or complex needs related to functional status, cognitive ability or social support system     Problem: Death & Dying  Goal: Pt/Family communicate acceptance of impending death and feel psychological comfort and peace  Outcome: Progressing     Problem: Decision Making  Goal: Pt/Family able to effectively weigh alternatives and participate in decision making related to treatment and care  Outcome: Progressing

## 2025-06-04 NOTE — PLAN OF CARE
Problem: Chronic Conditions and Co-morbidities  Goal: Patient's chronic conditions and co-morbidity symptoms are monitored and maintained or improved  6/3/2025 2009 by Lizett Yung RN  Outcome: Not Progressing  6/3/2025 1446 by Krista Reyes RN  Outcome: Progressing  Flowsheets (Taken 6/3/2025 0730)  Care Plan - Patient's Chronic Conditions and Co-Morbidity Symptoms are Monitored and Maintained or Improved:   Monitor and assess patient's chronic conditions and comorbid symptoms for stability, deterioration, or improvement   Collaborate with multidisciplinary team to address chronic and comorbid conditions and prevent exacerbation or deterioration     Problem: Discharge Planning  Goal: Discharge to home or other facility with appropriate resources  6/3/2025 2009 by Lizett Yung RN  Outcome: Progressing  6/3/2025 1446 by Krista Reyes RN  Outcome: Progressing  Flowsheets (Taken 6/3/2025 0730)  Discharge to home or other facility with appropriate resources:   Identify barriers to discharge with patient and caregiver   Arrange for needed discharge resources and transportation as appropriate     Problem: Safety - Adult  Goal: Free from fall injury  6/3/2025 2009 by Lizett Yung RN  Outcome: Progressing  6/3/2025 1446 by Krista Reyes RN  Outcome: Progressing     Problem: Pain  Goal: Verbalizes/displays adequate comfort level or baseline comfort level  6/3/2025 2009 by Lizett Yung RN  Outcome: Progressing  6/3/2025 1446 by Krista Reyes RN  Outcome: Progressing     Problem: Skin/Tissue Integrity  Goal: Skin integrity remains intact  Description: 1.  Monitor for areas of redness and/or skin breakdown2.  Assess vascular access sites hourly3.  Every 4-6 hours minimum:  Change oxygen saturation probe site4.  Every 4-6 hours:  If on nasal continuous positive airway pressure, respiratory therapy assess nares and determine need for appliance change or

## 2025-06-05 VITALS
HEIGHT: 60 IN | DIASTOLIC BLOOD PRESSURE: 32 MMHG | WEIGHT: 188.05 LBS | OXYGEN SATURATION: 74 % | HEART RATE: 79 BPM | TEMPERATURE: 98.4 F | RESPIRATION RATE: 14 BRPM | SYSTOLIC BLOOD PRESSURE: 135 MMHG | BODY MASS INDEX: 36.92 KG/M2

## 2025-06-05 PROCEDURE — 6360000002 HC RX W HCPCS: Performed by: INTERNAL MEDICINE

## 2025-06-05 RX ORDER — LORAZEPAM 1 MG/1
0.5 TABLET ORAL EVERY 12 HOURS PRN
Qty: 3 TABLET | Refills: 0 | Status: SHIPPED | OUTPATIENT
Start: 2025-06-05 | End: 2025-06-05 | Stop reason: HOSPADM

## 2025-06-05 RX ORDER — MORPHINE SULFATE 10 MG/5ML
2.5 SOLUTION ORAL EVERY 4 HOURS PRN
Qty: 22.5 ML | Refills: 0 | Status: SHIPPED | OUTPATIENT
Start: 2025-06-05 | End: 2025-06-05 | Stop reason: HOSPADM

## 2025-06-05 RX ORDER — GLYCOPYRROLATE 1 MG/1
1 TABLET ORAL 3 TIMES DAILY
Status: DISCONTINUED | OUTPATIENT
Start: 2025-06-05 | End: 2025-06-05 | Stop reason: HOSPADM

## 2025-06-05 RX ADMIN — MORPHINE SULFATE 4 MG: 4 INJECTION INTRAVENOUS at 10:41

## 2025-06-05 RX ADMIN — MORPHINE SULFATE 4 MG: 4 INJECTION INTRAVENOUS at 00:49

## 2025-06-05 RX ADMIN — MIDAZOLAM HYDROCHLORIDE 4 MG: 1 INJECTION, SOLUTION INTRAMUSCULAR; INTRAVENOUS at 10:41

## 2025-06-05 RX ADMIN — MIDAZOLAM HYDROCHLORIDE 4 MG: 1 INJECTION, SOLUTION INTRAMUSCULAR; INTRAVENOUS at 00:49

## 2025-06-05 ASSESSMENT — PAIN SCALES - GENERAL: PAINLEVEL_OUTOF10: 0

## 2025-06-05 NOTE — DISCHARGE SUMMARY
Physician Discharge Summary     Patient ID:    Nena Mcnulty  [id number]  84 y.o.  1940    Admit date: 5/24/2025    Discharge date : 6/5/2025      Final Diagnoses:   Generalized weakness [R53.1]  Sepsis with encephalopathy and septic shock, due to unspecified organism (HCC) [A41.9, R65.21, G93.41]  Acute hypoxic respiratory failure on 3 L nasal cannula  Aspiration pneumonia  Obstructive sleep apnea syndrome noncompliant with CPAP machine  Chronic obstructive pulmonary disease  ESBL Klebsiella UTI  Alzheimer's dementia  History of colon cancer history of right hemicolectomy  Type 2 Diabetes  GERD  PUD  Fibromyalgia  Hypertension  Hyperlipidemia    Reason for Hospitalization:  As above    Hospital Course:   An 84-year-old female with history of dementia, GERD, esophagitis, PUD, HTN, T2DM, TIA x 5, colorectal cancer s/p hemicolectomy in 2009 who was initially admitted on 5/24 with nausea, vomiting and generalized weakness. She was also recently diagnosed with UTI prior to admission. CT during this admission showed concern for partial small bowel obstruction. NG tube was placed and is being managed conservatively by general surgery. While on the floor, she was noted to become more hypoxic over the last few days and chest x-ray showed increasing edema. She was started on Lasix today. Rapid response was called due to severe hypoxia requiring heated high flow. Patient upgraded to ICU for further management. ICU discussed with the family the decision was made to switch patient to comfort care.     Problems:  Acute hypoxemic respiratory failure  Acute respiratory distress syndrome  Pulmonary edema  Right-sided pneumonia  Partial SBO due to adhesions  Nausea/vomiting  History of dementia, GERD, esophagitis, PUD, HTN, T2DM, TIA x 5, colorectal cancer s/p hemicolectomy in 2009  Hospice on board  6/4/2025 [facility] management received, primarily measures from Ruben DANGELO, who reports that patient does not  Retention Suture Text: Retention sutures were placed to support the closure and prevent dehiscence.

## 2025-06-05 NOTE — PLAN OF CARE
Problem: Chronic Conditions and Co-morbidities  Goal: Patient's chronic conditions and co-morbidity symptoms are monitored and maintained or improved  6/5/2025 1100 by Beth Serrano RN  Outcome: Adequate for Discharge  6/4/2025 2255 by Mauricio Reyes LPN  Outcome: Progressing     Problem: Discharge Planning  Goal: Discharge to home or other facility with appropriate resources  6/5/2025 1100 by Beth Serrano RN  Outcome: Adequate for Discharge  6/4/2025 2255 by Mauricio Reyes LPN  Outcome: Progressing     Problem: Safety - Adult  Goal: Free from fall injury  6/5/2025 1100 by Beth Serrano RN  Outcome: Adequate for Discharge  6/4/2025 2255 by Mauricio Reyes LPN  Outcome: Progressing     Problem: Pain  Goal: Verbalizes/displays adequate comfort level or baseline comfort level  6/5/2025 1100 by Beth Serrano RN  Outcome: Adequate for Discharge  6/4/2025 2255 by Mauricio Reyes LPN  Outcome: Progressing     Problem: Skin/Tissue Integrity  Goal: Skin integrity remains intact  Description: 1.  Monitor for areas of redness and/or skin breakdown2.  Assess vascular access sites hourly3.  Every 4-6 hours minimum:  Change oxygen saturation probe site4.  Every 4-6 hours:  If on nasal continuous positive airway pressure, respiratory therapy assess nares and determine need for appliance change or resting period  6/5/2025 1100 by Beth Serrano RN  Outcome: Adequate for Discharge  6/4/2025 2255 by Mauricio Reyes LPN  Outcome: Progressing     Problem: ABCDS Injury Assessment  Goal: Absence of physical injury  6/5/2025 1100 by Beth Serrano RN  Outcome: Adequate for Discharge  6/4/2025 2255 by Mauricio Reyes LPN  Outcome: Progressing     Problem: Neurosensory - Adult  Goal: Achieves stable or improved neurological status  6/5/2025 1100 by Beth Serrano RN  Outcome: Adequate for Discharge  6/4/2025 2255 by Mauricio Reyes LPN  Outcome: Progressing  Goal: Achieves maximal functionality

## 2025-06-05 NOTE — PLAN OF CARE
Problem: Chronic Conditions and Co-morbidities  Goal: Patient's chronic conditions and co-morbidity symptoms are monitored and maintained or improved  Outcome: Progressing     Problem: Discharge Planning  Goal: Discharge to home or other facility with appropriate resources  Outcome: Progressing     Problem: Safety - Adult  Goal: Free from fall injury  Outcome: Progressing     Problem: Pain  Goal: Verbalizes/displays adequate comfort level or baseline comfort level  Outcome: Progressing     Problem: Skin/Tissue Integrity  Goal: Skin integrity remains intact  Description: 1.  Monitor for areas of redness and/or skin breakdown2.  Assess vascular access sites hourly3.  Every 4-6 hours minimum:  Change oxygen saturation probe site4.  Every 4-6 hours:  If on nasal continuous positive airway pressure, respiratory therapy assess nares and determine need for appliance change or resting period  Outcome: Progressing     Problem: ABCDS Injury Assessment  Goal: Absence of physical injury  Outcome: Progressing     Problem: Neurosensory - Adult  Goal: Achieves stable or improved neurological status  Outcome: Progressing  Goal: Achieves maximal functionality and self care  Outcome: Progressing     Problem: Respiratory - Adult  Goal: Achieves optimal ventilation and oxygenation  Outcome: Progressing     Problem: Cardiovascular - Adult  Goal: Maintains optimal cardiac output and hemodynamic stability  Outcome: Progressing  Goal: Absence of cardiac dysrhythmias or at baseline  Outcome: Progressing     Problem: Skin/Tissue Integrity - Adult  Goal: Skin integrity remains intact  Description: 1.  Monitor for areas of redness and/or skin breakdown2.  Assess vascular access sites hourly3.  Every 4-6 hours minimum:  Change oxygen saturation probe site4.  Every 4-6 hours:  If on nasal continuous positive airway pressure, respiratory therapy assess nares and determine need for appliance change or resting period  Outcome: Progressing  Goal:

## 2025-06-05 NOTE — PROGRESS NOTES
Hospitalist Progress Note               Daily Progress Note: 2025      Hospital Day: 12     Chief complaint:   Chief Complaint   Patient presents with    Fatigue        Subjective:   This is an 84-year-old female with history of dementia, GERD, esophagitis, PUD, HTN, T2DM, TIA x 5, colorectal cancer s/p hemicolectomy in  who was initially admitted on  with nausea, vomiting and generalized weakness. She was also recently diagnosed with UTI prior to admission. CT during this admission showed concern for partial small bowel obstruction. NG tube was placed and is being managed conservatively by general surgery. While on the floor, she was noted to become more hypoxic over the last few days and chest x-ray showed increasing edema. She was started on Lasix today. Rapid response was called due to severe hypoxia requiring heated high flow. Patient upgraded to ICU for further management.     ICU discussed with the family the decision was made to switch patient to comfort care.  Continue current orders    Problems:  Acute hypoxemic respiratory failure  Acute respiratory distress syndrome  Pulmonary edema  Right-sided pneumonia  Partial SBO due to adhesions  Nausea/vomiting  History of dementia, GERD, esophagitis, PUD, HTN, T2DM, TIA x 5, colorectal cancer s/p hemicolectomy in     Consult  regarding hospice       CODE STATUS comfort care    Barriers to discharge    Medications reviewed  Current Facility-Administered Medications   Medication Dose Route Frequency    morphine (PF) injection 4 mg  4 mg IntraVENous Q15 Min PRN    midazolam PF (VERSED) injection 4 mg  4 mg IntraVENous Q15 Min PRN         Objective:   Physical Exam:     BP (!) 128/44   Pulse 82   Temp 98.2 °F (36.8 °C) (Axillary)   Resp 16   Ht 1.524 m (5')   Wt 85.3 kg (188 lb 0.8 oz)   SpO2 (!) 86%   BMI 36.73 kg/m²  O2 Flow Rate (L/min): 2 L/min O2 Device: Nasal cannula    Temp (24hrs), Av °F (36.7 °C), Min:97.6 °F (36.4 
    Hospitalist Progress Note               Daily Progress Note: 5/24/2025      Hospital Day: 1     Chief complaint:   Chief Complaint   Patient presents with    Fatigue        Subjective:   Hospital course to date:    Nena Mcnulty is a 84 y.o.  female with PMHx significant for dementia, GERD, esophagitis, PUD, hypertension, T2DM, TIA x 5, history of colorectal cancer who was brought to ED by family for generalized weakness.  Of note, patient discharged 5/23 after admission for coffee-ground emesis where she underwent EGD that showed reflux esophagitis but no evidence for bleeding.  During previous admission, also noted to have UTI and was discharged on Cipro.  Urine culture from previous admission still pending.  In ED, patient mildly hypertensive and requiring 4 L nasal cannula.  Lab work showed increase in creatinine from 0.99 to 1.74, WBC 24.5, hemoglobin 9.5.  CT head and CXR negative.  Lactic acid 4.35 and Pro-Domingo 2.81.  Blood cultures collected.  Repeat urine ordered.  Patient given sepsis bolus IVF and started on ceftriaxone.      Admitted to medical floor for further workup and management of generalized weakness secondary to sepsis caused by UTI.   --------  Patient is seen today for follow-up.  She was hypotensive on arrival to the floor, was given albumin.  She received a total of 3 L of crystalloid fluids.  Repeat blood pressure this morning is 94/50.  She is presently awake and alert.  Daughter is at the bedside.  Biggest issue that brought her in was just severe generalized weakness.        Medications reviewed  Current Facility-Administered Medications   Medication Dose Route Frequency    sodium chloride flush 0.9 % injection 5-40 mL  5-40 mL IntraVENous 2 times per day    sodium chloride flush 0.9 % injection 5-40 mL  5-40 mL IntraVENous PRN    0.9 % sodium chloride infusion   IntraVENous PRN    ondansetron (ZOFRAN-ODT) disintegrating tablet 4 mg  4 mg Oral Q8H PRN    Or    ondansetron (ZOFRAN) 
    Hospitalist Progress Note               Daily Progress Note: 5/27/2025      Hospital Day: 4     Chief complaint:   Chief Complaint   Patient presents with    Fatigue        Subjective:   Hospital course to date:    Nena Mcnulty is a 84 y.o.  female with PMHx significant for dementia, GERD, esophagitis, PUD, hypertension, T2DM, TIA x 5, history of colorectal cancer who was brought to ED by family for generalized weakness.  Of note, patient discharged 5/23 after admission for coffee-ground emesis where she underwent EGD that showed reflux esophagitis but no evidence for bleeding.  During previous admission, also noted to have UTI and was discharged on Cipro.  Urine culture from previous admission still pending.  In ED, patient mildly hypertensive and requiring 4 L nasal cannula.  Lab work showed increase in creatinine from 0.99 to 1.74, WBC 24.5, hemoglobin 9.5.  CT head and CXR negative.  Lactic acid 4.35 and Pro-Domingo 2.81.  Blood cultures collected.  Repeat urine ordered.  Patient given sepsis bolus IVF and started on ceftriaxone.      Admitted to medical floor for further workup and management of generalized weakness secondary to sepsis caused by UTI.     Early on 5/25 blood pressure dropped again and hemoglobin found to be 6.3.  She was given 1 unit of blood and hemoglobin improved to 7.2    Urine culture came back showing ESBL Klebsiella.  Patient was transition to meropenem    Patient had persistent oxygen requirements and a repeat chest x-ray was obtained on 5/26 which showed some interstitial edema and possible pneumonia in the right lower lobe.  She was given IV furosemide and IV fluids were stopped.    Patient noted to be vomiting overnight.  --------  Patient is seen today for follow-up.  More lethargic today.  She continues on oxygen 2 L nasal cannula.  Has had a very harsh cough.           Medications reviewed  Current Facility-Administered Medications   Medication Dose Route Frequency    
    Hospitalist Progress Note               Daily Progress Note: 5/29/2025      Hospital Day: 6     Chief complaint:   Chief Complaint   Patient presents with    Fatigue        Subjective:   Hospital course to date:    Nena Mcnulty is a 84 y.o.  female with PMHx significant for dementia, GERD, esophagitis, PUD, hypertension, T2DM, TIA x 5, history of colorectal cancer who was brought to ED by family for generalized weakness.  Of note, patient discharged 5/23 after admission for coffee-ground emesis where she underwent EGD that showed reflux esophagitis but no evidence for bleeding.  During previous admission, also noted to have UTI and was discharged on Cipro.  Urine culture from previous admission still pending.  In ED, patient mildly hypertensive and requiring 4 L nasal cannula.  Lab work showed increase in creatinine from 0.99 to 1.74, WBC 24.5, hemoglobin 9.5.  CT head and CXR negative.  Lactic acid 4.35 and Pro-Domingo 2.81.  Blood cultures collected.  Repeat urine ordered.  Patient given sepsis bolus IVF and started on ceftriaxone.      Admitted to medical floor for further workup and management of generalized weakness secondary to sepsis caused by UTI.     Early on 5/25 blood pressure dropped again and hemoglobin found to be 6.3.  She was given 1 unit of blood and hemoglobin improved to 7.2    Urine culture came back showing ESBL Klebsiella.  Patient was transitioned to meropenem    Patient had persistent oxygen requirements and a repeat chest x-ray was obtained on 5/26 which showed some interstitial edema and possible pneumonia in the right lower lobe.  She was given IV furosemide and IV fluids were stopped.    Patient noted to be vomiting overnight.    Echo showed an EF of 65% with diastolic dysfunction    Respiratory status worsened on 5/27, oxygen increased.  Patient started spiking fevers.  --------  Patient is seen today for follow-up.  Patient had some coffee-ground emesis early this morning.  Repeat 
    Hospitalist Progress Note               Daily Progress Note: 5/30/2025      Hospital Day: 7     Chief complaint:   Chief Complaint   Patient presents with    Fatigue        Subjective:   Hospital course to date:    Nena Mcnulty is a 84 y.o.  female with PMHx significant for dementia, GERD, esophagitis, PUD, hypertension, T2DM, TIA x 5, history of colorectal cancer who was brought to ED by family for generalized weakness.  Of note, patient discharged 5/23 after admission for coffee-ground emesis where she underwent EGD that showed reflux esophagitis but no evidence for bleeding.  During previous admission, also noted to have UTI and was discharged on Cipro.  Urine culture from previous admission still pending.  In ED, patient mildly hypertensive and requiring 4 L nasal cannula.  Lab work showed increase in creatinine from 0.99 to 1.74, WBC 24.5, hemoglobin 9.5.  CT head and CXR negative.  Lactic acid 4.35 and Pro-Domingo 2.81.  Blood cultures collected.  Repeat urine ordered.  Patient given sepsis bolus IVF and started on ceftriaxone.       Admitted to medical floor for further workup and management of generalized weakness secondary to sepsis caused by UTI.      Early on 5/25 blood pressure dropped again and hemoglobin found to be 6.3.  She was given 1 unit of blood and hemoglobin improved to 7.2     Urine culture came back showing ESBL Klebsiella.  Patient was transitioned to meropenem     Patient had persistent oxygen requirements and a repeat chest x-ray was obtained on 5/26 which showed some interstitial edema and possible pneumonia in the right lower lobe.  She was given IV furosemide and IV fluids were stopped.     Patient noted to be vomiting overnight.     Echo showed an EF of 65% with diastolic dysfunction     Respiratory status worsened on 5/27, oxygen increased.  Patient started spiking fevers.  --------  Patient is seen today for follow-up.  Resting comfortably.  In no apparent respiratory distress.  
  IMPRESSION:   Acute hypoxic respiratory failure on 3 L nasal cannula  Aspiration pneumonia  Obstructive sleep apnea syndrome noncompliant with CPAP machine  Chronic obstructive pulmonary disease  ESBL Klebsiella UTI  Alzheimer's dementia  History of colon cancer history of right hemicolectomy  Type 2 Diabetes  GERD  PUD  Fibromyalgia  Hypertension  Hyperlipidemia      RECOMMENDATIONS/PLAN:   84-year-old lady came in because of coffee-ground emesis patient with significant past medical history obstructive sleep apnea she was seen by me previously but she is noncompliant with her CPAP machine and she was on oxygen at home she has dementia unable to give much history out of the patient she had aspiration and was getting treatment for pneumonia and she had coffee-ground emesis underwent EGD no active bleeding she also had a history of colorectal cancer hypertension type 2 diabetes mellitus gastroesophageal flux disease daughter is at the bedside she was put on oxygen 4 L nasal cannula and the urine infection was ESBL Klebsiella and she was found to have fluid overload she received Lasix IV fluid was discontinued, she is now on oxygen 5 L nasal cannula  CT abdomen was done which showed extensive bilateral infiltrate  Her previous CAT scan of the chest shows esophageal wall thickening possible reflux and GERD disease agree with Protonix.  And a previous from 2022 showed EGD showed small hiatal hernia at EG junction with reflux induced esophagitis, and mild/mod bile reflux gastritis.   Speech has seen the patient patient had oropharyngeal swallowing which was normal.  Patient has frequent episodes of vomiting during the admission she had high risk for aspiration discussed with the daughter at bedside  On oxygen 3 L nasal cannula     5/31  Remains noted patient condition got worse yesterday she was more short of breath despite being on 5 L her oxygen requirement got worse so she is now on high flow nasal cannula 65% FiO2 
  IMPRESSION:   Acute hypoxic respiratory failure on 3 L nasal cannula  Aspiration pneumonia  Obstructive sleep apnea syndrome noncompliant with CPAP machine  Chronic obstructive pulmonary disease  ESBL Klebsiella UTI  Alzheimer's dementia  History of colon cancer history of right hemicolectomy  Type 2 Diabetes  GERD  PUD  Fibromyalgia  Hypertension  Hyperlipidemia  Additional workup outlined below  Pt is at high risk of sudden decline and decompensation with life threatening consequenses and continued end organ dysfunction and failure  Pt is critically ill. Time spent with pt and staff actively rendering care, managing pt and coordinating care as stated below; 35 minutes, exclusive of any procedures      RECOMMENDATIONS/PLAN:   84-year-old lady came in because of coffee-ground emesis patient with significant past medical history obstructive sleep apnea she was seen by me previously but she is noncompliant with her CPAP machine and she was on oxygen at home she has dementia unable to give much history out of the patient she had aspiration and was getting treatment for pneumonia and she had coffee-ground emesis underwent EGD no active bleeding she also had a history of colorectal cancer hypertension type 2 diabetes mellitus gastroesophageal flux disease daughter is at the bedside she was put on oxygen 4 L nasal cannula and the urine infection was ESBL Klebsiella and she was found to have fluid overload she received Lasix IV fluid was discontinued, she is now on oxygen 5 L nasal cannula  CT abdomen was done which showed extensive bilateral infiltrate  Her previous CAT scan of the chest shows esophageal wall thickening possible reflux and GERD disease agree with Protonix.  And a previous from 2022 showed EGD showed small hiatal hernia at EG junction with reflux induced esophagitis, and mild/mod bile reflux gastritis.   Speech has seen the patient patient had oropharyngeal swallowing which was normal.  Patient has 
  IMPRESSION:   Acute hypoxic respiratory failure on high flow nasal cannula  Aspiration pneumonia  Obstructive sleep apnea syndrome noncompliant with CPAP machine  Chronic obstructive pulmonary disease  ESBL Klebsiella UTI  Alzheimer's dementia  History of colon cancer history of right hemicolectomy  Type 2 Diabetes  GERD  PUD  Fibromyalgia  Hypertension  Hyperlipidemia  Additional workup outlined below  Pt is at high risk of sudden decline and decompensation with life threatening consequenses and continued end organ dysfunction and failure  Pt is critically ill. Time spent with pt and staff actively rendering care, managing pt and coordinating care as stated below; 35 minutes, exclusive of any procedures      RECOMMENDATIONS/PLAN:   84-year-old lady came in because of coffee-ground emesis patient with significant past medical history obstructive sleep apnea she was seen by me previously but she is noncompliant with her CPAP machine and she was on oxygen at home she has dementia unable to give much history out of the patient she had aspiration and was getting treatment for pneumonia and she had coffee-ground emesis underwent EGD no active bleeding she also had a history of colorectal cancer hypertension type 2 diabetes mellitus gastroesophageal flux disease daughter is at the bedside she was put on oxygen 4 L nasal cannula and the urine infection was ESBL Klebsiella and she was found to have fluid overload she received Lasix IV fluid was discontinued, she is now on oxygen 5 L nasal cannula  CT abdomen was done which showed extensive bilateral infiltrate  Her previous CAT scan of the chest shows esophageal wall thickening possible reflux and GERD disease agree with Protonix.  And a previous from 2022 showed EGD showed small hiatal hernia at EG junction with reflux induced esophagitis, and mild/mod bile reflux gastritis.   Speech has seen the patient patient had oropharyngeal swallowing which was normal.  Patient has 
  IMPRESSION:   Acute hypoxic respiratory failure on high flow nasal cannula  Aspiration pneumonia  Obstructive sleep apnea syndrome noncompliant with CPAP machine  Chronic obstructive pulmonary disease  ESBL Klebsiella UTI  Alzheimer's dementia  Hypokalemia  History of colon cancer history of right hemicolectomy  Type 2 Diabetes  GERD  PUD  Fibromyalgia  Hypertension  Hyperlipidemia  Additional workup outlined below  Pt is at high risk of sudden decline and decompensation with life threatening consequenses and continued end organ dysfunction and failure  Pt is critically ill. Time spent with pt and staff actively rendering care, managing pt and coordinating care as stated below; 35 minutes, exclusive of any procedures      RECOMMENDATIONS/PLAN:   84-year-old lady came in because of coffee-ground emesis patient with significant past medical history obstructive sleep apnea she was seen by me previously but she is noncompliant with her CPAP machine and she was on oxygen at home she has dementia unable to give much history out of the patient she had aspiration and was getting treatment for pneumonia and she had coffee-ground emesis underwent EGD no active bleeding she also had a history of colorectal cancer hypertension type 2 diabetes mellitus gastroesophageal flux disease daughter is at the bedside she was put on oxygen 4 L nasal cannula and the urine infection was ESBL Klebsiella and she was found to have fluid overload she received Lasix IV fluid was discontinued, she is now on oxygen 5 L nasal cannula  Agree with Empir[facility] IV antibiotics pending culture results   She is on meropenem Protonix and Zithromax  CT abdomen was done which showed extensive bilateral infiltrate  Her previous CAT scan of the chest shows esophageal wall thickening possible reflux and GERD disease agree with Protonix.  And a previous from 2022 showed EGD showed small hiatal hernia at EG junction with reflux induced esophagitis, and mild/mod 
0505: PCT checked vitals and sugar. Pt temp 102.9 axillary    0516: RN rechecked patient temp, patient temp at 102.6 axillary.     0531: RN reached out to provider Reasoner via perfect serve at this time regarding patient condition. Patient heart rate tachy in the 110's, has temp of 102.6 axillary, is now having inspiratory and expiratory wheezing, and has a brown/black spot on gown and lips, RN did not feed anything brown to patient and patient daughter at bedside stated they did not either, the patient hasn't ate since dinner.     0541: See orders    0544: RN also informed provider that daughter at beside requesting we move up chest xray time. See orders.      0555: Xray at bedside    0600: RN rechecked patient temp, temp at 102 axillary.     0607: RN applied ice packs to patient groin and underarms bilaterally, applied cool washcloth to forehead.     0645: RN rechecked patient temp, temp at 100.6 axillary     0713: RN rechecked temp during report and temp at 98.8 axillary.     
1600 patient pulled out NGT. Pricila charge RN tried NGT insertion x3, unsuccessful.     1637 IV abx given, vitals taken 84% on 6L NC. Called RT for treatment and gave IV morphine. Placed patient on non rebreather 15L, and called RRT.     During rapid, patient placed on 60L heated high flow. Restraints still in place. IV lasix given. CXR ordered.     1730 soap wayne enema given, unable to follow commands, no stool.     Patient still needs NGT, notified ICU RN.    1815 patient transferred to ICU, report given to HUI Felix. No questions or concerns presented at this time.       
2028  NGT noted to be at 73 cm, which seems very far in.  Reviewed CXR, which showed the NGT terminating in the proximal duodenum.  Spoke with ERWIN Frye.  NP advised to pull the NGT back to about 65 cm.  NGT retracted without complications.    2358  PRN Morphine administered for dyspnea s/p turning, at the daughter's request.      0227  ERWIN Frye notified of K 2.8, Mag 1.5, and Phos 2.0.  NP to order replacements.      0325  Patient is very restless in bed and hasn't slept tonight.  Daughter requesting something to help patient relax.  Notified ERWIN Frye.  NP to order benadryl.    0334  Benadryl administered.  Patient is still wide awake and restless.    
4 Eyes Skin Assessment     NAME:  Nena Mcnulty  YOB: 1940  MEDICAL RECORD NUMBER:  [id number]    The patient is being assessed for  Admission    I agree that at least one RN has performed a thorough Head to Toe Skin Assessment on the patient. ALL assessment sites listed below have been assessed.      Areas assessed by both nurses:    Head, Face, Ears, Shoulders, Back, Chest, Arms, Elbows, Hands, Sacrum. Buttock, Coccyx, Ischium, Legs. Feet and Heels, and Under Medical Devices   Patient has excoriation on the buttocks, blanchable redness on the buttocks and scars on bilateral knees.          Does the Patient have a Wound? No noted wound(s)       Ronald Prevention initiated by RN: Yes  Wound Care Orders initiated by RN: No    Pressure Injury (Stage 3,4, Unstageable, DTI, NWPT, and Complex wounds) if present, place Wound referral order by RN under : No    New Ostomies, if present place, Ostomy referral order under : No     Nurse 1 eSignature: Electronically signed by REJI DOTY RN on 5/24/25 at 8:59 PM EDT    **SHARE this note so that the co-signing nurse can place an eSignature**    Nurse 2 eSignature: Electronically signed by Lilian Chris RN on 6/4/25 at 4:06 AM EDT   
4 Eyes Skin Assessment     NAME:  Nena Mcnulty  YOB: 1940  MEDICAL RECORD NUMBER:  [id number]    The patient is being assessed for  Other Weekly    I agree that at least one RN has performed a thorough Head to Toe Skin Assessment on the patient. ALL assessment sites listed below have been assessed.      Areas assessed by both nurses:    Head, Face, Ears, Shoulders, Back, Chest, Arms, Elbows, Hands, Sacrum. Buttock, Coccyx, Ischium, Legs. Feet and Heels, and Under Medical Devices         Does the Patient have a Wound? No noted wound(s)       Ronald Prevention initiated by RN: Yes  Wound Care Orders initiated by RN: No    Pressure Injury (Stage 3,4, Unstageable, DTI, NWPT, and Complex wounds) if present, place Wound referral order by RN under : No    New Ostomies, if present place, Ostomy referral order under : No     Nurse 1 eSignature: Electronically signed by Lis Lyman RN on 5/28/25 at 3:08 AM EDT    **SHARE this note so that the co-signing nurse can place an eSignature**    Nurse 2 eSignature: Electronically signed by Katty Tillman RN on 5/28/25 at 3:09 AM EDT    
Attempted PT eval, patient unable to be aroused,Daughter reported not appropriate for therapy today.we will try tomorrow.  
Attempted bedside swallow evaluation. Patient lethargic and unable to maintain alertness for evaluation. Patient w/ hx of chronic reflux esophagitis, hiatal hernia, esophageal dysmotility and recent vomiting. Family at bedside reports good intake at home and eats chopped diet. Will re-attempt once more alert and able to participate.   
CHG bath completed with 4 eyes for skin. Low blood pressure so secure chatted Horacio Irvin about situation next shift provider will check on patient.  
CRITICAL CARE ADMISSION NOTE    Name: Nena Mcnulty   : 1940   MRN: [id number]   Date: 2025      Diagnoses/problem list:   Acute hypoxemic respiratory failure  Pulmonary edema  Partial SBO due to adhesions  Nausea/vomiting  History of dementia, GERD, esophagitis, PUD, HTN, T2DM, TIA x 5, colorectal cancer s/p hemicolectomy in     24-hour events:   Remains disoriented this morning.  HFNC weaned to 50 L 75% FiO2.  Diuresing well with Lasix 40 IV every 8 hours.  Discussed with patient's daughter at bedside about goals of care.  Patient remains DNR/DNI at this time.  She is considering comfort measures if there is no significant improvement in the coming days.    Assessment and plan:   This is an 84-year-old female with history of dementia, GERD, esophagitis, PUD, HTN, T2DM, TIA x 5, colorectal cancer s/p hemicolectomy in  who was initially admitted on  with nausea, vomiting and generalized weakness.  She was also recently diagnosed with UTI prior to admission.  CT during this admission showed concern for partial small bowel obstruction.  NG tube was placed and is being managed conservatively by general surgery.  While on the floor, she was noted to become more hypoxic over the last few days and chest x-ray showed increasing edema.  She was started on Lasix today.  Rapid response was called due to severe hypoxia requiring heated high flow.  Patient upgraded to ICU for further management.    NEUROLOGICAL:    Delirium precautions  Avoid sedating medications    PULMONOLOGY:   Monitor closely for airway protection  Chest X-ray with worsening pulm edema  Diurese with Lasix 40 IV every 8h  Continue to wean HFNC as tolerated    CARDIOVASCULAR:   Echo on  showed normal LVEF and no significant valvular abnormalities  BP stable at this time  Hold oral antihypertensives    GASTROINTESTINAL:   Conservative management of partial SBO per general surgery  Replaced NGT on   Follow gen surg 
CRITICAL CARE PROGRESS NOTE    Name: Nena Mcnulty   : 1940   MRN: [id number]   Date: 2025      Diagnoses/problem list:   Acute hypoxemic respiratory failure  Acute respiratory distress syndrome  Pulmonary edema  Right-sided pneumonia  Partial SBO due to adhesions  Nausea/vomiting  History of dementia, GERD, esophagitis, PUD, HTN, T2DM, TIA x 5, colorectal cancer s/p hemicolectomy in     24-hour events:   Remains on HFNC 60L 70% FiO2. Had BM this morning. She pulled out her NGT last night. Discussed with daughter at bedside, plan is to discharge home with hospice tomorrow.    Assessment and plan:   This is an 84-year-old female with history of dementia, GERD, esophagitis, PUD, HTN, T2DM, TIA x 5, colorectal cancer s/p hemicolectomy in  who was initially admitted on  with nausea, vomiting and generalized weakness.  She was also recently diagnosed with UTI prior to admission.  CT during this admission showed concern for partial small bowel obstruction.  NG tube was placed and is being managed conservatively by general surgery.  While on the floor, she was noted to become more hypoxic over the last few days and chest x-ray showed increasing edema.  She was started on Lasix today.  Rapid response was called due to severe hypoxia requiring heated high flow.  Patient upgraded to ICU for further management.    NEUROLOGICAL:    Delirium precautions  Avoid sedating medications  Will start Trazodone nightly     PULMONOLOGY:   Chest X-ray with persistent infiltrates   Severe ARDS secondary to pneumonia, likely from aspiration   Continue diuresis with Lasix 40 IV every 8h  Continue to wean HFNC as tolerated  Discussed with both daughters - plan for home hospice tomorrow     CARDIOVASCULAR:   Echo on  showed normal LVEF and no significant valvular abnormalities  BP stable at this time  Hold oral antihypertensives    GASTROINTESTINAL:   SBO improved, had BM on   NGT pulled out on  AM   Follow 
CRITICAL CARE PROGRESS NOTE    Name: Nena Mcnulty   : 1940   MRN: [id number]   Date: 6/3/2025      Diagnoses/problem list:   Acute hypoxemic respiratory failure  Acute respiratory distress syndrome  Pulmonary edema  Right-sided pneumonia  Partial SBO due to adhesions  Nausea/vomiting  History of dementia, GERD, esophagitis, PUD, HTN, T2DM, TIA x 5, colorectal cancer s/p hemicolectomy in     24-hour events:   Discussed with family at bedside this morning. Plan for comfort measures today.    Assessment and plan:   This is an 84-year-old female with history of dementia, GERD, esophagitis, PUD, HTN, T2DM, TIA x 5, colorectal cancer s/p hemicolectomy in  who was initially admitted on  with nausea, vomiting and generalized weakness.  She was also recently diagnosed with UTI prior to admission.  CT during this admission showed concern for partial small bowel obstruction.  NG tube was placed and is being managed conservatively by general surgery.  While on the floor, she was noted to become more hypoxic over the last few days and chest x-ray showed increasing edema.  She was started on Lasix today.  Rapid response was called due to severe hypoxia requiring heated high flow.  Patient upgraded to ICU for further management.    NEUROLOGICAL:    Delirium precautions  Avoid sedating medications  Continue Trazodone nightly     PULMONOLOGY:   Chest X-ray with persistent infiltrates   Severe ARDS secondary to pneumonia, likely from aspiration   Continue diuresis with Lasix 40 IV every 8h  Continue to wean HFNC as tolerated  Plan for comfort measures today    CARDIOVASCULAR:   Echo on  showed normal LVEF and no significant valvular abnormalities  BP stable at this time  Hold oral antihypertensives    GASTROINTESTINAL:   SBO improved, had BM on   NGT pulled out on  AM   Follow gen surg recs    RENAL/ELECTROLYTE:   Diurese with Lasix   Monitor UOP  Correct electrolytes     ENDOCRINE:   Keep BG 
CRITICAL CARE PROGRESS NOTE    Name: Nena Mcunlty   : 1940   MRN: [id number]   Date: 2025      Diagnoses/problem list:   Acute hypoxemic respiratory failure  Acute respiratory distress syndrome  Pulmonary edema  Right-sided pneumonia  Partial SBO due to adhesions  Nausea/vomiting  History of dementia, GERD, esophagitis, PUD, HTN, T2DM, TIA x 5, colorectal cancer s/p hemicolectomy in     24-hour events:   Increasing O2 requirements noted, on 60 L 90% HFNC this morning.  Repeat chest x-ray increasing infiltrates especially on the right side despite net -3.5 L in the last 24 hours.  This seems to be predominantly an infectious process and likely progressed to ARDS. Discussed with both daughters - plan for comfort measures tomorrow if no significant improvement.    Assessment and plan:   This is an 84-year-old female with history of dementia, GERD, esophagitis, PUD, HTN, T2DM, TIA x 5, colorectal cancer s/p hemicolectomy in  who was initially admitted on  with nausea, vomiting and generalized weakness.  She was also recently diagnosed with UTI prior to admission.  CT during this admission showed concern for partial small bowel obstruction.  NG tube was placed and is being managed conservatively by general surgery.  While on the floor, she was noted to become more hypoxic over the last few days and chest x-ray showed increasing edema.  She was started on Lasix today.  Rapid response was called due to severe hypoxia requiring heated high flow.  Patient upgraded to ICU for further management.    NEUROLOGICAL:    Delirium precautions  Avoid sedating medications    PULMONOLOGY:   Monitor closely for airway protection  Chest X-ray with worsening infiltrates   Severe ARDS secondary to pneumonia, likely aspiration   Continue diuresis with Lasix 40 IV every 8h  Continue to wean HFNC as tolerated  Discussed with both daughters - plan for comfort measures tomorrow if no improvement     CARDIOVASCULAR: 
Comprehensive Nutrition Assessment    Type and Reason for Visit:  Initial, LOS    Nutrition Recommendations/Plan:   As diet advances, recommend PO diet per SLP recs  Following for diet advancement and PO tolerance      Malnutrition Assessment:  Malnutrition Status:  At risk for malnutrition (05/30/25 1104)    Context:  Acute Illness     Findings of the 6 clinical characteristics of malnutrition:  Energy Intake:  Mild decrease in energy intake  Weight Loss:  No weight loss (Comp weight hx endorses ongoing weight gain within ~8 months)     Body Fat Loss:  Unable to assess     Muscle Mass Loss:  Unable to assess    Fluid Accumulation:  Unable to assess     Strength:  Not Performed    Nutrition Assessment:    Pt admitted for generalized weakness; RD screened for LOS. Currently NPO since MN and NGT placed to suction with concerns for SBO. As diet advances, recommend PO diet per SLP recs.    Nutrition Related Findings:    AAOx4; GCS 14; impaired vision, missing teeth, distended abdomen with active bowel sounds; LBM 5/28; + bloating and constipation; contender abdomen Wound Type: None       Current Nutrition Intake & Therapies:    Average Meal Intake: NPO  Average Supplements Intake: NPO  Diet NPO    Anthropometric Measures:  Height: 152.4 cm (5')  Ideal Body Weight (IBW): 100 lbs (45 kg)    Admission Body Weight: 82.4 kg (181 lb 10.5 oz)  Current Body Weight: 82.4 kg (181 lb 10.5 oz), 181.7 % IBW. Weight Source: Other  Current BMI (kg/m2): 35.5  Weight Adjustment For: No Adjustment  BMI Categories: Obese Class 1 (BMI 30.0-34.9)    Estimated Daily Nutrient Needs:  Energy Requirements Based On: Formula  Weight Used for Energy Requirements: Current  Energy (kcal/day): MSJ x 1.2 x 1.3 = 6198-3293  Weight Used for Protein Requirements: Current  Protein (g/day): 0.8-1.2 g pro/kg =   Method Used for Fluid Requirements: 1 ml/kcal  Fluid (ml/day): or per MD recs    Nutrition Diagnosis:   Inadequate oral intake related to 
Follow up call made to phlebotomist, will come up as soon as possible.   
MD notified of patient not responsive for speech therapy nor primary nurse to take medication at this time pt has fever prn tylenol supp given, held lasix at this time due to bp MD aware  
New consult received due to transfer to ICU, chart reviewed and spoke with Dr. Laird.  Pt's plan is for home with hospice 6-3-2025. SLP assessment is not indicated at this time per Dr. Laird. Will d/c SLP consult as per discussion with Dr. Laird.   
Noticed Med rec not completed. Attempted to complete the med rec with the daughter present with the patient tonight, but she said her other daughter knows her meds and that daughter will be back in the morning. Med rec still to be done.   
Nutrition Note     Type and Reason for Visit: Interim    Patient noted to be pursuing hospice care. RD service will sign off from regular follow up, but remains available by consult for any nutrition-related needs.    Lilian Medel RD  Contact: [phone]      
Occupational Therapy Note:    Orders acknowledged and chart reviewed. Spoke to pt's nurse who reports pt lethargic and not following commands. Note pt also unable to participate in swallow evaluation this AM. Will defer OT evaluation and continue to follow. Thank you.    Gayla Cornelius OTR/L  
Patient currently being followed by ST. Per chart review, patient w/ vomiting overnight NGT was placed to suction and imaging w/ concerns for SBO. Concerns for aspiration pna, however oropharyngeal swallow largely wfl during bedside but patient w/ frequent episodes of vomiting during admission. Will place patient on hold until clear for PO intake.   
Patient currently on PT/OT caseload, however patient transferred to ICU from  due to medical decline. Pt currently placed on hold and current PT/OT orders will be discontinued at this time. Will need new PT/OT evaluation order once pt medically stable for (re)assessment. Thank you.      
Patient had a bath again, she was dirty with vomit. Vomited several times for the night after eating pudding, minimal brown pudding color coming up with mucus when coughing.   Nil stool specimen order seen in notes. Patient had a small stool very dark green to black color. Nil blood noted. IV acces was changed. Blood sample collected was minimal and was only able to partly filled the purple tube, same sent to the lab and the  was informed that the patient still needs blood collected as only purple tube sent.    
Patient oxygen decreased to 84% on 4 liters, oxygen increased to 6 liters o2 maintained high 80's primary nurse called Dr. Nunez while respiratory was in room. Family had concerns and questions, discussed with family and patient. Family questioning hospice with Dr. Nunez. Patient 02 is now 93% on 6 liters patient repositioned in bed, prn tylenol given for fever.     New ABX orders placed and also prn morphine if needed for pt.       1600-All of patients children in room, primary nurse called Dr. Nunez so family could all speak regarding patients condition.   
Patient's daughter requests for RT percussion therapy to release mucus. Patient has congested, non-productive cough. Notified attending MD via private msg.   
Pt family didn't want primary RN to remove or restart IV at this time   
RT called by RN to come to bedside as pt was desatting. RT advised RN to place pt on non rebreather until RT arrives. This RT entered the room and saw 83% SpO2 on vitals machine and told RN to escalate situation. Rapid was called, high flow promptly set up on pt, ABG drawn per MD order. Pt is on 60L 100%, sats 90-93%. PaO2 70 on NRB.  
Received Order for Telemetry     Nena PAUL [facility]   1940   [id number]   Generalized weakness [R53.1]   Sue Red MD     Tele Box # [id number] placed on patient at  0604 am  ER Room # 1  Admitting to Room 207  Transferring Nurse NELDA Sellers Tech  Verified with Primary Nurse Lis at  0624 am   
SURGERY PROGRESS NOTE      Admit Date: 2025    Objective:     BP (!) 155/34   Pulse 84   Temp 98.4 °F (36.9 °C) (Axillary)   Resp 17   Ht 1.524 m (5')   Wt 85.3 kg (188 lb 0.8 oz)   SpO2 98%   BMI 36.73 kg/m²      Temp (24hrs), Av.1 °F (37.3 °C), Min:98.4 °F (36.9 °C), Max:100 °F (37.8 °C)      701 - 1900  In: -   Out: 600 [Urine:600]  1901 -  07  In: 3628.1 [I.V.:1229]  Out: 6970 [Urine:6620]    Assessment and Plan   Nena Mcnulty is an 84 y.o.  with a known history of a hemicolectomy in  who reportedly never had an episode of bowel obstruction since the surgery, now presents with malaise and nausea/vomiting as noticed by her daughter and care taker.      CT scan shows     GI TRACT: Dilated partly fluid-filled small bowel loops throughout the abdomen,  with transition point to decompressed loops in the right lower quadrant.  Previous right hemicolectomy with ileocolic anastomosis. Large amount of stool  in the colon. Colonic diverticulosis.  1. Extensive bibasilar airspace disease.  2. Small bowel obstruction with transition point right lower quadrant likely due  to adhesions. Previous right hemicolectomy. No free air or free fluid.     Partial bowel obstruction due to adhesive disease  No evidence of bowel ischemia, impending perforation or pneumatosis  Keep npo  NG tube to suction  Clinical monitoring for peritonitis or deterioration  Repeat abdominal x-rays in am    25  Had significant respiratory distress and transferred to ICU  Worsening bilateral pnuemonias  Abdomen soft , mildly distended  NG in place with minimal output  Difficult to assess for flatus and bowel movements  Daughter is considering transition to comfort care  Will assess abdomen with a single view portable x-rays  Doubt significant obstruction  Dr Marks will assume surgical care in am as needed    Addendum  Abdominal x-rays show no evidence of bowel obstruction  Air and stool seen in colon 
Spiritual Health History and Assessment/Progress Note  [facility]    Follow-up,  ,  ,      Name: Nena Mcnulty MRN: [id number]    Age: 84 y.o.     Sex: female   Language: English   Mandaeism: Jew   Generalized weakness     Date: 6/3/2025            Total Time Calculated: 16 min              Spiritual Assessment continued in [facility] [place] ICU        Referral/Consult From: Nurse   Encounter Overview/Reason: Follow-up  Service Provided For: Family    Veronica, Belief, Meaning:   Patient unable to assess at this time  Family/Friends identify as spiritual, are connected with a veronica tradition or spiritual practice, and have beliefs or practices that help with coping during difficult times      Importance and Influence:  Patient unable to assess at this time  Family/Friends have spiritual/personal beliefs that influence decisions regarding the patient's health    Community:  Patient Other: unable to assess  Family/Friends are connected with a spiritual community: and feel well-supported. Support system includes: Spouse/Partner, Veronica Community, and Extended family    Assessment and Plan of Care:     Patient Interventions include: Other: unable to assess  Family/Friends Interventions include: Facilitated expression of thoughts and feelings, Explored spiritual coping/struggle/distress, Engaged in theological reflection, Affirmed coping skills/support systems, and Facilitated life review and/or legacy    Patient Plan of Care: Spiritual Care available upon further referral  Family/Friends Plan of Care: Spiritual Care available upon further referral     responded to RN referral as patient has recently been transitioned to comfort care.  met with the patient's daughter and son in law. They shared about their current coping and note they are doing well at the time. They continue to be well supported by family. They appreciate  presence and support. Assurance of prayers, spiritual dialogue, 
Spoke with patient's daughter about her request for RT percussion. Per hospitalist note patient had some coffee ground emesis this morning so percussion therapy is contraindicated. Due to patient's dementia she may not be able to use an Aerobika effectively.  
Swallow evaluation earlier today, however patient inappropriate to participate d/t lethargy, inability to maintain alertness. SLP spoke with RN this afternoon who reports that pt status remains the same. Will attempt to evaluate again tomorrow as appropriate.     Thank you,   Caitlin Jerez M.Ed., CCC-SLP  
(VISTARIL) capsule 25 mg  25 mg Oral TID PRN    meropenem (MERREM) 1,000 mg in sodium chloride 0.9 % 100 mL IVPB (addEASE)  1,000 mg IntraVENous Q8H    docusate sodium (COLACE) capsule 100 mg  100 mg Oral Daily    sodium chloride flush 0.9 % injection 5-40 mL  5-40 mL IntraVENous 2 times per day    sodium chloride flush 0.9 % injection 5-40 mL  5-40 mL IntraVENous PRN    0.9 % sodium chloride infusion   IntraVENous PRN    ondansetron (ZOFRAN-ODT) disintegrating tablet 4 mg  4 mg Oral Q8H PRN    Or    ondansetron (ZOFRAN) injection 4 mg  4 mg IntraVENous Q6H PRN    polyethylene glycol (GLYCOLAX) packet 17 g  17 g Oral Daily PRN    acetaminophen (TYLENOL) tablet 650 mg  650 mg Oral Q6H PRN    Or    acetaminophen (TYLENOL) suppository 650 mg  650 mg Rectal Q6H PRN    heparin (porcine) injection 5,000 Units  5,000 Units SubCUTAneous 3 times per day    atorvastatin (LIPITOR) tablet 40 mg  40 mg Oral Daily    escitalopram (LEXAPRO) tablet 10 mg  10 mg Oral Daily    galantamine (RAZADYNE) tablet 8 mg  8 mg Oral BID WC    melatonin tablet 10 mg  10 mg Oral Nightly    QUEtiapine (SEROQUEL) tablet 100 mg  100 mg Oral BID    sucralfate (CARAFATE) tablet 1 g  1 g Oral BID    [Held by provider] amLODIPine (NORVASC) tablet 5 mg  5 mg Oral Daily    ferrous sulfate (IRON 325) tablet 325 mg  325 mg Oral Every Other Day    [Held by provider] furosemide (LASIX) tablet 20 mg  20 mg Oral Daily    insulin glargine (LANTUS) injection vial 30 Units  30 Units SubCUTAneous BID    memantine (NAMENDA) tablet 10 mg  10 mg Oral BID    pantoprazole (PROTONIX) tablet 40 mg  40 mg Oral BID    glucose chewable tablet 16 g  4 tablet Oral PRN    dextrose bolus 10% 125 mL  125 mL IntraVENous PRN    Or    dextrose bolus 10% 250 mL  250 mL IntraVENous PRN    glucagon injection 1 mg  1 mg SubCUTAneous PRN    dextrose 10 % infusion   IntraVENous Continuous PRN    insulin lispro (HUMALOG,ADMELOG) injection vial 0-8 Units  0-8 Units SubCUTAneous 4x Daily AC 
ZENIA DENNISON, [facility] on 6/1/2025 at 4:52 PM    
with:  O2 @ 2lpm      
(addEASE)  1,000 mg IntraVENous Q8H Rich Laird MD 33.3 mL/hr at 05/31/25 0837 1,000 mg at 05/31/25 0837    sodium chloride flush 0.9 % injection 5-40 mL  5-40 mL IntraVENous 2 times per day Ashleigh Irvin PA-C   10 mL at 05/31/25 0836    sodium chloride flush 0.9 % injection 5-40 mL  5-40 mL IntraVENous PRN Ashleigh Irvin PA-C   10 mL at 05/27/25 0704    0.9 % sodium chloride infusion   IntraVENous PRN Ashleigh Irvin PA-C   Stopped at 05/31/25 0234    ondansetron (ZOFRAN-ODT) disintegrating tablet 4 mg  4 mg Oral Q8H PRN Ashleigh Irvin PA-C        Or    ondansetron (ZOFRAN) injection 4 mg  4 mg IntraVENous Q6H PRN Ashleigh Irvin PA-C   4 mg at 05/27/25 0703    polyethylene glycol (GLYCOLAX) packet 17 g  17 g Oral Daily PRN Ashleigh Irvin PA-C        acetaminophen (TYLENOL) tablet 650 mg  650 mg Oral Q6H PRN Ashleigh Ivrin PA-C   650 mg at 05/29/25 1751    Or    acetaminophen (TYLENOL) suppository 650 mg  650 mg Rectal Q6H PRN Ashleigh Irvin PA-C   650 mg at 05/29/25 0520    [Held by provider] heparin (porcine) injection 5,000 Units  5,000 Units SubCUTAneous 3 times per day Ashleigh Irvin PA-C   5,000 Units at 05/28/25 2158    [Held by provider] atorvastatin (LIPITOR) tablet 40 mg  40 mg Oral Daily Ashleigh Irvin PA-C   40 mg at 05/29/25 0856    [Held by provider] escitalopram (LEXAPRO) tablet 10 mg  10 mg Oral Daily Ashleigh Irvin PA-C   10 mg at 05/29/25 0856    [Held by provider] galantamine (RAZADYNE) tablet 8 mg  8 mg Oral BID WC Ashleigh Irvin PA-C   8 mg at 05/29/25 1748    [Held by provider] melatonin tablet 10 mg  10 mg Oral Nightly Ashleigh Irvin PA-C   10 mg at 05/29/25 2101    [Held by provider] QUEtiapine (SEROQUEL) tablet 100 mg  100 mg Oral BID Ashleigh Irvin PA-C   100 mg at 05/29/25 2103    [Held by provider] sucralfate (CARAFATE) tablet 1 g  1 g Oral BID Ashleigh Irvin PA-C   1 g at 05/29/25 2102    [Held by 
0.00 - 0.04 K/UL    Differential Type AUTOMATED      RBC Comment Anisocytosis  1+       Comprehensive Metabolic Panel    Collection Time: 05/25/25  2:56 AM   Result Value Ref Range    Sodium 146 (H) 136 - 145 mmol/L    Potassium 3.4 (L) 3.5 - 5.1 mmol/L    Chloride 115 (H) 97 - 108 mmol/L    CO2 25 21 - 32 mmol/L    Anion Gap 6 2 - 12 mmol/L    Glucose 111 (H) 65 - 100 mg/dL    BUN 35 (H) 6 - 20 mg/dL    Creatinine 0.80 0.55 - 1.02 mg/dL    BUN/Creatinine Ratio 44 (H) 12 - 20      Est, Glom Filt Rate 73 >60 ml/min/1.73m2    Calcium 8.6 8.5 - 10.1 mg/dL    Total Bilirubin 0.2 0.2 - 1.0 mg/dL    AST 15 15 - 37 U/L    ALT 16 12 - 78 U/L    Alk Phosphatase 69 45 - 117 U/L    Total Protein 5.5 (L) 6.4 - 8.2 g/dL    Albumin 2.8 (L) 3.5 - 5.0 g/dL    Globulin 2.7 2.0 - 4.0 g/dL    Albumin/Globulin Ratio 1.0 (L) 1.1 - 2.2     Procalcitonin    Collection Time: 05/25/25  2:56 AM   Result Value Ref Range    Procalcitonin 3.47 (H) 0 ng/mL   Phosphorus    Collection Time: 05/25/25  2:56 AM   Result Value Ref Range    Phosphorus 2.1 (L) 2.6 - 4.7 mg/dL   Lactic Acid    Collection Time: 05/25/25  2:57 AM   Result Value Ref Range    Lactic Acid, Plasma 1.2 0.4 - 2.0 mmol/L   Hemoglobin and Hematocrit    Collection Time: 05/25/25  4:20 AM   Result Value Ref Range    Hemoglobin 6.0 (L) 11.5 - 16.0 g/dL    Hematocrit 19.7 (L) 35.0 - 47.0 %   POCT Glucose    Collection Time: 05/25/25  6:01 AM   Result Value Ref Range    POC Glucose 108 (H) 65 - 100 mg/dL    Performed by: Blake Jolly        XR CHEST PORTABLE   Final Result   No acute process.      Electronically signed by Jose Kenny      CT HEAD WO CONTRAST   Final Result      No acute process.      Electronically signed by Jose Kenny             Discussion/MDM:     [] High (any 2)    A. Problems (any 1)  [] Acute/Chronic Illness/injury posing threat to life or bodily function:    [] Severe exacerbation of chronic illness:  
or bodily function:    [] Severe exacerbation of chronic illness:    ---------------------------------------------------------------------  B. Risk of Treatment (any 1)   [] Drugs/treatments that require intensive monitoring for toxicity include:    [] IV ABX requiring serial renal monitoring for nephrotoxicity:     [] IV Narcotic analgesia for adverse drug reaction  [] Aggressive IV diuresis requiring serial monitoring for renal impairment and electrolyte derangements  [] Critical electrolyte abnormalities requiring IV replacement and close serial monitoring  [] SQ Insulin SS- monitoring serial FSBS for Hypoglycemic adverse drug reaction  [] Other -   [] Change in code status:    [] Decision to escalate care:    [] Major surgery/procedure with associated risk factors:    ----------------------------------------------------------------------  C. Data (any 2)  [] Discussed current management and discharge planning options with Case Management.  [] Discussed management of the case with:    [] Telemetry personally reviewed and interpreted as documented above    [] Imaging personally reviewed and interpreted, includes:    [] Data Review (any 3)  [] All available Consultant notes from yesterday/today were reviewed  [] All current labs were reviewed and interpreted for clinical significance   [] Appropriate follow-up labs were ordered  [] Collateral history obtained from:               Assessment:  Severe sepsis with leukocytosis, elevated lactate and tachycardia  Procalcitonin 3.41  WBC worsening at 24,000    Acute cystitis  Culture showing ESBL Klebsiella    Metabolic encephalopathy, improved    Acute hypoxic respiratory failure   Acute diastolic heart failure, probably largely due to IV fluids  Probable aspiration pneumonia right lower lobe  initial chest x-ray was negative  Chest x-ray 5/26 showed interstitial edema and right lower lobe infiltrate  Currently on 6L nasal cannula    Acute kidney injury, likely prerenal. 
ciprofloxacin 1 ug/mL Sensitive  [1]       gentamicin >=16 ug/mL Resistant      levofloxacin 1 ug/mL Sensitive  [2]       meropenem <=0.25 ug/mL Sensitive      nitrofurantoin 64 ug/mL Intermediate      piperacillin-tazobactam 8 ug/mL Sensitive      tobramycin >=16 ug/mL Resistant      trimethoprim-sulfamethoxazole >=320 ug/mL Resistant                   [1]  FDA**FDA INTERPRETATION REFLECTED, REFER TO CLSI FOR ALTERNATE  INTERPRETATIONS.**L       [2]  **FDA INTERPRETATION REFLECTED, REFER TO [employer]SI FOR ALTERNATE  INTERPRETATIONS.**                             Imaging:  XR CHEST PORTABLE   Final Result      The enteric tube terminates in the stomach. Stable bilateral lung opacities.         Electronically signed by Angel DEGROOT [employer]      XR ABDOMEN (KUB) (SINGLE AP VIEW)   Final Result   Decreased dilatation of small bowel loops.      Electronically signed by [employer]      XR CHEST PORTABLE   Final Result   Extensive bilateral airspace disease again visualized. This may be increased in   the lower right lung.         Electronically signed by SIN Jeanna      XR CHEST PORTABLE   Final Result      The enteric tube terminates in the proximal duodenum. Stable bilateral lung   opacities.         Electronically signed by [employer]      XR CHEST PORTABLE   Final Result   Enteric tube tip overlies the stomach.   Persistent severe pulmonary edema.         Electronically signed by SIN Jeanna      XR CHEST PORTABLE   Final Result   Increased severe pulmonary edema pattern.         Electronically signed by IFRAH PACREINALDO      XR ABDOMEN (KUB) (SINGLE AP VIEW)   Final Result   Interval satisfactory NG tube placement. Persistent at least partial   SBO.         Electronically signed by IFRAH [employer]Whistle[employer]      XR CHEST PORTABLE   Final Result   Nasogastric tube tip extends to the stomach.         Electronically signed by Jose Kenny      CT ABDOMEN PELVIS WO CONTRAST Additional Contrast? None   Final Result      1. Extensive bibasilar

## 2025-06-05 NOTE — CARE COORDINATION
DCP: home with daughter and [facility]  ALEXYS: today    DC order to home with hospice observed.     No further needs from CM at this time.       Transition of Care Plan:    RUR: 20%  Prior Level of Functioning: needs assist  Disposition: home with dtr and hospice  ALEXYS: 6/5/2025  Follow up appointments: n/a  DME needed: provided by hospice  Transportation at discharge: [facility] 1115 pickup  IM/[facility] Medicare/ letter given: n/a, hospice  Is patient a [place] and connected with [facility]? no  Caregiver Contact: dtr at bedside  Discharge Caregiver contacted prior to discharge? Dtr at bedside  Care Conference needed? no  Barriers to discharge: none

## 2025-06-05 NOTE — PROGRESS NOTES
Physician Progress Note      PATIENT:               DIONNE DUPONT  CSN #:                  [id number]  :                       1940  ADMIT DATE:       2025 7:41 PM  DISCH DATE:        2025 12:40 PM  RESPONDING  PROVIDER #:        Chadwick Nunez MD          QUERY TEXT:    84 y.o F admitted from 25 - 25. Sepsis is documented in the medical   record starting in the Internal Medicine PN on 25. Please provide   additional clinical indicators supportive of your documentation. Or please   document if the diagnosis of sepsis has been ruled out after study.    The clinical indicators include:  IM PN : \"Sepsis with tachycardia and leukocytosis, present on admission\"    Labs: WBC 14.2, 11.4    Vitals: Tmax 98.2 // HR 70 - 106  Options provided:  -- Sepsis present as evidenced by, Please document evidence.  -- Sepsis was ruled out after study  -- Other - I will add my own diagnosis  -- Disagree - Not applicable / Not valid  -- Disagree - Clinically unable to determine / Unknown  -- Refer to Clinical Documentation Reviewer    PROVIDER RESPONSE TEXT:    Sepsis is present as evidenced by    Query created by: Lucía Oliveira on 2025 8:06 AM      Electronically signed by:  Chadwick Nunez MD 2025 7:59 AM

## (undated) DEVICE — FORCEPS BX 240CM 2.4MM L NDL RAD JAW 4 M00513334

## (undated) DEVICE — 6617 IOBAN II PATIENT ISOLATION DRAPE 5/BX,4BX/CS: Brand: STERI-DRAPE™ IOBAN™ 2

## (undated) DEVICE — GUIDEWIRE ORTH L400MM DIA3.2MM FOR TFN

## (undated) DEVICE — SUTURE VICRYL + SZ 2-0 L27IN ABSRB WHT SH 1/2 CIR TAPERCUT VCP417H

## (undated) DEVICE — GLOVE ORANGE PI 8   MSG9080

## (undated) DEVICE — BIT DRILL CALIBRATED 4.2 EX-LONG

## (undated) DEVICE — BANDAGE COMPR M W6INXL10YD WHT BGE VELC E MTRX HK AND LOOP

## (undated) DEVICE — LAMINECTOMY ARM CRADLE FOAM POSITIONER: Brand: CARDINAL HEALTH

## (undated) DEVICE — CANNULA NASAL ADULT 10FT ETCO2/CO2 VENTFLO

## (undated) DEVICE — SOLUTION IRRIG 500ML 0.9% SOD CHLO USP POUR PLAS BTL

## (undated) DEVICE — PAD ABSORBENT 40X28 IN POLY BACKING BLU DRI-SAFE

## (undated) DEVICE — SUTURE MONOCRYL STRATAFIX SPRL + SZ 3-0 L18IN ABSRB UD PS-2 SXMP1B107

## (undated) DEVICE — DRESSING FOAM POST OPERATIVE 4X10 IN MEPILEX BORDER AG

## (undated) DEVICE — TOWEL,OR,DSP,ST,BLUE,DLX,6/PK,12PK/CS: Brand: MEDLINE

## (undated) DEVICE — GLOVE SURG SZ 75 L12IN FNGR THK79MIL GRN LTX FREE

## (undated) DEVICE — FORCEPS BX L240CM JAW DIA2.8MM L CAP W/ NDL MIC MESH TOOTH

## (undated) DEVICE — CANNULA NSL O2 AD 7 FT END-TIDAL CARBON DIOX VENTFLO

## (undated) DEVICE — COVER,TABLE,HEAVY DUTY,79"X110",STRL: Brand: MEDLINE

## (undated) DEVICE — PADDING CAST W4INXL4YD NONSTERILE COT RAYON MICROPLEATED

## (undated) DEVICE — SYRINGE 20ML LL S/C 50

## (undated) DEVICE — DRAPE EQUIP C ARM 74X42 IN MOB XR W/ TIE RUBBER BND LF

## (undated) DEVICE — MAJOR ORTHO PROCEDURE PACK: Brand: MEDLINE INDUSTRIES, INC.

## (undated) DEVICE — SUTURE STRATAFIX SYMMETRIC PDS + SZ 0 L18IN ABSRB L36MM SXPP1A401

## (undated) DEVICE — THE ENDO CARRY-ON PROCEDURE KIT CONTAINS ALL OF THE SUPPLIES AND INFECTION PREVENTION PRODUCTS NEEDED FOR ENDOSCOPIC PROCEDURES: Brand: ENDO CARRY-ON PROCEDURE KIT

## (undated) DEVICE — GOWN SURG XL 56.5 IN AAMI LEVEL 3 ORBIS

## (undated) DEVICE — SUTURE VICRYL + SZ 0 L27IN ABSRB UD CT-1 L36MM 1/2 CIR TAPR VCP260H

## (undated) DEVICE — YANKAUER,BULB TIP,W/O VENT,RIGID,STERILE: Brand: MEDLINE

## (undated) DEVICE — ROD RMR L950MM DIA2.5MM W/ EXTN BALL TIP

## (undated) DEVICE — SUTURE VICRYL + SZ 2-0 L27IN ABSRB UD CT-2 L26MM 1/2 CIR TAPR VCP269H

## (undated) DEVICE — BLADE,CARBON-STEEL,10,STRL,DISPOSABLE,TB: Brand: MEDLINE

## (undated) DEVICE — ENDOSCOPIC KIT 1.1+ 6 FT OP3 DE

## (undated) DEVICE — BASIC SINGLE BASIN-LF: Brand: MEDLINE INDUSTRIES, INC.